# Patient Record
Sex: FEMALE | Race: WHITE | NOT HISPANIC OR LATINO | Employment: OTHER | URBAN - METROPOLITAN AREA
[De-identification: names, ages, dates, MRNs, and addresses within clinical notes are randomized per-mention and may not be internally consistent; named-entity substitution may affect disease eponyms.]

---

## 2017-02-22 DIAGNOSIS — E03.9 HYPOTHYROIDISM, UNSPECIFIED TYPE: ICD-10-CM

## 2017-02-22 RX ORDER — LEVOTHYROXINE SODIUM 88 UG/1
TABLET ORAL
Qty: 90 TABLET | Refills: 0 | Status: SHIPPED | OUTPATIENT
Start: 2017-02-22 | End: 2017-06-14 | Stop reason: SDUPTHER

## 2017-05-02 DIAGNOSIS — E03.9 HYPOTHYROIDISM, UNSPECIFIED TYPE: ICD-10-CM

## 2017-05-02 RX ORDER — LEVOTHYROXINE SODIUM 88 UG/1
TABLET ORAL
Qty: 90 TABLET | Refills: 0 | OUTPATIENT
Start: 2017-05-02

## 2017-05-09 DIAGNOSIS — K21.00 GASTROESOPHAGEAL REFLUX DISEASE WITH ESOPHAGITIS: ICD-10-CM

## 2017-05-09 DIAGNOSIS — E78.2 MIXED HYPERLIPIDEMIA: ICD-10-CM

## 2017-05-09 RX ORDER — ATORVASTATIN CALCIUM 10 MG/1
TABLET, FILM COATED ORAL
Qty: 90 TABLET | Refills: 1 | OUTPATIENT
Start: 2017-05-09

## 2017-05-09 RX ORDER — OMEPRAZOLE 20 MG/1
CAPSULE, DELAYED RELEASE ORAL
Qty: 90 CAPSULE | Refills: 1 | OUTPATIENT
Start: 2017-05-09

## 2017-05-25 DIAGNOSIS — E03.9 HYPOTHYROIDISM, ACQUIRED: ICD-10-CM

## 2017-05-25 DIAGNOSIS — E78.5 HYPERLIPIDEMIA, UNSPECIFIED HYPERLIPIDEMIA TYPE: Primary | ICD-10-CM

## 2017-05-25 DIAGNOSIS — R73.01 IMPAIRED FASTING GLUCOSE: ICD-10-CM

## 2017-06-10 LAB
ALBUMIN SERPL-MCNC: 4.2 G/DL (ref 3.5–4.8)
ALBUMIN/GLOB SERPL: 1.8 {RATIO} (ref 1.2–2.2)
ALP SERPL-CCNC: 77 IU/L (ref 39–117)
ALT SERPL-CCNC: 11 IU/L (ref 0–32)
AST SERPL-CCNC: 15 IU/L (ref 0–40)
BASOPHILS # BLD AUTO: 0 X10E3/UL (ref 0–0.2)
BASOPHILS NFR BLD AUTO: 0 %
BILIRUB SERPL-MCNC: 0.3 MG/DL (ref 0–1.2)
BUN SERPL-MCNC: 14 MG/DL (ref 8–27)
BUN/CREAT SERPL: 20 (ref 12–28)
CALCIUM SERPL-MCNC: 9.5 MG/DL (ref 8.7–10.3)
CHLORIDE SERPL-SCNC: 101 MMOL/L (ref 96–106)
CHOLEST SERPL-MCNC: 178 MG/DL (ref 100–199)
CO2 SERPL-SCNC: 27 MMOL/L (ref 18–29)
CREAT SERPL-MCNC: 0.7 MG/DL (ref 0.57–1)
EOSINOPHIL # BLD AUTO: 0.2 X10E3/UL (ref 0–0.4)
EOSINOPHIL NFR BLD AUTO: 3 %
ERYTHROCYTE [DISTWIDTH] IN BLOOD BY AUTOMATED COUNT: 14.1 % (ref 12.3–15.4)
GLOBULIN SER CALC-MCNC: 2.4 G/DL (ref 1.5–4.5)
GLUCOSE SERPL-MCNC: 101 MG/DL (ref 65–99)
HBA1C MFR BLD: 5.6 % (ref 4.8–5.6)
HCT VFR BLD AUTO: 38.8 % (ref 34–46.6)
HDLC SERPL-MCNC: 45 MG/DL
HGB BLD-MCNC: 12.3 G/DL (ref 11.1–15.9)
IMM GRANULOCYTES # BLD: 0 X10E3/UL (ref 0–0.1)
IMM GRANULOCYTES NFR BLD: 0 %
LDLC SERPL CALC-MCNC: 97 MG/DL (ref 0–99)
LDLC/HDLC SERPL: 2.2 RATIO UNITS (ref 0–3.2)
LYMPHOCYTES # BLD AUTO: 1.3 X10E3/UL (ref 0.7–3.1)
LYMPHOCYTES NFR BLD AUTO: 18 %
MCH RBC QN AUTO: 27 PG (ref 26.6–33)
MCHC RBC AUTO-ENTMCNC: 31.7 G/DL (ref 31.5–35.7)
MCV RBC AUTO: 85 FL (ref 79–97)
MONOCYTES # BLD AUTO: 0.4 X10E3/UL (ref 0.1–0.9)
MONOCYTES NFR BLD AUTO: 6 %
NEUTROPHILS # BLD AUTO: 5.2 X10E3/UL (ref 1.4–7)
NEUTROPHILS NFR BLD AUTO: 73 %
PLATELET # BLD AUTO: 195 X10E3/UL (ref 150–379)
POTASSIUM SERPL-SCNC: 4.3 MMOL/L (ref 3.5–5.2)
PROT SERPL-MCNC: 6.6 G/DL (ref 6–8.5)
RBC # BLD AUTO: 4.56 X10E6/UL (ref 3.77–5.28)
SODIUM SERPL-SCNC: 145 MMOL/L (ref 134–144)
T4 FREE SERPL-MCNC: 0.81 NG/DL (ref 0.82–1.77)
TRIGL SERPL-MCNC: 182 MG/DL (ref 0–149)
TSH SERPL DL<=0.005 MIU/L-ACNC: 1.13 UIU/ML (ref 0.45–4.5)
VLDLC SERPL CALC-MCNC: 36 MG/DL (ref 5–40)
WBC # BLD AUTO: 7.1 X10E3/UL (ref 3.4–10.8)

## 2017-06-14 ENCOUNTER — OFFICE VISIT (OUTPATIENT)
Dept: FAMILY MEDICINE CLINIC | Facility: CLINIC | Age: 77
End: 2017-06-14

## 2017-06-14 VITALS
RESPIRATION RATE: 16 BRPM | TEMPERATURE: 97.7 F | HEART RATE: 88 BPM | DIASTOLIC BLOOD PRESSURE: 69 MMHG | WEIGHT: 181 LBS | BODY MASS INDEX: 32.07 KG/M2 | SYSTOLIC BLOOD PRESSURE: 124 MMHG | HEIGHT: 63 IN

## 2017-06-14 DIAGNOSIS — E78.2 MIXED HYPERLIPIDEMIA: ICD-10-CM

## 2017-06-14 DIAGNOSIS — K21.00 GASTROESOPHAGEAL REFLUX DISEASE WITH ESOPHAGITIS: ICD-10-CM

## 2017-06-14 DIAGNOSIS — F33.42 MAJOR DEPRESSIVE DISORDER, RECURRENT EPISODE, IN FULL REMISSION (HCC): ICD-10-CM

## 2017-06-14 DIAGNOSIS — Z00.00 ANNUAL PHYSICAL EXAM: Primary | ICD-10-CM

## 2017-06-14 DIAGNOSIS — E03.9 HYPOTHYROIDISM, UNSPECIFIED TYPE: ICD-10-CM

## 2017-06-14 PROCEDURE — G0438 PPPS, INITIAL VISIT: HCPCS | Performed by: FAMILY MEDICINE

## 2017-06-14 PROCEDURE — 99214 OFFICE O/P EST MOD 30 MIN: CPT | Performed by: FAMILY MEDICINE

## 2017-06-14 PROCEDURE — 96160 PT-FOCUSED HLTH RISK ASSMT: CPT | Performed by: FAMILY MEDICINE

## 2017-06-14 RX ORDER — ATORVASTATIN CALCIUM 10 MG/1
10 TABLET, FILM COATED ORAL DAILY
Qty: 90 TABLET | Refills: 1 | Status: SHIPPED | OUTPATIENT
Start: 2017-06-14 | End: 2017-12-04 | Stop reason: SDUPTHER

## 2017-06-14 RX ORDER — OMEPRAZOLE 20 MG/1
20 CAPSULE, DELAYED RELEASE ORAL DAILY
Qty: 90 CAPSULE | Refills: 1 | Status: SHIPPED | OUTPATIENT
Start: 2017-06-14 | End: 2017-12-04 | Stop reason: SDUPTHER

## 2017-06-14 RX ORDER — LEVOTHYROXINE SODIUM 0.1 MG/1
100 TABLET ORAL DAILY
Qty: 90 TABLET | Refills: 1 | Status: SHIPPED | OUTPATIENT
Start: 2017-06-14 | End: 2017-12-04 | Stop reason: SDUPTHER

## 2017-06-14 NOTE — PROGRESS NOTES
QUICK REFERENCE INFORMATION:  The ABCs of the Annual Wellness Visit    Initial Medicare Wellness Visit    HEALTH RISK ASSESSMENT    1940    Recent Hospitalizations:  No hospitalization(s) within the last year..        Current Medical Providers:  Patient Care Team:  Enmanuel Berger MD as PCP - General (Family Medicine)        Smoking Status:  History   Smoking Status   • Former Smoker   Smokeless Tobacco   • Never Used       Alcohol Consumption:  History   Alcohol Use   • Yes     Comment: rare       Depression Screen:   PHQ-9 Depression Screening 6/14/2017   Little interest or pleasure in doing things 0   Feeling down, depressed, or hopeless 0   Trouble falling or staying asleep, or sleeping too much 1   Feeling tired or having little energy 1   Poor appetite or overeating 0   Feeling bad about yourself - or that you are a failure or have let yourself or your family down 0   Trouble concentrating on things, such as reading the newspaper or watching television 0   Moving or speaking so slowly that other people could have noticed. Or the opposite - being so fidgety or restless that you have been moving around a lot more than usual 0   Thoughts that you would be better off dead, or of hurting yourself in some way 0   PHQ-9 Total Score 2       Health Habits and Functional and Cognitive Screening:  Functional & Cognitive Status 6/14/2017   Do you have difficulty preparing food and eating? No   Do you have difficulty bathing yourself? No   Do you have difficulty getting dressed? No   Do you have difficulty using the toilet? No   Do you have difficulty moving around from place to place? No   In the past year have you fallen or experienced a near fall? No   Do you need help using the phone?  No   Are you deaf or do you have serious difficulty hearing?  No   Do you need help with transportation? No   Do you need help shopping? No   Do you need help preparing meals?  No   Do you need help with housework?  No   Do you need  help with laundry? No   Do you need help taking your medications? No   Do you need help managing money? No   Do you have difficulty concentrating, remembering or making decisions? No       Health Habits  Current Diet: Limited Junk Food  Dental Exam: Up to date  Eye Exam: Not up to date  Exercise (times per week): 0 times per week  Current Exercise Activities Include: None          Does the patient have evidence of cognitive impairment? No    Asiprin use counseling: Taking ASA appropriately as indicated      Recent Lab Results:    Visual Acuity:  No exam data present    Age-appropriate Screening Schedule:  Refer to the list below for future screening recommendations based on patient's age, sex and/or medical conditions. Orders for these recommended tests are listed in the plan section. The patient has been provided with a written plan.    Health Maintenance   Topic Date Due   • INFLUENZA VACCINE  08/01/2017   • LIPID PANEL  06/09/2018   • MAMMOGRAM  07/25/2018   • COLONOSCOPY  02/20/2022   • TDAP/TD VACCINES (2 - Td) 09/26/2022   • PNEUMOCOCCAL VACCINES (65+ LOW/MEDIUM RISK)  Completed   • ZOSTER VACCINE  Completed        Subjective   History of Present Illness    Madalyn Galeas is a 76 y.o. female who presents for an Annual Wellness Visit.    The following portions of the patient's history were reviewed and updated as appropriate: allergies, current medications, past family history, past medical history, past social history, past surgical history and problem list.    Outpatient Medications Prior to Visit   Medication Sig Dispense Refill   • atorvastatin (LIPITOR) 10 MG tablet Take 1 tablet by mouth Daily. 90 tablet 1   • levothyroxine (SYNTHROID, LEVOTHROID) 88 MCG tablet TAKE 1 TABLET EVERY DAY 90 tablet 0   • omeprazole (priLOSEC) 20 MG capsule Take 1 capsule by mouth Daily. 90 capsule 1   • sertraline (ZOLOFT) 50 MG tablet Take 1 tablet by mouth Daily. 90 tablet 1   • ADCIRCA 20 MG tablet      • aspirin 81 MG  "EC tablet      • desonide (DESOWEN) 0.05 % cream Apply topically 2 (two) times a day. Use BID prn up to 10 days at a time     • ESBRIET 267 MG capsule      • meclizine (ANTIVERT) 25 MG tablet Take 25 mg by mouth 3 (three) times a day as needed for dizziness.     • verapamil SR (CALAN-SR) 120 MG CR tablet   3     No facility-administered medications prior to visit.        Patient Active Problem List   Diagnosis   • CHF (congestive heart failure)   • Esophageal reflux   • Hyperlipidemia   • Hypothyroidism, acquired   • Major depressive disorder, recurrent episode, in full remission   • Pulmonary fibrosis   • Pulmonary hypertension   • Sleep apnea, obstructive   • Vertigo, peripheral   • Impaired fasting glucose   • Murmur, heart       Advance Care Planning:  has an advance directive - a copy HAS NOT been provided    Identification of Risk Factors:  Risk factors include: weight , cardiovascular risk and inactivity.    Review of Systems    Compared to one year ago, the patient feels her physical health is the same.  Compared to one year ago, the patient feels her mental health is the same.    Objective     Physical Exam    Vitals:    06/14/17 1102   BP: 124/69   Pulse: 88   Resp: 16   Temp: 97.7 °F (36.5 °C)   TempSrc: Oral   Weight: 181 lb (82.1 kg)   Height: 62.5\" (158.8 cm)   PainSc: 0-No pain       Body mass index is 32.58 kg/(m^2).  Discussed the patient's BMI with her. The BMI is above average; BMI management plan is completed.    Assessment/Plan   Patient Self-Management and Personalized Health Advice  The patient has been provided with information about: diet, exercise and weight management and preventive services including:   · Exercise counseling provided, Fall Risk assessment done, Nutrition counseling provided.    Visit Diagnoses:    ICD-10-CM ICD-9-CM   1. Annual physical exam Z00.00 V70.0   2. Major depressive disorder, recurrent episode, in full remission F33.42 296.36   3. Gastroesophageal reflux disease " with esophagitis K21.0 530.11   4. Mixed hyperlipidemia E78.2 272.2   5. Hypothyroidism, unspecified type E03.9 244.9       No orders of the defined types were placed in this encounter.      Outpatient Encounter Prescriptions as of 6/14/2017   Medication Sig Dispense Refill   • atorvastatin (LIPITOR) 10 MG tablet Take 1 tablet by mouth Daily. 90 tablet 1   • levothyroxine (SYNTHROID, LEVOTHROID) 100 MCG tablet Take 1 tablet by mouth Daily. 90 tablet 1   • omeprazole (priLOSEC) 20 MG capsule Take 1 capsule by mouth Daily. 90 capsule 1   • sertraline (ZOLOFT) 50 MG tablet Take 1 tablet by mouth Daily. 90 tablet 1   • [DISCONTINUED] atorvastatin (LIPITOR) 10 MG tablet Take 1 tablet by mouth Daily. 90 tablet 1   • [DISCONTINUED] levothyroxine (SYNTHROID, LEVOTHROID) 88 MCG tablet TAKE 1 TABLET EVERY DAY 90 tablet 0   • [DISCONTINUED] omeprazole (priLOSEC) 20 MG capsule Take 1 capsule by mouth Daily. 90 capsule 1   • [DISCONTINUED] sertraline (ZOLOFT) 50 MG tablet Take 1 tablet by mouth Daily. 90 tablet 1   • ADCIRCA 20 MG tablet      • aspirin 81 MG EC tablet      • desonide (DESOWEN) 0.05 % cream Apply topically 2 (two) times a day. Use BID prn up to 10 days at a time     • ESBRIET 267 MG capsule      • meclizine (ANTIVERT) 25 MG tablet Take 25 mg by mouth 3 (three) times a day as needed for dizziness.     • verapamil SR (CALAN-SR) 120 MG CR tablet   3     No facility-administered encounter medications on file as of 6/14/2017.        Reviewed use of high risk medication in the elderly: not applicable  Reviewed for potential of harmful drug interactions in the elderly: not applicable    Follow Up:  No Follow-up on file.     An After Visit Summary and PPPS with all of these plans were given to the patient.

## 2017-06-14 NOTE — PATIENT INSTRUCTIONS
Medicare Wellness  Personal Prevention Plan of Service     Date of Office Visit:  2017  Encounter Provider:  Enmanuel Berger MD  Place of Service:  [facility]  Patient Name: Madalyn Galeas  :  1940    As part of the Medicare Wellness portion of your visit today, we are providing you with this personalized preventive plan of services (PPPS). This plan is based upon recommendations of the [place] Preventive Services Task Force (USPSTF) and the Advisory Committee on Immunization Practices (ACIP).    This lists the preventive care services that should be considered, and provides dates of when you are due. Items listed as completed are up-to-date and do not require any further intervention.    Health Maintenance   Topic Date Due   • INFLUENZA VACCINE  2017   • LIPID PANEL  2018   • MEDICARE ANNUAL WELLNESS  2018   • MAMMOGRAM  2018   • COLONOSCOPY  2022   • TDAP/TD VACCINES (2 - Td) 2022   • PNEUMOCOCCAL VACCINES (65+ LOW/MEDIUM RISK)  Completed   • ZOSTER VACCINE  Completed       No orders of the defined types were placed in this encounter.      Return in about 6 months (around 2017) for Recheck.

## 2017-06-14 NOTE — PROGRESS NOTES
"Subjective   Madalyn Galeas is a 76 y.o. female.     History of Present Illness     Chief Complaint:   Chief Complaint   Patient presents with   • MEDICARE WELLNESS EXAM   • Hypothyroidism   • Anxiety   • Heartburn   • LAB RESULTS   • Hyperlipidemia       Madalyn Galeas 76 y.o. female who presents today for Medical Management of the below listed issues and medication refills.  she has a history of   Patient Active Problem List   Diagnosis   • CHF (congestive heart failure)   • Esophageal reflux   • Hyperlipidemia   • Hypothyroidism, acquired   • Major depressive disorder, recurrent episode, in full remission   • Pulmonary fibrosis   • Pulmonary hypertension   • Sleep apnea, obstructive   • Vertigo, peripheral   • Impaired fasting glucose   • Murmur, heart   .  Since the last visit, she has overall felt well.  she has been compliant with   Current Outpatient Prescriptions:   •  atorvastatin (LIPITOR) 10 MG tablet, Take 1 tablet by mouth Daily., Disp: 90 tablet, Rfl: 1  •  levothyroxine (SYNTHROID, LEVOTHROID) 100 MCG tablet, Take 1 tablet by mouth Daily., Disp: 90 tablet, Rfl: 1  •  omeprazole (priLOSEC) 20 MG capsule, Take 1 capsule by mouth Daily., Disp: 90 capsule, Rfl: 1  •  sertraline (ZOLOFT) 50 MG tablet, Take 1 tablet by mouth Daily., Disp: 90 tablet, Rfl: 1  •  ADCIRCA 20 MG tablet, , Disp: , Rfl:   •  aspirin 81 MG EC tablet, , Disp: , Rfl:   •  desonide (DESOWEN) 0.05 % cream, Apply topically 2 (two) times a day. Use BID prn up to 10 days at a time, Disp: , Rfl:   •  ESBRIET 267 MG capsule, , Disp: , Rfl:   •  meclizine (ANTIVERT) 25 MG tablet, Take 25 mg by mouth 3 (three) times a day as needed for dizziness., Disp: , Rfl:   •  verapamil SR (CALAN-SR) 120 MG CR tablet, , Disp: , Rfl: 3.  she denies medication side effects.    All of the chronic condition(s) listed above are stable w/o issues.    /69  Pulse 88  Temp 97.7 °F (36.5 °C) (Oral)   Resp 16  Ht 62.5\" (158.8 cm)  Wt 181 lb (82.1 " kg)  LMP  (LMP Unknown)  BMI 32.58 kg/m2    Results for orders placed or performed in visit on 05/25/17   Comprehensive metabolic panel   Result Value Ref Range    Glucose 101 (H) 65 - 99 mg/dL    BUN 14 8 - 27 mg/dL    Creatinine 0.70 0.57 - 1.00 mg/dL    eGFR Non African Am 85 >59 mL/min/1.73    eGFR African Am 98 >59 mL/min/1.73    BUN/Creatinine Ratio 20 12 - 28    Sodium 145 (H) 134 - 144 mmol/L    Potassium 4.3 3.5 - 5.2 mmol/L    Chloride 101 96 - 106 mmol/L    Total CO2 27 18 - 29 mmol/L    Calcium 9.5 8.7 - 10.3 mg/dL    Total Protein 6.6 6.0 - 8.5 g/dL    Albumin 4.2 3.5 - 4.8 g/dL    Globulin 2.4 1.5 - 4.5 g/dL    A/G Ratio 1.8 1.2 - 2.2    Total Bilirubin 0.3 0.0 - 1.2 mg/dL    Alkaline Phosphatase 77 39 - 117 IU/L    AST (SGOT) 15 0 - 40 IU/L    ALT (SGPT) 11 0 - 32 IU/L   Lipid Panel With LDL/HDL Ratio   Result Value Ref Range    Total Cholesterol 178 100 - 199 mg/dL    Triglycerides 182 (H) 0 - 149 mg/dL    HDL Cholesterol 45 >39 mg/dL    VLDL Cholesterol 36 5 - 40 mg/dL    LDL Cholesterol  97 0 - 99 mg/dL    LDL/HDL Ratio 2.2 0.0 - 3.2 ratio units   TSH   Result Value Ref Range    TSH 1.130 0.450 - 4.500 uIU/mL   T4, Free   Result Value Ref Range    Free T4 0.81 (L) 0.82 - 1.77 ng/dL   Hemoglobin A1c   Result Value Ref Range    Hemoglobin A1C 5.6 4.8 - 5.6 %   CBC and Differential   Result Value Ref Range    WBC 7.1 3.4 - 10.8 x10E3/uL    RBC 4.56 3.77 - 5.28 x10E6/uL    Hemoglobin 12.3 11.1 - 15.9 g/dL    Hematocrit 38.8 34.0 - 46.6 %    MCV 85 79 - 97 fL    MCH 27.0 26.6 - 33.0 pg    MCHC 31.7 31.5 - 35.7 g/dL    RDW 14.1 12.3 - 15.4 %    Platelets 195 150 - 379 x10E3/uL    Neutrophil Rel % 73 %    Lymphocyte Rel % 18 %    Monocyte Rel % 6 %    Eosinophil Rel % 3 %    Basophil Rel % 0 %    Neutrophils Absolute 5.2 1.4 - 7.0 x10E3/uL    Lymphocytes Absolute 1.3 0.7 - 3.1 x10E3/uL    Monocytes Absolute 0.4 0.1 - 0.9 x10E3/uL    Eosinophils Absolute 0.2 0.0 - 0.4 x10E3/uL    Basophils Absolute 0.0  0.0 - 0.2 x10E3/uL    Immature Granulocyte Rel % 0 %    Immature Grans Absolute 0.0 0.0 - 0.1 x10E3/uL         The following portions of the patient's history were reviewed and updated as appropriate: allergies, current medications, past family history, past medical history, past social history, past surgical history and problem list.    Review of Systems   Constitutional: Negative for activity change, chills, fatigue and fever.   Respiratory: Negative for cough and chest tightness.    Cardiovascular: Negative for chest pain and palpitations.   Gastrointestinal: Negative for abdominal pain and nausea.   Endocrine: Negative for cold intolerance.   Psychiatric/Behavioral: Negative for behavioral problems and dysphoric mood.       Objective   Physical Exam   Constitutional: She appears well-developed and well-nourished.   Neck: Neck supple. No thyromegaly present.   Cardiovascular: Normal rate and regular rhythm.    No murmur heard.  Pulmonary/Chest: Effort normal and breath sounds normal.   Abdominal: Bowel sounds are normal.   Psychiatric: She has a normal mood and affect. Her behavior is normal.   Nursing note and vitals reviewed.  Labs reviewed with pt today during visit. All questions answered.      Assessment/Plan   Madalyn was seen today for medicare wellness exam, hypothyroidism, anxiety, heartburn, lab results and hyperlipidemia.    Diagnoses and all orders for this visit:    Annual physical exam    Major depressive disorder, recurrent episode, in full remission  -     sertraline (ZOLOFT) 50 MG tablet; Take 1 tablet by mouth Daily.    Gastroesophageal reflux disease with esophagitis  -     omeprazole (priLOSEC) 20 MG capsule; Take 1 capsule by mouth Daily.    Mixed hyperlipidemia  -     atorvastatin (LIPITOR) 10 MG tablet; Take 1 tablet by mouth Daily.    Hypothyroidism, unspecified type  -     levothyroxine (SYNTHROID, LEVOTHROID) 100 MCG tablet; Take 1 tablet by mouth Daily.

## 2017-07-21 ENCOUNTER — TRANSCRIBE ORDERS (OUTPATIENT)
Dept: ADMINISTRATIVE | Facility: HOSPITAL | Age: 77
End: 2017-07-21

## 2017-07-21 DIAGNOSIS — Z12.31 VISIT FOR SCREENING MAMMOGRAM: Primary | ICD-10-CM

## 2017-08-03 ENCOUNTER — APPOINTMENT (OUTPATIENT)
Dept: MAMMOGRAPHY | Facility: HOSPITAL | Age: 77
End: 2017-08-03

## 2017-08-10 ENCOUNTER — HOSPITAL ENCOUNTER (OUTPATIENT)
Dept: MAMMOGRAPHY | Facility: HOSPITAL | Age: 77
Discharge: HOME | End: 2017-08-10
Admitting: FAMILY MEDICINE

## 2017-08-10 DIAGNOSIS — Z12.31 VISIT FOR SCREENING MAMMOGRAM: ICD-10-CM

## 2017-08-10 PROCEDURE — G0202 SCR MAMMO BI INCL CAD: HCPCS

## 2017-08-10 PROCEDURE — 77063 BREAST TOMOSYNTHESIS BI: CPT

## 2017-11-06 DIAGNOSIS — K21.00 GASTROESOPHAGEAL REFLUX DISEASE WITH ESOPHAGITIS: ICD-10-CM

## 2017-11-06 DIAGNOSIS — E78.2 MIXED HYPERLIPIDEMIA: ICD-10-CM

## 2017-11-06 DIAGNOSIS — E03.9 HYPOTHYROIDISM, UNSPECIFIED TYPE: ICD-10-CM

## 2017-11-07 RX ORDER — ATORVASTATIN CALCIUM 10 MG/1
TABLET, FILM COATED ORAL
Qty: 90 TABLET | Refills: 1 | OUTPATIENT
Start: 2017-11-07

## 2017-11-07 RX ORDER — OMEPRAZOLE 20 MG/1
CAPSULE, DELAYED RELEASE ORAL
Qty: 90 CAPSULE | Refills: 1 | OUTPATIENT
Start: 2017-11-07

## 2017-11-07 RX ORDER — LEVOTHYROXINE SODIUM 0.1 MG/1
TABLET ORAL
Qty: 90 TABLET | Refills: 1 | OUTPATIENT
Start: 2017-11-07

## 2017-11-15 ENCOUNTER — TELEPHONE (OUTPATIENT)
Dept: FAMILY MEDICINE CLINIC | Facility: CLINIC | Age: 77
End: 2017-11-15

## 2017-11-15 RX ORDER — DESONIDE 0.5 MG/G
CREAM TOPICAL 2 TIMES DAILY
Qty: 60 G | Refills: 1 | Status: SHIPPED | OUTPATIENT
Start: 2017-11-15 | End: 2019-12-11 | Stop reason: SDUPTHER

## 2017-11-27 LAB
ALBUMIN SERPL-MCNC: 4.5 G/DL (ref 3.5–5.2)
ALBUMIN/GLOB SERPL: 1.7 G/DL
ALP SERPL-CCNC: 81 U/L (ref 39–117)
ALT SERPL-CCNC: 11 U/L (ref 1–33)
AST SERPL-CCNC: 18 U/L (ref 1–32)
BILIRUB SERPL-MCNC: 0.3 MG/DL (ref 0.1–1.2)
BUN SERPL-MCNC: 17 MG/DL (ref 8–23)
BUN/CREAT SERPL: 22.1 (ref 7–25)
CALCIUM SERPL-MCNC: 10.1 MG/DL (ref 8.6–10.5)
CHLORIDE SERPL-SCNC: 101 MMOL/L (ref 98–107)
CHOLEST SERPL-MCNC: 194 MG/DL (ref 0–200)
CO2 SERPL-SCNC: 32 MMOL/L (ref 22–29)
CREAT SERPL-MCNC: 0.77 MG/DL (ref 0.57–1)
GFR SERPLBLD CREATININE-BSD FMLA CKD-EPI: 73 ML/MIN/1.73
GFR SERPLBLD CREATININE-BSD FMLA CKD-EPI: 88 ML/MIN/1.73
GLOBULIN SER CALC-MCNC: 2.7 GM/DL
GLUCOSE SERPL-MCNC: 107 MG/DL (ref 65–99)
HDLC SERPL-MCNC: 50 MG/DL (ref 40–60)
LDLC SERPL CALC-MCNC: 114 MG/DL (ref 0–100)
LDLC/HDLC SERPL: 2.28 {RATIO}
POTASSIUM SERPL-SCNC: 4.4 MMOL/L (ref 3.5–5.2)
PROT SERPL-MCNC: 7.2 G/DL (ref 6–8.5)
SODIUM SERPL-SCNC: 144 MMOL/L (ref 136–145)
T4 FREE SERPL-MCNC: 0.88 NG/DL (ref 0.93–1.7)
TRIGL SERPL-MCNC: 149 MG/DL (ref 0–150)
TSH SERPL DL<=0.005 MIU/L-ACNC: 0.85 MIU/ML (ref 0.27–4.2)
VLDLC SERPL CALC-MCNC: 29.8 MG/DL (ref 5–40)

## 2017-12-04 ENCOUNTER — OFFICE VISIT (OUTPATIENT)
Dept: FAMILY MEDICINE CLINIC | Facility: CLINIC | Age: 77
End: 2017-12-04

## 2017-12-04 VITALS
BODY MASS INDEX: 32.39 KG/M2 | SYSTOLIC BLOOD PRESSURE: 128 MMHG | DIASTOLIC BLOOD PRESSURE: 60 MMHG | WEIGHT: 176 LBS | HEIGHT: 62 IN | TEMPERATURE: 97.8 F | HEART RATE: 74 BPM | RESPIRATION RATE: 16 BRPM

## 2017-12-04 DIAGNOSIS — E78.2 MIXED HYPERLIPIDEMIA: ICD-10-CM

## 2017-12-04 DIAGNOSIS — R73.01 IMPAIRED FASTING GLUCOSE: Primary | ICD-10-CM

## 2017-12-04 DIAGNOSIS — E03.9 HYPOTHYROIDISM, UNSPECIFIED TYPE: ICD-10-CM

## 2017-12-04 DIAGNOSIS — K21.00 GASTROESOPHAGEAL REFLUX DISEASE WITH ESOPHAGITIS: ICD-10-CM

## 2017-12-04 PROCEDURE — 99214 OFFICE O/P EST MOD 30 MIN: CPT | Performed by: FAMILY MEDICINE

## 2017-12-04 RX ORDER — ATORVASTATIN CALCIUM 10 MG/1
10 TABLET, FILM COATED ORAL DAILY
Qty: 90 TABLET | Refills: 1 | Status: SHIPPED | OUTPATIENT
Start: 2017-12-04 | End: 2018-05-29 | Stop reason: SDUPTHER

## 2017-12-04 RX ORDER — OMEPRAZOLE 20 MG/1
20 CAPSULE, DELAYED RELEASE ORAL DAILY
Qty: 90 CAPSULE | Refills: 1 | Status: SHIPPED | OUTPATIENT
Start: 2017-12-04 | End: 2018-06-03 | Stop reason: SDUPTHER

## 2017-12-04 RX ORDER — LEVOTHYROXINE SODIUM 112 UG/1
112 TABLET ORAL DAILY
Qty: 90 TABLET | Refills: 1 | Status: SHIPPED | OUTPATIENT
Start: 2017-12-04 | End: 2018-06-03 | Stop reason: SDUPTHER

## 2017-12-04 NOTE — PROGRESS NOTES
"Subjective   Madalyn Galeas is a 77 y.o. female.     History of Present Illness     Chief Complaint:   Chief Complaint   Patient presents with   • Hypertension     med refill    • Hyperlipidemia   • Hypothyroidism       Madalyn Galeas 77 y.o. female who presents today for Medical Management of the below listed issues and medication refills.  she has a problem list of   Patient Active Problem List   Diagnosis   • CHF (congestive heart failure)   • Esophageal reflux   • Hyperlipidemia   • Hypothyroidism, acquired   • Major depressive disorder, recurrent episode, in full remission   • Pulmonary fibrosis   • Pulmonary hypertension   • Sleep apnea, obstructive   • Vertigo, peripheral   • Impaired fasting glucose   • Murmur, heart   .  Since the last visit, she has overall felt well.  she has been compliant with   Current Outpatient Prescriptions:   •  atorvastatin (LIPITOR) 10 MG tablet, Take 1 tablet by mouth Daily., Disp: 90 tablet, Rfl: 1  •  levothyroxine (SYNTHROID, LEVOTHROID) 112 MCG tablet, Take 1 tablet by mouth Daily., Disp: 90 tablet, Rfl: 1  •  omeprazole (priLOSEC) 20 MG capsule, Take 1 capsule by mouth Daily., Disp: 90 capsule, Rfl: 1  •  ADCIRCA 20 MG tablet, , Disp: , Rfl:   •  aspirin 81 MG EC tablet, , Disp: , Rfl:   •  desonide (DESOWEN) 0.05 % cream, Apply  topically 2 (Two) Times a Day. Use BID prn up to 10 days at a time, Disp: 60 g, Rfl: 1  •  ESBRIET 267 MG capsule, , Disp: , Rfl:   •  meclizine (ANTIVERT) 25 MG tablet, Take 25 mg by mouth 3 (three) times a day as needed for dizziness., Disp: , Rfl:   •  sertraline (ZOLOFT) 50 MG tablet, Take 1 tablet by mouth Daily., Disp: 90 tablet, Rfl: 1  •  verapamil SR (CALAN-SR) 120 MG CR tablet, , Disp: , Rfl: 3.  she denies medication side effects.    All of the chronic condition(s) listed above are stable w/o issues.    /60  Pulse 74  Temp 97.8 °F (36.6 °C) (Oral)   Resp 16  Ht 62\" (157.5 cm)  Wt 176 lb (79.8 kg)  LMP  (LMP Unknown)  " BMI 32.19 kg/m2    Results for orders placed or performed in visit on 11/27/17   Comprehensive Metabolic Panel   Result Value Ref Range    Glucose 107 (H) 65 - 99 mg/dL    BUN 17 8 - 23 mg/dL    Creatinine 0.77 0.57 - 1.00 mg/dL    eGFR Non African Am 73 >60 mL/min/1.73    eGFR African Am 88 >60 mL/min/1.73    BUN/Creatinine Ratio 22.1 7.0 - 25.0    Sodium 144 136 - 145 mmol/L    Potassium 4.4 3.5 - 5.2 mmol/L    Chloride 101 98 - 107 mmol/L    Total CO2 32.0 (H) 22.0 - 29.0 mmol/L    Calcium 10.1 8.6 - 10.5 mg/dL    Total Protein 7.2 6.0 - 8.5 g/dL    Albumin 4.50 3.50 - 5.20 g/dL    Globulin 2.7 gm/dL    A/G Ratio 1.7 g/dL    Total Bilirubin 0.3 0.1 - 1.2 mg/dL    Alkaline Phosphatase 81 39 - 117 U/L    AST (SGOT) 18 1 - 32 U/L    ALT (SGPT) 11 1 - 33 U/L   Lipid Panel With LDL / HDL Ratio   Result Value Ref Range    Total Cholesterol 194 0 - 200 mg/dL    Triglycerides 149 0 - 150 mg/dL    HDL Cholesterol 50 40 - 60 mg/dL    VLDL Cholesterol 29.8 5 - 40 mg/dL    LDL Cholesterol  114 (H) 0 - 100 mg/dL    LDL/HDL Ratio 2.28    T4, Free   Result Value Ref Range    Free T4 0.88 (L) 0.93 - 1.70 ng/dL   TSH   Result Value Ref Range    TSH 0.848 0.270 - 4.200 mIU/mL           The following portions of the patient's history were reviewed and updated as appropriate: allergies, current medications, past family history, past medical history, past social history, past surgical history and problem list.    Review of Systems   Constitutional: Negative for activity change, chills, fatigue and fever.   Respiratory: Negative for cough and chest tightness.    Cardiovascular: Negative for chest pain and palpitations.   Gastrointestinal: Negative for abdominal pain and nausea.   Endocrine: Negative for cold intolerance.   Psychiatric/Behavioral: Negative for behavioral problems and dysphoric mood.       Objective   Physical Exam   Constitutional: She appears well-developed and well-nourished.   Neck: Neck supple. No thyromegaly  present.   Cardiovascular: Normal rate and regular rhythm.    No murmur heard.  Pulmonary/Chest: Effort normal and breath sounds normal.   Abdominal: Bowel sounds are normal.   Psychiatric: She has a normal mood and affect. Her behavior is normal.   Nursing note and vitals reviewed.  Labs reviewed with pt today during visit. All questions answered.      Assessment/Plan   Madalyn was seen today for hypertension, hyperlipidemia and hypothyroidism.    Diagnoses and all orders for this visit:    Impaired fasting glucose  -     Basic Metabolic Panel; Future  -     Hemoglobin A1c; Future    Mixed hyperlipidemia  -     atorvastatin (LIPITOR) 10 MG tablet; Take 1 tablet by mouth Daily.    Gastroesophageal reflux disease with esophagitis  -     omeprazole (priLOSEC) 20 MG capsule; Take 1 capsule by mouth Daily.    Hypothyroidism, unspecified type  Comments:  unresponsive to Rx.  Orders:  -     levothyroxine (SYNTHROID, LEVOTHROID) 112 MCG tablet; Take 1 tablet by mouth Daily.  -     TSH; Future  -     T4, Free; Future      Diet/exercise to decrease glucose.

## 2018-04-20 DIAGNOSIS — F33.42 MAJOR DEPRESSIVE DISORDER, RECURRENT EPISODE, IN FULL REMISSION (HCC): ICD-10-CM

## 2018-05-04 ENCOUNTER — RESULTS ENCOUNTER (OUTPATIENT)
Dept: FAMILY MEDICINE CLINIC | Facility: CLINIC | Age: 78
End: 2018-05-04

## 2018-05-04 DIAGNOSIS — R73.01 IMPAIRED FASTING GLUCOSE: ICD-10-CM

## 2018-05-04 DIAGNOSIS — E03.9 HYPOTHYROIDISM, UNSPECIFIED TYPE: ICD-10-CM

## 2018-05-29 DIAGNOSIS — E78.2 MIXED HYPERLIPIDEMIA: ICD-10-CM

## 2018-05-29 RX ORDER — ATORVASTATIN CALCIUM 10 MG/1
TABLET, FILM COATED ORAL
Qty: 90 TABLET | Refills: 0 | Status: SHIPPED | OUTPATIENT
Start: 2018-05-29 | End: 2018-06-03 | Stop reason: SDUPTHER

## 2018-06-03 NOTE — PROGRESS NOTES
"Subjective   Madalyn Galeas is a 77 y.o. female.     History of Present Illness     Chief Complaint:   Chief Complaint   Patient presents with   • Hyperlipidemia     med refill    • Heartburn   • Depression       Madalyn Galeas 77 y.o. female who presents today for Medical Management of the below listed issues and medication refills.  she has a problem list of   Patient Active Problem List   Diagnosis   • CHF (congestive heart failure)   • Esophageal reflux   • Hyperlipidemia   • Hypothyroidism, acquired   • Major depressive disorder, recurrent episode, in full remission   • Pulmonary fibrosis   • Pulmonary hypertension   • Sleep apnea, obstructive   • Vertigo, peripheral   • Impaired fasting glucose   • Murmur, heart   .  Since the last visit, she has overall felt well.  she has been compliant with   Current Outpatient Prescriptions:   •  ADCIRCA 20 MG tablet, , Disp: , Rfl:   •  aspirin 81 MG EC tablet, , Disp: , Rfl:   •  atorvastatin (LIPITOR) 10 MG tablet, Take 1 tablet by mouth Daily., Disp: 90 tablet, Rfl: 1  •  desonide (DESOWEN) 0.05 % cream, Apply  topically 2 (Two) Times a Day. Use BID prn up to 10 days at a time, Disp: 60 g, Rfl: 1  •  ESBRIET 267 MG capsule, , Disp: , Rfl:   •  levothyroxine (SYNTHROID, LEVOTHROID) 112 MCG tablet, Take 1 tablet by mouth Daily., Disp: 90 tablet, Rfl: 1  •  meclizine (ANTIVERT) 25 MG tablet, Take 25 mg by mouth 3 (three) times a day as needed for dizziness., Disp: , Rfl:   •  omeprazole (priLOSEC) 20 MG capsule, Take 1 capsule by mouth Daily., Disp: 90 capsule, Rfl: 1  •  sertraline (ZOLOFT) 50 MG tablet, Take 1 tablet by mouth Daily., Disp: 90 tablet, Rfl: 1  •  verapamil SR (CALAN-SR) 120 MG CR tablet, , Disp: , Rfl: 3.  she denies medication side effects.    All of the chronic condition(s) listed above are stable w/o issues.    /78   Pulse 84   Temp 97.7 °F (36.5 °C) (Oral)   Resp 16   Ht 157.5 cm (62\")   Wt 76.7 kg (169 lb)   LMP  (LMP Unknown)   " BMI 30.91 kg/m²     Results for orders placed or performed in visit on 11/27/17   Comprehensive Metabolic Panel   Result Value Ref Range    Glucose 107 (H) 65 - 99 mg/dL    BUN 17 8 - 23 mg/dL    Creatinine 0.77 0.57 - 1.00 mg/dL    eGFR Non African Am 73 >60 mL/min/1.73    eGFR African Am 88 >60 mL/min/1.73    BUN/Creatinine Ratio 22.1 7.0 - 25.0    Sodium 144 136 - 145 mmol/L    Potassium 4.4 3.5 - 5.2 mmol/L    Chloride 101 98 - 107 mmol/L    Total CO2 32.0 (H) 22.0 - 29.0 mmol/L    Calcium 10.1 8.6 - 10.5 mg/dL    Total Protein 7.2 6.0 - 8.5 g/dL    Albumin 4.50 3.50 - 5.20 g/dL    Globulin 2.7 gm/dL    A/G Ratio 1.7 g/dL    Total Bilirubin 0.3 0.1 - 1.2 mg/dL    Alkaline Phosphatase 81 39 - 117 U/L    AST (SGOT) 18 1 - 32 U/L    ALT (SGPT) 11 1 - 33 U/L   Lipid Panel With LDL / HDL Ratio   Result Value Ref Range    Total Cholesterol 194 0 - 200 mg/dL    Triglycerides 149 0 - 150 mg/dL    HDL Cholesterol 50 40 - 60 mg/dL    VLDL Cholesterol 29.8 5 - 40 mg/dL    LDL Cholesterol  114 (H) 0 - 100 mg/dL    LDL/HDL Ratio 2.28    T4, Free   Result Value Ref Range    Free T4 0.88 (L) 0.93 - 1.70 ng/dL   TSH   Result Value Ref Range    TSH 0.848 0.270 - 4.200 mIU/mL           The following portions of the patient's history were reviewed and updated as appropriate: allergies, current medications, past family history, past medical history, past social history, past surgical history and problem list.    Review of Systems   Constitutional: Negative for activity change, chills, fatigue and fever.   Respiratory: Negative for cough and chest tightness.    Cardiovascular: Negative for chest pain and palpitations.   Gastrointestinal: Negative for abdominal pain and nausea.   Endocrine: Negative for cold intolerance.   Psychiatric/Behavioral: Negative for behavioral problems and dysphoric mood.       Objective   Physical Exam   Constitutional: She appears well-developed and well-nourished.   Neck: Neck supple. No thyromegaly  present.   Cardiovascular: Normal rate and regular rhythm.    No murmur heard.  Pulmonary/Chest: Effort normal and breath sounds normal.   Abdominal: Bowel sounds are normal. There is no tenderness.   Neurological: She is alert.   Psychiatric: She has a normal mood and affect. Her behavior is normal.   Nursing note and vitals reviewed.  Labs have been ordered and pt to complete.    Assessment/Plan   Madalyn was seen today for hyperlipidemia, heartburn and depression.    Diagnoses and all orders for this visit:    Mixed hyperlipidemia  -     atorvastatin (LIPITOR) 10 MG tablet; Take 1 tablet by mouth Daily.    Hypothyroidism, unspecified type  -     levothyroxine (SYNTHROID, LEVOTHROID) 112 MCG tablet; Take 1 tablet by mouth Daily.    Gastroesophageal reflux disease with esophagitis  -     omeprazole (priLOSEC) 20 MG capsule; Take 1 capsule by mouth Daily.    Major depressive disorder, recurrent episode, in full remission  -     sertraline (ZOLOFT) 50 MG tablet; Take 1 tablet by mouth Daily.    Impaired fasting glucose    Diet/exercise to Rx the glucose.

## 2018-06-04 ENCOUNTER — OFFICE VISIT (OUTPATIENT)
Dept: FAMILY MEDICINE CLINIC | Facility: CLINIC | Age: 78
End: 2018-06-04

## 2018-06-04 VITALS
TEMPERATURE: 97.7 F | HEART RATE: 84 BPM | RESPIRATION RATE: 16 BRPM | DIASTOLIC BLOOD PRESSURE: 78 MMHG | SYSTOLIC BLOOD PRESSURE: 143 MMHG | HEIGHT: 62 IN | BODY MASS INDEX: 31.1 KG/M2 | WEIGHT: 169 LBS

## 2018-06-04 DIAGNOSIS — E78.2 MIXED HYPERLIPIDEMIA: Primary | ICD-10-CM

## 2018-06-04 DIAGNOSIS — E03.9 HYPOTHYROIDISM, UNSPECIFIED TYPE: ICD-10-CM

## 2018-06-04 DIAGNOSIS — R73.01 IMPAIRED FASTING GLUCOSE: ICD-10-CM

## 2018-06-04 DIAGNOSIS — F33.42 MAJOR DEPRESSIVE DISORDER, RECURRENT EPISODE, IN FULL REMISSION (HCC): ICD-10-CM

## 2018-06-04 DIAGNOSIS — K21.00 GASTROESOPHAGEAL REFLUX DISEASE WITH ESOPHAGITIS: ICD-10-CM

## 2018-06-04 PROCEDURE — 99214 OFFICE O/P EST MOD 30 MIN: CPT | Performed by: FAMILY MEDICINE

## 2018-06-04 RX ORDER — ATORVASTATIN CALCIUM 10 MG/1
10 TABLET, FILM COATED ORAL DAILY
Qty: 90 TABLET | Refills: 1 | Status: SHIPPED | OUTPATIENT
Start: 2018-06-04 | End: 2018-12-04 | Stop reason: SDUPTHER

## 2018-06-04 RX ORDER — LEVOTHYROXINE SODIUM 112 UG/1
TABLET ORAL
Qty: 90 TABLET | Refills: 1 | Status: SHIPPED | OUTPATIENT
Start: 2018-06-04 | End: 2018-11-29 | Stop reason: SDUPTHER

## 2018-06-04 RX ORDER — OMEPRAZOLE 20 MG/1
20 CAPSULE, DELAYED RELEASE ORAL DAILY
Qty: 90 CAPSULE | Refills: 1 | Status: SHIPPED | OUTPATIENT
Start: 2018-06-04 | End: 2018-12-04 | Stop reason: SDUPTHER

## 2018-06-04 RX ORDER — LEVOTHYROXINE SODIUM 112 UG/1
112 TABLET ORAL DAILY
Qty: 90 TABLET | Refills: 1 | Status: SHIPPED | OUTPATIENT
Start: 2018-06-04 | End: 2018-06-04 | Stop reason: SDUPTHER

## 2018-06-05 LAB
BUN SERPL-MCNC: 15 MG/DL (ref 8–23)
BUN/CREAT SERPL: 17.6 (ref 7–25)
CALCIUM SERPL-MCNC: 10.3 MG/DL (ref 8.6–10.5)
CHLORIDE SERPL-SCNC: 101 MMOL/L (ref 98–107)
CO2 SERPL-SCNC: 35.5 MMOL/L (ref 22–29)
CREAT SERPL-MCNC: 0.85 MG/DL (ref 0.57–1)
GFR SERPLBLD CREATININE-BSD FMLA CKD-EPI: 65 ML/MIN/1.73
GFR SERPLBLD CREATININE-BSD FMLA CKD-EPI: 79 ML/MIN/1.73
GLUCOSE SERPL-MCNC: 109 MG/DL (ref 65–99)
HBA1C MFR BLD: 5.21 % (ref 4.8–5.6)
POTASSIUM SERPL-SCNC: 5 MMOL/L (ref 3.5–5.2)
SODIUM SERPL-SCNC: 145 MMOL/L (ref 136–145)
T4 FREE SERPL-MCNC: 1.15 NG/DL (ref 0.93–1.7)
TSH SERPL DL<=0.005 MIU/L-ACNC: 0.41 MIU/ML (ref 0.27–4.2)

## 2018-07-12 ENCOUNTER — TRANSCRIBE ORDERS (OUTPATIENT)
Dept: FAMILY MEDICINE CLINIC | Facility: CLINIC | Age: 78
End: 2018-07-12

## 2018-07-12 DIAGNOSIS — Z12.31 VISIT FOR SCREENING MAMMOGRAM: Primary | ICD-10-CM

## 2018-08-13 ENCOUNTER — HOSPITAL ENCOUNTER (OUTPATIENT)
Dept: MAMMOGRAPHY | Facility: HOSPITAL | Age: 78
Discharge: HOME | End: 2018-08-13
Admitting: FAMILY MEDICINE

## 2018-08-13 DIAGNOSIS — Z12.31 VISIT FOR SCREENING MAMMOGRAM: ICD-10-CM

## 2018-08-13 PROCEDURE — 77063 BREAST TOMOSYNTHESIS BI: CPT

## 2018-08-13 PROCEDURE — 77067 SCR MAMMO BI INCL CAD: CPT

## 2018-11-20 ENCOUNTER — TELEPHONE (OUTPATIENT)
Dept: FAMILY MEDICINE CLINIC | Facility: CLINIC | Age: 78
End: 2018-11-20

## 2018-11-20 DIAGNOSIS — I10 BENIGN ESSENTIAL HYPERTENSION: Primary | ICD-10-CM

## 2018-11-20 DIAGNOSIS — R73.01 IMPAIRED FASTING GLUCOSE: ICD-10-CM

## 2018-11-20 DIAGNOSIS — E78.5 HYPERLIPIDEMIA, UNSPECIFIED HYPERLIPIDEMIA TYPE: ICD-10-CM

## 2018-11-20 DIAGNOSIS — E03.9 HYPOTHYROIDISM, ACQUIRED: ICD-10-CM

## 2018-11-28 LAB
ALBUMIN SERPL-MCNC: 3.8 G/DL (ref 3.5–5.2)
ALBUMIN/GLOB SERPL: 1.3 G/DL
ALP SERPL-CCNC: 76 U/L (ref 39–117)
ALT SERPL-CCNC: 8 U/L (ref 1–33)
AST SERPL-CCNC: 14 U/L (ref 1–32)
BASOPHILS # BLD AUTO: 0.02 10*3/MM3 (ref 0–0.2)
BASOPHILS NFR BLD AUTO: 0.3 % (ref 0–1.5)
BILIRUB SERPL-MCNC: 0.3 MG/DL (ref 0.1–1.2)
BUN SERPL-MCNC: 17 MG/DL (ref 8–23)
BUN/CREAT SERPL: 26.6 (ref 7–25)
CALCIUM SERPL-MCNC: 9.9 MG/DL (ref 8.6–10.5)
CHLORIDE SERPL-SCNC: 103 MMOL/L (ref 98–107)
CHOLEST SERPL-MCNC: 162 MG/DL (ref 0–200)
CO2 SERPL-SCNC: 31.1 MMOL/L (ref 22–29)
CREAT SERPL-MCNC: 0.64 MG/DL (ref 0.57–1)
EOSINOPHIL # BLD AUTO: 0.15 10*3/MM3 (ref 0–0.7)
EOSINOPHIL NFR BLD AUTO: 2.4 % (ref 0.3–6.2)
ERYTHROCYTE [DISTWIDTH] IN BLOOD BY AUTOMATED COUNT: 13.1 % (ref 11.7–13)
GLOBULIN SER CALC-MCNC: 3 GM/DL
GLUCOSE SERPL-MCNC: 103 MG/DL (ref 65–99)
HBA1C MFR BLD: 5.3 % (ref 4.8–5.6)
HCT VFR BLD AUTO: 39.6 % (ref 35.6–45.5)
HDLC SERPL-MCNC: 44 MG/DL (ref 40–60)
HGB BLD-MCNC: 12 G/DL (ref 11.9–15.5)
IMM GRANULOCYTES # BLD: 0 10*3/MM3 (ref 0–0.03)
IMM GRANULOCYTES NFR BLD: 0 % (ref 0–0.5)
LDLC SERPL CALC-MCNC: 94 MG/DL (ref 0–100)
LDLC/HDLC SERPL: 2.13 {RATIO}
LYMPHOCYTES # BLD AUTO: 1.05 10*3/MM3 (ref 0.9–4.8)
LYMPHOCYTES NFR BLD AUTO: 16.9 % (ref 19.6–45.3)
MCH RBC QN AUTO: 26.7 PG (ref 26.9–32)
MCHC RBC AUTO-ENTMCNC: 30.3 G/DL (ref 32.4–36.3)
MCV RBC AUTO: 88 FL (ref 80.5–98.2)
MONOCYTES # BLD AUTO: 0.53 10*3/MM3 (ref 0.2–1.2)
MONOCYTES NFR BLD AUTO: 8.5 % (ref 5–12)
NEUTROPHILS # BLD AUTO: 4.46 10*3/MM3 (ref 1.9–8.1)
NEUTROPHILS NFR BLD AUTO: 71.9 % (ref 42.7–76)
PLATELET # BLD AUTO: 185 10*3/MM3 (ref 140–500)
POTASSIUM SERPL-SCNC: 5.1 MMOL/L (ref 3.5–5.2)
PROT SERPL-MCNC: 6.8 G/DL (ref 6–8.5)
RBC # BLD AUTO: 4.5 10*6/MM3 (ref 3.9–5.2)
SODIUM SERPL-SCNC: 144 MMOL/L (ref 136–145)
T4 FREE SERPL-MCNC: 1.09 NG/DL (ref 0.93–1.7)
TRIGL SERPL-MCNC: 121 MG/DL (ref 0–150)
TSH SERPL DL<=0.005 MIU/L-ACNC: 0.18 MIU/ML (ref 0.27–4.2)
VLDLC SERPL CALC-MCNC: 24.2 MG/DL (ref 5–40)
WBC # BLD AUTO: 6.21 10*3/MM3 (ref 4.5–10.7)

## 2018-11-29 DIAGNOSIS — E03.9 HYPOTHYROIDISM, UNSPECIFIED TYPE: ICD-10-CM

## 2018-11-29 RX ORDER — LEVOTHYROXINE SODIUM 112 UG/1
TABLET ORAL
Qty: 90 TABLET | Refills: 0 | Status: SHIPPED | OUTPATIENT
Start: 2018-11-29 | End: 2018-12-04 | Stop reason: SDUPTHER

## 2018-12-04 ENCOUNTER — OFFICE VISIT (OUTPATIENT)
Dept: FAMILY MEDICINE CLINIC | Facility: CLINIC | Age: 78
End: 2018-12-04

## 2018-12-04 VITALS
TEMPERATURE: 98.1 F | DIASTOLIC BLOOD PRESSURE: 73 MMHG | HEIGHT: 62 IN | RESPIRATION RATE: 16 BRPM | BODY MASS INDEX: 31.83 KG/M2 | HEART RATE: 74 BPM | WEIGHT: 173 LBS | SYSTOLIC BLOOD PRESSURE: 145 MMHG

## 2018-12-04 DIAGNOSIS — E78.2 MIXED HYPERLIPIDEMIA: ICD-10-CM

## 2018-12-04 DIAGNOSIS — K21.00 GASTROESOPHAGEAL REFLUX DISEASE WITH ESOPHAGITIS: ICD-10-CM

## 2018-12-04 DIAGNOSIS — E03.9 HYPOTHYROIDISM, UNSPECIFIED TYPE: ICD-10-CM

## 2018-12-04 DIAGNOSIS — F33.42 MAJOR DEPRESSIVE DISORDER, RECURRENT EPISODE, IN FULL REMISSION (HCC): ICD-10-CM

## 2018-12-04 DIAGNOSIS — Z00.00 MEDICARE ANNUAL WELLNESS VISIT, SUBSEQUENT: Primary | ICD-10-CM

## 2018-12-04 PROCEDURE — G0439 PPPS, SUBSEQ VISIT: HCPCS | Performed by: FAMILY MEDICINE

## 2018-12-04 PROCEDURE — 99214 OFFICE O/P EST MOD 30 MIN: CPT | Performed by: FAMILY MEDICINE

## 2018-12-04 PROCEDURE — 96160 PT-FOCUSED HLTH RISK ASSMT: CPT | Performed by: FAMILY MEDICINE

## 2018-12-04 RX ORDER — OMEPRAZOLE 20 MG/1
20 CAPSULE, DELAYED RELEASE ORAL DAILY
Qty: 90 CAPSULE | Refills: 1 | Status: SHIPPED | OUTPATIENT
Start: 2018-12-04 | End: 2019-06-18 | Stop reason: SDUPTHER

## 2018-12-04 RX ORDER — LEVOTHYROXINE SODIUM 112 UG/1
112 TABLET ORAL DAILY
Qty: 90 TABLET | Refills: 1 | Status: SHIPPED | OUTPATIENT
Start: 2018-12-04 | End: 2019-06-18 | Stop reason: SDUPTHER

## 2018-12-04 RX ORDER — ATORVASTATIN CALCIUM 10 MG/1
10 TABLET, FILM COATED ORAL DAILY
Qty: 90 TABLET | Refills: 1 | Status: SHIPPED | OUTPATIENT
Start: 2018-12-04 | End: 2019-06-18 | Stop reason: SDUPTHER

## 2018-12-04 NOTE — PROGRESS NOTES
QUICK REFERENCE INFORMATION:  The ABCs of the Annual Wellness Visit    Subsequent Medicare Wellness Visit    HEALTH RISK ASSESSMENT    1940    Recent Hospitalizations:  No hospitalization(s) within the last year..        Current Medical Providers:  Patient Care Team:  Enmanuel Berger MD as PCP - General (Family Medicine)  Zita Guy MD as Consulting Physician (Pulmonary Disease)        Smoking Status:  Social History     Tobacco Use   Smoking Status Former Smoker   Smokeless Tobacco Never Used       Alcohol Consumption:  Social History     Substance and Sexual Activity   Alcohol Use Yes    Comment: rare       Depression Screen:   PHQ-2/PHQ-9 Depression Screening 12/4/2018   Little interest or pleasure in doing things 0   Feeling down, depressed, or hopeless 0   Trouble falling or staying asleep, or sleeping too much -   Feeling tired or having little energy -   Poor appetite or overeating -   Feeling bad about yourself - or that you are a failure or have let yourself or your family down -   Trouble concentrating on things, such as reading the newspaper or watching television -   Moving or speaking so slowly that other people could have noticed. Or the opposite - being so fidgety or restless that you have been moving around a lot more than usual -   Thoughts that you would be better off dead, or of hurting yourself in some way -   Total Score 0       Health Habits and Functional and Cognitive Screening:  Functional & Cognitive Status 12/4/2018   Do you have difficulty preparing food and eating? No   Do you have difficulty bathing yourself, getting dressed or grooming yourself? No   Do you have difficulty using the toilet? No   Do you have difficulty moving around from place to place? No   Do you have trouble with steps or getting out of a bed or a chair? No   In the past year have you fallen or experienced a near fall? No   Current Diet Well Balanced Diet   Dental Exam Up to date   Eye Exam Up to date    Exercise (times per week) 0 times per week   Current Exercise Activities Include None   Do you need help using the phone?  No   Are you deaf or do you have serious difficulty hearing?  No   Do you need help with transportation? No   Do you need help shopping? No   Do you need help preparing meals?  No   Do you need help with housework?  No   Do you need help with laundry? No   Do you need help taking your medications? No   Do you need help managing money? No   Do you ever drive or ride in a car without wearing a seat belt? No   Have you felt unusual stress, anger or loneliness in the last month? No   Who do you live with? Alone   If you need help, do you have trouble finding someone available to you? Yes   Have you been bothered in the last four weeks by sexual problems? No   Do you have difficulty concentrating, remembering or making decisions? No           Does the patient have evidence of cognitive impairment? No    Aspirin use counseling: Taking ASA appropriately as indicated      Recent Lab Results:  CMP:  Lab Results   Component Value Date     (H) 11/28/2018    BUN 17 11/28/2018    CREATININE 0.64 11/28/2018    EGFRIFNONA 90 11/28/2018    EGFRIFAFRI 109 11/28/2018    BCR 26.6 (H) 11/28/2018     11/28/2018    K 5.1 11/28/2018    CO2 31.1 (H) 11/28/2018    CALCIUM 9.9 11/28/2018    PROTENTOTREF 6.8 11/28/2018    ALBUMIN 3.80 11/28/2018    LABGLOBREF 3.0 11/28/2018    LABIL2 1.3 11/28/2018    BILITOT 0.3 11/28/2018    ALKPHOS 76 11/28/2018    AST 14 11/28/2018    ALT 8 11/28/2018     Lipid Panel:  Lab Results   Component Value Date    TRIG 121 11/28/2018    HDL 44 11/28/2018    VLDL 24.2 11/28/2018    LDLHDL 2.13 11/28/2018     HbA1c:  Lab Results   Component Value Date    HGBA1C 5.30 11/28/2018       Visual Acuity:  No exam data present    Age-appropriate Screening Schedule:  Refer to the list below for future screening recommendations based on patient's age, sex and/or medical conditions. Orders  for these recommended tests are listed in the plan section. The patient has been provided with a written plan.    Health Maintenance   Topic Date Due   • ZOSTER VACCINE (2 of 3) 06/04/2019 (Originally 9/20/2012)   • LIPID PANEL  11/28/2019   • MAMMOGRAM  08/13/2020   • COLONOSCOPY  02/20/2022   • TDAP/TD VACCINES (2 - Td) 09/26/2022   • INFLUENZA VACCINE  Completed   • PNEUMOCOCCAL VACCINES (65+ LOW/MEDIUM RISK)  Completed        Subjective   History of Present Illness    Madalyn Galeas is a 78 y.o. female who presents for an Subsequent Wellness Visit.    The following portions of the patient's history were reviewed and updated as appropriate: allergies, current medications, past family history, past medical history, past social history, past surgical history and problem list.    Outpatient Medications Prior to Visit   Medication Sig Dispense Refill   • atorvastatin (LIPITOR) 10 MG tablet Take 1 tablet by mouth Daily. 90 tablet 1   • levothyroxine (SYNTHROID, LEVOTHROID) 112 MCG tablet TAKE 1 TABLET EVERY DAY 90 tablet 0   • omeprazole (priLOSEC) 20 MG capsule Take 1 capsule by mouth Daily. 90 capsule 1   • ADCIRCA 20 MG tablet      • aspirin 81 MG EC tablet      • desonide (DESOWEN) 0.05 % cream Apply  topically 2 (Two) Times a Day. Use BID prn up to 10 days at a time 60 g 1   • ESBRIET 267 MG capsule      • meclizine (ANTIVERT) 25 MG tablet Take 25 mg by mouth 3 (three) times a day as needed for dizziness.     • verapamil SR (CALAN-SR) 120 MG CR tablet   3   • sertraline (ZOLOFT) 50 MG tablet Take 1 tablet by mouth Daily. 90 tablet 1     No facility-administered medications prior to visit.        Patient Active Problem List   Diagnosis   • CHF (congestive heart failure) (CMS/HCC)   • Esophageal reflux   • Hyperlipidemia   • Hypothyroidism   • Major depressive disorder, recurrent episode, in full remission (CMS/HCC)   • Pulmonary fibrosis (CMS/HCC)   • Pulmonary hypertension (CMS/HCC)   • Sleep apnea,  "obstructive   • Vertigo, peripheral   • Impaired fasting glucose   • Murmur, heart   • Benign essential hypertension       Advance Care Planning:  has an advance directive - a copy HAS NOT been provided    Identification of Risk Factors:  Risk factors include: cardiovascular risk and pulmonary disease.    Review of Systems    Compared to one year ago, the patient feels her physical health is the same.  Compared to one year ago, the patient feels her mental health is the same.    Objective     Physical Exam    Vitals:    12/04/18 1007   BP: 145/73   Pulse: 74   Resp: 16   Temp: 98.1 °F (36.7 °C)   TempSrc: Oral   Weight: 78.5 kg (173 lb)   Height: 157.5 cm (62\")   PainSc: 0-No pain       Patient's Body mass index is 31.64 kg/m². BMI is above normal parameters. Recommendations include: exercise counseling and nutrition counseling.      Assessment/Plan   Patient Self-Management and Personalized Health Advice  The patient has been provided with information about: diet, exercise and weight management and preventive services including:   · Exercise counseling provided, Fall Risk assessment done, Nutrition counseling provided.    Visit Diagnoses:    ICD-10-CM ICD-9-CM   1. Medicare annual wellness visit, subsequent Z00.00 V70.0   2. Mixed hyperlipidemia E78.2 272.2   3. Hypothyroidism, unspecified type E03.9 244.9   4. Gastroesophageal reflux disease with esophagitis K21.0 530.11   5. Major depressive disorder, recurrent episode, in full remission (CMS/[facility]) F33.42 296.36       No orders of the defined types were placed in this encounter.      Outpatient Encounter Medications as of 12/4/2018   Medication Sig Dispense Refill   • [DISCONTINUED] atorvastatin (LIPITOR) 10 MG tablet Take 1 tablet by mouth Daily. 90 tablet 1   • [DISCONTINUED] levothyroxine (SYNTHROID, LEVOTHROID) 112 MCG tablet TAKE 1 TABLET EVERY DAY 90 tablet 0   • [DISCONTINUED] omeprazole (priLOSEC) 20 MG capsule Take 1 capsule by mouth Daily. 90 capsule 1 "   • ADCIRCA 20 MG tablet      • aspirin 81 MG EC tablet      • atorvastatin (LIPITOR) 10 MG tablet Take 1 tablet by mouth Daily. 90 tablet 1   • desonide (DESOWEN) 0.05 % cream Apply  topically 2 (Two) Times a Day. Use BID prn up to 10 days at a time 60 g 1   • ESBRIET 267 MG capsule      • levothyroxine (SYNTHROID, LEVOTHROID) 112 MCG tablet Take 1 tablet by mouth Daily. 90 tablet 1   • meclizine (ANTIVERT) 25 MG tablet Take 25 mg by mouth 3 (three) times a day as needed for dizziness.     • omeprazole (priLOSEC) 20 MG capsule Take 1 capsule by mouth Daily. 90 capsule 1   • sertraline (ZOLOFT) 50 MG tablet Take 1 tablet by mouth Daily. 90 tablet 1   • verapamil SR (CALAN-SR) 120 MG CR tablet   3   • [DISCONTINUED] sertraline (ZOLOFT) 50 MG tablet Take 1 tablet by mouth Daily. 90 tablet 1     No facility-administered encounter medications on file as of 12/4/2018.        Reviewed use of high risk medication in the elderly: not applicable  Reviewed for potential of harmful drug interactions in the elderly: not applicable    Follow Up:  No Follow-up on file.     An After Visit Summary and PPPS with all of these plans were given to the patient.

## 2018-12-04 NOTE — PROGRESS NOTES
Subjective   Madalyn Galeas is a 78 y.o. female.     History of Present Illness     Chief Complaint:   Chief Complaint   Patient presents with   • medicare wellness   • Hypothyroidism     med refill  - lab results - meds reviewed with pt    • Hyperlipidemia   • Heartburn       Madalyn Galeas 78 y.o. female who presents today for Medical Management of the below listed issues and medication refills.  she has a problem list of   Patient Active Problem List   Diagnosis   • CHF (congestive heart failure) (CMS/[facility])   • Esophageal reflux   • Hyperlipidemia   • Hypothyroidism   • Major depressive disorder, recurrent episode, in full remission (CMS/[facility])   • Pulmonary fibrosis (CMS/[facility])   • Pulmonary hypertension (CMS/[facility])   • Sleep apnea, obstructive   • Vertigo, peripheral   • Impaired fasting glucose   • Murmur, heart   • Benign essential hypertension   .  Since the last visit, she has overall felt well.  she has been compliant with   Current Outpatient Medications:   •  ADCIRCA 20 MG tablet, , Disp: , Rfl:   •  aspirin 81 MG EC tablet, , Disp: , Rfl:   •  atorvastatin (LIPITOR) 10 MG tablet, Take 1 tablet by mouth Daily., Disp: 90 tablet, Rfl: 1  •  desonide (DESOWEN) 0.05 % cream, Apply  topically 2 (Two) Times a Day. Use BID prn up to 10 days at a time, Disp: 60 g, Rfl: 1  •  ESBRIET 267 MG capsule, , Disp: , Rfl:   •  levothyroxine (SYNTHROID, LEVOTHROID) 112 MCG tablet, Take 1 tablet by mouth Daily., Disp: 90 tablet, Rfl: 1  •  meclizine (ANTIVERT) 25 MG tablet, Take 25 mg by mouth 3 (three) times a day as needed for dizziness., Disp: , Rfl:   •  omeprazole (priLOSEC) 20 MG capsule, Take 1 capsule by mouth Daily., Disp: 90 capsule, Rfl: 1  •  sertraline (ZOLOFT) 50 MG tablet, Take 1 tablet by mouth Daily., Disp: 90 tablet, Rfl: 1  •  verapamil SR (CALAN-SR) 120 MG CR tablet, , Disp: , Rfl: 3.  she denies medication side effects.    All of the chronic condition(s) listed above are stable w/o issues.    /73   " Pulse 74   Temp 98.1 °F (36.7 °C) (Oral)   Resp 16   Ht 157.5 cm (62\")   Wt 78.5 kg (173 lb)   LMP  (LMP Unknown)   BMI 31.64 kg/m²     Results for orders placed or performed in visit on 11/20/18   Comprehensive metabolic panel   Result Value Ref Range    Glucose 103 (H) 65 - 99 mg/dL    BUN 17 8 - 23 mg/dL    Creatinine 0.64 0.57 - 1.00 mg/dL    eGFR Non African Am 90 >60 mL/min/1.73    eGFR African Am 109 >60 mL/min/1.73    BUN/Creatinine Ratio 26.6 (H) 7.0 - 25.0    Sodium 144 136 - 145 mmol/L    Potassium 5.1 3.5 - 5.2 mmol/L    Chloride 103 98 - 107 mmol/L    Total CO2 31.1 (H) 22.0 - 29.0 mmol/L    Calcium 9.9 8.6 - 10.5 mg/dL    Total Protein 6.8 6.0 - 8.5 g/dL    Albumin 3.80 3.50 - 5.20 g/dL    Globulin 3.0 gm/dL    A/G Ratio 1.3 g/dL    Total Bilirubin 0.3 0.1 - 1.2 mg/dL    Alkaline Phosphatase 76 39 - 117 U/L    AST (SGOT) 14 1 - 32 U/L    ALT (SGPT) 8 1 - 33 U/L   Lipid Panel With LDL/HDL Ratio   Result Value Ref Range    Total Cholesterol 162 0 - 200 mg/dL    Triglycerides 121 0 - 150 mg/dL    HDL Cholesterol 44 40 - 60 mg/dL    VLDL Cholesterol 24.2 5 - 40 mg/dL    LDL Cholesterol  94 0 - 100 mg/dL    LDL/HDL Ratio 2.13    TSH   Result Value Ref Range    TSH 0.184 (L) 0.270 - 4.200 mIU/mL   T4, Free   Result Value Ref Range    Free T4 1.09 0.93 - 1.70 ng/dL   Hemoglobin A1c   Result Value Ref Range    Hemoglobin A1C 5.30 4.80 - 5.60 %   CBC and Differential   Result Value Ref Range    WBC 6.21 4.50 - 10.70 10*3/mm3    RBC 4.50 3.90 - 5.20 10*6/mm3    Hemoglobin 12.0 11.9 - 15.5 g/dL    Hematocrit 39.6 35.6 - 45.5 %    MCV 88.0 80.5 - 98.2 fL    MCH 26.7 (L) 26.9 - 32.0 pg    MCHC 30.3 (L) 32.4 - 36.3 g/dL    RDW 13.1 (H) 11.7 - 13.0 %    Platelets 185 140 - 500 10*3/mm3    Neutrophil Rel % 71.9 42.7 - 76.0 %    Lymphocyte Rel % 16.9 (L) 19.6 - 45.3 %    Monocyte Rel % 8.5 5.0 - 12.0 %    Eosinophil Rel % 2.4 0.3 - 6.2 %    Basophil Rel % 0.3 0.0 - 1.5 %    Neutrophils Absolute 4.46 1.90 - 8.10 " 10*3/mm3    Lymphocytes Absolute 1.05 0.90 - 4.80 10*3/mm3    Monocytes Absolute 0.53 0.20 - 1.20 10*3/mm3    Eosinophils Absolute 0.15 0.00 - 0.70 10*3/mm3    Basophils Absolute 0.02 0.00 - 0.20 10*3/mm3    Immature Granulocyte Rel % 0.0 0.0 - 0.5 %    Immature Grans Absolute 0.00 0.00 - 0.03 10*3/mm3           The following portions of the patient's history were reviewed and updated as appropriate: allergies, current medications, past family history, past medical history, past social history, past surgical history and problem list.    Review of Systems   Constitutional: Negative for activity change, chills, fatigue and fever.   Respiratory: Negative for cough and chest tightness.    Cardiovascular: Negative for chest pain and palpitations.   Gastrointestinal: Negative for abdominal pain and nausea.   Endocrine: Negative for cold intolerance.   Psychiatric/Behavioral: Negative for behavioral problems and dysphoric mood.       Objective   Physical Exam   Constitutional: She appears well-developed and well-nourished.   Neck: Neck supple. No thyromegaly present.   Cardiovascular: Normal rate and regular rhythm.   No murmur heard.  Pulmonary/Chest: Effort normal and breath sounds normal.   Abdominal: Bowel sounds are normal. There is no tenderness.   Neurological: She is alert.   Psychiatric: She has a normal mood and affect. Her behavior is normal.   Nursing note and vitals reviewed.  Labs reviewed with pt today during visit. All questions answered.      Assessment/Plan   Madalyn was seen today for medicare wellness, hypothyroidism, hyperlipidemia and heartburn.    Diagnoses and all orders for this visit:    Medicare annual wellness visit, subsequent    Mixed hyperlipidemia  -     atorvastatin (LIPITOR) 10 MG tablet; Take 1 tablet by mouth Daily.    Hypothyroidism, unspecified type  -     levothyroxine (SYNTHROID, LEVOTHROID) 112 MCG tablet; Take 1 tablet by mouth Daily.    Gastroesophageal reflux disease with  esophagitis  -     omeprazole (priLOSEC) 20 MG capsule; Take 1 capsule by mouth Daily.    Major depressive disorder, recurrent episode, in full remission (CMS/HCC)  -     sertraline (ZOLOFT) 50 MG tablet; Take 1 tablet by mouth Daily.

## 2019-06-14 ENCOUNTER — TELEPHONE (OUTPATIENT)
Dept: FAMILY MEDICINE CLINIC | Facility: CLINIC | Age: 79
End: 2019-06-14

## 2019-06-14 DIAGNOSIS — R73.01 IMPAIRED FASTING GLUCOSE: ICD-10-CM

## 2019-06-14 DIAGNOSIS — I10 BENIGN ESSENTIAL HYPERTENSION: Primary | ICD-10-CM

## 2019-06-14 DIAGNOSIS — E03.9 ACQUIRED HYPOTHYROIDISM: ICD-10-CM

## 2019-06-14 DIAGNOSIS — E78.5 HYPERLIPIDEMIA, UNSPECIFIED HYPERLIPIDEMIA TYPE: ICD-10-CM

## 2019-06-14 DIAGNOSIS — I50.9 CONGESTIVE HEART FAILURE, UNSPECIFIED HF CHRONICITY, UNSPECIFIED HEART FAILURE TYPE (HCC): ICD-10-CM

## 2019-06-14 LAB
ALBUMIN SERPL-MCNC: 4 G/DL (ref 3.5–5.2)
ALBUMIN/GLOB SERPL: 1.4 G/DL
ALP SERPL-CCNC: 79 U/L (ref 39–117)
ALT SERPL-CCNC: 11 U/L (ref 1–33)
AST SERPL-CCNC: 17 U/L (ref 1–32)
BASOPHILS # BLD AUTO: 0.03 10*3/MM3 (ref 0–0.2)
BASOPHILS NFR BLD AUTO: 0.6 % (ref 0–1.5)
BILIRUB SERPL-MCNC: 0.3 MG/DL (ref 0.2–1.2)
BUN SERPL-MCNC: 11 MG/DL (ref 8–23)
BUN/CREAT SERPL: 16.2 (ref 7–25)
CALCIUM SERPL-MCNC: 9.8 MG/DL (ref 8.6–10.5)
CHLORIDE SERPL-SCNC: 105 MMOL/L (ref 98–107)
CHOLEST SERPL-MCNC: 160 MG/DL (ref 0–200)
CO2 SERPL-SCNC: 33.2 MMOL/L (ref 22–29)
CREAT SERPL-MCNC: 0.68 MG/DL (ref 0.57–1)
EOSINOPHIL # BLD AUTO: 0.2 10*3/MM3 (ref 0–0.4)
EOSINOPHIL NFR BLD AUTO: 3.8 % (ref 0.3–6.2)
ERYTHROCYTE [DISTWIDTH] IN BLOOD BY AUTOMATED COUNT: 12.8 % (ref 12.3–15.4)
GLOBULIN SER CALC-MCNC: 2.8 GM/DL
GLUCOSE SERPL-MCNC: 99 MG/DL (ref 65–99)
HBA1C MFR BLD: 5 % (ref 4.8–5.6)
HCT VFR BLD AUTO: 39 % (ref 34–46.6)
HDLC SERPL-MCNC: 52 MG/DL (ref 40–60)
HGB BLD-MCNC: 12.1 G/DL (ref 12–15.9)
IMM GRANULOCYTES # BLD AUTO: 0.02 10*3/MM3 (ref 0–0.05)
IMM GRANULOCYTES NFR BLD AUTO: 0.4 % (ref 0–0.5)
LDLC SERPL CALC-MCNC: 88 MG/DL (ref 0–100)
LDLC/HDLC SERPL: 1.7 {RATIO}
LYMPHOCYTES # BLD AUTO: 0.99 10*3/MM3 (ref 0.7–3.1)
LYMPHOCYTES NFR BLD AUTO: 18.6 % (ref 19.6–45.3)
MCH RBC QN AUTO: 26.7 PG (ref 26.6–33)
MCHC RBC AUTO-ENTMCNC: 31 G/DL (ref 31.5–35.7)
MCV RBC AUTO: 85.9 FL (ref 79–97)
MONOCYTES # BLD AUTO: 0.41 10*3/MM3 (ref 0.1–0.9)
MONOCYTES NFR BLD AUTO: 7.7 % (ref 5–12)
NEUTROPHILS # BLD AUTO: 3.68 10*3/MM3 (ref 1.7–7)
NEUTROPHILS NFR BLD AUTO: 68.9 % (ref 42.7–76)
NRBC BLD AUTO-RTO: 0 /100 WBC (ref 0–0.2)
PLATELET # BLD AUTO: 203 10*3/MM3 (ref 140–450)
POTASSIUM SERPL-SCNC: 5.6 MMOL/L (ref 3.5–5.2)
PROT SERPL-MCNC: 6.8 G/DL (ref 6–8.5)
RBC # BLD AUTO: 4.54 10*6/MM3 (ref 3.77–5.28)
SODIUM SERPL-SCNC: 146 MMOL/L (ref 136–145)
T4 FREE SERPL-MCNC: 1.25 NG/DL (ref 0.93–1.7)
TRIGL SERPL-MCNC: 99 MG/DL (ref 0–150)
TSH SERPL DL<=0.005 MIU/L-ACNC: 0.14 MIU/ML (ref 0.27–4.2)
VLDLC SERPL CALC-MCNC: 19.8 MG/DL
WBC # BLD AUTO: 5.33 10*3/MM3 (ref 3.4–10.8)

## 2019-06-18 ENCOUNTER — OFFICE VISIT (OUTPATIENT)
Dept: FAMILY MEDICINE CLINIC | Facility: CLINIC | Age: 79
End: 2019-06-18

## 2019-06-18 VITALS
RESPIRATION RATE: 16 BRPM | BODY MASS INDEX: 31.28 KG/M2 | OXYGEN SATURATION: 100 % | SYSTOLIC BLOOD PRESSURE: 141 MMHG | HEART RATE: 76 BPM | WEIGHT: 170 LBS | DIASTOLIC BLOOD PRESSURE: 82 MMHG | TEMPERATURE: 98.1 F | HEIGHT: 62 IN

## 2019-06-18 DIAGNOSIS — K21.00 GASTROESOPHAGEAL REFLUX DISEASE WITH ESOPHAGITIS: ICD-10-CM

## 2019-06-18 DIAGNOSIS — E78.2 MIXED HYPERLIPIDEMIA: Primary | ICD-10-CM

## 2019-06-18 DIAGNOSIS — I27.20 PULMONARY HYPERTENSION (HCC): ICD-10-CM

## 2019-06-18 DIAGNOSIS — E03.9 HYPOTHYROIDISM, UNSPECIFIED TYPE: ICD-10-CM

## 2019-06-18 DIAGNOSIS — F33.42 MAJOR DEPRESSIVE DISORDER, RECURRENT EPISODE, IN FULL REMISSION (HCC): ICD-10-CM

## 2019-06-18 DIAGNOSIS — J84.10 PULMONARY FIBROSIS (HCC): ICD-10-CM

## 2019-06-18 PROCEDURE — 99214 OFFICE O/P EST MOD 30 MIN: CPT | Performed by: FAMILY MEDICINE

## 2019-06-18 RX ORDER — LEVOTHYROXINE SODIUM 112 UG/1
112 TABLET ORAL DAILY
Qty: 90 TABLET | Refills: 1 | Status: SHIPPED | OUTPATIENT
Start: 2019-06-18 | End: 2019-12-11

## 2019-06-18 RX ORDER — ATORVASTATIN CALCIUM 10 MG/1
10 TABLET, FILM COATED ORAL DAILY
Qty: 90 TABLET | Refills: 1 | Status: SHIPPED | OUTPATIENT
Start: 2019-06-18 | End: 2019-12-11 | Stop reason: SDUPTHER

## 2019-06-18 RX ORDER — OMEPRAZOLE 20 MG/1
20 CAPSULE, DELAYED RELEASE ORAL DAILY
Qty: 90 CAPSULE | Refills: 1 | Status: SHIPPED | OUTPATIENT
Start: 2019-06-18 | End: 2019-12-11 | Stop reason: SDUPTHER

## 2019-06-18 NOTE — PROGRESS NOTES
Subjective   Madalyn Galeas is a 78 y.o. female.     History of Present Illness     Chief Complaint:   Chief Complaint   Patient presents with   • Hyperlipidemia     med refill - lab results   • Hypothyroidism   • Anxiety   • handicapp parking form       Madalyn Galeas 78 y.o. female who presents today for Medical Management of the below listed issues and medication refills.  she has a problem list of   Patient Active Problem List   Diagnosis   • CHF (congestive heart failure) (CMS/HCC)   • Esophageal reflux   • Hyperlipidemia   • Hypothyroidism   • Major depressive disorder, recurrent episode, in full remission (CMS/HCC)   • Pulmonary fibrosis (CMS/HCC)   • Pulmonary hypertension (CMS/HCC)   • Sleep apnea, obstructive   • Vertigo, peripheral   • Impaired fasting glucose   • Murmur, heart   • Benign essential hypertension   .  Since the last visit, she has overall felt well.  she has been compliant with   Current Outpatient Medications:   •  atorvastatin (LIPITOR) 10 MG tablet, Take 1 tablet by mouth Daily., Disp: 90 tablet, Rfl: 1  •  levothyroxine (SYNTHROID, LEVOTHROID) 112 MCG tablet, Take 1 tablet by mouth Daily., Disp: 90 tablet, Rfl: 1  •  omeprazole (priLOSEC) 20 MG capsule, Take 1 capsule by mouth Daily., Disp: 90 capsule, Rfl: 1  •  sertraline (ZOLOFT) 50 MG tablet, Take 1 tablet by mouth Daily., Disp: 90 tablet, Rfl: 1  •  ADCIRCA 20 MG tablet, , Disp: , Rfl:   •  aspirin 81 MG EC tablet, , Disp: , Rfl:   •  desonide (DESOWEN) 0.05 % cream, Apply  topically 2 (Two) Times a Day. Use BID prn up to 10 days at a time, Disp: 60 g, Rfl: 1  •  ESBRIET 267 MG capsule, , Disp: , Rfl:   •  meclizine (ANTIVERT) 25 MG tablet, Take 25 mg by mouth 3 (three) times a day as needed for dizziness., Disp: , Rfl:   •  Selexipag (UPTRAVI) 600 MCG tablet, Take  by mouth., Disp: , Rfl:   •  verapamil SR (CALAN-SR) 120 MG CR tablet, , Disp: , Rfl: 3.  she denies medication side effects.    All of the chronic condition(s)  "listed above are stable w/o issues.    /82   Pulse 76   Temp 98.1 °F (36.7 °C) (Oral)   Resp 16   Ht 157.5 cm (62\")   Wt 77.1 kg (170 lb)   LMP  (LMP Unknown)   SpO2 100%   BMI 31.09 kg/m²     Results for orders placed or performed in visit on 06/14/19   Comprehensive metabolic panel   Result Value Ref Range    Glucose 99 65 - 99 mg/dL    BUN 11 8 - 23 mg/dL    Creatinine 0.68 0.57 - 1.00 mg/dL    eGFR Non African Am 84 >60 mL/min/1.73    eGFR African Am 102 >60 mL/min/1.73    BUN/Creatinine Ratio 16.2 7.0 - 25.0    Sodium 146 (H) 136 - 145 mmol/L    Potassium 5.6 (H) 3.5 - 5.2 mmol/L    Chloride 105 98 - 107 mmol/L    Total CO2 33.2 (H) 22.0 - 29.0 mmol/L    Calcium 9.8 8.6 - 10.5 mg/dL    Total Protein 6.8 6.0 - 8.5 g/dL    Albumin 4.00 3.50 - 5.20 g/dL    Globulin 2.8 gm/dL    A/G Ratio 1.4 g/dL    Total Bilirubin 0.3 0.2 - 1.2 mg/dL    Alkaline Phosphatase 79 39 - 117 U/L    AST (SGOT) 17 1 - 32 U/L    ALT (SGPT) 11 1 - 33 U/L   Lipid Panel With LDL/HDL Ratio   Result Value Ref Range    Total Cholesterol 160 0 - 200 mg/dL    Triglycerides 99 0 - 150 mg/dL    HDL Cholesterol 52 40 - 60 mg/dL    VLDL Cholesterol 19.8 mg/dL    LDL Cholesterol  88 0 - 100 mg/dL    LDL/HDL Ratio 1.70    TSH   Result Value Ref Range    TSH 0.135 (L) 0.270 - 4.200 mIU/mL   Hemoglobin A1c   Result Value Ref Range    Hemoglobin A1C 5.00 4.80 - 5.60 %   T4, Free   Result Value Ref Range    Free T4 1.25 0.93 - 1.70 ng/dL   CBC and Differential   Result Value Ref Range    WBC 5.33 3.40 - 10.80 10*3/mm3    RBC 4.54 3.77 - 5.28 10*6/mm3    Hemoglobin 12.1 12.0 - 15.9 g/dL    Hematocrit 39.0 34.0 - 46.6 %    MCV 85.9 79.0 - 97.0 fL    MCH 26.7 26.6 - 33.0 pg    MCHC 31.0 (L) 31.5 - 35.7 g/dL    RDW 12.8 12.3 - 15.4 %    Platelets 203 140 - 450 10*3/mm3    Neutrophil Rel % 68.9 42.7 - 76.0 %    Lymphocyte Rel % 18.6 (L) 19.6 - 45.3 %    Monocyte Rel % 7.7 5.0 - 12.0 %    Eosinophil Rel % 3.8 0.3 - 6.2 %    Basophil Rel % 0.6 0.0 - " 1.5 %    Neutrophils Absolute 3.68 1.70 - 7.00 10*3/mm3    Lymphocytes Absolute 0.99 0.70 - 3.10 10*3/mm3    Monocytes Absolute 0.41 0.10 - 0.90 10*3/mm3    Eosinophils Absolute 0.20 0.00 - 0.40 10*3/mm3    Basophils Absolute 0.03 0.00 - 0.20 10*3/mm3    Immature Granulocyte Rel % 0.4 0.0 - 0.5 %    Immature Grans Absolute 0.02 0.00 - 0.05 10*3/mm3    nRBC 0.0 0.0 - 0.2 /100 WBC           The following portions of the patient's history were reviewed and updated as appropriate: allergies, current medications, past family history, past medical history, past social history, past surgical history and problem list.    Review of Systems   Constitutional: Negative for activity change, chills, fatigue and fever.   Respiratory: Negative for cough and chest tightness.    Cardiovascular: Negative for chest pain and palpitations.   Gastrointestinal: Negative for abdominal pain and nausea.   Endocrine: Negative for cold intolerance.   Psychiatric/Behavioral: Negative for behavioral problems and dysphoric mood.       Objective   Physical Exam   Constitutional: She appears well-developed and well-nourished.   Neck: Neck supple. No thyromegaly present.   Cardiovascular: Normal rate and regular rhythm.   No murmur heard.  Pulmonary/Chest: Effort normal and breath sounds normal.   Abdominal: Bowel sounds are normal. There is no tenderness.   Neurological: She is alert.   Psychiatric: She has a normal mood and affect. Her behavior is normal.   Nursing note and vitals reviewed.  Labs reviewed with pt today during visit. All questions answered.      Assessment/Plan   Madalyn was seen today for hyperlipidemia, hypothyroidism, anxiety and handicapp parking form.    Diagnoses and all orders for this visit:    Mixed hyperlipidemia  -     atorvastatin (LIPITOR) 10 MG tablet; Take 1 tablet by mouth Daily.  -     Basic Metabolic Panel; Future  -     Lipid Panel; Future    Hypothyroidism, unspecified type  -     levothyroxine (SYNTHROID,  LEVOTHROID) 112 MCG tablet; Take 1 tablet by mouth Daily.  -     T4, Free; Future  -     TSH; Future    Gastroesophageal reflux disease with esophagitis  -     omeprazole (priLOSEC) 20 MG capsule; Take 1 capsule by mouth Daily.    Major depressive disorder, recurrent episode, in full remission (CMS/HCC)  -     sertraline (ZOLOFT) 50 MG tablet; Take 1 tablet by mouth Daily.  -     Basic Metabolic Panel; Future    Pulmonary fibrosis (CMS/HCC)    Pulmonary hypertension (CMS/HCC)  -     Basic Metabolic Panel; Future

## 2019-07-10 ENCOUNTER — TRANSCRIBE ORDERS (OUTPATIENT)
Dept: ADMINISTRATIVE | Facility: HOSPITAL | Age: 79
End: 2019-07-10

## 2019-07-10 DIAGNOSIS — Z12.31 VISIT FOR SCREENING MAMMOGRAM: Primary | ICD-10-CM

## 2019-08-08 ENCOUNTER — TELEPHONE (OUTPATIENT)
Dept: FAMILY MEDICINE CLINIC | Facility: CLINIC | Age: 79
End: 2019-08-08

## 2019-08-08 NOTE — TELEPHONE ENCOUNTER
Patient calling wanting to know if you would see a NEW PT Chay Guidry  (3/27/1958) Pt does not have a family

## 2019-08-27 ENCOUNTER — HOSPITAL ENCOUNTER (OUTPATIENT)
Dept: MAMMOGRAPHY | Facility: HOSPITAL | Age: 79
Discharge: HOME | End: 2019-08-27
Admitting: FAMILY MEDICINE

## 2019-08-27 DIAGNOSIS — Z12.31 VISIT FOR SCREENING MAMMOGRAM: ICD-10-CM

## 2019-08-27 PROCEDURE — 77063 BREAST TOMOSYNTHESIS BI: CPT

## 2019-08-27 PROCEDURE — 77067 SCR MAMMO BI INCL CAD: CPT

## 2019-11-18 ENCOUNTER — RESULTS ENCOUNTER (OUTPATIENT)
Dept: FAMILY MEDICINE CLINIC | Facility: CLINIC | Age: 79
End: 2019-11-18

## 2019-11-18 DIAGNOSIS — E03.9 HYPOTHYROIDISM, UNSPECIFIED TYPE: ICD-10-CM

## 2019-11-18 DIAGNOSIS — E78.2 MIXED HYPERLIPIDEMIA: ICD-10-CM

## 2019-11-18 DIAGNOSIS — I27.20 PULMONARY HYPERTENSION (HCC): ICD-10-CM

## 2019-11-18 DIAGNOSIS — F33.42 MAJOR DEPRESSIVE DISORDER, RECURRENT EPISODE, IN FULL REMISSION (HCC): ICD-10-CM

## 2019-12-07 LAB
BUN SERPL-MCNC: 18 MG/DL (ref 8–23)
BUN/CREAT SERPL: 26.1 (ref 7–25)
CALCIUM SERPL-MCNC: 9.8 MG/DL (ref 8.6–10.5)
CHLORIDE SERPL-SCNC: 103 MMOL/L (ref 98–107)
CHOLEST SERPL-MCNC: 187 MG/DL (ref 0–200)
CO2 SERPL-SCNC: 34.7 MMOL/L (ref 22–29)
CREAT SERPL-MCNC: 0.69 MG/DL (ref 0.57–1)
GLUCOSE SERPL-MCNC: 111 MG/DL (ref 65–99)
HDLC SERPL-MCNC: 50 MG/DL (ref 40–60)
LDLC SERPL CALC-MCNC: 115 MG/DL (ref 0–100)
POTASSIUM SERPL-SCNC: 4.7 MMOL/L (ref 3.5–5.2)
SODIUM SERPL-SCNC: 145 MMOL/L (ref 136–145)
T4 FREE SERPL-MCNC: 0.98 NG/DL (ref 0.93–1.7)
TRIGL SERPL-MCNC: 110 MG/DL (ref 0–150)
TSH SERPL DL<=0.005 MIU/L-ACNC: 0.21 UIU/ML (ref 0.27–4.2)
VLDLC SERPL CALC-MCNC: 22 MG/DL

## 2019-12-11 ENCOUNTER — OFFICE VISIT (OUTPATIENT)
Dept: FAMILY MEDICINE CLINIC | Facility: CLINIC | Age: 79
End: 2019-12-11

## 2019-12-11 VITALS
SYSTOLIC BLOOD PRESSURE: 133 MMHG | TEMPERATURE: 98 F | BODY MASS INDEX: 31.1 KG/M2 | WEIGHT: 169 LBS | HEIGHT: 62 IN | RESPIRATION RATE: 20 BRPM | DIASTOLIC BLOOD PRESSURE: 72 MMHG | HEART RATE: 96 BPM | OXYGEN SATURATION: 93 %

## 2019-12-11 DIAGNOSIS — E03.9 ACQUIRED HYPOTHYROIDISM: ICD-10-CM

## 2019-12-11 DIAGNOSIS — K21.00 GASTROESOPHAGEAL REFLUX DISEASE WITH ESOPHAGITIS: ICD-10-CM

## 2019-12-11 DIAGNOSIS — F33.42 MAJOR DEPRESSIVE DISORDER, RECURRENT EPISODE, IN FULL REMISSION (HCC): ICD-10-CM

## 2019-12-11 DIAGNOSIS — R73.01 IMPAIRED FASTING GLUCOSE: ICD-10-CM

## 2019-12-11 DIAGNOSIS — E78.2 MIXED HYPERLIPIDEMIA: Primary | ICD-10-CM

## 2019-12-11 PROCEDURE — 99214 OFFICE O/P EST MOD 30 MIN: CPT | Performed by: FAMILY MEDICINE

## 2019-12-11 RX ORDER — DESONIDE 0.5 MG/G
CREAM TOPICAL 2 TIMES DAILY
Qty: 60 G | Refills: 1 | Status: SHIPPED | OUTPATIENT
Start: 2019-12-11 | End: 2020-12-30

## 2019-12-11 RX ORDER — OMEPRAZOLE 20 MG/1
20 CAPSULE, DELAYED RELEASE ORAL DAILY
Qty: 90 CAPSULE | Refills: 1 | Status: SHIPPED | OUTPATIENT
Start: 2019-12-11 | End: 2020-06-11 | Stop reason: SDUPTHER

## 2019-12-11 RX ORDER — LEVOTHYROXINE SODIUM 112 UG/1
112 TABLET ORAL DAILY
Qty: 90 TABLET | Refills: 1 | Status: CANCELLED | OUTPATIENT
Start: 2019-12-11

## 2019-12-11 RX ORDER — ATORVASTATIN CALCIUM 10 MG/1
10 TABLET, FILM COATED ORAL DAILY
Qty: 90 TABLET | Refills: 1 | Status: SHIPPED | OUTPATIENT
Start: 2019-12-11 | End: 2020-06-11 | Stop reason: SDUPTHER

## 2019-12-11 RX ORDER — LEVOTHYROXINE SODIUM 0.12 MG/1
125 TABLET ORAL DAILY
Qty: 90 TABLET | Refills: 1 | Status: SHIPPED | OUTPATIENT
Start: 2019-12-11 | End: 2020-06-11 | Stop reason: SDUPTHER

## 2019-12-11 NOTE — PROGRESS NOTES
Subjective   Madalyn Galeas is a 79 y.o. female.     History of Present Illness     Chief Complaint:   Chief Complaint   Patient presents with   • Hyperlipidemia     med refill - lab results - meds reviewed with pt today    • Hypothyroidism   • Heartburn   • Depression       Madalyn Galeas 79 y.o. female who presents today for Medical Management of the below listed issues and medication refills.  she has a problem list of   Patient Active Problem List   Diagnosis   • CHF (congestive heart failure) (CMS/HCC)   • Esophageal reflux   • Hyperlipidemia   • Hypothyroidism   • Major depressive disorder, recurrent episode, in full remission (CMS/HCC)   • Pulmonary fibrosis (CMS/HCC)   • Pulmonary hypertension (CMS/HCC)   • Sleep apnea, obstructive   • Vertigo, peripheral   • Impaired fasting glucose   • Murmur, heart   • Benign essential hypertension   .  Since the last visit, she has overall felt well.  she has been compliant with   Current Outpatient Medications:   •  ADCIRCA 20 MG tablet, , Disp: , Rfl:   •  aspirin 81 MG EC tablet, , Disp: , Rfl:   •  atorvastatin (LIPITOR) 10 MG tablet, Take 1 tablet by mouth Daily., Disp: 90 tablet, Rfl: 1  •  desonide (DESOWEN) 0.05 % cream, Apply  topically to the appropriate area as directed 2 (Two) Times a Day., Disp: 60 g, Rfl: 1  •  ESBRIET 267 MG capsule, , Disp: , Rfl:   •  levothyroxine (SYNTHROID, LEVOTHROID) 125 MCG tablet, Take 1 tablet by mouth Daily., Disp: 90 tablet, Rfl: 1  •  meclizine (ANTIVERT) 25 MG tablet, Take 25 mg by mouth 3 (three) times a day as needed for dizziness., Disp: , Rfl:   •  omeprazole (priLOSEC) 20 MG capsule, Take 1 capsule by mouth Daily., Disp: 90 capsule, Rfl: 1  •  Selexipag (UPTRAVI) 600 MCG tablet, Take  by mouth., Disp: , Rfl:   •  sertraline (ZOLOFT) 50 MG tablet, Take 1 tablet by mouth Daily., Disp: 90 tablet, Rfl: 1  •  verapamil SR (CALAN-SR) 120 MG CR tablet, , Disp: , Rfl: 3.  she denies medication side effects.    All of the  "other chronic condition(s) listed above are stable w/o issues.    /72   Pulse 96   Temp 98 °F (36.7 °C) (Oral)   Resp 20   Ht 157.5 cm (62\")   Wt 76.7 kg (169 lb)   LMP  (LMP Unknown)   SpO2 93% Comment: pt on 4 liters of oxygen  BMI 30.91 kg/m²     Results for orders placed or performed in visit on 11/18/19   T4, Free   Result Value Ref Range    Free T4 0.98 0.93 - 1.70 ng/dL   TSH   Result Value Ref Range    TSH 0.214 (L) 0.270 - 4.200 uIU/mL   Basic Metabolic Panel   Result Value Ref Range    Glucose 111 (H) 65 - 99 mg/dL    BUN 18 8 - 23 mg/dL    Creatinine 0.69 0.57 - 1.00 mg/dL    eGFR Non African Am 82 >60 mL/min/1.73    eGFR African Am 100 >60 mL/min/1.73    BUN/Creatinine Ratio 26.1 (H) 7.0 - 25.0    Sodium 145 136 - 145 mmol/L    Potassium 4.7 3.5 - 5.2 mmol/L    Chloride 103 98 - 107 mmol/L    Total CO2 34.7 (H) 22.0 - 29.0 mmol/L    Calcium 9.8 8.6 - 10.5 mg/dL   Lipid Panel   Result Value Ref Range    Total Cholesterol 187 0 - 200 mg/dL    Triglycerides 110 0 - 150 mg/dL    HDL Cholesterol 50 40 - 60 mg/dL    VLDL Cholesterol 22 mg/dL    LDL Cholesterol  115 (H) 0 - 100 mg/dL           The following portions of the patient's history were reviewed and updated as appropriate: allergies, current medications, past family history, past medical history, past social history, past surgical history and problem list.    Review of Systems   Constitutional: Negative for activity change, chills, fatigue and fever.   Respiratory: Negative for cough and chest tightness.    Cardiovascular: Negative for chest pain and palpitations.   Gastrointestinal: Negative for abdominal pain and nausea.   Endocrine: Negative for cold intolerance.   Psychiatric/Behavioral: Negative for behavioral problems and dysphoric mood.       Objective   Physical Exam   Constitutional: She appears well-developed and well-nourished.   Neck: Neck supple. No thyromegaly present.   Cardiovascular: Normal rate and regular rhythm.   No " murmur heard.  Pulmonary/Chest: Effort normal and breath sounds normal.   Abdominal: Bowel sounds are normal. There is no tenderness.   Neurological: She is alert.   Psychiatric: She has a normal mood and affect. Her behavior is normal.   Nursing note and vitals reviewed.  Labs reviewed with pt today during visit. All questions answered.      Assessment/Plan   Madalyn was seen today for hyperlipidemia, hypothyroidism, heartburn and depression.    Diagnoses and all orders for this visit:    Mixed hyperlipidemia  -     atorvastatin (LIPITOR) 10 MG tablet; Take 1 tablet by mouth Daily.    Acquired hypothyroidism  -     levothyroxine (SYNTHROID, LEVOTHROID) 125 MCG tablet; Take 1 tablet by mouth Daily.    Gastroesophageal reflux disease with esophagitis  -     omeprazole (priLOSEC) 20 MG capsule; Take 1 capsule by mouth Daily.    Major depressive disorder, recurrent episode, in full remission (CMS/HCC)  -     sertraline (ZOLOFT) 50 MG tablet; Take 1 tablet by mouth Daily.    Impaired fasting glucose    Other orders  -     desonide (DESOWEN) 0.05 % cream; Apply  topically to the appropriate area as directed 2 (Two) Times a Day.    Diet/exercise/weight management to treat the glucose discussed.

## 2020-05-30 DIAGNOSIS — E03.9 ACQUIRED HYPOTHYROIDISM: ICD-10-CM

## 2020-06-01 RX ORDER — LEVOTHYROXINE SODIUM 0.12 MG/1
TABLET ORAL
Qty: 90 TABLET | Refills: 1 | OUTPATIENT
Start: 2020-06-01

## 2020-06-02 ENCOUNTER — TELEPHONE (OUTPATIENT)
Dept: FAMILY MEDICINE CLINIC | Facility: CLINIC | Age: 80
End: 2020-06-02

## 2020-06-02 DIAGNOSIS — R73.01 IMPAIRED FASTING GLUCOSE: ICD-10-CM

## 2020-06-02 DIAGNOSIS — E03.9 ACQUIRED HYPOTHYROIDISM: ICD-10-CM

## 2020-06-02 DIAGNOSIS — I10 BENIGN ESSENTIAL HYPERTENSION: ICD-10-CM

## 2020-06-02 DIAGNOSIS — E78.5 HYPERLIPIDEMIA, UNSPECIFIED HYPERLIPIDEMIA TYPE: ICD-10-CM

## 2020-06-02 DIAGNOSIS — I50.9 CONGESTIVE HEART FAILURE, UNSPECIFIED HF CHRONICITY, UNSPECIFIED HEART FAILURE TYPE (HCC): Primary | ICD-10-CM

## 2020-06-04 DIAGNOSIS — K21.00 GASTROESOPHAGEAL REFLUX DISEASE WITH ESOPHAGITIS: ICD-10-CM

## 2020-06-04 DIAGNOSIS — F33.42 MAJOR DEPRESSIVE DISORDER, RECURRENT EPISODE, IN FULL REMISSION (HCC): ICD-10-CM

## 2020-06-04 DIAGNOSIS — E78.2 MIXED HYPERLIPIDEMIA: ICD-10-CM

## 2020-06-05 RX ORDER — OMEPRAZOLE 20 MG/1
CAPSULE, DELAYED RELEASE ORAL
Qty: 90 CAPSULE | Refills: 1 | OUTPATIENT
Start: 2020-06-05

## 2020-06-05 RX ORDER — ATORVASTATIN CALCIUM 10 MG/1
TABLET, FILM COATED ORAL
Qty: 90 TABLET | Refills: 1 | OUTPATIENT
Start: 2020-06-05

## 2020-06-06 LAB
ALBUMIN SERPL-MCNC: 3.9 G/DL (ref 3.5–5.2)
ALBUMIN/GLOB SERPL: 1.5 G/DL
ALP SERPL-CCNC: 76 U/L (ref 39–117)
ALT SERPL-CCNC: 10 U/L (ref 1–33)
AST SERPL-CCNC: 13 U/L (ref 1–32)
BASOPHILS # BLD AUTO: 0.04 10*3/MM3 (ref 0–0.2)
BASOPHILS NFR BLD AUTO: 0.7 % (ref 0–1.5)
BILIRUB SERPL-MCNC: 0.2 MG/DL (ref 0.2–1.2)
BUN SERPL-MCNC: 10 MG/DL (ref 8–23)
BUN/CREAT SERPL: 13.3 (ref 7–25)
CALCIUM SERPL-MCNC: 9.6 MG/DL (ref 8.6–10.5)
CHLORIDE SERPL-SCNC: 103 MMOL/L (ref 98–107)
CHOLEST SERPL-MCNC: 163 MG/DL (ref 0–200)
CO2 SERPL-SCNC: 35.3 MMOL/L (ref 22–29)
CREAT SERPL-MCNC: 0.75 MG/DL (ref 0.57–1)
EOSINOPHIL # BLD AUTO: 0.23 10*3/MM3 (ref 0–0.4)
EOSINOPHIL NFR BLD AUTO: 4.1 % (ref 0.3–6.2)
ERYTHROCYTE [DISTWIDTH] IN BLOOD BY AUTOMATED COUNT: 13.1 % (ref 12.3–15.4)
GLOBULIN SER CALC-MCNC: 2.6 GM/DL
GLUCOSE SERPL-MCNC: 115 MG/DL (ref 65–99)
HBA1C MFR BLD: 5.2 % (ref 4.8–5.6)
HCT VFR BLD AUTO: 33.4 % (ref 34–46.6)
HDLC SERPL-MCNC: 51 MG/DL (ref 40–60)
HGB BLD-MCNC: 10.4 G/DL (ref 12–15.9)
IMM GRANULOCYTES # BLD AUTO: 0.02 10*3/MM3 (ref 0–0.05)
IMM GRANULOCYTES NFR BLD AUTO: 0.4 % (ref 0–0.5)
LDLC SERPL CALC-MCNC: 89 MG/DL (ref 0–100)
LDLC/HDLC SERPL: 1.75 {RATIO}
LYMPHOCYTES # BLD AUTO: 1.28 10*3/MM3 (ref 0.7–3.1)
LYMPHOCYTES NFR BLD AUTO: 22.7 % (ref 19.6–45.3)
MCH RBC QN AUTO: 24.8 PG (ref 26.6–33)
MCHC RBC AUTO-ENTMCNC: 31.1 G/DL (ref 31.5–35.7)
MCV RBC AUTO: 79.5 FL (ref 79–97)
MONOCYTES # BLD AUTO: 0.42 10*3/MM3 (ref 0.1–0.9)
MONOCYTES NFR BLD AUTO: 7.5 % (ref 5–12)
NEUTROPHILS # BLD AUTO: 3.64 10*3/MM3 (ref 1.7–7)
NEUTROPHILS NFR BLD AUTO: 64.6 % (ref 42.7–76)
NRBC BLD AUTO-RTO: 0 /100 WBC (ref 0–0.2)
PLATELET # BLD AUTO: 231 10*3/MM3 (ref 140–450)
POTASSIUM SERPL-SCNC: 4.4 MMOL/L (ref 3.5–5.2)
PROT SERPL-MCNC: 6.5 G/DL (ref 6–8.5)
RBC # BLD AUTO: 4.2 10*6/MM3 (ref 3.77–5.28)
SODIUM SERPL-SCNC: 144 MMOL/L (ref 136–145)
T4 FREE SERPL-MCNC: 1 NG/DL (ref 0.93–1.7)
TRIGL SERPL-MCNC: 115 MG/DL (ref 0–150)
TSH SERPL DL<=0.005 MIU/L-ACNC: 0.12 UIU/ML (ref 0.27–4.2)
VLDLC SERPL CALC-MCNC: 23 MG/DL
WBC # BLD AUTO: 5.63 10*3/MM3 (ref 3.4–10.8)

## 2020-06-08 DIAGNOSIS — D64.9 ANEMIA, UNSPECIFIED TYPE: Primary | ICD-10-CM

## 2020-06-11 ENCOUNTER — OFFICE VISIT (OUTPATIENT)
Dept: FAMILY MEDICINE CLINIC | Facility: CLINIC | Age: 80
End: 2020-06-11

## 2020-06-11 VITALS
DIASTOLIC BLOOD PRESSURE: 60 MMHG | TEMPERATURE: 96.8 F | SYSTOLIC BLOOD PRESSURE: 115 MMHG | BODY MASS INDEX: 30.55 KG/M2 | WEIGHT: 166 LBS | RESPIRATION RATE: 16 BRPM | OXYGEN SATURATION: 99 % | HEART RATE: 82 BPM | HEIGHT: 62 IN

## 2020-06-11 DIAGNOSIS — F33.42 MAJOR DEPRESSIVE DISORDER, RECURRENT EPISODE, IN FULL REMISSION (HCC): ICD-10-CM

## 2020-06-11 DIAGNOSIS — E78.2 MIXED HYPERLIPIDEMIA: Primary | ICD-10-CM

## 2020-06-11 DIAGNOSIS — K21.00 GASTROESOPHAGEAL REFLUX DISEASE WITH ESOPHAGITIS: ICD-10-CM

## 2020-06-11 DIAGNOSIS — R73.01 IMPAIRED FASTING GLUCOSE: ICD-10-CM

## 2020-06-11 DIAGNOSIS — E03.9 ACQUIRED HYPOTHYROIDISM: ICD-10-CM

## 2020-06-11 DIAGNOSIS — D64.9 ANEMIA, UNSPECIFIED TYPE: ICD-10-CM

## 2020-06-11 PROCEDURE — 99214 OFFICE O/P EST MOD 30 MIN: CPT | Performed by: FAMILY MEDICINE

## 2020-06-11 RX ORDER — OMEPRAZOLE 20 MG/1
20 CAPSULE, DELAYED RELEASE ORAL DAILY
Qty: 90 CAPSULE | Refills: 1 | Status: SHIPPED | OUTPATIENT
Start: 2020-06-11 | End: 2020-11-16

## 2020-06-11 RX ORDER — LEVOTHYROXINE SODIUM 0.12 MG/1
125 TABLET ORAL DAILY
Qty: 90 TABLET | Refills: 1 | Status: SHIPPED | OUTPATIENT
Start: 2020-06-11 | End: 2020-11-15 | Stop reason: SDUPTHER

## 2020-06-11 RX ORDER — FUROSEMIDE 40 MG/1
TABLET ORAL
COMMUNITY
Start: 2020-05-18 | End: 2020-12-15

## 2020-06-11 RX ORDER — GABAPENTIN 100 MG/1
CAPSULE ORAL
COMMUNITY
Start: 2020-05-04 | End: 2020-06-11

## 2020-06-11 RX ORDER — SELEXIPAG 800 UG/1
TABLET, COATED ORAL 2 TIMES DAILY
COMMUNITY
Start: 2020-05-26 | End: 2020-12-15

## 2020-06-11 RX ORDER — ATORVASTATIN CALCIUM 10 MG/1
10 TABLET, FILM COATED ORAL DAILY
Qty: 90 TABLET | Refills: 1 | Status: SHIPPED | OUTPATIENT
Start: 2020-06-11 | End: 2020-11-15 | Stop reason: SDUPTHER

## 2020-06-11 NOTE — PROGRESS NOTES
Subjective   Madalyn Galeas is a 79 y.o. female.     History of Present Illness     Chief Complaint:   Chief Complaint   Patient presents with   • Hyperlipidemia     med refill  - lab results - meds reviewed with pt today    • Hypothyroidism     [facility]    • Heartburn     on 6 liters of oxygen spo2 99% done today    • Depression       Madalyn Galeas 79 y.o. female who presents today for Medical Management of the below listed issues and medication refills.  she has a problem list of   Patient Active Problem List   Diagnosis   • CHF (congestive heart failure) (CMS/HCC)   • Esophageal reflux   • Hyperlipidemia   • Hypothyroidism   • Major depressive disorder, recurrent episode, in full remission (CMS/HCC)   • Pulmonary fibrosis (CMS/HCC)   • Pulmonary hypertension (CMS/HCC)   • Sleep apnea, obstructive   • Vertigo, peripheral   • Impaired fasting glucose   • Murmur, heart   • Benign essential hypertension   .  Since the last visit, she has overall felt well.  she has been compliant with   Current Outpatient Medications:   •  atorvastatin (LIPITOR) 10 MG tablet, Take 1 tablet by mouth Daily., Disp: 90 tablet, Rfl: 1  •  levothyroxine (SYNTHROID, LEVOTHROID) 125 MCG tablet, Take 1 tablet by mouth Daily., Disp: 90 tablet, Rfl: 1  •  omeprazole (priLOSEC) 20 MG capsule, Take 1 capsule by mouth Daily., Disp: 90 capsule, Rfl: 1  •  sertraline (ZOLOFT) 50 MG tablet, Take 1 tablet by mouth Daily., Disp: 90 tablet, Rfl: 1  •  ADCIRCA 20 MG tablet, , Disp: , Rfl:   •  aspirin 81 MG EC tablet, , Disp: , Rfl:   •  desonide (DESOWEN) 0.05 % cream, Apply  topically to the appropriate area as directed 2 (Two) Times a Day., Disp: 60 g, Rfl: 1  •  ESBRIET 267 MG capsule, , Disp: , Rfl:   •  furosemide (LASIX) 40 MG tablet, TK 1 T PO QD FOR 2 TO 3 DAYS PRN FOR LEG SWELLING., Disp: , Rfl:   •  meclizine (ANTIVERT) 25 MG tablet, Take 25 mg by mouth 3 (three) times a day as needed for dizziness., Disp: , Rfl:   •  UPTRAVI 800  "MCG tablet, , Disp: , Rfl:   •  verapamil SR (CALAN-SR) 120 MG CR tablet, , Disp: , Rfl: 3.  she denies medication side effects.  Advance Care Planning   ACP discussion was held with the patient during this visit. Patient has an advance directive (not in EMR), copy requested.    All of the other chronic condition(s) listed above are stable w/o issues.    /60   Pulse 82   Temp 96.8 °F (36 °C) (Oral)   Resp 16   Ht 157.5 cm (62\")   Wt 75.3 kg (166 lb)   LMP  (LMP Unknown)   SpO2 99%   BMI 30.36 kg/m²     Results for orders placed or performed in visit on 06/02/20   Comprehensive metabolic panel   Result Value Ref Range    Glucose 115 (H) 65 - 99 mg/dL    BUN 10 8 - 23 mg/dL    Creatinine 0.75 0.57 - 1.00 mg/dL    eGFR Non African Am 75 >60 mL/min/1.73    eGFR African Am 91 >60 mL/min/1.73    BUN/Creatinine Ratio 13.3 7.0 - 25.0    Sodium 144 136 - 145 mmol/L    Potassium 4.4 3.5 - 5.2 mmol/L    Chloride 103 98 - 107 mmol/L    Total CO2 35.3 (H) 22.0 - 29.0 mmol/L    Calcium 9.6 8.6 - 10.5 mg/dL    Total Protein 6.5 6.0 - 8.5 g/dL    Albumin 3.90 3.50 - 5.20 g/dL    Globulin 2.6 gm/dL    A/G Ratio 1.5 g/dL    Total Bilirubin 0.2 0.2 - 1.2 mg/dL    Alkaline Phosphatase 76 39 - 117 U/L    AST (SGOT) 13 1 - 32 U/L    ALT (SGPT) 10 1 - 33 U/L   Lipid Panel With LDL/HDL Ratio   Result Value Ref Range    Total Cholesterol 163 0 - 200 mg/dL    Triglycerides 115 0 - 150 mg/dL    HDL Cholesterol 51 40 - 60 mg/dL    VLDL Cholesterol 23 mg/dL    LDL Cholesterol  89 0 - 100 mg/dL    LDL/HDL Ratio 1.75    TSH   Result Value Ref Range    TSH 0.120 (L) 0.270 - 4.200 uIU/mL   Hemoglobin A1c   Result Value Ref Range    Hemoglobin A1C 5.20 4.80 - 5.60 %   T4, Free   Result Value Ref Range    Free T4 1.00 0.93 - 1.70 ng/dL   CBC and Differential   Result Value Ref Range    WBC 5.63 3.40 - 10.80 10*3/mm3    RBC 4.20 3.77 - 5.28 10*6/mm3    Hemoglobin 10.4 (L) 12.0 - 15.9 g/dL    Hematocrit 33.4 (L) 34.0 - 46.6 %    MCV 79.5 " 79.0 - 97.0 fL    MCH 24.8 (L) 26.6 - 33.0 pg    MCHC 31.1 (L) 31.5 - 35.7 g/dL    RDW 13.1 12.3 - 15.4 %    Platelets 231 140 - 450 10*3/mm3    Neutrophil Rel % 64.6 42.7 - 76.0 %    Lymphocyte Rel % 22.7 19.6 - 45.3 %    Monocyte Rel % 7.5 5.0 - 12.0 %    Eosinophil Rel % 4.1 0.3 - 6.2 %    Basophil Rel % 0.7 0.0 - 1.5 %    Neutrophils Absolute 3.64 1.70 - 7.00 10*3/mm3    Lymphocytes Absolute 1.28 0.70 - 3.10 10*3/mm3    Monocytes Absolute 0.42 0.10 - 0.90 10*3/mm3    Eosinophils Absolute 0.23 0.00 - 0.40 10*3/mm3    Basophils Absolute 0.04 0.00 - 0.20 10*3/mm3    Immature Granulocyte Rel % 0.4 0.0 - 0.5 %    Immature Grans Absolute 0.02 0.00 - 0.05 10*3/mm3    nRBC 0.0 0.0 - 0.2 /100 WBC           The following portions of the patient's history were reviewed and updated as appropriate: allergies, current medications, past family history, past medical history, past social history, past surgical history and problem list.    Review of Systems   Constitutional: Negative for activity change, chills, fatigue and fever.   Respiratory: Negative for cough and chest tightness.    Cardiovascular: Negative for chest pain and palpitations.   Gastrointestinal: Negative for abdominal pain and nausea.   Endocrine: Negative for cold intolerance.   Psychiatric/Behavioral: Negative for behavioral problems and dysphoric mood.       Objective   Physical Exam   Constitutional: She appears well-developed and well-nourished.   Neck: Neck supple. No thyromegaly present.   Cardiovascular: Normal rate and regular rhythm.   No murmur heard.  Pulmonary/Chest: Effort normal and breath sounds normal.   Abdominal: Bowel sounds are normal. There is no tenderness.   Neurological: She is alert.   Psychiatric: She has a normal mood and affect. Her behavior is normal.   Nursing note and vitals reviewed.  Labs reviewed with pt today during visit. All questions answered.      Assessment/Plan   Madalyn was seen today for hyperlipidemia, hypothyroidism,  heartburn and depression.    Diagnoses and all orders for this visit:    Mixed hyperlipidemia  -     atorvastatin (LIPITOR) 10 MG tablet; Take 1 tablet by mouth Daily.    Gastroesophageal reflux disease with esophagitis  -     omeprazole (priLOSEC) 20 MG capsule; Take 1 capsule by mouth Daily.    Major depressive disorder, recurrent episode, in full remission (CMS/HCC)  -     sertraline (ZOLOFT) 50 MG tablet; Take 1 tablet by mouth Daily.    Acquired hypothyroidism  -     levothyroxine (SYNTHROID, LEVOTHROID) 125 MCG tablet; Take 1 tablet by mouth Daily.    Anemia, unspecified type    Impaired fasting glucose    Diet/exercise/weight management to treat the glucose discussed.  GI referral already placed and pt to f/u there.

## 2020-09-02 ENCOUNTER — TRANSCRIBE ORDERS (OUTPATIENT)
Dept: ADMINISTRATIVE | Facility: HOSPITAL | Age: 80
End: 2020-09-02

## 2020-09-02 DIAGNOSIS — Z12.31 VISIT FOR SCREENING MAMMOGRAM: Primary | ICD-10-CM

## 2020-09-14 ENCOUNTER — OFFICE VISIT (OUTPATIENT)
Dept: GASTROENTEROLOGY | Facility: CLINIC | Age: 80
End: 2020-09-14

## 2020-09-14 VITALS — WEIGHT: 161.2 LBS | TEMPERATURE: 97.3 F | BODY MASS INDEX: 29.66 KG/M2 | HEIGHT: 62 IN

## 2020-09-14 DIAGNOSIS — J84.10 PULMONARY FIBROSIS (HCC): ICD-10-CM

## 2020-09-14 DIAGNOSIS — R63.4 WEIGHT LOSS: ICD-10-CM

## 2020-09-14 DIAGNOSIS — I27.20 PULMONARY HYPERTENSION (HCC): ICD-10-CM

## 2020-09-14 DIAGNOSIS — R68.81 EARLY SATIETY: ICD-10-CM

## 2020-09-14 DIAGNOSIS — D50.9 IRON DEFICIENCY ANEMIA, UNSPECIFIED IRON DEFICIENCY ANEMIA TYPE: Primary | ICD-10-CM

## 2020-09-14 LAB
BASOPHILS # BLD AUTO: ABNORMAL 10*3/UL
BASOPHILS # BLD MANUAL: 0 10*3/MM3 (ref 0–0.2)
BASOPHILS NFR BLD MANUAL: 0 % (ref 0–1.5)
DIFFERENTIAL COMMENT: ABNORMAL
EOSINOPHIL # BLD AUTO: ABNORMAL 10*3/UL
EOSINOPHIL # BLD MANUAL: 0.22 10*3/MM3 (ref 0–0.4)
EOSINOPHIL NFR BLD AUTO: ABNORMAL %
EOSINOPHIL NFR BLD MANUAL: 3 % (ref 0.3–6.2)
ERYTHROCYTE [DISTWIDTH] IN BLOOD BY AUTOMATED COUNT: 13.9 % (ref 12.3–15.4)
FERRITIN SERPL-MCNC: 8.15 NG/ML (ref 13–150)
FOLATE SERPL-MCNC: >20 NG/ML (ref 4.78–24.2)
HCT VFR BLD AUTO: 28.3 % (ref 34–46.6)
HGB BLD-MCNC: 8.2 G/DL (ref 12–15.9)
IRON SATN MFR SERPL: 5 % (ref 20–50)
IRON SERPL-MCNC: 18 MCG/DL (ref 37–145)
LYMPHOCYTES # BLD AUTO: ABNORMAL 10*3/UL
LYMPHOCYTES # BLD MANUAL: 0.88 10*3/MM3 (ref 0.7–3.1)
LYMPHOCYTES NFR BLD AUTO: ABNORMAL %
LYMPHOCYTES NFR BLD MANUAL: 12 % (ref 19.6–45.3)
MCH RBC QN AUTO: 20.7 PG (ref 26.6–33)
MCHC RBC AUTO-ENTMCNC: 29 G/DL (ref 31.5–35.7)
MCV RBC AUTO: 71.5 FL (ref 79–97)
MONOCYTES # BLD MANUAL: 0.66 10*3/MM3 (ref 0.1–0.9)
MONOCYTES NFR BLD AUTO: ABNORMAL %
MONOCYTES NFR BLD MANUAL: 9 % (ref 5–12)
NEUTROPHILS # BLD MANUAL: 5.56 10*3/MM3 (ref 1.7–7)
NEUTROPHILS NFR BLD AUTO: ABNORMAL %
NEUTROPHILS NFR BLD MANUAL: 76 % (ref 42.7–76)
PLATELET # BLD AUTO: 242 10*3/MM3 (ref 140–450)
PLATELET BLD QL SMEAR: ABNORMAL
RBC # BLD AUTO: 3.96 10*6/MM3 (ref 3.77–5.28)
RBC MORPH BLD: ABNORMAL
TIBC SERPL-MCNC: 398 MCG/DL
UIBC SERPL-MCNC: 380 MCG/DL (ref 112–346)
VIT B12 SERPL-MCNC: 349 PG/ML (ref 211–946)
WBC # BLD AUTO: 7.31 10*3/MM3 (ref 3.4–10.8)

## 2020-09-14 PROCEDURE — 99203 OFFICE O/P NEW LOW 30 MIN: CPT | Performed by: INTERNAL MEDICINE

## 2020-09-14 NOTE — PROGRESS NOTES
Subjective   Chief Complaint   Patient presents with   • Anemia       Madalyn Galeas is a  80 y.o. female here for an initial visit for anemia.     Her hemoglobin was 10.4 in June.  MCV was 79.  This was a new finding.  Previous hemoglobin the year prior was normal.    She reports unitentional weight loss.  She reports that he appetite is not as good as previous.  She reports early satiety.  She has 2-3 BM/d.  No abdominal pain.  She has pulm fibrosis and HTN and is on 4-8L daily.  She is followed by Dr Zita Shah.      C/s 2012 at [facility] - Dr Pennington.  She reports a history of polyps.  Her brother had colon cancer.    HPI  Past Medical History:   Diagnosis Date   • Benign essential hypertension    • CHF (congestive heart failure) (CMS/HCC)    • Colonic polyp    • Esophageal reflux    • H/O complete eye exam 03/2018   • Hx of bone density study 02/15/2016   • Hyperlipidemia    • Hypothyroidism, acquired    • Major depressive disorder, recurrent episode, in full remission (CMS/HCC)    • Pulmonary fibrosis (CMS/HCC)    • Pulmonary hypertension (CMS/HCC)    • Sleep apnea, obstructive    • Vertigo, peripheral      Past Surgical History:   Procedure Laterality Date   • COLONOSCOPY  02/20/2012   • INCONTINENCE SURGERY      Dr. Espinal   • MAMMO BILATERAL  2017    dr de la cruz    • PAP SMEAR  2014    dr de la cruz       Current Outpatient Medications:   •  ADCIRCA 20 MG tablet, , Disp: , Rfl:   •  aspirin 81 MG EC tablet, , Disp: , Rfl:   •  atorvastatin (LIPITOR) 10 MG tablet, Take 1 tablet by mouth Daily., Disp: 90 tablet, Rfl: 1  •  desonide (DESOWEN) 0.05 % cream, Apply  topically to the appropriate area as directed 2 (Two) Times a Day., Disp: 60 g, Rfl: 1  •  ESBRIET 267 MG capsule, , Disp: , Rfl:   •  furosemide (LASIX) 40 MG tablet, TK 1 T PO QD FOR 2 TO 3 DAYS PRN FOR LEG SWELLING., Disp: , Rfl:   •  levothyroxine (SYNTHROID, LEVOTHROID) 125 MCG tablet, Take 1 tablet by mouth Daily., Disp: 90 tablet, Rfl: 1  •   meclizine (ANTIVERT) 25 MG tablet, Take 25 mg by mouth 3 (three) times a day as needed for dizziness., Disp: , Rfl:   •  omeprazole (priLOSEC) 20 MG capsule, Take 1 capsule by mouth Daily., Disp: 90 capsule, Rfl: 1  •  sertraline (ZOLOFT) 50 MG tablet, Take 1 tablet by mouth Daily., Disp: 90 tablet, Rfl: 1  •  UPTRAVI 800 MCG tablet, 2 (two) times a day., Disp: , Rfl:   •  verapamil SR (CALAN-SR) 120 MG CR tablet, , Disp: , Rfl: 3  PRN Meds:.  Allergies   Allergen Reactions   • Sulfa Antibiotics      Social History     Socioeconomic History   • Marital status:      Spouse name: Not on file   • Number of children: Not on file   • Years of education: Not on file   • Highest education level: Not on file   Tobacco Use   • Smoking status: Former Smoker     Packs/day: 2.00     Types: Cigarettes     Start date:      Quit date:      Years since quittin.7   • Smokeless tobacco: Never Used   Substance and Sexual Activity   • Alcohol use: Yes     Comment: rare   • Drug use: No   • Sexual activity: Never     Partners: Male     Family History   Problem Relation Age of Onset   • Breast cancer Cousin    • Lung cancer Father    • Colon cancer Brother    • Prostate cancer Brother    • Lung disease Brother      Review of Systems   Constitutional: Positive for appetite change and unexpected weight change.   Respiratory: Positive for shortness of breath.    Gastrointestinal: Positive for diarrhea. Negative for abdominal pain and blood in stool.   All other systems reviewed and are negative.    Vitals:    20 1036   Temp: 97.3 °F (36.3 °C)         20  1036   Weight: 73.1 kg (161 lb 3.2 oz)       Objective   Physical Exam  Constitutional:       Appearance: Normal appearance. She is well-developed.   HENT:      Head: Normocephalic and atraumatic.   Eyes:      General: No scleral icterus.     Conjunctiva/sclera: Conjunctivae normal.   Neck:      Musculoskeletal: Normal range of motion and neck supple.    Pulmonary:      Effort: Pulmonary effort is normal.      Breath sounds: Normal breath sounds.   Abdominal:      General: There is no distension.      Palpations: Abdomen is soft.   Skin:     General: Skin is warm and dry.   Neurological:      Mental Status: She is alert.   Psychiatric:         Mood and Affect: Mood normal.         Behavior: Behavior normal.       No radiology results for the last 7 days    Assessment/Plan   Diagnoses and all orders for this visit:    Iron deficiency anemia, unspecified iron deficiency anemia type  -     CBC & Differential  -     Ferritin  -     Iron Profile  -     Vitamin B12  -     Folate  -     FL upper gi single contrast w kub; Future  -     FL Barium Enema Air Contrast; Future    Weight loss    Early satiety    Pulmonary fibrosis (CMS/HCC)    Pulmonary hypertension (CMS/HCC)      Plan:  · New onset iron deficiency noted this past June-significant change from previous lab work the year prior.  She does have a family history of colon polyps and colon cancer she is high risk for sedation given her history of pulmonary fibrosis and pulmonary hypertension.  We discussed performing an endoscopic evaluation with pulmonary clearance versus upper GI/barium enema for conservative work-up.  She prefers to proceed with noninvasive work-up.  We will order these test.  · Repeat CBC today, check iron studies, B12 and folate although she is microcytic more than anything.  She may have some component of anemia of chronic disease.  Further recommendations based upon the results of her repeat labs and radiology testing

## 2020-09-15 ENCOUNTER — TELEPHONE (OUTPATIENT)
Dept: GASTROENTEROLOGY | Facility: CLINIC | Age: 80
End: 2020-09-15

## 2020-09-15 DIAGNOSIS — D50.9 IRON DEFICIENCY ANEMIA, UNSPECIFIED IRON DEFICIENCY ANEMIA TYPE: Primary | ICD-10-CM

## 2020-09-15 RX ORDER — FERROUS SULFATE 325(65) MG
325 TABLET ORAL 2 TIMES DAILY WITH MEALS
Qty: 60 TABLET | Refills: 4 | Status: SHIPPED | OUTPATIENT
Start: 2020-09-15 | End: 2020-11-16

## 2020-09-15 RX ORDER — FERROUS SULFATE 325(65) MG
325 TABLET ORAL 2 TIMES DAILY WITH MEALS
Qty: 60 TABLET | Refills: 4 | Status: SHIPPED | OUTPATIENT
Start: 2020-09-15 | End: 2020-09-15 | Stop reason: SDUPTHER

## 2020-09-15 NOTE — TELEPHONE ENCOUNTER
Call to pt.  States has never seen blood before, but today noted blood in toilet water and on tissue.  Wanted DR Boyd to be aware of this.     Message to Dr Boyd

## 2020-09-15 NOTE — TELEPHONE ENCOUNTER
Called pt and advised of Dr Boyd's note. Pt verb understanding and will get labs done week of Sept 28th at her local [facility].  Pt advised that she needs that ferrous sulfate to go to her [facility].  Script resent to there.     ADvised pt we will call [facility] scheduling to arrange her ugi and be. Pt verb understanding and states she needs a Monday     Called [facility] scheduling and spoke with Linda .  Due conflicting preps pt can not get done on same day.  Pt scheduled for 09/21/2020 at 8 am at [facility] for BE, pt to arrive at 730a.     Also ugi scheduled for 09/28 at 930a arrive at 9a.      Pt to call [phone] for instructions.     Called pt and advised of the above. Pt verb understanding and will call scheduling to get instructions.  Update sent to Dr oByd.    complains of pain/discomfort

## 2020-09-15 NOTE — TELEPHONE ENCOUNTER
----- Message from Danielle Wolf sent at 9/15/2020  4:13 PM EDT -----  Regarding: call back  Contact: [phone]  Pt needs a call back

## 2020-09-15 NOTE — TELEPHONE ENCOUNTER
Anemia has worsened - very iron deficient.    Start ferrous sulfate 325 mg bid - take with food - will make stool dark -    Repeat cbc 2 weeks    pls try to expedite scheduling of her UGI and BE

## 2020-09-17 ENCOUNTER — TRANSCRIBE ORDERS (OUTPATIENT)
Dept: ADMINISTRATIVE | Facility: HOSPITAL | Age: 80
End: 2020-09-17

## 2020-09-17 ENCOUNTER — TELEPHONE (OUTPATIENT)
Dept: GASTROENTEROLOGY | Facility: CLINIC | Age: 80
End: 2020-09-17

## 2020-09-17 DIAGNOSIS — Z01.818 OTHER SPECIFIED PRE-OPERATIVE EXAMINATION: Primary | ICD-10-CM

## 2020-09-17 NOTE — TELEPHONE ENCOUNTER
Call to pt.  See note of 9/15.  Advise iron causes stool to look black.      States has not seen any blood in toilet water or on tissue since 9/15.     Will go to [facility] in [place] for necessary lab.     Update to DR Boyd.

## 2020-09-17 NOTE — TELEPHONE ENCOUNTER
----- Message from Danielle Palacios sent at 9/17/2020  2:56 PM EDT -----  Regarding: PT UPDATE-STOOLS NOW BLACK  PT SAID STOOLS NOW BLACK. [phone]

## 2020-09-21 ENCOUNTER — HOSPITAL ENCOUNTER (OUTPATIENT)
Dept: GENERAL RADIOLOGY | Facility: HOSPITAL | Age: 80
Discharge: HOME | End: 2020-09-21
Admitting: INTERNAL MEDICINE

## 2020-09-21 ENCOUNTER — TELEPHONE (OUTPATIENT)
Dept: GASTROENTEROLOGY | Facility: CLINIC | Age: 80
End: 2020-09-21

## 2020-09-21 DIAGNOSIS — D50.9 IRON DEFICIENCY ANEMIA, UNSPECIFIED IRON DEFICIENCY ANEMIA TYPE: ICD-10-CM

## 2020-09-21 PROCEDURE — 74270 X-RAY XM COLON 1CNTRST STD: CPT

## 2020-09-21 RX ADMIN — BARIUM SULFATE 350 ML: 1.05 SUSPENSION ORAL; RECTAL at 08:30

## 2020-09-25 ENCOUNTER — LAB (OUTPATIENT)
Dept: LAB | Facility: HOSPITAL | Age: 80
End: 2020-09-25

## 2020-09-25 DIAGNOSIS — Z01.818 OTHER SPECIFIED PRE-OPERATIVE EXAMINATION: ICD-10-CM

## 2020-09-28 ENCOUNTER — APPOINTMENT (OUTPATIENT)
Dept: GENERAL RADIOLOGY | Facility: HOSPITAL | Age: 80
End: 2020-09-28

## 2020-10-01 LAB
BASOPHILS # BLD AUTO: 0.1 X10E3/UL (ref 0–0.2)
BASOPHILS NFR BLD AUTO: 1 %
EOSINOPHIL # BLD AUTO: 0.2 X10E3/UL (ref 0–0.4)
EOSINOPHIL NFR BLD AUTO: 3 %
ERYTHROCYTE [DISTWIDTH] IN BLOOD BY AUTOMATED COUNT: 17.2 % (ref 11.7–15.4)
HCT VFR BLD AUTO: 31.9 % (ref 34–46.6)
HGB BLD-MCNC: 9.6 G/DL (ref 11.1–15.9)
IMM GRANULOCYTES # BLD AUTO: 0 X10E3/UL (ref 0–0.1)
IMM GRANULOCYTES NFR BLD AUTO: 0 %
LYMPHOCYTES # BLD AUTO: 1.1 X10E3/UL (ref 0.7–3.1)
LYMPHOCYTES NFR BLD AUTO: 16 %
MCH RBC QN AUTO: 22 PG (ref 26.6–33)
MCHC RBC AUTO-ENTMCNC: 30.1 G/DL (ref 31.5–35.7)
MCV RBC AUTO: 73 FL (ref 79–97)
MONOCYTES # BLD AUTO: 0.5 X10E3/UL (ref 0.1–0.9)
MONOCYTES NFR BLD AUTO: 7 %
NEUTROPHILS # BLD AUTO: 4.9 X10E3/UL (ref 1.4–7)
NEUTROPHILS NFR BLD AUTO: 73 %
PLATELET # BLD AUTO: 291 X10E3/UL (ref 150–450)
RBC # BLD AUTO: 4.37 X10E6/UL (ref 3.77–5.28)
WBC # BLD AUTO: 6.7 X10E3/UL (ref 3.4–10.8)

## 2020-10-02 ENCOUNTER — TELEPHONE (OUTPATIENT)
Dept: GASTROENTEROLOGY | Facility: CLINIC | Age: 80
End: 2020-10-02

## 2020-10-02 DIAGNOSIS — D50.9 IRON DEFICIENCY ANEMIA, UNSPECIFIED IRON DEFICIENCY ANEMIA TYPE: Primary | ICD-10-CM

## 2020-10-05 NOTE — TELEPHONE ENCOUNTER
Call to pt.  Advise per DR Boyd note.  Verb understanding.     States has quit taking iron supplement after barium enema because caused emesis.  Message to Dr Boyd.

## 2020-10-06 NOTE — TELEPHONE ENCOUNTER
If She does not feel like she can tolerate the iron supplement would recommend a multivitamin with iron and still repeat CBC in 4 weeks

## 2020-10-08 NOTE — TELEPHONE ENCOUNTER
"Call to pt.  Advise per DR Boyd note.  Verb understanding. States will complete CBC at [facility] in [place] - order placed.     States very stressed out about upcoming UGI on 10/19 \"because I can't be put to sleep\".  States barium enema was so stressful that not sure \"my body can tolerate something like that again - it took me 2 wks to get over\".  Asking if could just monitor labs and delay UGI.  Question to Dr Boyd.   "

## 2020-10-08 NOTE — TELEPHONE ENCOUNTER
I Know that the barium enema was hard on her but I strongly recommend the upper GI study given the severity of her anemia and iron deficiency.  I think that she will tolerate this better than the barium enema

## 2020-10-16 ENCOUNTER — LAB (OUTPATIENT)
Dept: LAB | Facility: HOSPITAL | Age: 80
End: 2020-10-16

## 2020-10-16 PROCEDURE — C9803 HOPD COVID-19 SPEC COLLECT: HCPCS

## 2020-10-16 PROCEDURE — U0004 COV-19 TEST NON-CDC HGH THRU: HCPCS | Performed by: INTERNAL MEDICINE

## 2020-10-17 LAB — SARS-COV-2 RNA RESP QL NAA+PROBE: NOT DETECTED

## 2020-10-19 ENCOUNTER — HOSPITAL ENCOUNTER (OUTPATIENT)
Dept: GENERAL RADIOLOGY | Facility: HOSPITAL | Age: 80
Discharge: HOME | End: 2020-10-19
Admitting: INTERNAL MEDICINE

## 2020-10-19 DIAGNOSIS — D50.9 IRON DEFICIENCY ANEMIA, UNSPECIFIED IRON DEFICIENCY ANEMIA TYPE: ICD-10-CM

## 2020-10-19 PROCEDURE — 74246 X-RAY XM UPR GI TRC 2CNTRST: CPT

## 2020-10-19 RX ADMIN — ANTACID/ANTIFLATULENT 1 TABLET: 380; 550; 10; 10 GRANULE, EFFERVESCENT ORAL at 09:32

## 2020-10-19 RX ADMIN — BARIUM SULFATE 135 ML: 980 POWDER, FOR SUSPENSION ORAL at 09:32

## 2020-10-21 ENCOUNTER — TELEPHONE (OUTPATIENT)
Dept: GASTROENTEROLOGY | Facility: CLINIC | Age: 80
End: 2020-10-21

## 2020-10-21 DIAGNOSIS — D50.9 IRON DEFICIENCY ANEMIA, UNSPECIFIED IRON DEFICIENCY ANEMIA TYPE: Primary | ICD-10-CM

## 2020-10-21 NOTE — TELEPHONE ENCOUNTER
Please let her know that the upper GI study did not show any ulceration or mass.  No apparent cause for her iron deficiency or anemia.  I would like to have her repeat her labs in early November-CBC, ferritin.

## 2020-10-22 DIAGNOSIS — E78.2 MIXED HYPERLIPIDEMIA: ICD-10-CM

## 2020-10-22 DIAGNOSIS — K21.00 GASTROESOPHAGEAL REFLUX DISEASE WITH ESOPHAGITIS: ICD-10-CM

## 2020-10-22 DIAGNOSIS — F33.42 MAJOR DEPRESSIVE DISORDER, RECURRENT EPISODE, IN FULL REMISSION (HCC): ICD-10-CM

## 2020-10-22 DIAGNOSIS — E03.9 ACQUIRED HYPOTHYROIDISM: ICD-10-CM

## 2020-10-22 NOTE — TELEPHONE ENCOUNTER
Call to pt.  Advise per DR Boyd note.  Verb understanding.     States will go to [facility] in [place] in early November for labs.

## 2020-10-23 RX ORDER — OMEPRAZOLE 20 MG/1
CAPSULE, DELAYED RELEASE ORAL
Qty: 90 CAPSULE | Refills: 1 | OUTPATIENT
Start: 2020-10-23

## 2020-10-23 RX ORDER — LEVOTHYROXINE SODIUM 0.12 MG/1
TABLET ORAL
Qty: 90 TABLET | Refills: 1 | OUTPATIENT
Start: 2020-10-23

## 2020-10-23 RX ORDER — ATORVASTATIN CALCIUM 10 MG/1
TABLET, FILM COATED ORAL
Qty: 90 TABLET | Refills: 1 | OUTPATIENT
Start: 2020-10-23

## 2020-10-26 ENCOUNTER — RESULTS ENCOUNTER (OUTPATIENT)
Dept: GASTROENTEROLOGY | Facility: CLINIC | Age: 80
End: 2020-10-26

## 2020-10-26 DIAGNOSIS — D50.9 IRON DEFICIENCY ANEMIA, UNSPECIFIED IRON DEFICIENCY ANEMIA TYPE: ICD-10-CM

## 2020-11-03 ENCOUNTER — TELEPHONE (OUTPATIENT)
Dept: GASTROENTEROLOGY | Facility: CLINIC | Age: 80
End: 2020-11-03

## 2020-11-03 RX ORDER — CHOLESTYRAMINE 4 G/9G
4 POWDER, FOR SUSPENSION ORAL DAILY
Qty: 378 G | Refills: 0 | Status: SHIPPED | OUTPATIENT
Start: 2020-11-03 | End: 2020-11-16

## 2020-11-03 NOTE — TELEPHONE ENCOUNTER
Called pt and pt reports that ever since the barium enema she has had diarrhea.  She states that the test was very hard on her.   Pt reports that food just runs thru her and she has diarrhea after eating anything.  Pt has lost 10lbs since barium enema.  She denies a fever and chills.  Pt states she is having 3-4 very soft to watery stools with urgency per day.  Pt has not tried taking anything for diarrhea due to fear of constipation.  Pt also reports she gets her lab work tomorrow. Advised pt will send message to Dr Boyd and in the meantime advised pt to drink plenty of fluids ( gatorade , powerade) and if symptoms worsen to go to ER.  Pt verb understanding.

## 2020-11-03 NOTE — TELEPHONE ENCOUNTER
rec trial cholestyramine - this should help to bulk her stool without stopping her up - can use daily as needed - sent to local pharmacy - she needs to take this several hours apart from her thyroid pill

## 2020-11-03 NOTE — TELEPHONE ENCOUNTER
----- Message from Danielle Wolf Rep sent at 11/3/2020 10:47 AM EST -----  Regarding: questions  Contact: [phone]  Pt has some questions she would like to speak to someone

## 2020-11-05 LAB
ERYTHROCYTE [DISTWIDTH] IN BLOOD BY AUTOMATED COUNT: 17.6 % (ref 12.3–15.4)
FERRITIN SERPL-MCNC: 10.7 NG/ML (ref 13–150)
HCT VFR BLD AUTO: 32.3 % (ref 34–46.6)
HGB BLD-MCNC: 9.4 G/DL (ref 12–15.9)
MCH RBC QN AUTO: 21.7 PG (ref 26.6–33)
MCHC RBC AUTO-ENTMCNC: 29.1 G/DL (ref 31.5–35.7)
MCV RBC AUTO: 74.6 FL (ref 79–97)
PLATELET # BLD AUTO: 218 10*3/MM3 (ref 140–450)
RBC # BLD AUTO: 4.33 10*6/MM3 (ref 3.77–5.28)
WBC # BLD AUTO: 8.14 10*3/MM3 (ref 3.4–10.8)

## 2020-11-09 ENCOUNTER — TELEPHONE (OUTPATIENT)
Dept: GASTROENTEROLOGY | Facility: CLINIC | Age: 80
End: 2020-11-09

## 2020-11-09 ENCOUNTER — RESULTS ENCOUNTER (OUTPATIENT)
Dept: GASTROENTEROLOGY | Facility: CLINIC | Age: 80
End: 2020-11-09

## 2020-11-09 DIAGNOSIS — R19.7 DIARRHEA, UNSPECIFIED TYPE: Primary | ICD-10-CM

## 2020-11-09 DIAGNOSIS — R19.7 DIARRHEA, UNSPECIFIED TYPE: ICD-10-CM

## 2020-11-09 NOTE — TELEPHONE ENCOUNTER
----- Message from Danielle Wolf Rep sent at 11/9/2020  8:06 AM EST -----  Regarding: med  Contact: [phone]  Pt daughter called about medication for mother, wants to speak to someone soon.Mother has has diarrhea for 8 weeks and med is not working. Anyi is daughter and number listed is hers.

## 2020-11-09 NOTE — TELEPHONE ENCOUNTER
----- Message from Danielle Huerta sent at 11/9/2020  4:07 PM EST -----  Regarding: Call back  Contact: [phone]  PT mother home luis manuel would like a call back regarding last conversation. Please call home FRANCIS

## 2020-11-09 NOTE — TELEPHONE ENCOUNTER
"Called pt's daughter who reports that her mother picked up the cholestyramine on Friday and it has not helped at all.  Her mother continues to have water diarrhea \"all the time.\"  She is having urgency and accidents. She has lost more weight since she was in the office. Current weight 155.  Pt' s daughter is asking if her gall stones could be inflammed.    Daughter states \"this can not keep going on\".  She advised that her mother \"was fine till she came to our office\".  She states she feels like her mother needs an antibx.  She denies a fever and her mother has been drinking plenty of fluids and watching her diet. ADvsied heck end message to Dr Boyd.   "

## 2020-11-09 NOTE — TELEPHONE ENCOUNTER
I would like to check her stool for C. difficile given that she is not responding.  She can increase the cholestyramine to twice daily.  She can use Imodium as needed

## 2020-11-10 NOTE — TELEPHONE ENCOUNTER
"lab results and questions  Received: Today  Message Contents   June Duke, Danielle Rep  [facility]   Phone Number: [phone]             Pt is wanting results but also is wanting to get some answers      **Call to daughter, Anyi.  Advise per DR Boyd note.  Verb understanding.  Very aggressive - states \"this should have all been done last wk - this is ridiculous\".  Update to DR Boyd.     Advise that stool kit at .  Gena Harrington RN.   "

## 2020-11-12 LAB — C DIFF TOX A+B STL QL IA: NEGATIVE

## 2020-11-15 RX ORDER — OMEPRAZOLE 20 MG/1
20 CAPSULE, DELAYED RELEASE ORAL DAILY
Qty: 90 CAPSULE | Refills: 1 | Status: CANCELLED | OUTPATIENT
Start: 2020-11-15

## 2020-11-15 NOTE — PROGRESS NOTES
"Subjective   Madalyn Galeas is a 80 y.o. female.     History of Present Illness     Chief Complaint:   Chief Complaint   Patient presents with   • Hypothyroidism     med refill due  -  pt to schedule med wellness per dot ryan    • Hyperlipidemia   • Heartburn   • Depression   • Diarrhea     x  7-8 weeks    • no appettite     weight loss        Madalyn Galeas 80 y.o. female who presents today for Medical Management of the below listed issues and medication refills.  she has a problem list of   Patient Active Problem List   Diagnosis   • CHF (congestive heart failure) (CMS/HCC)   • Esophageal reflux   • Hyperlipidemia   • Hypothyroidism   • Major depressive disorder, recurrent episode, in full remission (CMS/HCC)   • Pulmonary fibrosis (CMS/HCC)   • Pulmonary hypertension (CMS/HCC)   • Sleep apnea, obstructive   • Vertigo, peripheral   • Impaired fasting glucose   • Murmur, heart   • Benign essential hypertension   .  Since the last visit, she has been seeing GI for persistent anemia issues and persistent diarrhea/weight loss. Recent C.Diff was NEGATIVE. Pt reports \"I haven't been the same since the Barium Enema!\" Pt reports when she eats, she has to quickly go to the BR for loose stools. Barium showed Sigmoid Diverticulosis and gallstones. UGI was essentially normal. Doesn't like the Questran and stopped it.  she has been compliant with   Current Outpatient Medications:   •  ADCIRCA 20 MG tablet, , Disp: , Rfl:   •  aspirin 81 MG EC tablet, , Disp: , Rfl:   •  atorvastatin (LIPITOR) 10 MG tablet, Take 1 tablet by mouth Daily., Disp: 90 tablet, Rfl: 1  •  colesevelam (Welchol) 625 MG tablet, Take 3 tablets by mouth 2 (Two) Times a Day With Meals., Disp: 84 tablet, Rfl: 0  •  desonide (DESOWEN) 0.05 % cream, Apply  topically to the appropriate area as directed 2 (Two) Times a Day., Disp: 60 g, Rfl: 1  •  ESBRIET 267 MG capsule, , Disp: , Rfl:   •  famotidine (Pepcid) 20 MG tablet, Take 1 tablet by mouth 2 " "(Two) Times a Day., Disp: 180 tablet, Rfl: 1  •  FeroSul 325 (65 Fe) MG tablet, TAKE 1 TABLET TWICE DAILY WITH MEALS, Disp: 180 tablet, Rfl: 0  •  furosemide (LASIX) 40 MG tablet, TK 1 T PO QD FOR 2 TO 3 DAYS PRN FOR LEG SWELLING., Disp: , Rfl:   •  levothyroxine (SYNTHROID, LEVOTHROID) 125 MCG tablet, Take 1 tablet by mouth Daily., Disp: 90 tablet, Rfl: 1  •  meclizine (ANTIVERT) 25 MG tablet, Take 25 mg by mouth 3 (three) times a day as needed for dizziness., Disp: , Rfl:   •  sertraline (ZOLOFT) 50 MG tablet, Take 1 tablet by mouth Daily., Disp: 90 tablet, Rfl: 1  •  UPTRAVI 800 MCG tablet, 2 (two) times a day., Disp: , Rfl:   •  verapamil SR (CALAN-SR) 120 MG CR tablet, , Disp: , Rfl: 3.  she denies medication side effects.    All of the other chronic condition(s) listed above are stable w/o issues.    /82   Pulse 102   Temp 97.4 °F (36.3 °C) (Oral)   Resp 20   Ht 157.5 cm (62\")   Wt 64 kg (141 lb)   LMP  (LMP Unknown)   BMI 25.79 kg/m²     Results for orders placed or performed in visit on 11/09/20   Clostridium Difficile EIA - Stool, Per Rectum    Specimen: Per Rectum; Stool    ST   Result Value Ref Range    C difficile Toxins A+B, EIA Negative Negative           The following portions of the patient's history were reviewed and updated as appropriate: allergies, current medications, past family history, past medical history, past social history, past surgical history and problem list.    Review of Systems   Constitutional: Negative for activity change, chills, fatigue and fever.   Respiratory: Negative for cough and chest tightness.    Cardiovascular: Negative for chest pain and palpitations.   Gastrointestinal: Positive for diarrhea. Negative for abdominal pain and nausea.   Endocrine: Negative for cold intolerance.   Psychiatric/Behavioral: Negative for behavioral problems and dysphoric mood.       Objective   Physical Exam  Vitals signs and nursing note reviewed.   Constitutional:       " Appearance: She is well-developed.   Neck:      Musculoskeletal: Neck supple.      Thyroid: No thyromegaly.   Cardiovascular:      Rate and Rhythm: Normal rate and regular rhythm.      Heart sounds: No murmur.   Pulmonary:      Effort: Pulmonary effort is normal.      Breath sounds: Normal breath sounds.   Abdominal:      General: Bowel sounds are normal.      Tenderness: There is no abdominal tenderness.   Psychiatric:         Behavior: Behavior normal.     Labs from GI reviewed with visit today.    Assessment/Plan   Diagnoses and all orders for this visit:    1. Mixed hyperlipidemia (Primary)  -     atorvastatin (LIPITOR) 10 MG tablet; Take 1 tablet by mouth Daily.  Dispense: 90 tablet; Refill: 1    2. Acquired hypothyroidism  -     levothyroxine (SYNTHROID, LEVOTHROID) 125 MCG tablet; Take 1 tablet by mouth Daily.  Dispense: 90 tablet; Refill: 1    3. Gastroesophageal reflux disease with esophagitis without hemorrhage  -     Discontinue: famotidine (Pepcid) 20 MG tablet; Take 1 tablet by mouth 2 (Two) Times a Day.  Dispense: 28 tablet; Refill: 0  -     famotidine (Pepcid) 20 MG tablet; Take 1 tablet by mouth 2 (Two) Times a Day.  Dispense: 180 tablet; Refill: 1    4. Major depressive disorder, recurrent episode, in full remission (CMS/HCC)  -     sertraline (ZOLOFT) 50 MG tablet; Take 1 tablet by mouth Daily.  Dispense: 90 tablet; Refill: 1    5. Chronic diarrhea  -     colesevelam (Welchol) 625 MG tablet; Take 3 tablets by mouth 2 (Two) Times a Day With Meals.  Dispense: 84 tablet; Refill: 0    6. Anemia, unspecified type  -     Ambulatory Referral to Hematology    Other orders  -     Cancel: omeprazole (priLOSEC) 20 MG capsule; Take 1 capsule by mouth Daily.  Dispense: 90 capsule; Refill: 1    Pt to remove lactose from her system.

## 2020-11-16 ENCOUNTER — OFFICE VISIT (OUTPATIENT)
Dept: FAMILY MEDICINE CLINIC | Facility: CLINIC | Age: 80
End: 2020-11-16

## 2020-11-16 ENCOUNTER — TELEPHONE (OUTPATIENT)
Dept: GASTROENTEROLOGY | Facility: CLINIC | Age: 80
End: 2020-11-16

## 2020-11-16 VITALS
WEIGHT: 141 LBS | BODY MASS INDEX: 25.95 KG/M2 | HEART RATE: 102 BPM | RESPIRATION RATE: 20 BRPM | DIASTOLIC BLOOD PRESSURE: 82 MMHG | SYSTOLIC BLOOD PRESSURE: 156 MMHG | HEIGHT: 62 IN | TEMPERATURE: 97.4 F

## 2020-11-16 DIAGNOSIS — D50.9 IRON DEFICIENCY ANEMIA, UNSPECIFIED IRON DEFICIENCY ANEMIA TYPE: Primary | ICD-10-CM

## 2020-11-16 DIAGNOSIS — K21.00 GASTROESOPHAGEAL REFLUX DISEASE WITH ESOPHAGITIS WITHOUT HEMORRHAGE: ICD-10-CM

## 2020-11-16 DIAGNOSIS — D64.9 ANEMIA, UNSPECIFIED TYPE: ICD-10-CM

## 2020-11-16 DIAGNOSIS — F33.42 MAJOR DEPRESSIVE DISORDER, RECURRENT EPISODE, IN FULL REMISSION (HCC): ICD-10-CM

## 2020-11-16 DIAGNOSIS — E03.9 ACQUIRED HYPOTHYROIDISM: ICD-10-CM

## 2020-11-16 DIAGNOSIS — K52.9 CHRONIC DIARRHEA: ICD-10-CM

## 2020-11-16 DIAGNOSIS — E78.2 MIXED HYPERLIPIDEMIA: Primary | ICD-10-CM

## 2020-11-16 PROCEDURE — 99214 OFFICE O/P EST MOD 30 MIN: CPT | Performed by: FAMILY MEDICINE

## 2020-11-16 RX ORDER — LEVOTHYROXINE SODIUM 0.12 MG/1
125 TABLET ORAL DAILY
Qty: 90 TABLET | Refills: 1 | Status: SHIPPED | OUTPATIENT
Start: 2020-11-16 | End: 2021-06-30 | Stop reason: SDUPTHER

## 2020-11-16 RX ORDER — COLESEVELAM 180 1/1
1875 TABLET ORAL 2 TIMES DAILY WITH MEALS
Qty: 84 TABLET | Refills: 0 | Status: SHIPPED | OUTPATIENT
Start: 2020-11-16 | End: 2020-12-15

## 2020-11-16 RX ORDER — FERROUS SULFATE 325(65) MG
TABLET ORAL
Qty: 180 TABLET | Refills: 0 | Status: SHIPPED | OUTPATIENT
Start: 2020-11-16 | End: 2020-11-24 | Stop reason: SINTOL

## 2020-11-16 RX ORDER — ATORVASTATIN CALCIUM 10 MG/1
10 TABLET, FILM COATED ORAL DAILY
Qty: 90 TABLET | Refills: 1 | Status: SHIPPED | OUTPATIENT
Start: 2020-11-16 | End: 2021-06-30 | Stop reason: SDUPTHER

## 2020-11-16 RX ORDER — FAMOTIDINE 20 MG/1
20 TABLET, FILM COATED ORAL 2 TIMES DAILY
Qty: 28 TABLET | Refills: 0 | Status: SHIPPED | OUTPATIENT
Start: 2020-11-16 | End: 2020-11-16 | Stop reason: SDUPTHER

## 2020-11-16 RX ORDER — FAMOTIDINE 20 MG/1
20 TABLET, FILM COATED ORAL 2 TIMES DAILY
Qty: 180 TABLET | Refills: 1 | Status: SHIPPED | OUTPATIENT
Start: 2020-11-16 | End: 2020-11-30

## 2020-11-16 NOTE — TELEPHONE ENCOUNTER
Anemia persists but is stable.  Iron stores are still low.  Recommend continue iron twice daily.  If counts do not start to improve may need IV iron.  Repeat labs in 6 to 8 weeks

## 2020-11-17 ENCOUNTER — TELEPHONE (OUTPATIENT)
Dept: GASTROENTEROLOGY | Facility: CLINIC | Age: 80
End: 2020-11-17

## 2020-11-17 NOTE — TELEPHONE ENCOUNTER
C. difficile is negative-agree with PCP recommendations regarding change in medication to control diarrhea (welchol). Call if not improving

## 2020-11-21 ENCOUNTER — RESULTS ENCOUNTER (OUTPATIENT)
Dept: GASTROENTEROLOGY | Facility: CLINIC | Age: 80
End: 2020-11-21

## 2020-11-21 DIAGNOSIS — D50.9 IRON DEFICIENCY ANEMIA, UNSPECIFIED IRON DEFICIENCY ANEMIA TYPE: ICD-10-CM

## 2020-11-24 ENCOUNTER — CONSULT (OUTPATIENT)
Dept: ONCOLOGY | Facility: CLINIC | Age: 80
End: 2020-11-24

## 2020-11-24 ENCOUNTER — LAB (OUTPATIENT)
Dept: LAB | Facility: HOSPITAL | Age: 80
End: 2020-11-24

## 2020-11-24 VITALS
HEIGHT: 62 IN | WEIGHT: 148.1 LBS | SYSTOLIC BLOOD PRESSURE: 124 MMHG | HEART RATE: 76 BPM | BODY MASS INDEX: 27.25 KG/M2 | TEMPERATURE: 97 F | OXYGEN SATURATION: 99 % | DIASTOLIC BLOOD PRESSURE: 55 MMHG | RESPIRATION RATE: 20 BRPM

## 2020-11-24 DIAGNOSIS — K52.9 CHRONIC DIARRHEA: ICD-10-CM

## 2020-11-24 DIAGNOSIS — R63.4 WEIGHT LOSS, ABNORMAL: ICD-10-CM

## 2020-11-24 DIAGNOSIS — K90.9 MALABSORPTION OF IRON: ICD-10-CM

## 2020-11-24 DIAGNOSIS — D50.8 OTHER IRON DEFICIENCY ANEMIA: Primary | ICD-10-CM

## 2020-11-24 DIAGNOSIS — R19.01 ABDOMINAL MASS, RIGHT UPPER QUADRANT: ICD-10-CM

## 2020-11-24 DIAGNOSIS — E44.1 MILD PROTEIN-CALORIE MALNUTRITION (HCC): ICD-10-CM

## 2020-11-24 DIAGNOSIS — E03.9 ACQUIRED HYPOTHYROIDISM: ICD-10-CM

## 2020-11-24 DIAGNOSIS — K52.9 CHRONIC DIARRHEA OF UNKNOWN ORIGIN: ICD-10-CM

## 2020-11-24 DIAGNOSIS — D64.9 ANEMIA, UNSPECIFIED TYPE: Primary | ICD-10-CM

## 2020-11-24 PROBLEM — D50.9 IRON DEFICIENCY ANEMIA: Status: ACTIVE | Noted: 2020-11-24

## 2020-11-24 LAB
ALBUMIN SERPL-MCNC: 3.3 G/DL (ref 3.5–5.2)
ALBUMIN/GLOB SERPL: 1.3 G/DL (ref 1.1–2.4)
ALP SERPL-CCNC: 71 U/L (ref 38–116)
ALT SERPL W P-5'-P-CCNC: 6 U/L (ref 0–33)
ANION GAP SERPL CALCULATED.3IONS-SCNC: 7.1 MMOL/L (ref 5–15)
AST SERPL-CCNC: 16 U/L (ref 0–32)
BASOPHILS # BLD AUTO: 0.07 10*3/MM3 (ref 0–0.2)
BASOPHILS NFR BLD AUTO: 0.6 % (ref 0–1.5)
BILIRUB SERPL-MCNC: <0.2 MG/DL (ref 0.2–1.2)
BUN SERPL-MCNC: 11 MG/DL (ref 6–20)
BUN/CREAT SERPL: 17.2 (ref 7.3–30)
CALCIUM SPEC-SCNC: 9.1 MG/DL (ref 8.5–10.2)
CHLORIDE SERPL-SCNC: 102 MMOL/L (ref 98–107)
CO2 SERPL-SCNC: 30.9 MMOL/L (ref 22–29)
CREAT SERPL-MCNC: 0.64 MG/DL (ref 0.6–1.1)
DEPRECATED RDW RBC AUTO: 43.8 FL (ref 37–54)
EOSINOPHIL # BLD AUTO: 0.22 10*3/MM3 (ref 0–0.4)
EOSINOPHIL NFR BLD AUTO: 1.9 % (ref 0.3–6.2)
ERYTHROCYTE [DISTWIDTH] IN BLOOD BY AUTOMATED COUNT: 17.1 % (ref 12.3–15.4)
ERYTHROCYTE [SEDIMENTATION RATE] IN BLOOD: 45 MM/HR (ref 0–30)
FERRITIN SERPL-MCNC: 15.9 NG/ML (ref 13–150)
FOLATE SERPL-MCNC: 14.5 NG/ML (ref 4.78–24.2)
GFR SERPL CREATININE-BSD FRML MDRD: 90 ML/MIN/1.73
GLOBULIN UR ELPH-MCNC: 2.6 GM/DL (ref 1.8–3.5)
GLUCOSE SERPL-MCNC: 114 MG/DL (ref 74–124)
HCT VFR BLD AUTO: 31.1 % (ref 34–46.6)
HGB BLD-MCNC: 9.3 G/DL (ref 12–15.9)
HGB RETIC QN AUTO: 24.2 PG (ref 29.8–36.1)
IMM GRANULOCYTES # BLD AUTO: 0.14 10*3/MM3 (ref 0–0.05)
IMM GRANULOCYTES NFR BLD AUTO: 1.2 % (ref 0–0.5)
IMM RETICS NFR: 14.4 % (ref 3–15.8)
IRON 24H UR-MRATE: 15 MCG/DL (ref 37–145)
IRON SATN MFR SERPL: 5 % (ref 14–48)
LDH SERPL-CCNC: 228 U/L (ref 99–259)
LYMPHOCYTES # BLD AUTO: 2.31 10*3/MM3 (ref 0.7–3.1)
LYMPHOCYTES NFR BLD AUTO: 20.2 % (ref 19.6–45.3)
MCH RBC QN AUTO: 21.2 PG (ref 26.6–33)
MCHC RBC AUTO-ENTMCNC: 29.9 G/DL (ref 31.5–35.7)
MCV RBC AUTO: 70.8 FL (ref 79–97)
MONOCYTES # BLD AUTO: 0.86 10*3/MM3 (ref 0.1–0.9)
MONOCYTES NFR BLD AUTO: 7.5 % (ref 5–12)
NEUTROPHILS NFR BLD AUTO: 68.6 % (ref 42.7–76)
NEUTROPHILS NFR BLD AUTO: 7.85 10*3/MM3 (ref 1.7–7)
NRBC BLD AUTO-RTO: 0 /100 WBC (ref 0–0.2)
PLATELET # BLD AUTO: 321 10*3/MM3 (ref 140–450)
PMV BLD AUTO: 8.7 FL (ref 6–12)
POTASSIUM SERPL-SCNC: 4 MMOL/L (ref 3.5–4.7)
PROT SERPL-MCNC: 5.9 G/DL (ref 6.3–8)
RBC # BLD AUTO: 4.39 10*6/MM3 (ref 3.77–5.28)
RETICS # AUTO: 0.04 10*6/MM3 (ref 0.02–0.13)
RETICS/RBC NFR AUTO: 1.02 % (ref 0.7–1.9)
SODIUM SERPL-SCNC: 140 MMOL/L (ref 134–145)
T4 FREE SERPL-MCNC: 1.07 NG/DL (ref 0.93–1.7)
TIBC SERPL-MCNC: 293 MCG/DL (ref 249–505)
TRANSFERRIN SERPL-MCNC: 209 MG/DL (ref 200–360)
TSH SERPL DL<=0.05 MIU/L-ACNC: 0.71 UIU/ML (ref 0.27–4.2)
VIT B12 BLD-MCNC: 527 PG/ML (ref 211–946)
WBC # BLD AUTO: 11.45 10*3/MM3 (ref 3.4–10.8)

## 2020-11-24 PROCEDURE — 82728 ASSAY OF FERRITIN: CPT | Performed by: INTERNAL MEDICINE

## 2020-11-24 PROCEDURE — 85025 COMPLETE CBC W/AUTO DIFF WBC: CPT

## 2020-11-24 PROCEDURE — 85652 RBC SED RATE AUTOMATED: CPT | Performed by: INTERNAL MEDICINE

## 2020-11-24 PROCEDURE — 82607 VITAMIN B-12: CPT | Performed by: INTERNAL MEDICINE

## 2020-11-24 PROCEDURE — 80053 COMPREHEN METABOLIC PANEL: CPT | Performed by: INTERNAL MEDICINE

## 2020-11-24 PROCEDURE — 84443 ASSAY THYROID STIM HORMONE: CPT | Performed by: INTERNAL MEDICINE

## 2020-11-24 PROCEDURE — 83615 LACTATE (LD) (LDH) ENZYME: CPT | Performed by: INTERNAL MEDICINE

## 2020-11-24 PROCEDURE — 84439 ASSAY OF FREE THYROXINE: CPT | Performed by: INTERNAL MEDICINE

## 2020-11-24 PROCEDURE — 84466 ASSAY OF TRANSFERRIN: CPT | Performed by: INTERNAL MEDICINE

## 2020-11-24 PROCEDURE — 83540 ASSAY OF IRON: CPT | Performed by: INTERNAL MEDICINE

## 2020-11-24 PROCEDURE — 82746 ASSAY OF FOLIC ACID SERUM: CPT | Performed by: INTERNAL MEDICINE

## 2020-11-24 PROCEDURE — 99205 OFFICE O/P NEW HI 60 MIN: CPT | Performed by: INTERNAL MEDICINE

## 2020-11-24 PROCEDURE — 36415 COLL VENOUS BLD VENIPUNCTURE: CPT

## 2020-11-24 PROCEDURE — 85046 RETICYTE/HGB CONCENTRATE: CPT | Performed by: INTERNAL MEDICINE

## 2020-11-24 NOTE — PROGRESS NOTES
Subjective     PATIENT WAS CALLED THE DAY BEFORE BY THE OFFICE TO ASK FOR SYMPTOMS THAT COULD BE CONSISTENT WITH CORONAVIRUS INFECTION, AND BEING NEGATIVE WAS SCHEDULED TO BE SEEN IN THE OFFICE TODAY. SIMILAR QUESTIONING TODAY INCLUDING, CHILLS, FEVER, NEW COUGH, SHORTNESS OF BREATH, DIARRHEA,DIFFUSE BODY ACHES  AND CHANGES IN SMELL OR TASTE WERE NEGATIVE.THE PATIENT DENIED ANY CONTACT WITH PERSONS WHO WERE POSITIVE FOR COVID, AND PATIENT IS NOT IN CATEGORY OF HIGH RISK BEHAVIOR TO ACQUIRE COVID.    DURING THE VISIT WITH THE PATIENT TODAY , PATIENT HAD FACE MASK, MY MEDICAL ASSISTANT AND I  HAD PROPPER PROTECTIVE EQUIPMENT, AND I DID HAND HYGIENE WITH SOAP AND WATER BEFORE AND AFTER THE VISIT.     REASON FOR CONSULTATION:  IRON DEFICIECNY ANEMIA, CHRONIC DIARRHEA, MALNUTRITION, WEIGHT LOSS, ABDOMINAL MASS RUQ  Provide an opinion on any further workup or treatment                             REQUESTING PHYSICIAN:  Enmanuel Berger MD   PCP - General, Family Medicine    Since 11/13/2015    [phone]    Anju Garg MD   Consulting Physician, Gastroenterology    Since 11/15/2020    [phone]    New chat            RECORDS OBTAINED:  Records of the patients history including those obtained from the referring provider were reviewed and summarized in detail.    HISTORY OF PRESENT ILLNESS:  The patient is a 80 y.o. year old female who is here for an opinion about the above issue.    I had the opportunity to see this patient in consultation today along with her daughter, a 80-year-old delightful white female who has developed iron deficiency anemia for the last several months, more documented since the summer of 2019 and having very significant fatigue. Since she underwent upper GI series and barium enema, the patient has not had any day that she has not had at least 6 stools, diarrheic in nature, associated with episodes of incontinence and accidents. She has not had any passage of blood in the  stool or mucus. She has minimal abdominal pain. It does not matter what she eats, she has diarrhea. She has diarrhea through the day and through the night. She has had episodes of accidents in the middle of the night when she realizes that she has defecated in her diaper. The patient obviously is afraid of eating anything because she has not found a food that will settle her stomach. She has no nausea, vomiting, distention, jaundice, chills or fever. She has significant degree of fatigue for any activity and her shortness of breath associated with pulmonary fibrosis has become worse obviously because of the development of her anemia. She depends on oxygen. The patient is sitting in the house all day long not willing to do anything because of the profound fatigue and shortness of breath.     The patient denies any rashes in the skin. No joint pain. No swelling in lower extremities. She has lost muscle mass. She has had cough associated with her pulmonary fibrosis. She relates no hemoptysis. She has occasional palpitations. No evidence of congestive heart failure.      Past Medical History:   Diagnosis Date   • Benign essential hypertension    • CHF (congestive heart failure) (CMS/HCC)    • Colonic polyp    • Esophageal reflux    • H/O complete eye exam 03/2018   • Hx of bone density study 02/15/2016   • Hyperlipidemia    • Hypothyroidism, acquired    • Major depressive disorder, recurrent episode, in full remission (CMS/HCC)    • Pulmonary fibrosis (CMS/HCC)    • Pulmonary hypertension (CMS/HCC)    • Sleep apnea, obstructive    • Vertigo, peripheral         Past Surgical History:   Procedure Laterality Date   • COLONOSCOPY  02/20/2012   • INCONTINENCE SURGERY      Dr. Espinal   • MAMMO BILATERAL  2017    dr de la cruz    • PAP SMEAR  2014    dr de la cruz        Current Outpatient Medications on File Prior to Visit   Medication Sig Dispense Refill   • ADCIRCA 20 MG tablet      • aspirin 81 MG EC tablet      •  atorvastatin (LIPITOR) 10 MG tablet Take 1 tablet by mouth Daily. 90 tablet 1   • colesevelam (Welchol) 625 MG tablet Take 3 tablets by mouth 2 (Two) Times a Day With Meals. 84 tablet 0   • desonide (DESOWEN) 0.05 % cream Apply  topically to the appropriate area as directed 2 (Two) Times a Day. 60 g 1   • ESBRIET 267 MG capsule      • famotidine (Pepcid) 20 MG tablet Take 1 tablet by mouth 2 (Two) Times a Day. 180 tablet 1   • FeroSul 325 (65 Fe) MG tablet TAKE 1 TABLET TWICE DAILY WITH MEALS 180 tablet 0   • furosemide (LASIX) 40 MG tablet TK 1 T PO QD FOR 2 TO 3 DAYS PRN FOR LEG SWELLING.     • levothyroxine (SYNTHROID, LEVOTHROID) 125 MCG tablet Take 1 tablet by mouth Daily. 90 tablet 1   • meclizine (ANTIVERT) 25 MG tablet Take 25 mg by mouth 3 (three) times a day as needed for dizziness.     • sertraline (ZOLOFT) 50 MG tablet Take 1 tablet by mouth Daily. 90 tablet 1   • UPTRAVI 800 MCG tablet 2 (two) times a day.     • verapamil SR (CALAN-SR) 120 MG CR tablet   3     No current facility-administered medications on file prior to visit.         ALLERGIES:    Allergies   Allergen Reactions   • Sulfa Antibiotics         Social History     Socioeconomic History   • Marital status:      Spouse name: Not on file   • Number of children: Not on file   • Years of education: Not on file   • Highest education level: Not on file   Tobacco Use   • Smoking status: Former Smoker     Packs/day: 2.00     Types: Cigarettes     Start date:      Quit date:      Years since quittin.9   • Smokeless tobacco: Never Used   Substance and Sexual Activity   • Alcohol use: Yes     Comment: rare   • Drug use: No   • Sexual activity: Never     Partners: Male        Family History   Problem Relation Age of Onset   • Breast cancer Cousin    • Lung cancer Father    • Colon cancer Brother    • Prostate cancer Brother    • Lung disease Brother         Review of Systems   General: no fever, no chills, SEVERE fatigue,NEGATIVE 15  LB weight changes,  lack of appetite.  Eyes: no epiphora, xerophthalmia,conjunctivitis, pain, glaucoma, blurred vision, blindness, secretion, photophobia, proptosis, diplopia.  Ears: no otorrhea, tinnitus, otorrhagia, deafness, pain, vertigo.  Nose: no rhinorrhea, no epistaxis, no alteration in perception of odors, no sinuses pressure.  Mouth: no alteration in gums or teeth,  No ulcers, no difficulty with mastication or deglut ion, no odynophagia.  Neck: no masses or pain, no thyroid alterations, no pain in muscles or arteries, no carotid odynia, no crepitation.  Respiratory:  cough, no sputum production,MODERATE dyspnea,no trepopnea, no pleuritic pain,no hemoptysis.  Heart: no syncope, no irregularity, OCCASIONAL palpitations, no angina,no orthopnea,no paroxysmal nocturnal dyspnea.  Vascular Venous: no tenderness,no edema,no palpable cords,no postphlebitic syndrome, no skin changes no ulcerations.  Vascular Arterial: no distal ischemia, noclaudication, no gangrene, no neuropathic ischemic pain, no skin ulcers, no paleness no cyanosis.  GI: no dysphagia, no odynophagia, no regurgitation, no heartburn,no indigestion,no nausea,no vomiting,no hematemesis ,no melena,no jaundice, distention, no obstipation,no enterorrhagia,no proctalgia,no anal  lesions, STATED SEVERE CHANGES   in bowel habits, FOR 2 MONTHS  : no frequency, no hesitancy, no hematuria, no discharge,no  pain.  Musculoskeletal: no muscle or tendon pain or inflammation,no  joint pain, no edema, SEVERE functional limitation,no fasciculations, no mass.LOOSING MUSCLE MASS  Neurologic: no headache, no seizures, noalterations on Craneal nerves, no motor deficit, no sensory deficit, normal coordination, no alteration in memory,normal orientation, calculation,normal writting, verbal and written language.  Skin: no rashes,no pruritus no localized lesions.  Psychiatric:  anxiety, no depression,no agitation, no delusions, proper insight.      Objective     Vitals:  "   11/24/20 1358   BP: 124/55   Pulse: 76   Resp: 20   Temp: 97 °F (36.1 °C)   TempSrc: Temporal   SpO2: 99%   Weight: 67.2 kg (148 lb 1.6 oz)   Height: 157.5 cm (62.01\")   PainSc: 0-No pain     Current Status 11/24/2020   ECOG score 1       Physical Exam  I HAVE PERSONALLY REVIEWED THE HISTORY OF THE PRESENT ILLNESS, PAST MEDICAL HISTORY, FAMILY HISTORY, SOCIAL HISTORY, ALLERGIES, MEDICATIONS STATED ABOVE IN THE OFFICE NOTE FROM TODAY.     Patient screened  for depression based on a PHQ-9 score of 0 on 6/11/2020.       GENERAL:  Well-developed, POORLY nourished  Patient  in no acute distress. ON OXYGEN  SKIN:  Warm, dry ,NO rashes,NO purpura ,NO petechiae.  HEENT:  Pupils were equal and reactive to light and accomodation, conjunctivae noninjected, no pterygium, normal extraocular movements, normal visual acuity.   NECK:  Supple with good range of motion; no thyromegaly or masses, no JVD or bruits, no cervical adenopathies.No carotid artery pain, no carotid abnormal pulsation , NO arterial dance.  LYMPHATICS:  No cervical, NO supraclavicular, NO axillary,NO epitrochlear , NO inguinal adenopathy.  CARDIAC   normal rate and regular rhythm, without murmur,NO rubs NO S3 NO S4 right or left . Normal femoral, popliteal, pedis, brachial and carotid pulses.  VASCULAR ARTERIAL: normal carotids,brachial,radial,femoral,popliteal, pedis pulses , no bruits.no paleness or cyanosis, no pain, no edema, no numbness, no gangrene.  VASCULAR VENOUS: no cyanosis, collateral circulation, varicosities, edema, palpable cords, pain, erythema.  ABDOMEN:  Soft, nontender with no hepatomegaly, no splenomegaly, no ascites, no collateral circulation,no distention,no [place] sign, no abdominal pain, no inguinal hernias,no umbilical hernia, no abdominal bruits. Upon deep palpation of her abdomen in the right upper quadrant she has a mass that measures close to 6 cm in diameter, soft, mobile, nontender that is mobilized right to left, up and down " with no difficulties. If this represents a bowel loop or a gallbladder that is dilated and descended to this area that is above the umbilical towards the right and diagonal to the right axillae, it is probably representation of gallbladder distention or some other anatomical alteration. I have the belief that her liver is mildly enlarged, nodular, nontender, 2 cm below the right costal margin. There is no ascites. There is no collateral circulation and there is no splenomegaly.      GENITAL: Not  Performed.  EXTREMITIES  AND SPINE:  FINGER  clubbing, NO cyanosis or edema, no deformities or pain .MINIMAL T SPINE kyphosis, NO scoliosis, deformities or pain in spine, ribs or pelvic bone.SARCOPENIA  NEUROLOGICAL:  Patient was awake, alert, oriented to time, person and place.        RECENT LABS:  Hematology WBC   Date Value Ref Range Status   11/24/2020 11.45 (H) 3.40 - 10.80 10*3/mm3 Final   11/04/2020 8.14 3.40 - 10.80 10*3/mm3 Final   03/18/2018 8.6 4.5 - 11.0 10*3/uL Final     RBC   Date Value Ref Range Status   11/24/2020 4.39 3.77 - 5.28 10*6/mm3 Final   11/04/2020 4.33 3.77 - 5.28 10*6/mm3 Final   03/18/2018 4.81 4.0 - 6.0 10*6/uL Final     Hemoglobin   Date Value Ref Range Status   11/24/2020 9.3 (L) 12.0 - 15.9 g/dL Final   03/18/2018 12.8 12.0 - 16.0 g/dL Final     Hematocrit   Date Value Ref Range Status   11/24/2020 31.1 (L) 34.0 - 46.6 % Final   03/18/2018 39.8 33.0 - 51.0 % Final     Platelets   Date Value Ref Range Status   11/24/2020 321 140 - 450 10*3/mm3 Final   03/18/2018 253 150 - 450 10*3/uL Final       CBC:    WBC   Date Value Ref Range Status   11/24/2020 11.45 (H) 3.40 - 10.80 10*3/mm3 Final   11/04/2020 8.14 3.40 - 10.80 10*3/mm3 Final   03/18/2018 8.6 4.5 - 11.0 10*3/uL Final     RBC   Date Value Ref Range Status   11/24/2020 4.39 3.77 - 5.28 10*6/mm3 Final   11/04/2020 4.33 3.77 - 5.28 10*6/mm3 Final   03/18/2018 4.81 4.0 - 6.0 10*6/uL Final     Hemoglobin   Date Value Ref Range Status    11/24/2020 9.3 (L) 12.0 - 15.9 g/dL Final   03/18/2018 12.8 12.0 - 16.0 g/dL Final     Hematocrit   Date Value Ref Range Status   11/24/2020 31.1 (L) 34.0 - 46.6 % Final   03/18/2018 39.8 33.0 - 51.0 % Final     MCV   Date Value Ref Range Status   11/24/2020 70.8 (L) 79.0 - 97.0 fL Final   03/18/2018 82.7 80.0 - 100.0 fL Final     MCH   Date Value Ref Range Status   11/24/2020 21.2 (L) 26.6 - 33.0 pg Final   03/18/2018 26.5 (L) 27.0 - 31.0 pg Final     MCHC   Date Value Ref Range Status   11/24/2020 29.9 (L) 31.5 - 35.7 g/dL Final   03/18/2018 32.0 32.0 - 37.0 g/dL Final     RDW   Date Value Ref Range Status   11/24/2020 17.1 (H) 12.3 - 15.4 % Final   03/18/2018 12.5 11.6 - 13.7 % Final     RDW-SD   Date Value Ref Range Status   11/24/2020 43.8 37.0 - 54.0 fl Final     MPV   Date Value Ref Range Status   11/24/2020 8.7 6.0 - 12.0 fL Final   03/18/2018 6.0 (L) 7.8 - 11.0 fL Final     Platelets   Date Value Ref Range Status   11/24/2020 321 140 - 450 10*3/mm3 Final   03/18/2018 253 150 - 450 10*3/uL Final     Neutrophil %   Date Value Ref Range Status   11/24/2020 68.6 42.7 - 76.0 % Final     Lymphocyte Rel %   Date Value Ref Range Status   03/18/2018 14.3 (L) 20 - 51 % Final     Lymphocyte %   Date Value Ref Range Status   11/24/2020 20.2 19.6 - 45.3 % Final     Monocyte Rel %   Date Value Ref Range Status   03/18/2018 4.6 0 - 15 % Final     Monocyte %   Date Value Ref Range Status   11/24/2020 7.5 5.0 - 12.0 % Final     Eosinophil %   Date Value Ref Range Status   11/24/2020 1.9 0.3 - 6.2 % Final     Basophil %   Date Value Ref Range Status   11/24/2020 0.6 0.0 - 1.5 % Final     Immature Grans %   Date Value Ref Range Status   11/24/2020 1.2 (H) 0.0 - 0.5 % Final   03/18/2018 81.1 (H) 42 - 75 % Final     Neutrophils, Absolute   Date Value Ref Range Status   11/24/2020 7.85 (H) 1.70 - 7.00 10*3/mm3 Final     Lymphocytes Absolute   Date Value Ref Range Status   03/18/2018 1.20 1.2 - 3.4 10*3/uL Final      Lymphocytes, Absolute   Date Value Ref Range Status   11/24/2020 2.31 0.70 - 3.10 10*3/mm3 Final     Monocytes Absolute   Date Value Ref Range Status   03/18/2018 0.40 0.1 - 0.6 10*3/uL Final     Monocytes, Absolute   Date Value Ref Range Status   11/24/2020 0.86 0.10 - 0.90 10*3/mm3 Final     Eosinophils, Absolute   Date Value Ref Range Status   11/24/2020 0.22 0.00 - 0.40 10*3/mm3 Final     Basophils, Absolute   Date Value Ref Range Status   11/24/2020 0.07 0.00 - 0.20 10*3/mm3 Final     Immature Grans, Absolute   Date Value Ref Range Status   11/24/2020 0.14 (H) 0.00 - 0.05 10*3/mm3 Final   03/18/2018 7.00 (H) 1.4 - 6.5 10*3/uL Final     nRBC   Date Value Ref Range Status   11/24/2020 0.0 0.0 - 0.2 /100 WBC Final        CMP:    Glucose   Date Value Ref Range Status   11/24/2020 114 74 - 124 mg/dL Final     BUN   Date Value Ref Range Status   11/24/2020 11 6 - 20 mg/dL Final     Creatinine   Date Value Ref Range Status   11/24/2020 0.64 0.60 - 1.10 mg/dL Final     Sodium   Date Value Ref Range Status   11/24/2020 140 134 - 145 mmol/L Final     Potassium   Date Value Ref Range Status   11/24/2020 4.0 3.5 - 4.7 mmol/L Final     Chloride   Date Value Ref Range Status   11/24/2020 102 98 - 107 mmol/L Final     CO2   Date Value Ref Range Status   11/24/2020 30.9 (H) 22.0 - 29.0 mmol/L Final     Calcium   Date Value Ref Range Status   11/24/2020 9.1 8.5 - 10.2 mg/dL Final     Total Protein   Date Value Ref Range Status   11/24/2020 5.9 (L) 6.3 - 8.0 g/dL Final     Albumin   Date Value Ref Range Status   11/24/2020 3.30 (L) 3.50 - 5.20 g/dL Final     ALT (SGPT)   Date Value Ref Range Status   11/24/2020 6 0 - 33 U/L Final     AST (SGOT)   Date Value Ref Range Status   11/24/2020 16 0 - 32 U/L Final     Alkaline Phosphatase   Date Value Ref Range Status   11/24/2020 71 38 - 116 U/L Final     Total Bilirubin   Date Value Ref Range Status   11/24/2020 <0.2 (L) 0.2 - 1.2 mg/dL Final     eGFR  Am   Date Value Ref  Range Status   06/05/2020 91 >60 mL/min/1.73 Final     Globulin   Date Value Ref Range Status   11/24/2020 2.6 1.8 - 3.5 gm/dL Final     A/G Ratio   Date Value Ref Range Status   11/24/2020 1.3 1.1 - 2.4 g/dL Final     BUN/Creatinine Ratio   Date Value Ref Range Status   11/24/2020 17.2 7.3 - 30.0 Final     Anion Gap   Date Value Ref Range Status   11/24/2020 7.1 5.0 - 15.0 mmol/L Final             Component      Latest Ref Rng & Units 11/28/2018 6/14/2019 6/5/2020 9/14/2020             11:42 AM   WBC      3.40 - 10.80 10*3/mm3 6.21 5.33 5.63 7.31   RBC      3.77 - 5.28 10*6/mm3 4.50 4.54 4.20 3.96   Hemoglobin      12.0 - 15.9 g/dL 12.0 12.1 10.4 (L) 8.2 (L)   Hematocrit      34.0 - 46.6 % 39.6 39.0 33.4 (L) 28.3 (L)   MCV      79.0 - 97.0 fL 88.0 85.9 79.5 71.5 (L)   MCH      26.6 - 33.0 pg 26.7 (L) 26.7 24.8 (L) 20.7 (L)   MCHC      31.5 - 35.7 g/dL 30.3 (L) 31.0 (L) 31.1 (L) 29.0 (L)   RDW      12.3 - 15.4 % 13.1 (H) 12.8 13.1 13.9   RDW-SD      37.0 - 54.0 fl       MPV      6.0 - 12.0 fL       Platelets      140 - 450 10*3/mm3 185 203 231 242   Neutrophil Rel %      42.7 - 76.0 % 71.9 68.9 64.6 CANCELED   Lymphocyte Rel %      19.6 - 45.3 % 16.9 (L) 18.6 (L) 22.7 CANCELED   Monocyte Rel %      5.0 - 12.0 % 8.5 7.7 7.5 CANCELED   Eosinophil Rel %      0.3 - 6.2 % 2.4 3.8 4.1 CANCELED   Basophil Rel %      0.0 - 1.5 % 0.3 0.6 0.7    Immature Granulocyte Rel %      0.0 - 0.5 % 0.0 0.4 0.4    Neutrophils Absolute      1.70 - 7.00 10*3/mm3 4.46 3.68 3.64    Lymphocytes Absolute      0.70 - 3.10 10*3/mm3 1.05 0.99 1.28 CANCELED   Monocytes Absolute      0.10 - 0.90 10*3/mm3 0.53 0.41 0.42    Eosinophils Absolute      0.00 - 0.40 10*3/mm3 0.15 0.20 0.23 CANCELED   Basophils Absolute      0.00 - 0.20 10*3/mm3 0.02 0.03 0.04 CANCELED   Immature Grans, Absolute      0.00 - 0.05 10*3/mm3 0.00 0.02 0.02      Component      Latest Ref Rng & Units 9/14/2020 9/30/2020 11/4/2020 11/24/2020          11:42 AM      WBC      3.40  - 10.80 10*3/mm3  6.7 8.14 11.45 (H)   RBC      3.77 - 5.28 10*6/mm3  4.37 4.33 4.39   Hemoglobin      12.0 - 15.9 g/dL  9.6 (L) 9.4 (L) 9.3 (L)   Hematocrit      34.0 - 46.6 %  31.9 (L) 32.3 (L) 31.1 (L)   MCV      79.0 - 97.0 fL  73 (L) 74.6 (L) 70.8 (L)   MCH      26.6 - 33.0 pg  22.0 (L) 21.7 (L) 21.2 (L)   MCHC      31.5 - 35.7 g/dL  30.1 (L) 29.1 (L) 29.9 (L)   RDW      12.3 - 15.4 %  17.2 (H) 17.6 (H) 17.1 (H)   RDW-SD      37.0 - 54.0 fl    43.8   MPV      6.0 - 12.0 fL    8.7   Platelets      140 - 450 10*3/mm3  291 218 321   Neutrophil Rel %      42.7 - 76.0 % 76.0 73  68.6   Lymphocyte Rel %      19.6 - 45.3 % 12.0 (L) 16  20.2   Monocyte Rel %      5.0 - 12.0 % 9.0 7  7.5   Eosinophil Rel %      0.3 - 6.2 % 3.0 3  1.9   Basophil Rel %      0.0 - 1.5 % 0.0 1  0.6   Immature Granulocyte Rel %      0.0 - 0.5 %  0  1.2 (H)   Neutrophils Absolute      1.70 - 7.00 10*3/mm3 5.56 4.9  7.85 (H)   Lymphocytes Absolute      0.70 - 3.10 10*3/mm3 0.88 1.1  2.31   Monocytes Absolute      0.10 - 0.90 10*3/mm3 0.66 0.5  0.86   Eosinophils Absolute      0.00 - 0.40 10*3/mm3  0.2  0.22   Basophils Absolute      0.00 - 0.20 10*3/mm3 0.00 0.1  0.07   Immature Grans, Absolute      0.00 - 0.05 10*3/mm3  0.0  0.14 (H)     FLUOROSCOPIC UPPER GI DOUBLE CONTRAST WITH KUB     CLINICAL: Iron deficiency anemia.     FLUOROSCOPY TIME: 1 minute 51 seconds, 110 images.     FINDINGS: On the preliminary  radiograph, there are multiple  gallstones within the right upper quadrant. In addition, there is  retained barium arising from several sigmoid diverticula. Nonobstructive  bowel gas pattern.     Rapid sequence imaging of the esophagus performed while swallowing  thickened liquid barium. Valleculae and piriform sinuses have a  satisfactory appearance. No aspiration or penetration during this  examination. Intermittent tertiary wave formation of the mid esophagus.  No restriction to the flow of thin liquid barium through the  esophagus  nor GE junction. Small sliding-type hiatal hernia. Minimal amount of  gastroesophageal reflux with patient supine.     Gastric rugal fold pattern is satisfactory. No gastric ulceration or  mass identified. The duodenal bulb is normal. No ulceration or mass  identified. The remainder of the duodenum within normal limits.     CONCLUSION:  1. Intermittent tertiary waves of the mid esophagus, small sliding-type  hiatal hernia with a minimal amount of gastroesophageal reflux.  2. Stomach and duodenum within normal limits.     This report was finalized on 10/19/2020 10:50 AM by Dr. Kemal Ragsdale M.D.     Addendum    Addendum:  Gallstones are noted     This report was finalized on 9/21/2020 9:14 AM by Dr. Hugo Pinto M.D.      Addended by Hugo Pinto MD on 9/21/2020  9:14 AM      Study Result    BARIUM ENEMA     INDICATION: Iron deficiency anemia, history of polyps     Total fluoroscopy time 2 minutes and 31 seconds.  23 images     FINDINGS:  Sigmoid diverticulosis. No obvious colon polyp or mass. No evidence of  colonic dilatation. Contrasted reflux into the distal ileum which  appeared within normal limits.     IMPRESSION:  Sigmoid diverticulosis. No obvious colon polyp or mass     This report was finalized on 9/21/2020 9:10 AM by Dr. Hugo Pinto M.D.            Assessment/Plan     Diagnoses and all orders for this visit:    1. Other iron deficiency anemia (Primary)  -     Comprehensive Metabolic Panel  -     Ferritin  -     Iron Profile  -     Retic With IRF & RET-He  -     Vitamin B12  -     Folate  -     Lactate Dehydrogenase  -     Sedimentation Rate  -     CT Abdomen Pelvis Without Contrast; Future  -     TSH  -     T4, Free  -     CBC & Differential; Future  -     Comprehensive Metabolic Panel; Future  -     Ferritin; Future  -     Iron Profile; Future  -     Retic With IRF & RET-He; Future  -     CBC & Differential; Future  -     Iron Profile; Future  -     Ferritin; Future  -      Comprehensive Metabolic Panel; Future  -     Retic With IRF & RET-He; Future    2. Chronic diarrhea of unknown origin  -     Comprehensive Metabolic Panel  -     Ferritin  -     Iron Profile  -     Retic With IRF & RET-He  -     Vitamin B12  -     Folate  -     Lactate Dehydrogenase  -     Sedimentation Rate  -     CT Abdomen Pelvis Without Contrast; Future  -     TSH  -     T4, Free  -     CBC & Differential; Future  -     Comprehensive Metabolic Panel; Future  -     Ferritin; Future  -     Iron Profile; Future  -     Retic With IRF & RET-He; Future    3. Abdominal mass, right upper quadrant  -     Comprehensive Metabolic Panel  -     Ferritin  -     Iron Profile  -     Retic With IRF & RET-He  -     Vitamin B12  -     Folate  -     Lactate Dehydrogenase  -     Sedimentation Rate  -     CT Abdomen Pelvis Without Contrast; Future  -     TSH  -     T4, Free  -     CBC & Differential; Future  -     Comprehensive Metabolic Panel; Future  -     Ferritin; Future  -     Iron Profile; Future  -     Retic With IRF & RET-He; Future    4. Weight loss, abnormal  -     Comprehensive Metabolic Panel  -     Ferritin  -     Iron Profile  -     Retic With IRF & RET-He  -     Vitamin B12  -     Folate  -     Lactate Dehydrogenase  -     Sedimentation Rate  -     CT Abdomen Pelvis Without Contrast; Future  -     TSH  -     T4, Free  -     CBC & Differential; Future  -     Comprehensive Metabolic Panel; Future  -     Ferritin; Future  -     Iron Profile; Future  -     Retic With IRF & RET-He; Future    5. Acquired hypothyroidism  -     TSH  -     T4, Free  -     CBC & Differential; Future  -     Comprehensive Metabolic Panel; Future  -     Ferritin; Future  -     Iron Profile; Future  -     Retic With IRF & RET-He; Future    6. Mild protein-calorie malnutrition (CMS/HCC)    7. Chronic diarrhea    8. Malabsorption of iron     I have reviewed her peripheral blood today. Please review below.     Red blood cell morphology is very striking  in the fact that the patient has anisocytosis 2+, poikilocytosis 2+, microcytes 3+ and 3+ hypochromia. There is occasional macroovalocytes. In the white cell morphology there is occasional hypersegmented polys with normal granularity. No blasts or plasma cells in circulation. Platelet count morphology was normal.     In summary this patient has progressive anemia. I documented in the laboratory parameters above progressive drop in the hemoglobin, progressive drop in the MCV. Morphology of peripheral blood is typical of iron deficiency anemia and I would not be surprised if she has an associated B12 or folate deficiency. This patient has had upper GI series and barium enema by GI, Dr. Boyd, and no anatomical alteration has been found in her upper GI tract or colon besides diverticular disease. The symptoms that she has today she blames them, especially the diarrhea, since the time that she had the barium enema. I think that was not an association. The fact that she has so much diarrhea day and night and with weight loss, profound fatigue, sarcopenia leads me to believe that it is some other entity that is producing this. It sounds like to me she has a malabsorptive process of some sort.     The patient also has a palpable mass in the right upper quadrant and the way how it feels to my hands, it is probably a palpable gallbladder. She had no pain there. This is very striking. Her abdomen is very soft and it is very easy to find.     Under the present circumstances and knowing that the patient cannot tolerate oral iron because it makes her to throw up and produce profuse diarrhea and makes the diarrhea worse, I do believe that the patient needs to have the followin. For the workup for her anemia, she went back to the lab to obtain a comprehensive metabolic profile, ferritin, iron, TIBC, B12, folic acid level, reticulated hemoglobin.   2. Given the fact that the patient has had a TSH that was on the low side  maybe her thyroid medication is overcorrecting her and this maybe this is part of the diarrhea process. I will obtain a TSH and a T4.   3. I do believe that the patient needs to have a CT scan of the abdomen and pelvis. I do not think she can handle oral contrast and I do not want to give her IV contrast, I find no need. I want to be sure there is no other anatomical alteration in her small intestine and GI tract that is favoring for her to have all the issues pertinent to her clinical symptoms.     I wonder if she could have some other process in her bowel including inflammatory bowel disease, bacterial overgrowth syndrome, celiac disease and differential diagnosis is more than extensive. This information would require to be shared with Dr. Boyd, her Gastroenterologist, for further review after she returns back in a few days after she proceeds with proper management of her anemia. Given the fact that she cannot handle oral iron by mouth, absolutely the patient needs to have Injectafer on 2 different occasions and this will be scheduled to start to happen as early as next week. Hopefully when I see her back in a month her hemoglobin will be substantially improved and maybe the shortness of breath will be better.     The CT scan was scheduled to be done in the next week or 10 days and she will be called at home with the report of this once that this becomes available. If any other intervention is necessary this will be further discussed with GI or primary care.     I reviewed all the medicines that the patient is taking. None of them would be typically associated with diarrhea including the medicines that she takes for pulmonary hypertension and pulmonary fibrosis. I do not use these medicines and I reviewed the literature in [facility] and there are no issues in regard to profuse diarrhea. I do not know if the patient needs to be using Welchol. It is not very useful and I asked her to not even bother thinking about  iron by mouth because she cannot handle and she is not taking it anyway.    We discussed all these facts with the patient and her daughter in the room.          I DISCUSSED WITH PATIENT IN DETAIL FORMS TO DECREASE CHANCES OF CORONAVIRUS INFECTION INCLUDING ISOLATION, PROPER HAND HIGIENE, AVOID PUBLIC PLACES  WITH CROWDS, FOLLOW  CDC RECOMENDATIONS, AND KEEP PERSONAL AND SOCIAL RESPONSIBILITY, WARE A MASK IN PUBLIC PLACES.  PATIENT IS AWARE THIS INFECTION COULD HAVE SEVERE CONSEQUENCES TO PERSONAL HEALTH AND FAMILY RAMIFICATIONS OF THIS.

## 2020-11-30 DIAGNOSIS — K21.00 GASTROESOPHAGEAL REFLUX DISEASE WITH ESOPHAGITIS WITHOUT HEMORRHAGE: ICD-10-CM

## 2020-11-30 RX ORDER — FAMOTIDINE 20 MG/1
TABLET, FILM COATED ORAL
Qty: 28 TABLET | Refills: 0 | Status: SHIPPED | OUTPATIENT
Start: 2020-11-30 | End: 2021-05-19

## 2020-12-03 ENCOUNTER — HOSPITAL ENCOUNTER (OUTPATIENT)
Dept: PET IMAGING | Facility: HOSPITAL | Age: 80
Discharge: HOME | End: 2020-12-03
Admitting: INTERNAL MEDICINE

## 2020-12-03 DIAGNOSIS — K52.9 CHRONIC DIARRHEA OF UNKNOWN ORIGIN: ICD-10-CM

## 2020-12-03 DIAGNOSIS — R63.4 WEIGHT LOSS, ABNORMAL: ICD-10-CM

## 2020-12-03 DIAGNOSIS — R19.01 ABDOMINAL MASS, RIGHT UPPER QUADRANT: ICD-10-CM

## 2020-12-03 DIAGNOSIS — D50.8 OTHER IRON DEFICIENCY ANEMIA: ICD-10-CM

## 2020-12-03 PROCEDURE — 74176 CT ABD & PELVIS W/O CONTRAST: CPT

## 2020-12-04 ENCOUNTER — OFFICE VISIT (OUTPATIENT)
Dept: ONCOLOGY | Facility: CLINIC | Age: 80
End: 2020-12-04

## 2020-12-04 ENCOUNTER — INFUSION (OUTPATIENT)
Dept: ONCOLOGY | Facility: HOSPITAL | Age: 80
End: 2020-12-04

## 2020-12-04 ENCOUNTER — APPOINTMENT (OUTPATIENT)
Dept: LAB | Facility: HOSPITAL | Age: 80
End: 2020-12-04

## 2020-12-04 VITALS
RESPIRATION RATE: 20 BRPM | TEMPERATURE: 98.4 F | WEIGHT: 145.2 LBS | SYSTOLIC BLOOD PRESSURE: 123 MMHG | HEIGHT: 62 IN | BODY MASS INDEX: 26.72 KG/M2 | HEART RATE: 76 BPM | OXYGEN SATURATION: 98 % | DIASTOLIC BLOOD PRESSURE: 74 MMHG

## 2020-12-04 VITALS — DIASTOLIC BLOOD PRESSURE: 71 MMHG | SYSTOLIC BLOOD PRESSURE: 139 MMHG

## 2020-12-04 DIAGNOSIS — K52.9 CHRONIC DIARRHEA: ICD-10-CM

## 2020-12-04 DIAGNOSIS — K90.9 MALABSORPTION OF IRON: ICD-10-CM

## 2020-12-04 DIAGNOSIS — D50.0 IRON DEFICIENCY ANEMIA DUE TO CHRONIC BLOOD LOSS: ICD-10-CM

## 2020-12-04 DIAGNOSIS — I27.20 PULMONARY HYPERTENSION (HCC): ICD-10-CM

## 2020-12-04 DIAGNOSIS — K56.1 COLONIC INTUSSUSCEPTION (HCC): ICD-10-CM

## 2020-12-04 DIAGNOSIS — R19.01 RIGHT UPPER QUADRANT ABDOMINAL MASS: ICD-10-CM

## 2020-12-04 DIAGNOSIS — D50.8 OTHER IRON DEFICIENCY ANEMIA: Primary | ICD-10-CM

## 2020-12-04 DIAGNOSIS — J84.10 PULMONARY FIBROSIS (HCC): ICD-10-CM

## 2020-12-04 DIAGNOSIS — R97.0 ELEVATED CARCINOEMBRYONIC ANTIGEN (CEA): ICD-10-CM

## 2020-12-04 DIAGNOSIS — E44.1 MILD PROTEIN-CALORIE MALNUTRITION (HCC): ICD-10-CM

## 2020-12-04 DIAGNOSIS — R63.4 WEIGHT LOSS, ABNORMAL: Primary | ICD-10-CM

## 2020-12-04 PROCEDURE — 96365 THER/PROPH/DIAG IV INF INIT: CPT

## 2020-12-04 PROCEDURE — 25010000002 FERRIC CARBOXYMALTOSE 750 MG/15ML SOLUTION 15 ML VIAL: Performed by: INTERNAL MEDICINE

## 2020-12-04 PROCEDURE — 99215 OFFICE O/P EST HI 40 MIN: CPT | Performed by: INTERNAL MEDICINE

## 2020-12-04 PROCEDURE — 96374 THER/PROPH/DIAG INJ IV PUSH: CPT

## 2020-12-04 PROCEDURE — 63710000001 PROCHLORPERAZINE MALEATE PER 10 MG: Performed by: INTERNAL MEDICINE

## 2020-12-04 RX ORDER — SODIUM CHLORIDE 9 MG/ML
250 INJECTION, SOLUTION INTRAVENOUS ONCE
Status: COMPLETED | OUTPATIENT
Start: 2020-12-04 | End: 2020-12-04

## 2020-12-04 RX ORDER — PROCHLORPERAZINE MALEATE 10 MG
10 TABLET ORAL ONCE
Status: COMPLETED | OUTPATIENT
Start: 2020-12-04 | End: 2020-12-04

## 2020-12-04 RX ADMIN — SODIUM CHLORIDE 250 ML: 9 INJECTION, SOLUTION INTRAVENOUS at 15:08

## 2020-12-04 RX ADMIN — PROCHLORPERAZINE MALEATE 10 MG: 10 TABLET ORAL at 15:00

## 2020-12-04 RX ADMIN — FERRIC CARBOXYMALTOSE INJECTION 750 MG: 50 INJECTION, SOLUTION INTRAVENOUS at 15:08

## 2020-12-04 NOTE — PROGRESS NOTES
Subjective     PATIENT WAS CALLED THE DAY BEFORE BY THE OFFICE TO ASK FOR SYMPTOMS THAT COULD BE CONSISTENT WITH CORONAVIRUS INFECTION, AND BEING NEGATIVE WAS SCHEDULED TO BE SEEN IN THE OFFICE TODAY. SIMILAR QUESTIONING TODAY INCLUDING, CHILLS, FEVER, NEW COUGH, SHORTNESS OF BREATH, DIARRHEA,DIFFUSE BODY ACHES  AND CHANGES IN SMELL OR TASTE WERE NEGATIVE.THE PATIENT DENIED ANY CONTACT WITH PERSONS WHO WERE POSITIVE FOR COVID, AND PATIENT IS NOT IN CATEGORY OF HIGH RISK BEHAVIOR TO ACQUIRE COVID.    DURING THE VISIT WITH THE PATIENT TODAY , PATIENT HAD FACE MASK, MY MEDICAL ASSISTANT AND I  HAD PROPPER PROTECTIVE EQUIPMENT, AND I DID HAND HYGIENE WITH SOAP AND WATER BEFORE AND AFTER THE VISIT.     REASON FOR FOLLOW UP:  IRON DEFICIECNY ANEMIA, CHRONIC DIARRHEA, MALNUTRITION, WEIGHT LOSS, ABDOMINAL MASS RUQ      I had the opportunity to see this patient in consultation today along with her daughter, a 80-year-old delightful white female who has developed iron deficiency anemia for the last several months, more documented since the summer of 2019 and having very significant fatigue. Since she underwent upper GI series and barium enema, the patient has not had any day that she has not had at least 6 stools, diarrheic in nature, associated with episodes of incontinence and accidents. She has not had any passage of blood in the stool or mucus. She has minimal abdominal pain. It does not matter what she eats, she has diarrhea. She has diarrhea through the day and through the night. She has had episodes of accidents in the middle of the night when she realizes that she has defecated in her diaper. The patient obviously is afraid of eating anything because she has not found a food that will settle her stomach. She has no nausea, vomiting, distention, jaundice, chills or fever. She has significant degree of fatigue for any activity and her shortness of breath associated with pulmonary fibrosis has become worse obviously  because of the development of her anemia. She depends on oxygen. The patient is sitting in the house all day long not willing to do anything because of the profound fatigue and shortness of breath.     The patient denies any rashes in the skin. No joint pain. No swelling in lower extremities. She has lost muscle mass. She has had cough associated with her pulmonary fibrosis. She relates no hemoptysis. She has occasional palpitations. No evidence of congestive heart failure.  The patient was called this morning by me at home after I reviewed the CT scan report with the radiologist at [facility] yesterday, Dr. Pinto, including the discovery of intussusception in the colon and a mass in that anatomical site that goes along with the clinical documented mass in my physical examination during the original consultation. The patient at this time remains on oxygen. She has fatigue and obviously she is anxious about the phone call and she wants to hear from me along with her daughter what I have to say. She continues having diarrhea. She feels that her abdomen in her own words is empty and she feels afraid of eating anything because she continues having runny bowels and passage of blood in the stool from time to time. She has no distention. No nausea or vomiting. Urination is appropriate. She remains on oxygen due to her pulmonary fibrosis and pulmonary hypertension. She has no swelling in her lower extremities. She is also ready to proceed with Injectafer today in the office to correct her profound iron deficiency anemia.        Past Medical History:   Diagnosis Date   • Benign essential hypertension    • CHF (congestive heart failure) (CMS/HCC)    • Colonic polyp    • Esophageal reflux    • H/O complete eye exam 03/2018   • Hx of bone density study 02/15/2016   • Hyperlipidemia    • Hypothyroidism, acquired    • Major depressive disorder, recurrent episode, in full remission (CMS/HCC)    • Pulmonary fibrosis (CMS/HCC)     • Pulmonary hypertension (CMS/HCC)    • Sleep apnea, obstructive    • Vertigo, peripheral         Past Surgical History:   Procedure Laterality Date   • COLONOSCOPY  2012   • INCONTINENCE SURGERY      Dr. Espinal   • MAMMO BILATERAL      dr de la cruz    • PAP SMEAR      dr de la cruz        Current Outpatient Medications on File Prior to Visit   Medication Sig Dispense Refill   • ADCIRCA 20 MG tablet      • aspirin 81 MG EC tablet      • atorvastatin (LIPITOR) 10 MG tablet Take 1 tablet by mouth Daily. 90 tablet 1   • colesevelam (Welchol) 625 MG tablet Take 3 tablets by mouth 2 (Two) Times a Day With Meals. 84 tablet 0   • desonide (DESOWEN) 0.05 % cream Apply  topically to the appropriate area as directed 2 (Two) Times a Day. 60 g 1   • ESBRIET 267 MG capsule      • famotidine (PEPCID) 20 MG tablet TAKE ONE TABLET BY MOUTH TWICE DAILY 28 tablet 0   • furosemide (LASIX) 40 MG tablet TK 1 T PO QD FOR 2 TO 3 DAYS PRN FOR LEG SWELLING.     • levothyroxine (SYNTHROID, LEVOTHROID) 125 MCG tablet Take 1 tablet by mouth Daily. 90 tablet 1   • meclizine (ANTIVERT) 25 MG tablet Take 25 mg by mouth 3 (three) times a day as needed for dizziness.     • sertraline (ZOLOFT) 50 MG tablet Take 1 tablet by mouth Daily. 90 tablet 1   • UPTRAVI 800 MCG tablet 2 (two) times a day.     • verapamil SR (CALAN-SR) 120 MG CR tablet   3     No current facility-administered medications on file prior to visit.         ALLERGIES:    Allergies   Allergen Reactions   • Sulfa Antibiotics         Social History     Socioeconomic History   • Marital status:      Spouse name: Not on file   • Number of children: Not on file   • Years of education: Not on file   • Highest education level: Not on file   Tobacco Use   • Smoking status: Former Smoker     Packs/day: 2.00     Types: Cigarettes     Start date:      Quit date:      Years since quittin.9   • Smokeless tobacco: Never Used   Substance and Sexual Activity   •  Alcohol use: Yes     Comment: rare   • Drug use: No   • Sexual activity: Never     Partners: Male        Family History   Problem Relation Age of Onset   • Breast cancer Cousin    • Lung cancer Father    • Colon cancer Brother    • Prostate cancer Brother    • Lung disease Brother         Review of Systems       General: no fever, no chills, SEVERE fatigue,NEGATIVE weight changes,  lack of appetite.  Eyes: no epiphora, xerophthalmia,conjunctivitis, pain, glaucoma, blurred vision, blindness, secretion, photophobia, proptosis, diplopia.  Ears: no otorrhea, tinnitus, otorrhagia, deafness, pain, vertigo.  Nose: no rhinorrhea, no epistaxis, no alteration in perception of odors, no sinuses pressure.  Mouth: no alteration in gums or teeth,  No ulcers, no difficulty with mastication or deglut ion, no odynophagia.  Neck: no masses or pain, no thyroid alterations, no pain in muscles or arteries, no carotid odynia, no crepitation.  Respiratory: no cough, no sputum production,no dyspnea,no trepopnea, no pleuritic pain,no hemoptysis.  Heart: no syncope, no irregularity, no palpitations, no angina,no orthopnea,no paroxysmal nocturnal dyspnea.  Vascular Venous: no tenderness,no edema,no palpable cords,no postphlebitic syndrome, no skin changes no ulcerations.  Vascular Arterial: no distal ischemia, noclaudication, no gangrene, no neuropathic ischemic pain, no skin ulcers, no paleness no cyanosis.  GI: no dysphagia, no odynophagia, no regurgitation, no heartburn,no indigestion, nausea,no vomiting,no hematemesis ,no melena,no jaundice,no distention, no obstipation, enterorrhagia,no proctalgia,no anal  lesions, STATED changes in bowel habits.  : no frequency, no hesitancy, no hematuria, no discharge,no  pain.  Musculoskeletal: no muscle or tendon pain or inflammation,no  joint pain, no edema, SEVERE functional limitation,no fasciculations, no mass.  Neurologic: no headache, no seizures, noalterations on Craneal nerves, no motor  "deficit, no sensory deficit, normal coordination, no alteration in memory,normal orientation, calculation,normal writting, verbal and written language.  Skin: no rashes,no pruritus no localized lesions.  Psychiatric: no anxiety, no depression,no agitation, no delusions, proper insight.      Objective     Vitals:    12/04/20 1409   BP: 123/74   Pulse: 76   Resp: 20   Temp: 98.4 °F (36.9 °C)   TempSrc: Temporal   SpO2: 98%   Weight: 65.9 kg (145 lb 3.2 oz)   Height: 157.5 cm (62.01\")   PainSc: 0-No pain     Current Status 12/4/2020   ECOG score 1       Physical Exam  I HAVE PERSONALLY REVIEWED THE HISTORY OF THE PRESENT ILLNESS, PAST MEDICAL HISTORY, FAMILY HISTORY, SOCIAL HISTORY, ALLERGIES, MEDICATIONS STATED ABOVE IN THE OFFICE NOTE FROM TODAY.     Patient screened  for depression based on a PHQ-9 score of 0 on 6/11/2020.       GENERAL:  Well-developed, POORLY nourished  Patient  in no acute distress. ON OXYGEN  SKIN:  Warm, dry ,NO rashes,NO purpura ,NO petechiae.  HEENT:  Pupils were equal and reactive to light and accomodation, conjunctivae noninjected, no pterygium, normal extraocular movements, normal visual acuity.   NECK:  Supple with good range of motion; no thyromegaly or masses, no JVD or bruits, no cervical adenopathies.No carotid artery pain, no carotid abnormal pulsation , NO arterial dance.  LYMPHATICS:  No cervical, NO supraclavicular, NO axillary,NO epitrochlear , NO inguinal adenopathy.  CARDIAC   normal rate and regular rhythm, without murmur,NO rubs NO S3 NO S4 right or left . Normal femoral, popliteal, pedis, brachial and carotid pulses.  VASCULAR ARTERIAL: normal carotids,brachial,radial,femoral,popliteal, pedis pulses , no bruits.no paleness or cyanosis, no pain, no edema, no numbness, no gangrene.  VASCULAR VENOUS: no cyanosis, collateral circulation, varicosities, edema, palpable cords, pain, erythema.  ABDOMEN:  Soft, nontender with no hepatomegaly, no splenomegaly, no ascites, no " collateral circulation,no distention,no Surprise sign, no abdominal pain, no inguinal hernias,no umbilical hernia, no abdominal bruits. Upon deep palpation of her abdomen in the right upper quadrant she has a mass that measures close to 6 cm in diameter, soft, mobile, nontender that is mobilized right to left, up and down with no difficulties. If this represents a bowel loop or a gallbladder that is dilated and descended to this area that is above the umbilical towards the right and diagonal to the right axillae, it is probably representation of gallbladder distention or some other anatomical alteration. I have the belief that her liver is mildly enlarged, nodular, nontender, 2 cm below the right costal margin. There is no ascites. There is no collateral circulation and there is no splenomegaly.  GENITAL: Not  Performed.  EXTREMITIES  AND SPINE:  FINGER  clubbing, NO cyanosis or edema, no deformities or pain .MINIMAL T SPINE kyphosis, NO scoliosis, deformities or pain in spine, ribs or pelvic bone.SARCOPENIA  NEUROLOGICAL:  Patient was awake, alert, oriented to time, person and place.    EXAM REMAINS THE SAME STATED BY STERLING SOUSA MD ON 12/4/20    RECENT LABS:  Hematology WBC   Date Value Ref Range Status   11/24/2020 11.45 (H) 3.40 - 10.80 10*3/mm3 Final   11/04/2020 8.14 3.40 - 10.80 10*3/mm3 Final   03/18/2018 8.6 4.5 - 11.0 10*3/uL Final     RBC   Date Value Ref Range Status   11/24/2020 4.39 3.77 - 5.28 10*6/mm3 Final   11/04/2020 4.33 3.77 - 5.28 10*6/mm3 Final   03/18/2018 4.81 4.0 - 6.0 10*6/uL Final     Hemoglobin   Date Value Ref Range Status   11/24/2020 9.3 (L) 12.0 - 15.9 g/dL Final   03/18/2018 12.8 12.0 - 16.0 g/dL Final     Hematocrit   Date Value Ref Range Status   11/24/2020 31.1 (L) 34.0 - 46.6 % Final   03/18/2018 39.8 33.0 - 51.0 % Final     Platelets   Date Value Ref Range Status   11/24/2020 321 140 - 450 10*3/mm3 Final   03/18/2018 253 150 - 450 10*3/uL Final       CBC:    WBC   Date Value  Ref Range Status   11/24/2020 11.45 (H) 3.40 - 10.80 10*3/mm3 Final   11/04/2020 8.14 3.40 - 10.80 10*3/mm3 Final   03/18/2018 8.6 4.5 - 11.0 10*3/uL Final     RBC   Date Value Ref Range Status   11/24/2020 4.39 3.77 - 5.28 10*6/mm3 Final   11/04/2020 4.33 3.77 - 5.28 10*6/mm3 Final   03/18/2018 4.81 4.0 - 6.0 10*6/uL Final     Hemoglobin   Date Value Ref Range Status   11/24/2020 9.3 (L) 12.0 - 15.9 g/dL Final   03/18/2018 12.8 12.0 - 16.0 g/dL Final     Hematocrit   Date Value Ref Range Status   11/24/2020 31.1 (L) 34.0 - 46.6 % Final   03/18/2018 39.8 33.0 - 51.0 % Final     MCV   Date Value Ref Range Status   11/24/2020 70.8 (L) 79.0 - 97.0 fL Final   03/18/2018 82.7 80.0 - 100.0 fL Final     MCH   Date Value Ref Range Status   11/24/2020 21.2 (L) 26.6 - 33.0 pg Final   03/18/2018 26.5 (L) 27.0 - 31.0 pg Final     MCHC   Date Value Ref Range Status   11/24/2020 29.9 (L) 31.5 - 35.7 g/dL Final   03/18/2018 32.0 32.0 - 37.0 g/dL Final     RDW   Date Value Ref Range Status   11/24/2020 17.1 (H) 12.3 - 15.4 % Final   03/18/2018 12.5 11.6 - 13.7 % Final     RDW-SD   Date Value Ref Range Status   11/24/2020 43.8 37.0 - 54.0 fl Final     MPV   Date Value Ref Range Status   11/24/2020 8.7 6.0 - 12.0 fL Final   03/18/2018 6.0 (L) 7.8 - 11.0 fL Final     Platelets   Date Value Ref Range Status   11/24/2020 321 140 - 450 10*3/mm3 Final   03/18/2018 253 150 - 450 10*3/uL Final     Neutrophil %   Date Value Ref Range Status   11/24/2020 68.6 42.7 - 76.0 % Final     Lymphocyte Rel %   Date Value Ref Range Status   03/18/2018 14.3 (L) 20 - 51 % Final     Lymphocyte %   Date Value Ref Range Status   11/24/2020 20.2 19.6 - 45.3 % Final     Monocyte Rel %   Date Value Ref Range Status   03/18/2018 4.6 0 - 15 % Final     Monocyte %   Date Value Ref Range Status   11/24/2020 7.5 5.0 - 12.0 % Final     Eosinophil %   Date Value Ref Range Status   11/24/2020 1.9 0.3 - 6.2 % Final     Basophil %   Date Value Ref Range Status    11/24/2020 0.6 0.0 - 1.5 % Final     Immature Grans %   Date Value Ref Range Status   11/24/2020 1.2 (H) 0.0 - 0.5 % Final   03/18/2018 81.1 (H) 42 - 75 % Final     Neutrophils, Absolute   Date Value Ref Range Status   11/24/2020 7.85 (H) 1.70 - 7.00 10*3/mm3 Final     Lymphocytes Absolute   Date Value Ref Range Status   03/18/2018 1.20 1.2 - 3.4 10*3/uL Final     Lymphocytes, Absolute   Date Value Ref Range Status   11/24/2020 2.31 0.70 - 3.10 10*3/mm3 Final     Monocytes Absolute   Date Value Ref Range Status   03/18/2018 0.40 0.1 - 0.6 10*3/uL Final     Monocytes, Absolute   Date Value Ref Range Status   11/24/2020 0.86 0.10 - 0.90 10*3/mm3 Final     Eosinophils, Absolute   Date Value Ref Range Status   11/24/2020 0.22 0.00 - 0.40 10*3/mm3 Final     Basophils, Absolute   Date Value Ref Range Status   11/24/2020 0.07 0.00 - 0.20 10*3/mm3 Final     Immature Grans, Absolute   Date Value Ref Range Status   11/24/2020 0.14 (H) 0.00 - 0.05 10*3/mm3 Final   03/18/2018 7.00 (H) 1.4 - 6.5 10*3/uL Final     nRBC   Date Value Ref Range Status   11/24/2020 0.0 0.0 - 0.2 /100 WBC Final        CMP:    Glucose   Date Value Ref Range Status   11/24/2020 114 74 - 124 mg/dL Final     BUN   Date Value Ref Range Status   11/24/2020 11 6 - 20 mg/dL Final     Creatinine   Date Value Ref Range Status   11/24/2020 0.64 0.60 - 1.10 mg/dL Final     Sodium   Date Value Ref Range Status   11/24/2020 140 134 - 145 mmol/L Final     Potassium   Date Value Ref Range Status   11/24/2020 4.0 3.5 - 4.7 mmol/L Final     Chloride   Date Value Ref Range Status   11/24/2020 102 98 - 107 mmol/L Final     CO2   Date Value Ref Range Status   11/24/2020 30.9 (H) 22.0 - 29.0 mmol/L Final     Calcium   Date Value Ref Range Status   11/24/2020 9.1 8.5 - 10.2 mg/dL Final     Total Protein   Date Value Ref Range Status   11/24/2020 5.9 (L) 6.3 - 8.0 g/dL Final     Albumin   Date Value Ref Range Status   11/24/2020 3.30 (L) 3.50 - 5.20 g/dL Final     ALT  (SGPT)   Date Value Ref Range Status   11/24/2020 6 0 - 33 U/L Final     AST (SGOT)   Date Value Ref Range Status   11/24/2020 16 0 - 32 U/L Final     Alkaline Phosphatase   Date Value Ref Range Status   11/24/2020 71 38 - 116 U/L Final     Total Bilirubin   Date Value Ref Range Status   11/24/2020 <0.2 (L) 0.2 - 1.2 mg/dL Final     eGFR  Am   Date Value Ref Range Status   06/05/2020 91 >60 mL/min/1.73 Final     Globulin   Date Value Ref Range Status   11/24/2020 2.6 1.8 - 3.5 gm/dL Final     A/G Ratio   Date Value Ref Range Status   11/24/2020 1.3 1.1 - 2.4 g/dL Final     BUN/Creatinine Ratio   Date Value Ref Range Status   11/24/2020 17.2 7.3 - 30.0 Final     Anion Gap   Date Value Ref Range Status   11/24/2020 7.1 5.0 - 15.0 mmol/L Final               CT ABDOMEN AND PELVIS WITHOUT IV CONTRAST     HISTORY: Anemia, diarrhea, weight loss, right upper quadrant abdominal  mass     TECHNIQUE: Radiation dose reduction techniques were utilized, including  automated exposure control and exposure modulation based on body size.   3 mm images were obtained through the abdomen and pelvis without the  administration of IV contrast.      COMPARISON: Chest CT 05/27/2015     FINDINGS:      ABDOMEN:  There is interstitial fibrosis within the lung bases. There is a  calcification in the dome of the liver. Scattered tiny granulomatous  calcifications elsewhere in the liver. Cholelithiasis. Spleen is  unremarkable. Kidneys are unremarkable. The adrenal glands are  unremarkable. Pancreas is unremarkable.     PELVIS:  There is a focal intussusception of the colon in the region of the  hepatic flexure. There is some mild adjacent inflammation but no  evidence of an obstruction. There is concern for underlying mass and  recommend further evaluation with a colonoscopy. Sigmoid diverticulosis.  Bladder is unremarkable. The bone windows show degenerative changes of  the lumbar spine.     IMPRESSION:  1. There is a focal  intussusception of the colon at the hepatic flexure  with some mild adjacent inflammation but no evidence for an obstruction.  There is concern for an underlying mass. Recommend further evaluation  with a colonoscopy.  2. Cholelithiasis  3. Sigmoid diverticulosis     Findings were discussed with Dr. Lara at 1617 hours on 12/03/2020     Radiation dose reduction techniques were utilized, including automated  exposure control and exposure modulation based on body size.     This report was finalized on 12/3/2020 4:17 PM by Dr. Hugo Pinto M.D.             Assessment/Plan     Diagnoses and all orders for this visit:    1. Weight loss, abnormal (Primary)    2. Pulmonary hypertension (CMS/HCC)    3. Pulmonary fibrosis (CMS/HCC)    4. Mild protein-calorie malnutrition (CMS/HCC)    5. Iron deficiency anemia due to chronic blood loss    6. Chronic diarrhea    7. Right upper quadrant abdominal mass    8. Colonic intussusception (CMS/HCC)     I have reviewed her peripheral blood today. Please review below.     Red blood cell morphology is very striking in the fact that the patient has anisocytosis 2+, poikilocytosis 2+, microcytes 3+ and 3+ hypochromia. There is occasional macroovalocytes. In the white cell morphology there is occasional hypersegmented polys with normal granularity. No blasts or plasma cells in circulation. Platelet count morphology was normal.     In summary this patient has progressive anemia. I documented in the laboratory parameters above progressive drop in the hemoglobin, progressive drop in the MCV. Morphology of peripheral blood is typical of iron deficiency anemia and I would not be surprised if she has an associated B12 or folate deficiency. This patient has had upper GI series and barium enema by GI, Dr. Boyd, and no anatomical alteration has been found in her upper GI tract or colon besides diverticular disease. The symptoms that she has today she blames them, especially the diarrhea, since  the time that she had the barium enema. I think that was not an association. The fact that she has so much diarrhea day and night and with weight loss, profound fatigue, sarcopenia leads me to believe that it is some other entity that is producing this. It sounds like to me she has a malabsorptive process of some sort.     The patient also has a palpable mass in the right upper quadrant and the way how it feels to my hands, it is probably a palpable gallbladder. She had no pain there. This is very striking. Her abdomen is very soft and it is very easy to find.     Under the present circumstances and knowing that the patient cannot tolerate oral iron because it makes her to throw up and produce profuse diarrhea and makes the diarrhea worse, I do believe that the patient needs to have the followin. For the workup for her anemia, she went back to the lab to obtain a comprehensive metabolic profile, ferritin, iron, TIBC, B12, folic acid level, reticulated hemoglobin.   2. Given the fact that the patient has had a TSH that was on the low side maybe her thyroid medication is overcorrecting her and this maybe this is part of the diarrhea process. I will obtain a TSH and a T4.   3. I do believe that the patient needs to have a CT scan of the abdomen and pelvis. I do not think she can handle oral contrast and I do not want to give her IV contrast, I find no need. I want to be sure there is no other anatomical alteration in her small intestine and GI tract that is favoring for her to have all the issues pertinent to her clinical symptoms.     I wonder if she could have some other process in her bowel including inflammatory bowel disease, bacterial overgrowth syndrome, celiac disease and differential diagnosis is more than extensive. This information would require to be shared with Dr. Boyd, her Gastroenterologist, for further review after she returns back in a few days after she proceeds with proper management of her  anemia. Given the fact that she cannot handle oral iron by mouth, absolutely the patient needs to have Injectafer on 2 different occasions and this will be scheduled to start to happen as early as TODAY.   I discussed with the patient on 12/04/2020 after reviewing the case with the radiologist Dr. Pinto at [facility] in regards to her CT scan of the abdomen. This documents intussusception of the colon and there is a mass located in that that probably represents a colon cancer. Obviously, the question is how to proceed with all these issues on this patient. It is very obvious that she has profound iron deficiency anemia. Her ferritin level, iron level are low. She cannot handle oral iron therapy and she will initiate Injectafer today and she will have the 2nd infusion next week. I expect that in 3 weeks she will have very significant rebound in erythropoiesis and I expect that her pulmonary manifestations will be better in regard to her pulmonary hypertension and her pulmonary fibrosis. Typically patient's with this condition develop polycythemia instead of anemia.    The other issue is what we are going to do with her colon. I do not think that the patient is a normal surgical candidate under the present circumstances and we need to try to fix her up in the best way possible to see if she gets to the point to have a resection of this issue. I have made her an appointment to be seen by cardiology, Rambo Dasilva MD, and proceed with an echocardiogram in days. We have also made an appointment for her to see Alfonso Cesar MD, General Surgery to review the case with me.     I am going to place a phone call to talk to Zita Guy MD, the patient's Pulmonologist in regard to her pulmonary situation her ability to go under general anesthesia or local anesthesia.    I also reviewed during the previous visit thyroid function that was normal. I also reviewed B12, folic acid levels that were normal and  sedimentation rate that was normal.    I will review a CEA level next week with her blood draw next week.    I am planning to see the patient back in a week to see where we stand and how to proceed. I think at some point the patient needs to be admitted to the hospital depending on the agreement in all of the team of physicians involved in her care and decide how to proceed in the intussusception and probably contains a mass that is probably associated with colon cancer.    I dicussed all of these facts with the patient and her daughter in the room today.     The discussion of all of the events and review of the radiological and review of the radiological analysis in the PAC system [facility] took over 1 hours and 20 minutes.          I DISCUSSED WITH PATIENT IN DETAIL FORMS TO DECREASE CHANCES OF CORONAVIRUS INFECTION INCLUDING ISOLATION, PROPER HAND HIGIENE, AVOID PUBLIC PLACES  WITH CROWDS, FOLLOW  CDC RECOMENDATIONS, AND KEEP PERSONAL AND SOCIAL RESPONSIBILITY, WARE A MASK IN PUBLIC PLACES.  PATIENT IS AWARE THIS INFECTION COULD HAVE SEVERE CONSEQUENCES TO PERSONAL HEALTH AND FAMILY RAMIFICATIONS OF THIS.

## 2020-12-07 ENCOUNTER — TELEPHONE (OUTPATIENT)
Dept: CARDIOLOGY | Facility: CLINIC | Age: 80
End: 2020-12-07

## 2020-12-07 NOTE — TELEPHONE ENCOUNTER
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

## 2020-12-07 NOTE — TELEPHONE ENCOUNTER
Liliya, will you create a spot on Friday morning at 830AM and have patient come in for a new patient eval with me?    lila urbina

## 2020-12-09 ENCOUNTER — HOSPITAL ENCOUNTER (OUTPATIENT)
Dept: CARDIOLOGY | Facility: HOSPITAL | Age: 80
Discharge: HOME | End: 2020-12-09
Admitting: INTERNAL MEDICINE

## 2020-12-09 VITALS
DIASTOLIC BLOOD PRESSURE: 90 MMHG | SYSTOLIC BLOOD PRESSURE: 140 MMHG | BODY MASS INDEX: 28.13 KG/M2 | OXYGEN SATURATION: 100 % | HEART RATE: 67 BPM | WEIGHT: 149 LBS | HEIGHT: 61 IN

## 2020-12-09 DIAGNOSIS — K52.9 CHRONIC DIARRHEA: ICD-10-CM

## 2020-12-09 DIAGNOSIS — K56.1 COLONIC INTUSSUSCEPTION (HCC): ICD-10-CM

## 2020-12-09 DIAGNOSIS — E44.1 MILD PROTEIN-CALORIE MALNUTRITION (HCC): ICD-10-CM

## 2020-12-09 DIAGNOSIS — D50.0 IRON DEFICIENCY ANEMIA DUE TO CHRONIC BLOOD LOSS: ICD-10-CM

## 2020-12-09 DIAGNOSIS — J84.10 PULMONARY FIBROSIS (HCC): ICD-10-CM

## 2020-12-09 DIAGNOSIS — R19.01 RIGHT UPPER QUADRANT ABDOMINAL MASS: ICD-10-CM

## 2020-12-09 DIAGNOSIS — I27.20 PULMONARY HYPERTENSION (HCC): ICD-10-CM

## 2020-12-09 DIAGNOSIS — R63.4 WEIGHT LOSS, ABNORMAL: ICD-10-CM

## 2020-12-09 LAB
AORTIC ARCH: 2.1 CM
ASCENDING AORTA: 3.1 CM
BH CV ECHO MEAS - ACS: 1.8 CM
BH CV ECHO MEAS - AO ARCH DIAM (PROXIMAL TRANS.): 2.3 CM
BH CV ECHO MEAS - AO MAX PG (FULL): 7.8 MMHG
BH CV ECHO MEAS - AO MAX PG: 11 MMHG
BH CV ECHO MEAS - AO MEAN PG (FULL): 3.8 MMHG
BH CV ECHO MEAS - AO MEAN PG: 5.5 MMHG
BH CV ECHO MEAS - AO ROOT AREA (BSA CORRECTED): 1.8
BH CV ECHO MEAS - AO ROOT AREA: 7 CM^2
BH CV ECHO MEAS - AO ROOT DIAM: 3 CM
BH CV ECHO MEAS - AO V2 MAX: 165.7 CM/SEC
BH CV ECHO MEAS - AO V2 MEAN: 108.1 CM/SEC
BH CV ECHO MEAS - AO V2 VTI: 35.7 CM
BH CV ECHO MEAS - ASC AORTA: 3.1 CM
BH CV ECHO MEAS - AVA(I,A): 1.5 CM^2
BH CV ECHO MEAS - AVA(I,D): 1.5 CM^2
BH CV ECHO MEAS - AVA(V,A): 1.3 CM^2
BH CV ECHO MEAS - AVA(V,D): 1.3 CM^2
BH CV ECHO MEAS - BSA(HAYCOCK): 1.7 M^2
BH CV ECHO MEAS - BSA: 1.7 M^2
BH CV ECHO MEAS - BZI_BMI: 28.2 KILOGRAMS/M^2
BH CV ECHO MEAS - BZI_METRIC_HEIGHT: 154.9 CM
BH CV ECHO MEAS - BZI_METRIC_WEIGHT: 67.6 KG
BH CV ECHO MEAS - EDV(CUBED): 158.1 ML
BH CV ECHO MEAS - EDV(MOD-SP2): 83 ML
BH CV ECHO MEAS - EDV(MOD-SP4): 97 ML
BH CV ECHO MEAS - EDV(TEICH): 141.8 ML
BH CV ECHO MEAS - EF(CUBED): 47.3 %
BH CV ECHO MEAS - EF(MOD-BP): 41 %
BH CV ECHO MEAS - EF(MOD-SP2): 41 %
BH CV ECHO MEAS - EF(MOD-SP4): 41.2 %
BH CV ECHO MEAS - EF(TEICH): 39.2 %
BH CV ECHO MEAS - ESV(CUBED): 83.4 ML
BH CV ECHO MEAS - ESV(MOD-SP2): 49 ML
BH CV ECHO MEAS - ESV(MOD-SP4): 57 ML
BH CV ECHO MEAS - ESV(TEICH): 86.2 ML
BH CV ECHO MEAS - FS: 19.2 %
BH CV ECHO MEAS - IVS/LVPW: 0.91
BH CV ECHO MEAS - IVSD: 0.87 CM
BH CV ECHO MEAS - LA 3D VOL INDEX: 28
BH CV ECHO MEAS - LAT PEAK E' VEL: 8.6 CM/SEC
BH CV ECHO MEAS - LV DIASTOLIC VOL/BSA (35-75): 58.2 ML/M^2
BH CV ECHO MEAS - LV MASS(C)D: 182.2 GRAMS
BH CV ECHO MEAS - LV MASS(C)DI: 109.3 GRAMS/M^2
BH CV ECHO MEAS - LV MAX PG: 3.2 MMHG
BH CV ECHO MEAS - LV MEAN PG: 1.7 MMHG
BH CV ECHO MEAS - LV SYSTOLIC VOL/BSA (12-30): 34.2 ML/M^2
BH CV ECHO MEAS - LV V1 MAX: 89.7 CM/SEC
BH CV ECHO MEAS - LV V1 MEAN: 57.5 CM/SEC
BH CV ECHO MEAS - LV V1 VTI: 21.1 CM
BH CV ECHO MEAS - LVIDD: 5.4 CM
BH CV ECHO MEAS - LVIDS: 4.4 CM
BH CV ECHO MEAS - LVLD AP2: 6.3 CM
BH CV ECHO MEAS - LVLD AP4: 6.8 CM
BH CV ECHO MEAS - LVLS AP2: 5.9 CM
BH CV ECHO MEAS - LVLS AP4: 5.7 CM
BH CV ECHO MEAS - LVOT AREA (M): 2.5 CM^2
BH CV ECHO MEAS - LVOT AREA: 2.5 CM^2
BH CV ECHO MEAS - LVOT DIAM: 1.8 CM
BH CV ECHO MEAS - LVPWD: 0.95 CM
BH CV ECHO MEAS - MED PEAK E' VEL: 7.1 CM/SEC
BH CV ECHO MEAS - MR MAX PG: 106.6 MMHG
BH CV ECHO MEAS - MR MAX VEL: 516.3 CM/SEC
BH CV ECHO MEAS - MV A DUR: 0.11 SEC
BH CV ECHO MEAS - MV A MAX VEL: 126.4 CM/SEC
BH CV ECHO MEAS - MV DEC SLOPE: 281.6 CM/SEC^2
BH CV ECHO MEAS - MV DEC TIME: 0.18 SEC
BH CV ECHO MEAS - MV E MAX VEL: 72.7 CM/SEC
BH CV ECHO MEAS - MV E/A: 0.58
BH CV ECHO MEAS - MV MAX PG: 3.9 MMHG
BH CV ECHO MEAS - MV MEAN PG: 1.9 MMHG
BH CV ECHO MEAS - MV P1/2T MAX VEL: 88.2 CM/SEC
BH CV ECHO MEAS - MV P1/2T: 91.8 MSEC
BH CV ECHO MEAS - MV V2 MAX: 99.1 CM/SEC
BH CV ECHO MEAS - MV V2 MEAN: 66.2 CM/SEC
BH CV ECHO MEAS - MV V2 VTI: 25.5 CM
BH CV ECHO MEAS - MVA P1/2T LCG: 2.5 CM^2
BH CV ECHO MEAS - MVA(P1/2T): 2.4 CM^2
BH CV ECHO MEAS - MVA(VTI): 2.1 CM^2
BH CV ECHO MEAS - PA MAX PG (FULL): 5.5 MMHG
BH CV ECHO MEAS - PA MAX PG: 6.8 MMHG
BH CV ECHO MEAS - PA V2 MAX: 130.3 CM/SEC
BH CV ECHO MEAS - PI END-D VEL: 116.7 CM/SEC
BH CV ECHO MEAS - PULM A REVS DUR: 0.09 SEC
BH CV ECHO MEAS - PULM A REVS VEL: 32.4 CM/SEC
BH CV ECHO MEAS - PULM DIAS VEL: 38.6 CM/SEC
BH CV ECHO MEAS - PULM S/D: 1.4
BH CV ECHO MEAS - PULM SYS VEL: 52.8 CM/SEC
BH CV ECHO MEAS - PVA(V,A): 1.9 CM^2
BH CV ECHO MEAS - PVA(V,D): 1.9 CM^2
BH CV ECHO MEAS - QP/QS: 0.93
BH CV ECHO MEAS - RAP SYSTOLE: 8 MMHG
BH CV ECHO MEAS - RV MAX PG: 1.3 MMHG
BH CV ECHO MEAS - RV MEAN PG: 0.7 MMHG
BH CV ECHO MEAS - RV V1 MAX: 57 CM/SEC
BH CV ECHO MEAS - RV V1 MEAN: 37.7 CM/SEC
BH CV ECHO MEAS - RV V1 VTI: 11.1 CM
BH CV ECHO MEAS - RVOT AREA: 4.4 CM^2
BH CV ECHO MEAS - RVOT DIAM: 2.4 CM
BH CV ECHO MEAS - RVSP: 25.9 MMHG
BH CV ECHO MEAS - SI(AO): 150.5 ML/M^2
BH CV ECHO MEAS - SI(CUBED): 44.8 ML/M^2
BH CV ECHO MEAS - SI(LVOT): 31.5 ML/M^2
BH CV ECHO MEAS - SI(MOD-SP2): 20.4 ML/M^2
BH CV ECHO MEAS - SI(MOD-SP4): 24 ML/M^2
BH CV ECHO MEAS - SI(TEICH): 33.3 ML/M^2
BH CV ECHO MEAS - SUP REN AO DIAM: 1.9 CM
BH CV ECHO MEAS - SV(AO): 250.8 ML
BH CV ECHO MEAS - SV(CUBED): 74.7 ML
BH CV ECHO MEAS - SV(LVOT): 52.4 ML
BH CV ECHO MEAS - SV(MOD-SP2): 34 ML
BH CV ECHO MEAS - SV(MOD-SP4): 40 ML
BH CV ECHO MEAS - SV(RVOT): 49 ML
BH CV ECHO MEAS - SV(TEICH): 55.5 ML
BH CV ECHO MEAS - TAPSE (>1.6): 1.7 CM
BH CV ECHO MEAS - TR MAX VEL: 211.8 CM/SEC
BH CV ECHO MEASUREMENTS AVERAGE E/E' RATIO: 9.26
BH CV XLRA - RV BASE: 3.1 CM
BH CV XLRA - RV LENGTH: 6.8 CM
BH CV XLRA - RV MID: 2 CM
BH CV XLRA - TDI S': 21.1 CM/SEC
LEFT ATRIUM VOLUME: 46 CM3
MAXIMAL PREDICTED HEART RATE: 141 BPM
SINUS: 2.6 CM
STJ: 3.2 CM
STRESS TARGET HR: 120 BPM

## 2020-12-09 PROCEDURE — 93356 MYOCRD STRAIN IMG SPCKL TRCK: CPT | Performed by: INTERNAL MEDICINE

## 2020-12-09 PROCEDURE — 93306 TTE W/DOPPLER COMPLETE: CPT | Performed by: INTERNAL MEDICINE

## 2020-12-09 PROCEDURE — 93306 TTE W/DOPPLER COMPLETE: CPT

## 2020-12-09 PROCEDURE — 93356 MYOCRD STRAIN IMG SPCKL TRCK: CPT

## 2020-12-09 NOTE — PROGRESS NOTES
RM:________    Referral Provider: Emmanuel Lara MD Wheeler, James, MD    NEW PATIENT/ CONSULT  PREVIOUS CARDIOLOGIST:   CARDIAC TESTING:     : 1940   AGE: 80 y.o.    2020  REASON FOR VISIT/  CC:      WT: ____________ BP: __________L __________R/ HR_______________    CHEST PAIN: _____________    SOA: ____________PALPS: __________      LIGHTHEADED: ___________ FATIGUE: _______________ EDEMA______________  ALLERGIES:  Sulfa antibiotics  SMOKING HISTORY  Social History     Tobacco Use   • Smoking status: Former Smoker     Packs/day: 2.00     Types: Cigarettes     Start date:      Quit date:      Years since quittin.9   • Smokeless tobacco: Never Used   Substance Use Topics   • Alcohol use: Yes     Comment: rare   • Drug use: No          CHILDREN: _______________________       CAFFEINE USE________  ALCOHOL _____________  OCCUPATION_____________  Past Surgical History:   Procedure Laterality Date   • COLONOSCOPY  2012   • INCONTINENCE SURGERY      Dr. Espinal   • MAMMO BILATERAL      dr de la cruz    • PAP SMEAR      dr de la cruz                FAMILY HISTORY  HEART DISEASE  CHF  DIABETES  CARDIAC ARREST  STROKE  CANCER  ANEURYSM                                                             H/O: MI_____   STROKE________   GOUT_____   ANEMIA______     CAROTID________ HIV____ CAD_______ HYPERCHOL _____    H/O: CHF _____   RF____ DM___ HTN_______PVD____THYROID DISEASE_______    PMH: GI ____   HEPATITIS ___ KIDNEY DISEASE ___ LUNG DISEASE _______     SLEEP APNEA ____ BLOOD CLOTS ____ DVT ____ VEIN STRIPPING ___________     CANCER _________________________________ CHEMO/ RADIATION__________

## 2020-12-10 ENCOUNTER — LAB (OUTPATIENT)
Dept: LAB | Facility: HOSPITAL | Age: 80
End: 2020-12-10

## 2020-12-10 ENCOUNTER — OFFICE VISIT (OUTPATIENT)
Dept: ONCOLOGY | Facility: CLINIC | Age: 80
End: 2020-12-10

## 2020-12-10 VITALS
OXYGEN SATURATION: 98 % | HEART RATE: 60 BPM | BODY MASS INDEX: 26.33 KG/M2 | TEMPERATURE: 98 F | WEIGHT: 143.1 LBS | HEIGHT: 62 IN | RESPIRATION RATE: 20 BRPM | SYSTOLIC BLOOD PRESSURE: 141 MMHG | DIASTOLIC BLOOD PRESSURE: 62 MMHG

## 2020-12-10 DIAGNOSIS — K56.1 COLONIC INTUSSUSCEPTION (HCC): Primary | ICD-10-CM

## 2020-12-10 DIAGNOSIS — R19.01 RIGHT UPPER QUADRANT ABDOMINAL MASS: ICD-10-CM

## 2020-12-10 DIAGNOSIS — R63.4 WEIGHT LOSS, ABNORMAL: ICD-10-CM

## 2020-12-10 DIAGNOSIS — D50.8 OTHER IRON DEFICIENCY ANEMIA: ICD-10-CM

## 2020-12-10 DIAGNOSIS — E44.1 MILD PROTEIN-CALORIE MALNUTRITION (HCC): ICD-10-CM

## 2020-12-10 DIAGNOSIS — K56.1 COLONIC INTUSSUSCEPTION (HCC): ICD-10-CM

## 2020-12-10 DIAGNOSIS — J84.10 PULMONARY FIBROSIS (HCC): ICD-10-CM

## 2020-12-10 DIAGNOSIS — R97.0 ELEVATED CARCINOEMBRYONIC ANTIGEN (CEA): ICD-10-CM

## 2020-12-10 DIAGNOSIS — D50.0 IRON DEFICIENCY ANEMIA DUE TO CHRONIC BLOOD LOSS: ICD-10-CM

## 2020-12-10 DIAGNOSIS — I27.20 PULMONARY HYPERTENSION (HCC): ICD-10-CM

## 2020-12-10 DIAGNOSIS — K52.9 CHRONIC DIARRHEA: ICD-10-CM

## 2020-12-10 LAB
ALBUMIN SERPL-MCNC: 3.2 G/DL (ref 3.5–5.2)
ALBUMIN/GLOB SERPL: 1.2 G/DL (ref 1.1–2.4)
ALP SERPL-CCNC: 70 U/L (ref 38–116)
ALT SERPL W P-5'-P-CCNC: 7 U/L (ref 0–33)
ANION GAP SERPL CALCULATED.3IONS-SCNC: 5.8 MMOL/L (ref 5–15)
AST SERPL-CCNC: 19 U/L (ref 0–32)
BASOPHILS # BLD AUTO: 0.04 10*3/MM3 (ref 0–0.2)
BASOPHILS NFR BLD AUTO: 0.6 % (ref 0–1.5)
BILIRUB SERPL-MCNC: <0.2 MG/DL (ref 0.2–1.2)
BUN SERPL-MCNC: 11 MG/DL (ref 6–20)
BUN/CREAT SERPL: 15.9 (ref 7.3–30)
CALCIUM SPEC-SCNC: 9.2 MG/DL (ref 8.5–10.2)
CEA SERPL-MCNC: 6.79 NG/ML
CHLORIDE SERPL-SCNC: 102 MMOL/L (ref 98–107)
CO2 SERPL-SCNC: 35.2 MMOL/L (ref 22–29)
CREAT SERPL-MCNC: 0.69 MG/DL (ref 0.6–1.1)
DEPRECATED RDW RBC AUTO: 46.5 FL (ref 37–54)
EOSINOPHIL # BLD AUTO: 0.22 10*3/MM3 (ref 0–0.4)
EOSINOPHIL NFR BLD AUTO: 3.4 % (ref 0.3–6.2)
ERYTHROCYTE [DISTWIDTH] IN BLOOD BY AUTOMATED COUNT: 17.3 % (ref 12.3–15.4)
GFR SERPL CREATININE-BSD FRML MDRD: 82 ML/MIN/1.73
GLOBULIN UR ELPH-MCNC: 2.6 GM/DL (ref 1.8–3.5)
GLUCOSE SERPL-MCNC: 107 MG/DL (ref 74–124)
HCT VFR BLD AUTO: 31.7 % (ref 34–46.6)
HGB BLD-MCNC: 9.1 G/DL (ref 12–15.9)
IMM GRANULOCYTES # BLD AUTO: 0.01 10*3/MM3 (ref 0–0.05)
IMM GRANULOCYTES NFR BLD AUTO: 0.2 % (ref 0–0.5)
LYMPHOCYTES # BLD AUTO: 0.85 10*3/MM3 (ref 0.7–3.1)
LYMPHOCYTES NFR BLD AUTO: 13 % (ref 19.6–45.3)
MCH RBC QN AUTO: 22.1 PG (ref 26.6–33)
MCHC RBC AUTO-ENTMCNC: 28.7 G/DL (ref 31.5–35.7)
MCV RBC AUTO: 76.9 FL (ref 79–97)
MONOCYTES # BLD AUTO: 0.57 10*3/MM3 (ref 0.1–0.9)
MONOCYTES NFR BLD AUTO: 8.7 % (ref 5–12)
NEUTROPHILS NFR BLD AUTO: 4.84 10*3/MM3 (ref 1.7–7)
NEUTROPHILS NFR BLD AUTO: 74.1 % (ref 42.7–76)
NRBC BLD AUTO-RTO: 0 /100 WBC (ref 0–0.2)
PLATELET # BLD AUTO: 200 10*3/MM3 (ref 140–450)
PMV BLD AUTO: 8.4 FL (ref 6–12)
POTASSIUM SERPL-SCNC: 4.2 MMOL/L (ref 3.5–4.7)
PROT SERPL-MCNC: 5.8 G/DL (ref 6.3–8)
RBC # BLD AUTO: 4.12 10*6/MM3 (ref 3.77–5.28)
SODIUM SERPL-SCNC: 143 MMOL/L (ref 134–145)
WBC # BLD AUTO: 6.53 10*3/MM3 (ref 3.4–10.8)

## 2020-12-10 PROCEDURE — 99214 OFFICE O/P EST MOD 30 MIN: CPT | Performed by: INTERNAL MEDICINE

## 2020-12-10 PROCEDURE — 36415 COLL VENOUS BLD VENIPUNCTURE: CPT

## 2020-12-10 PROCEDURE — 80053 COMPREHEN METABOLIC PANEL: CPT

## 2020-12-10 PROCEDURE — 82378 CARCINOEMBRYONIC ANTIGEN: CPT | Performed by: INTERNAL MEDICINE

## 2020-12-10 PROCEDURE — 85025 COMPLETE CBC W/AUTO DIFF WBC: CPT

## 2020-12-10 NOTE — PROGRESS NOTES
Subjective     PATIENT WAS CALLED THE DAY BEFORE BY THE OFFICE TO ASK FOR SYMPTOMS THAT COULD BE CONSISTENT WITH CORONAVIRUS INFECTION, AND BEING NEGATIVE WAS SCHEDULED TO BE SEEN IN THE OFFICE TODAY. SIMILAR QUESTIONING TODAY INCLUDING, CHILLS, FEVER, NEW COUGH, SHORTNESS OF BREATH, DIARRHEA,DIFFUSE BODY ACHES  AND CHANGES IN SMELL OR TASTE WERE NEGATIVE.THE PATIENT DENIED ANY CONTACT WITH PERSONS WHO WERE POSITIVE FOR COVID, AND PATIENT IS NOT IN CATEGORY OF HIGH RISK BEHAVIOR TO ACQUIRE COVID.    DURING THE VISIT WITH THE PATIENT TODAY , PATIENT HAD FACE MASK, MY MEDICAL ASSISTANT AND I  HAD PROPPER PROTECTIVE EQUIPMENT, AND I DID HAND HYGIENE WITH SOAP AND WATER BEFORE AND AFTER THE VISIT.     REASON FOR FOLLOW UP:  IRON DEFICIECNY ANEMIA, CHRONIC DIARRHEA, MALNUTRITION, WEIGHT LOSS, ABDOMINAL MASS RUQ      I had the opportunity to see this patient in consultation  along with her daughter, a 80-year-old delightful white female who has developed iron deficiency anemia for the last several months, more documented since the summer of 2019 and having very significant fatigue. Since she underwent upper GI series and barium enema, the patient has not had any day that she has not had at least 6 stools, diarrheic in nature, associated with episodes of incontinence and accidents. She has not had any passage of blood in the stool or mucus. She has minimal abdominal pain. It does not matter what she eats, she has diarrhea. She has diarrhea through the day and through the night. She has had episodes of accidents in the middle of the night when she realizes that she has defecated in her diaper. The patient obviously is afraid of eating anything because she has not found a food that will settle her stomach. She has no nausea, vomiting, distention, jaundice, chills or fever. She has significant degree of fatigue for any activity and her shortness of breath associated with pulmonary fibrosis has become worse obviously because  of the development of her anemia. She depends on oxygen. The patient is sitting in the house all day long not willing to do anything because of the profound fatigue and shortness of breath.     The patient denies any rashes in the skin. No joint pain. No swelling in lower extremities. She has lost muscle mass. She has had cough associated with her pulmonary fibrosis. She relates no hemoptysis. She has occasional palpitations. No evidence of congestive heart failure.  The patient was called  by me at home after I reviewed the CT scan report with the radiologist at [facility] yesterday, Dr. Pinto, including the discovery of intussusception in the colon and a mass in that anatomical site that goes along with the clinical documented mass in my physical examination during the original consultation. The patient at this time remains on oxygen. She has fatigue and obviously she is anxious about the phone call and she wants to hear from me along with her daughter what I have to say. She continues having diarrhea. She feels that her abdomen in her own words is empty and she feels afraid of eating anything because she continues having runny bowels and passage of blood in the stool from time to time. She has no distention. No nausea or vomiting. Urination is appropriate. She remains on oxygen due to her pulmonary fibrosis and pulmonary hypertension. She has no swelling in her lower extremities. She is also ready to proceed with Injectafer today in the office to correct her profound iron deficiency anemia.    I further reviewed the patient with the daughter in the room on 12/10/2020. The Injectafer infusion last week had no difficulties and she will have another one tomorrow. We had the opportunity to review the case with Zita Guy MD, the patient’s pulmonologist. Please review below. Also had the opportunity to review her most recent echocardiogram done at [facility]. Please review below. The patient now  admits that she is taking Nohelia-Mullins on regular schedule to minimize her abdominal pain and I think part of the GI bleeding is related to this process. As I stated to the patient and daughter today, no more Nohelia-Mullins at all. She has had less diarrhea during the last few days though. The stool remains pigmented most likely because of blood loss.        Past Medical History:   Diagnosis Date   • Benign essential hypertension    • CHF (congestive heart failure) (CMS/HCC)    • Colonic polyp    • Esophageal reflux    • H/O complete eye exam 03/2018   • Hx of bone density study 02/15/2016   • Hyperlipidemia    • Hypothyroidism, acquired    • Major depressive disorder, recurrent episode, in full remission (CMS/HCC)    • Pulmonary fibrosis (CMS/HCC)    • Pulmonary hypertension (CMS/HCC)    • Sleep apnea, obstructive    • Vertigo, peripheral         Past Surgical History:   Procedure Laterality Date   • COLONOSCOPY  02/20/2012   • INCONTINENCE SURGERY      Dr. Espinal   • MAMMO BILATERAL  2017    dr de la cruz    • PAP SMEAR  2014    dr de la cruz        Current Outpatient Medications on File Prior to Visit   Medication Sig Dispense Refill   • ADCIRCA 20 MG tablet      • aspirin 81 MG EC tablet      • atorvastatin (LIPITOR) 10 MG tablet Take 1 tablet by mouth Daily. 90 tablet 1   • colesevelam (Welchol) 625 MG tablet Take 3 tablets by mouth 2 (Two) Times a Day With Meals. 84 tablet 0   • desonide (DESOWEN) 0.05 % cream Apply  topically to the appropriate area as directed 2 (Two) Times a Day. 60 g 1   • ESBRIET 267 MG capsule      • famotidine (PEPCID) 20 MG tablet TAKE ONE TABLET BY MOUTH TWICE DAILY 28 tablet 0   • furosemide (LASIX) 40 MG tablet TK 1 T PO QD FOR 2 TO 3 DAYS PRN FOR LEG SWELLING.     • levothyroxine (SYNTHROID, LEVOTHROID) 125 MCG tablet Take 1 tablet by mouth Daily. 90 tablet 1   • meclizine (ANTIVERT) 25 MG tablet Take 25 mg by mouth 3 (three) times a day as needed for dizziness.     • sertraline  (ZOLOFT) 50 MG tablet Take 1 tablet by mouth Daily. 90 tablet 1   • UPTRAVI 800 MCG tablet 2 (two) times a day.     • verapamil SR (CALAN-SR) 120 MG CR tablet   3     No current facility-administered medications on file prior to visit.         ALLERGIES:    Allergies   Allergen Reactions   • Sulfa Antibiotics         Social History     Socioeconomic History   • Marital status:      Spouse name: Not on file   • Number of children: Not on file   • Years of education: Not on file   • Highest education level: Not on file   Tobacco Use   • Smoking status: Former Smoker     Packs/day: 2.00     Types: Cigarettes     Start date:      Quit date:      Years since quittin.9   • Smokeless tobacco: Never Used   Substance and Sexual Activity   • Alcohol use: Yes     Comment: rare   • Drug use: No   • Sexual activity: Never     Partners: Male        Family History   Problem Relation Age of Onset   • Breast cancer Cousin    • Lung cancer Father    • Colon cancer Brother    • Prostate cancer Brother    • Lung disease Brother         Review of Systems       General: no fever, no chills, SEVERE fatigue,NEGATIVE weight changes,  lack of appetite.  Eyes: no epiphora, xerophthalmia,conjunctivitis, pain, glaucoma, blurred vision, blindness, secretion, photophobia, proptosis, diplopia.  Ears: no otorrhea, tinnitus, otorrhagia, deafness, pain, vertigo.  Nose: no rhinorrhea, no epistaxis, no alteration in perception of odors, no sinuses pressure.  Mouth: no alteration in gums or teeth,  No ulcers, no difficulty with mastication or deglut ion, no odynophagia.  Neck: no masses or pain, no thyroid alterations, no pain in muscles or arteries, no carotid odynia, no crepitation.  Respiratory: no cough, no sputum production,no dyspnea,no trepopnea, no pleuritic pain,no hemoptysis.  Heart: no syncope, no irregularity, no palpitations, no angina,no orthopnea,no paroxysmal nocturnal dyspnea.  Vascular Venous: no tenderness,no  "edema,no palpable cords,no postphlebitic syndrome, no skin changes no ulcerations.  Vascular Arterial: no distal ischemia, noclaudication, no gangrene, no neuropathic ischemic pain, no skin ulcers, no paleness no cyanosis.  GI: no dysphagia, no odynophagia, no regurgitation, no heartburn,no indigestion, nausea,no vomiting,no hematemesis ,no melena,no jaundice,no distention, no obstipation, enterorrhagia,no proctalgia,no anal  lesions, STATED changes in bowel habits.  : no frequency, no hesitancy, no hematuria, no discharge,no  pain.  Musculoskeletal: no muscle or tendon pain or inflammation,no  joint pain, no edema, SEVERE functional limitation,no fasciculations, no mass.  Neurologic: no headache, no seizures, noalterations on Craneal nerves, no motor deficit, no sensory deficit, normal coordination, no alteration in memory,normal orientation, calculation,normal writting, verbal and written language.  Skin: no rashes,no pruritus no localized lesions.  Psychiatric: no anxiety, no depression,no agitation, no delusions, proper insight.  REVIEWED BY STERLING SOUSA 12/10/20 REMAINS THE SAME AS ABOVE    Objective     Vitals:    12/10/20 1500   BP: 141/62   Pulse: 60   Resp: 20   Temp: 98 °F (36.7 °C)   TempSrc: Temporal   SpO2: 98%  Comment: with oxygen   Weight: 64.9 kg (143 lb 1.6 oz)   Height: 157.5 cm (62.01\")   PainSc: 0-No pain     Current Status 12/10/2020   ECOG score 1       Physical Exam  I HAVE PERSONALLY REVIEWED THE HISTORY OF THE PRESENT ILLNESS, PAST MEDICAL HISTORY, FAMILY HISTORY, SOCIAL HISTORY, ALLERGIES, MEDICATIONS STATED ABOVE IN THE OFFICE NOTE FROM TODAY.     Patient screened  for depression based on a PHQ-9 score of 0 on 6/11/2020.       GENERAL:  Well-developed, POORLY nourished  Patient  in no acute distress. ON OXYGEN  SKIN:  Warm, dry ,NO rashes,NO purpura ,NO petechiae.  HEENT:  Pupils were equal and reactive to light and accomodation, conjunctivae noninjected, no pterygium, normal " extraocular movements, normal visual acuity.   NECK:  Supple with good range of motion; no thyromegaly or masses, no JVD or bruits, no cervical adenopathies.No carotid artery pain, no carotid abnormal pulsation , NO arterial dance.  LYMPHATICS:  No cervical, NO supraclavicular, NO axillary,NO epitrochlear , NO inguinal adenopathy.  CARDIAC   normal rate and regular rhythm, without murmur,NO rubs NO S3 NO S4 right or left . Normal femoral, popliteal, pedis, brachial and carotid pulses.  VASCULAR ARTERIAL: normal carotids,brachial,radial,femoral,popliteal, pedis pulses , no bruits.no paleness or cyanosis, no pain, no edema, no numbness, no gangrene.  VASCULAR VENOUS: no cyanosis, collateral circulation, varicosities, edema, palpable cords, pain, erythema.  ABDOMEN:  Soft, nontender with no hepatomegaly, no splenomegaly, no ascites, no collateral circulation,no distention,no Weldon sign, no abdominal pain, no inguinal hernias,no umbilical hernia, no abdominal bruits. Upon deep palpation of her abdomen in the right upper quadrant she has a mass that measures close to 6 cm in diameter, soft, mobile, nontender that is mobilized right to left, up and down with no difficulties. If this represents a bowel loop or a gallbladder that is dilated and descended to this area that is above the umbilical towards the right and diagonal to the right axillae, it is probably representation of gallbladder distention or some other anatomical alteration. I have the belief that her liver is mildly enlarged, nodular, nontender, 2 cm below the right costal margin. There is no ascites. There is no collateral circulation and there is no splenomegaly.  GENITAL: Not  Performed.  EXTREMITIES  AND SPINE:  FINGER  clubbing, NO cyanosis or edema, no deformities or pain .MINIMAL T SPINE kyphosis, NO scoliosis, deformities or pain in spine, ribs or pelvic bone.SARCOPENIA  NEUROLOGICAL:  Patient was awake, alert, oriented to time, person and  place.    EXAM REMAINS THE SAME STATED BY STERLING SOUSA MD ON 12/10/20    RECENT LABS:  Hematology WBC   Date Value Ref Range Status   12/10/2020 6.53 3.40 - 10.80 10*3/mm3 Final   11/04/2020 8.14 3.40 - 10.80 10*3/mm3 Final   03/18/2018 8.6 4.5 - 11.0 10*3/uL Final     RBC   Date Value Ref Range Status   12/10/2020 4.12 3.77 - 5.28 10*6/mm3 Final   11/04/2020 4.33 3.77 - 5.28 10*6/mm3 Final   03/18/2018 4.81 4.0 - 6.0 10*6/uL Final     Hemoglobin   Date Value Ref Range Status   12/10/2020 9.1 (L) 12.0 - 15.9 g/dL Final   03/18/2018 12.8 12.0 - 16.0 g/dL Final     Hematocrit   Date Value Ref Range Status   12/10/2020 31.7 (L) 34.0 - 46.6 % Final   03/18/2018 39.8 33.0 - 51.0 % Final     Platelets   Date Value Ref Range Status   12/10/2020 200 140 - 450 10*3/mm3 Final   03/18/2018 253 150 - 450 10*3/uL Final       CBC:    WBC   Date Value Ref Range Status   12/10/2020 6.53 3.40 - 10.80 10*3/mm3 Final   11/04/2020 8.14 3.40 - 10.80 10*3/mm3 Final   03/18/2018 8.6 4.5 - 11.0 10*3/uL Final     RBC   Date Value Ref Range Status   12/10/2020 4.12 3.77 - 5.28 10*6/mm3 Final   11/04/2020 4.33 3.77 - 5.28 10*6/mm3 Final   03/18/2018 4.81 4.0 - 6.0 10*6/uL Final     Hemoglobin   Date Value Ref Range Status   12/10/2020 9.1 (L) 12.0 - 15.9 g/dL Final   03/18/2018 12.8 12.0 - 16.0 g/dL Final     Hematocrit   Date Value Ref Range Status   12/10/2020 31.7 (L) 34.0 - 46.6 % Final   03/18/2018 39.8 33.0 - 51.0 % Final     MCV   Date Value Ref Range Status   12/10/2020 76.9 (L) 79.0 - 97.0 fL Final   03/18/2018 82.7 80.0 - 100.0 fL Final     MCH   Date Value Ref Range Status   12/10/2020 22.1 (L) 26.6 - 33.0 pg Final   03/18/2018 26.5 (L) 27.0 - 31.0 pg Final     MCHC   Date Value Ref Range Status   12/10/2020 28.7 (L) 31.5 - 35.7 g/dL Final   03/18/2018 32.0 32.0 - 37.0 g/dL Final     RDW   Date Value Ref Range Status   12/10/2020 17.3 (H) 12.3 - 15.4 % Final   03/18/2018 12.5 11.6 - 13.7 % Final     RDW-SD   Date Value Ref Range  Status   12/10/2020 46.5 37.0 - 54.0 fl Final     MPV   Date Value Ref Range Status   12/10/2020 8.4 6.0 - 12.0 fL Final   03/18/2018 6.0 (L) 7.8 - 11.0 fL Final     Platelets   Date Value Ref Range Status   12/10/2020 200 140 - 450 10*3/mm3 Final   03/18/2018 253 150 - 450 10*3/uL Final     Neutrophil %   Date Value Ref Range Status   12/10/2020 74.1 42.7 - 76.0 % Final     Lymphocyte Rel %   Date Value Ref Range Status   03/18/2018 14.3 (L) 20 - 51 % Final     Lymphocyte %   Date Value Ref Range Status   12/10/2020 13.0 (L) 19.6 - 45.3 % Final     Monocyte Rel %   Date Value Ref Range Status   03/18/2018 4.6 0 - 15 % Final     Monocyte %   Date Value Ref Range Status   12/10/2020 8.7 5.0 - 12.0 % Final     Eosinophil %   Date Value Ref Range Status   12/10/2020 3.4 0.3 - 6.2 % Final     Basophil %   Date Value Ref Range Status   12/10/2020 0.6 0.0 - 1.5 % Final     Immature Grans %   Date Value Ref Range Status   12/10/2020 0.2 0.0 - 0.5 % Final   03/18/2018 81.1 (H) 42 - 75 % Final     Neutrophils, Absolute   Date Value Ref Range Status   12/10/2020 4.84 1.70 - 7.00 10*3/mm3 Final     Lymphocytes Absolute   Date Value Ref Range Status   03/18/2018 1.20 1.2 - 3.4 10*3/uL Final     Lymphocytes, Absolute   Date Value Ref Range Status   12/10/2020 0.85 0.70 - 3.10 10*3/mm3 Final     Monocytes Absolute   Date Value Ref Range Status   03/18/2018 0.40 0.1 - 0.6 10*3/uL Final     Monocytes, Absolute   Date Value Ref Range Status   12/10/2020 0.57 0.10 - 0.90 10*3/mm3 Final     Eosinophils, Absolute   Date Value Ref Range Status   12/10/2020 0.22 0.00 - 0.40 10*3/mm3 Final     Basophils, Absolute   Date Value Ref Range Status   12/10/2020 0.04 0.00 - 0.20 10*3/mm3 Final     Immature Grans, Absolute   Date Value Ref Range Status   12/10/2020 0.01 0.00 - 0.05 10*3/mm3 Final   03/18/2018 7.00 (H) 1.4 - 6.5 10*3/uL Final     nRBC   Date Value Ref Range Status   12/10/2020 0.0 0.0 - 0.2 /100 WBC Final        CMP:    Glucose    Date Value Ref Range Status   12/10/2020 107 74 - 124 mg/dL Final     BUN   Date Value Ref Range Status   12/10/2020 11 6 - 20 mg/dL Final     Creatinine   Date Value Ref Range Status   12/10/2020 0.69 0.60 - 1.10 mg/dL Final     Sodium   Date Value Ref Range Status   12/10/2020 143 134 - 145 mmol/L Final     Potassium   Date Value Ref Range Status   12/10/2020 4.2 3.5 - 4.7 mmol/L Final     Chloride   Date Value Ref Range Status   12/10/2020 102 98 - 107 mmol/L Final     CO2   Date Value Ref Range Status   12/10/2020 35.2 (H) 22.0 - 29.0 mmol/L Final     Calcium   Date Value Ref Range Status   12/10/2020 9.2 8.5 - 10.2 mg/dL Final     Total Protein   Date Value Ref Range Status   12/10/2020 5.8 (L) 6.3 - 8.0 g/dL Final     Albumin   Date Value Ref Range Status   12/10/2020 3.20 (L) 3.50 - 5.20 g/dL Final     ALT (SGPT)   Date Value Ref Range Status   12/10/2020 7 0 - 33 U/L Final     AST (SGOT)   Date Value Ref Range Status   12/10/2020 19 0 - 32 U/L Final     Alkaline Phosphatase   Date Value Ref Range Status   12/10/2020 70 38 - 116 U/L Final     Total Bilirubin   Date Value Ref Range Status   12/10/2020 <0.2 (L) 0.2 - 1.2 mg/dL Final     eGFR  Am   Date Value Ref Range Status   06/05/2020 91 >60 mL/min/1.73 Final     Globulin   Date Value Ref Range Status   12/10/2020 2.6 1.8 - 3.5 gm/dL Final     A/G Ratio   Date Value Ref Range Status   12/10/2020 1.2 1.1 - 2.4 g/dL Final     BUN/Creatinine Ratio   Date Value Ref Range Status   12/10/2020 15.9 7.3 - 30.0 Final     Anion Gap   Date Value Ref Range Status   12/10/2020 5.8 5.0 - 15.0 mmol/L Final               CT ABDOMEN AND PELVIS WITHOUT IV CONTRAST     HISTORY: Anemia, diarrhea, weight loss, right upper quadrant abdominal  mass     TECHNIQUE: Radiation dose reduction techniques were utilized, including  automated exposure control and exposure modulation based on body size.   3 mm images were obtained through the abdomen and pelvis without  the  administration of IV contrast.      COMPARISON: Chest CT 05/27/2015     FINDINGS:      ABDOMEN:  There is interstitial fibrosis within the lung bases. There is a  calcification in the dome of the liver. Scattered tiny granulomatous  calcifications elsewhere in the liver. Cholelithiasis. Spleen is  unremarkable. Kidneys are unremarkable. The adrenal glands are  unremarkable. Pancreas is unremarkable.     PELVIS:  There is a focal intussusception of the colon in the region of the  hepatic flexure. There is some mild adjacent inflammation but no  evidence of an obstruction. There is concern for underlying mass and  recommend further evaluation with a colonoscopy. Sigmoid diverticulosis.  Bladder is unremarkable. The bone windows show degenerative changes of  the lumbar spine.     IMPRESSION:  1. There is a focal intussusception of the colon at the hepatic flexure  with some mild adjacent inflammation but no evidence for an obstruction.  There is concern for an underlying mass. Recommend further evaluation  with a colonoscopy.  2. Cholelithiasis  3. Sigmoid diverticulosis     Findings were discussed with Dr. Lara at 1617 hours on 12/03/2020     Radiation dose reduction techniques were utilized, including automated  exposure control and exposure modulation based on body size.     This report was finalized on 12/3/2020 4:17 PM by Dr. Hugo Pinto M.D.     Interpretation Summary ECHOCARDIOGRAM    · Calculated left ventricular EF = 41% Estimated left ventricular EF was in disagreement with the calculated left ventricular EF. Left ventricular ejection fraction appears to be 46 - 50%. Left ventricular systolic function is low normal. Normal global longitudinal LV strain (GLS) = -20.1%. Left ventricle strain data was reviewed by the physician. Strain images are foreshortened Normal left ventricular cavity size and wall thickness noted. All left ventricular wall segments contract normally. Left ventricular diastolic  function is consistent with (grade I) impaired relaxation.  · The left atrial cavity is mildly dilated.  · Estimated right ventricular systolic pressure from tricuspid regurgitation is normal (<35 mmHg).                Assessment/Plan     Diagnoses and all orders for this visit:    1. Colonic intussusception (CMS/HCC) (Primary)  -     CBC & Differential; Future  -     CBC & Differential; Future  -     CBC & Differential; Future  -     Comprehensive Metabolic Panel; Future  -     Ferritin; Future  -     Iron Profile; Future  -     Retic With IRF & RET-He; Future  -     Ambulatory Referral to Pulmonology    2. Other iron deficiency anemia  -     CBC & Differential; Future  -     CBC & Differential; Future  -     CBC & Differential; Future  -     Comprehensive Metabolic Panel; Future  -     Ferritin; Future  -     Iron Profile; Future  -     Retic With IRF & RET-He; Future  -     Ambulatory Referral to Pulmonology    3. Pulmonary hypertension (CMS/HCC)  -     CBC & Differential; Future  -     CBC & Differential; Future  -     CBC & Differential; Future  -     Comprehensive Metabolic Panel; Future  -     Ferritin; Future  -     Iron Profile; Future  -     Retic With IRF & RET-He; Future  -     Ambulatory Referral to Pulmonology    4. Weight loss, abnormal  -     CBC & Differential; Future  -     CBC & Differential; Future  -     CBC & Differential; Future  -     Comprehensive Metabolic Panel; Future  -     Ferritin; Future  -     Iron Profile; Future  -     Retic With IRF & RET-He; Future  -     Ambulatory Referral to Pulmonology    5. Chronic diarrhea  -     CBC & Differential; Future  -     CBC & Differential; Future  -     CBC & Differential; Future  -     Comprehensive Metabolic Panel; Future  -     Ferritin; Future  -     Iron Profile; Future  -     Retic With IRF & RET-He; Future  -     Ambulatory Referral to Pulmonology    6. Pulmonary fibrosis (CMS/HCC)  -     CBC & Differential; Future  -     CBC &  Differential; Future  -     CBC & Differential; Future  -     Comprehensive Metabolic Panel; Future  -     Ferritin; Future  -     Iron Profile; Future  -     Retic With IRF & RET-He; Future  -     Ambulatory Referral to Pulmonology    7. Right upper quadrant abdominal mass  -     CBC & Differential; Future  -     CBC & Differential; Future  -     CBC & Differential; Future  -     Comprehensive Metabolic Panel; Future  -     Ferritin; Future  -     Iron Profile; Future  -     Retic With IRF & RET-He; Future  -     Ambulatory Referral to Pulmonology     I have reviewed her peripheral blood today. Please review below.     Red blood cell morphology is very striking in the fact that the patient has anisocytosis 2+, poikilocytosis 2+, microcytes 3+ and 3+ hypochromia. There is occasional macroovalocytes. In the white cell morphology there is occasional hypersegmented polys with normal granularity. No blasts or plasma cells in circulation. Platelet count morphology was normal.     In summary this patient has progressive anemia. I documented in the laboratory parameters above progressive drop in the hemoglobin, progressive drop in the MCV. Morphology of peripheral blood is typical of iron deficiency anemia and I would not be surprised if she has an associated B12 or folate deficiency. This patient has had upper GI series and barium enema by GI, Dr. Boyd, and no anatomical alteration has been found in her upper GI tract or colon besides diverticular disease. The symptoms that she has today she blames them, especially the diarrhea, since the time that she had the barium enema. I think that was not an association. The fact that she has so much diarrhea day and night and with weight loss, profound fatigue, sarcopenia leads me to believe that it is some other entity that is producing this. It sounds like to me she has a malabsorptive process of some sort.     The patient also has a palpable mass in the right upper quadrant  and the way how it feels to my hands, it is probably a palpable gallbladder. She had no pain there. This is very striking. Her abdomen is very soft and it is very easy to find.     Under the present circumstances and knowing that the patient cannot tolerate oral iron because it makes her to throw up and produce profuse diarrhea and makes the diarrhea worse, I do believe that the patient needs to have the followin. For the workup for her anemia, she went back to the lab to obtain a comprehensive metabolic profile, ferritin, iron, TIBC, B12, folic acid level, reticulated hemoglobin.   2. Given the fact that the patient has had a TSH that was on the low side maybe her thyroid medication is overcorrecting her and this maybe this is part of the diarrhea process. I will obtain a TSH and a T4.   3. I do believe that the patient needs to have a CT scan of the abdomen and pelvis. I do not think she can handle oral contrast and I do not want to give her IV contrast, I find no need. I want to be sure there is no other anatomical alteration in her small intestine and GI tract that is favoring for her to have all the issues pertinent to her clinical symptoms.     I wonder if she could have some other process in her bowel including inflammatory bowel disease, bacterial overgrowth syndrome, celiac disease and differential diagnosis is more than extensive. This information would require to be shared with Dr. Boyd, her Gastroenterologist, for further review after she returns back in a few days after she proceeds with proper management of her anemia. Given the fact that she cannot handle oral iron by mouth, absolutely the patient needs to have Injectafer on 2 different occasions and this will be scheduled to start to happen as early as TODAY.   I discussed with the patient on 2020 after reviewing the case with the radiologist Dr. Pinto at [facility] in regards to her CT scan of the abdomen. This documents  intussusception of the colon and there is a mass located in that that probably represents a colon cancer. Obviously, the question is how to proceed with all these issues on this patient. It is very obvious that she has profound iron deficiency anemia. Her ferritin level, iron level are low. She cannot handle oral iron therapy and she will initiate Injectafer today and she will have the 2nd infusion next week. I expect that in 3 weeks she will have very significant rebound in erythropoiesis and I expect that her pulmonary manifestations will be better in regard to her pulmonary hypertension and her pulmonary fibrosis. Typically patient's with this condition develop polycythemia instead of anemia.    The other issue is what we are going to do with her colon. I do not think that the patient is a normal surgical candidate under the present circumstances and we need to try to fix her up in the best way possible to see if she gets to the point to have a resection of this issue. I have made her an appointment to be seen by cardiology, Rambo Dasilva MD, and proceed with an echocardiogram in days. We have also made an appointment for her to see Alfonso Cesar MD, General Surgery to review the case with me.       I reviewed the patient back with the daughter in the room on 12/10/2020. In the interim, I have had a conversation with Zita Guy MD, the patient’s pulmonologist, who strongly believes that the patient will be able to go through surgery as long as she has proper pulmonary assessment before intervention and she asked me to go ahead and consult Pulmonary Medicine, Dr. Ramos and associates for this purpose. A consult has been placed.     I reviewed her echocardiogram and actually it is better than I expected for somebody who has pulmonary fibrosis. The patient is going to be seen by Dr. Dasilva tomorrow.     In summary I think the patient’s intussusception is producing bleeding, chronically, leading to iron deficiency  anemia. She has had Injectafer a week ago, another one to be given to her tomorrow and thereafter she will require blood work on weekly basis. If the hemoglobin goes below 9, I think she will require transfusion of 2 units of red cells. She will have weekly appointment for CBC and nurse check and I will review her back on 12/30/2020.     I EMPHATICALLY POINTED OUT TO THE PATIENT’S DAUGHTER SHE CANNOT TAKE ANYMORE ZULMA-SELTZER. SHE HAS BEEN TAKING THIS ON A REGULAR SCHEDULE AND THIS IS PRODUCING PROBABLY ALSO AN ELEMENT OF GI BLEEDING ASSOCIATED WITH HER INTUSSUSCEPTION AND WHY NOT GASTRITIS AND SO FORTH.     I advised the patient to remain on her oxygen.     I placed a consult to be seen by Pulmonary Medicine, Dr. Ramos, as stated above.     I will review her back on 12/30/2020. The patient will be seen by Dr. Cesar on 12/15/2020 and it will be up to him when to schedule surgery if he thinks that is the way to go.     I discussed all these facts with the patient and her daughter in the room.          I DISCUSSED WITH PATIENT IN DETAIL FORMS TO DECREASE CHANCES OF CORONAVIRUS INFECTION INCLUDING ISOLATION, PROPER HAND HIGIENE, AVOID PUBLIC PLACES  WITH CROWDS, FOLLOW  CDC RECOMENDATIONS, AND KEEP PERSONAL AND SOCIAL RESPONSIBILITY, WARE A MASK IN PUBLIC PLACES.  PATIENT IS AWARE THIS INFECTION COULD HAVE SEVERE CONSEQUENCES TO PERSONAL HEALTH AND FAMILY RAMIFICATIONS OF THIS.

## 2020-12-11 ENCOUNTER — OFFICE VISIT (OUTPATIENT)
Dept: CARDIOLOGY | Facility: CLINIC | Age: 80
End: 2020-12-11

## 2020-12-11 ENCOUNTER — INFUSION (OUTPATIENT)
Dept: ONCOLOGY | Facility: HOSPITAL | Age: 80
End: 2020-12-11

## 2020-12-11 VITALS
SYSTOLIC BLOOD PRESSURE: 132 MMHG | RESPIRATION RATE: 16 BRPM | BODY MASS INDEX: 26.68 KG/M2 | HEART RATE: 64 BPM | HEIGHT: 62 IN | WEIGHT: 145 LBS | OXYGEN SATURATION: 98 % | DIASTOLIC BLOOD PRESSURE: 84 MMHG

## 2020-12-11 VITALS
DIASTOLIC BLOOD PRESSURE: 60 MMHG | HEART RATE: 59 BPM | TEMPERATURE: 96.4 F | OXYGEN SATURATION: 99 % | SYSTOLIC BLOOD PRESSURE: 145 MMHG

## 2020-12-11 DIAGNOSIS — J84.10 PULMONARY FIBROSIS (HCC): ICD-10-CM

## 2020-12-11 DIAGNOSIS — I10 ESSENTIAL HYPERTENSION: ICD-10-CM

## 2020-12-11 DIAGNOSIS — I27.20 PULMONARY HYPERTENSION (HCC): ICD-10-CM

## 2020-12-11 DIAGNOSIS — D50.8 OTHER IRON DEFICIENCY ANEMIA: Primary | ICD-10-CM

## 2020-12-11 DIAGNOSIS — I49.1 PREMATURE ATRIAL COMPLEXES: ICD-10-CM

## 2020-12-11 DIAGNOSIS — D50.0 IRON DEFICIENCY ANEMIA DUE TO CHRONIC BLOOD LOSS: ICD-10-CM

## 2020-12-11 DIAGNOSIS — K90.9 MALABSORPTION OF IRON: ICD-10-CM

## 2020-12-11 DIAGNOSIS — Z01.810 PREOP CARDIOVASCULAR EXAM: Primary | ICD-10-CM

## 2020-12-11 PROCEDURE — 93000 ELECTROCARDIOGRAM COMPLETE: CPT | Performed by: INTERNAL MEDICINE

## 2020-12-11 PROCEDURE — 63710000001 PROCHLORPERAZINE MALEATE PER 10 MG: Performed by: INTERNAL MEDICINE

## 2020-12-11 PROCEDURE — 96367 TX/PROPH/DG ADDL SEQ IV INF: CPT

## 2020-12-11 PROCEDURE — 99214 OFFICE O/P EST MOD 30 MIN: CPT | Performed by: INTERNAL MEDICINE

## 2020-12-11 PROCEDURE — 25010000002 FERRIC CARBOXYMALTOSE 750 MG/15ML SOLUTION 15 ML VIAL: Performed by: INTERNAL MEDICINE

## 2020-12-11 PROCEDURE — 96374 THER/PROPH/DIAG INJ IV PUSH: CPT

## 2020-12-11 PROCEDURE — 96365 THER/PROPH/DIAG IV INF INIT: CPT

## 2020-12-11 RX ORDER — PROCHLORPERAZINE MALEATE 10 MG
10 TABLET ORAL ONCE
Status: COMPLETED | OUTPATIENT
Start: 2020-12-11 | End: 2020-12-11

## 2020-12-11 RX ORDER — SODIUM CHLORIDE 9 MG/ML
250 INJECTION, SOLUTION INTRAVENOUS ONCE
Status: COMPLETED | OUTPATIENT
Start: 2020-12-11 | End: 2020-12-11

## 2020-12-11 RX ADMIN — SODIUM CHLORIDE 250 ML: 9 INJECTION, SOLUTION INTRAVENOUS at 15:02

## 2020-12-11 RX ADMIN — PROCHLORPERAZINE MALEATE 10 MG: 10 TABLET ORAL at 14:43

## 2020-12-11 RX ADMIN — FERRIC CARBOXYMALTOSE INJECTION 750 MG: 50 INJECTION, SOLUTION INTRAVENOUS at 15:02

## 2020-12-11 NOTE — PROGRESS NOTES
"Date of Office Visit: 20  Encounter Provider: Rambo Dasilva MD  Place of Service: [facility] CARDIOLOGY  Patient Name: Madalyn Galeas  :1940    Chief Complaint   Patient presents with   • Congestive Heart Failure     New patient consult    :     HPI:     Ms. Galeas is 80 y.o. and presents today at the request of Dr Lara for procedural risk evaluation.    She was seen by a cardiologist at [place] in  for \"diastolic congestive heart failure.\"  However, her proBNP was completely normal (<30) and it was noted that she was on furosemide for her lung disease/PHTN and not really CHF.     She has a long history of pulmonary fibrosis and a history of PHTN.  She is on pirfenidone for ILD.  She is on tadalafil for PHTN, and she is oxygen dependent.  She was on selexipag for PHTN but stopped it recently due to diarrhea. She had a right heart cath in  that reported \"mild-moderate pulmonary hypertension,\" but I don't have any numeric values.  She has regular echocardiograms at [place]; I can review the reports but not the images.  The last study there was in 2020 and reported an LVEF of 60%, mild left atrial dilation, and normal RVSP (30-35 mm Hg).      She had an echo in our office last week.  LV systolic function was a bit difficult to assess as she was having a lot of ectopy; however, her LVEF appeared to be low-normal/borderline reduced, 45-50%.  Her RVSP was also normal on that study.    She has undergone evaluation for her severe diarrhea.  Her prior pulmonologist felt that she was too high risk for a colonoscopy, so she had a VCE and a barium enema.  She then saw Dr Lara for anemia, and he ordered a CT scan.  This showed colonic intussusception and a suspected mass.  She has been referred to Dr Cesar.  She has also been referred to [facility] but does not have an appointment with them yet.    She is chronically short of breath due to her lung " disease.  She denies chest pain, orthopnea, leg swelling, palpitations, lightheadedness, or syncope. Despite being on oxygen, she still ambulates on her own and at a good pace.     Past Medical History:   Diagnosis Date   • Colonic polyp    • Esophageal reflux    • Essential hypertension    • H/O complete eye exam 2018   • Hx of bone density study 02/15/2016   • Hyperlipidemia    • Hypothyroidism, acquired    • Major depressive disorder, recurrent episode, in full remission (CMS/HCC)    • Pulmonary fibrosis (CMS/HCC)    • Pulmonary hypertension (CMS/HCC)    • Sleep apnea, obstructive    • Vertigo, peripheral        Past Surgical History:   Procedure Laterality Date   • COLONOSCOPY  2012   • INCONTINENCE SURGERY      Dr. Espinal   • MAMMO BILATERAL      dr de la cruz    • PAP SMEAR      dr de la cruz       Social History     Socioeconomic History   • Marital status:      Spouse name: Not on file   • Number of children: Not on file   • Years of education: Not on file   • Highest education level: Not on file   Tobacco Use   • Smoking status: Former Smoker     Packs/day: 2.00     Types: Cigarettes     Start date:      Quit date:      Years since quittin.9   • Smokeless tobacco: Never Used   • Tobacco comment: Caffeine use: Drinks Coca Cola    Substance and Sexual Activity   • Alcohol use: Yes     Comment: rare   • Drug use: No   • Sexual activity: Never     Partners: Male       Family History   Problem Relation Age of Onset   • Breast cancer Cousin    • Lung cancer Father    • Colon cancer Brother    • Prostate cancer Brother    • Lung disease Brother        Review of Systems   Constitution: Positive for decreased appetite and weight loss.   HENT: Positive for hearing loss.    Cardiovascular: Positive for dyspnea on exertion.   Gastrointestinal: Positive for abdominal pain and diarrhea.   All other systems reviewed and are negative.      Allergies   Allergen Reactions   • Sulfa  "Antibiotics          Current Outpatient Medications:   •  ADCIRCA 20 MG tablet, , Disp: , Rfl:   •  atorvastatin (LIPITOR) 10 MG tablet, Take 1 tablet by mouth Daily., Disp: 90 tablet, Rfl: 1  •  ESBRIET 267 MG capsule, , Disp: , Rfl:   •  famotidine (PEPCID) 20 MG tablet, TAKE ONE TABLET BY MOUTH TWICE DAILY, Disp: 28 tablet, Rfl: 0  •  levothyroxine (SYNTHROID, LEVOTHROID) 125 MCG tablet, Take 1 tablet by mouth Daily., Disp: 90 tablet, Rfl: 1  •  sertraline (ZOLOFT) 50 MG tablet, Take 1 tablet by mouth Daily., Disp: 90 tablet, Rfl: 1  •  verapamil SR (CALAN-SR) 120 MG CR tablet, , Disp: , Rfl: 3  •  aspirin 81 MG EC tablet, , Disp: , Rfl:   •  colesevelam (Welchol) 625 MG tablet, Take 3 tablets by mouth 2 (Two) Times a Day With Meals., Disp: 84 tablet, Rfl: 0  •  desonide (DESOWEN) 0.05 % cream, Apply  topically to the appropriate area as directed 2 (Two) Times a Day., Disp: 60 g, Rfl: 1  •  furosemide (LASIX) 40 MG tablet, TK 1 T PO QD FOR 2 TO 3 DAYS PRN FOR LEG SWELLING., Disp: , Rfl:   •  meclizine (ANTIVERT) 25 MG tablet, Take 25 mg by mouth 3 (three) times a day as needed for dizziness., Disp: , Rfl:   •  UPTRAVI 800 MCG tablet, 2 (two) times a day., Disp: , Rfl:       Objective:     Vitals:    12/11/20 0841   BP: 132/84   Pulse: 64   Resp: 16   SpO2: 98%   Weight: 65.8 kg (145 lb)   Height: 157.5 cm (62.01\")     Body mass index is 26.51 kg/m².    Vitals signs reviewed.   Constitutional:       Appearance: Well-developed and not in distress.   Eyes:      Conjunctiva/sclera: Conjunctivae normal.   HENT:      Head: Normocephalic.      Nose: Nose normal.         Comments: Masked  Neck:      Musculoskeletal: Normal range of motion.      Vascular: No JVD. JVD normal.   Pulmonary:      Effort: Pulmonary effort is normal.      Breath sounds: Decreased air movement present.   Cardiovascular:      Normal rate. Occasional ectopic beats. Regular rhythm.      Murmurs: There is no murmur.   Pulses:     Intact distal " pulses.   Abdominal:      Palpations: Abdomen is soft.      Tenderness: There is no abdominal tenderness.   Musculoskeletal: Normal range of motion.   Skin:     General: Skin is warm and dry.      Findings: No rash.   Neurological:      Mental Status: Alert, oriented to person, place, and time and oriented to person, place and time.      Cranial Nerves: No cranial nerve deficit.   Psychiatric:         Behavior: Behavior normal.         Thought Content: Thought content normal.         Judgment: Judgment normal.           ECG 12 Lead    Date/Time: 12/11/2020 11:01 AM  Performed by: Rambo Dasilva MD  Authorized by: Rambo Dasilva MD   Comparison: compared with previous ECG   Similar to previous ECG  Rhythm: sinus rhythm  Ectopy: atrial premature contractions  Conduction: left anterior fascicular block  ST Segments: ST segments normal  T Waves: T waves normal  QRS axis: left  Other: no other findings    Clinical impression: abnormal EKG              Assessment:       Diagnosis Plan   1. Preop cardiovascular exam  Stress Test With Myocardial Perfusion One Day   2. Pulmonary fibrosis (CMS/HCC)  Stress Test With Myocardial Perfusion One Day   3. Pulmonary hypertension (CMS/HCC)     4. Premature atrial complexes     5. Essential hypertension     6. 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  However, with tadalafil, her RVSP has been normal on two echocardiograms this year. She has a remotely reported history of diastolic CHF, but it doesn't seem like she actually does. Her proBNP was normal and her diastolic function has been normal for age.  She takes furosemide for PHTN.      An echo from June reported an EF of 60%.  I cannot personally review those images.  An echo from last week showed an EF of 45-50%; however, true assessment of systolic function was difficult as there  was frequent atrial ectopy, and as there was poor endocardial border visualization.  Contrast was not administered due to her lung disease.    Because of the discrepancy, as well as the need for probable endoscopy and potentially laparascopic colectomy, I will get a perfusion stress test.  This will also reassess LVSF.    Her PACs are due to LA dilation and lung disease. They are asymptomatic; she's on verapamil.    Her BP is within goal for age.    She is on aspirin for primary prevention.  I asked her to stop it, especially in the setting of chronic GI blood loss.     She has    Sincerely,       Rambo Dasilva MD

## 2020-12-14 ENCOUNTER — APPOINTMENT (OUTPATIENT)
Dept: MAMMOGRAPHY | Facility: HOSPITAL | Age: 80
End: 2020-12-14

## 2020-12-14 ENCOUNTER — TELEPHONE (OUTPATIENT)
Dept: ONCOLOGY | Facility: CLINIC | Age: 80
End: 2020-12-14

## 2020-12-14 NOTE — TELEPHONE ENCOUNTER
Pt asked to move lab and rn review tomorrow, 12.15.20 to around 3:30p, if possible.    [phone] PH

## 2020-12-15 ENCOUNTER — INFUSION (OUTPATIENT)
Dept: ONCOLOGY | Facility: HOSPITAL | Age: 80
End: 2020-12-15

## 2020-12-15 ENCOUNTER — OFFICE VISIT (OUTPATIENT)
Dept: SURGERY | Facility: CLINIC | Age: 80
End: 2020-12-15

## 2020-12-15 ENCOUNTER — LAB (OUTPATIENT)
Dept: LAB | Facility: HOSPITAL | Age: 80
End: 2020-12-15

## 2020-12-15 ENCOUNTER — APPOINTMENT (OUTPATIENT)
Dept: LAB | Facility: HOSPITAL | Age: 80
End: 2020-12-15

## 2020-12-15 ENCOUNTER — APPOINTMENT (OUTPATIENT)
Dept: ONCOLOGY | Facility: HOSPITAL | Age: 80
End: 2020-12-15

## 2020-12-15 VITALS — WEIGHT: 141 LBS | HEIGHT: 61 IN | BODY MASS INDEX: 26.62 KG/M2

## 2020-12-15 DIAGNOSIS — J84.10 PULMONARY FIBROSIS (HCC): ICD-10-CM

## 2020-12-15 DIAGNOSIS — I27.20 PULMONARY HYPERTENSION (HCC): ICD-10-CM

## 2020-12-15 DIAGNOSIS — K56.1 COLONIC INTUSSUSCEPTION (HCC): ICD-10-CM

## 2020-12-15 DIAGNOSIS — K59.1 FUNCTIONAL DIARRHEA: ICD-10-CM

## 2020-12-15 DIAGNOSIS — D50.8 OTHER IRON DEFICIENCY ANEMIA: ICD-10-CM

## 2020-12-15 DIAGNOSIS — K52.9 CHRONIC DIARRHEA: ICD-10-CM

## 2020-12-15 DIAGNOSIS — R63.4 WEIGHT LOSS, ABNORMAL: ICD-10-CM

## 2020-12-15 DIAGNOSIS — Z99.81 SUPPLEMENTAL OXYGEN DEPENDENT: ICD-10-CM

## 2020-12-15 DIAGNOSIS — E46 PROTEIN-CALORIE MALNUTRITION, UNSPECIFIED SEVERITY (HCC): ICD-10-CM

## 2020-12-15 DIAGNOSIS — D50.0 IRON DEFICIENCY ANEMIA DUE TO CHRONIC BLOOD LOSS: Primary | ICD-10-CM

## 2020-12-15 DIAGNOSIS — K80.20 CALCULUS OF GALLBLADDER WITHOUT CHOLECYSTITIS WITHOUT OBSTRUCTION: ICD-10-CM

## 2020-12-15 DIAGNOSIS — R19.01 RIGHT UPPER QUADRANT ABDOMINAL MASS: ICD-10-CM

## 2020-12-15 LAB
BASOPHILS # BLD AUTO: 0.05 10*3/MM3 (ref 0–0.2)
BASOPHILS NFR BLD AUTO: 0.6 % (ref 0–1.5)
DEPRECATED RDW RBC AUTO: 50.4 FL (ref 37–54)
EOSINOPHIL # BLD AUTO: 0.15 10*3/MM3 (ref 0–0.4)
EOSINOPHIL NFR BLD AUTO: 1.8 % (ref 0.3–6.2)
ERYTHROCYTE [DISTWIDTH] IN BLOOD BY AUTOMATED COUNT: 19 % (ref 12.3–15.4)
HCT VFR BLD AUTO: 33.9 % (ref 34–46.6)
HGB BLD-MCNC: 10.2 G/DL (ref 12–15.9)
IMM GRANULOCYTES # BLD AUTO: 0.16 10*3/MM3 (ref 0–0.05)
IMM GRANULOCYTES NFR BLD AUTO: 1.9 % (ref 0–0.5)
LYMPHOCYTES # BLD AUTO: 0.97 10*3/MM3 (ref 0.7–3.1)
LYMPHOCYTES NFR BLD AUTO: 11.4 % (ref 19.6–45.3)
MCH RBC QN AUTO: 22.8 PG (ref 26.6–33)
MCHC RBC AUTO-ENTMCNC: 30.1 G/DL (ref 31.5–35.7)
MCV RBC AUTO: 75.8 FL (ref 79–97)
MONOCYTES # BLD AUTO: 0.77 10*3/MM3 (ref 0.1–0.9)
MONOCYTES NFR BLD AUTO: 9.1 % (ref 5–12)
NEUTROPHILS NFR BLD AUTO: 6.38 10*3/MM3 (ref 1.7–7)
NEUTROPHILS NFR BLD AUTO: 75.2 % (ref 42.7–76)
NRBC BLD AUTO-RTO: 0 /100 WBC (ref 0–0.2)
PLATELET # BLD AUTO: 195 10*3/MM3 (ref 140–450)
PMV BLD AUTO: 9.6 FL (ref 6–12)
RBC # BLD AUTO: 4.47 10*6/MM3 (ref 3.77–5.28)
WBC # BLD AUTO: 8.48 10*3/MM3 (ref 3.4–10.8)

## 2020-12-15 PROCEDURE — 99205 OFFICE O/P NEW HI 60 MIN: CPT | Performed by: SURGERY

## 2020-12-15 PROCEDURE — 36415 COLL VENOUS BLD VENIPUNCTURE: CPT

## 2020-12-15 PROCEDURE — 85025 COMPLETE CBC W/AUTO DIFF WBC: CPT

## 2020-12-15 PROCEDURE — G0463 HOSPITAL OUTPT CLINIC VISIT: HCPCS

## 2020-12-15 NOTE — PROGRESS NOTES
CBC results reviewed with pt. HGB improved to 10.2. Pt states she has been feeling better with better appetite and less diarrhea. Pt denies any new complaints or concerns. She had surgical consult today but states MD is unsure if she will have surgery or not. Still waiting on pulmonary consult. She will return weekly for lab with RN review. Pt instructed to call with any new complaints. She v/u.     Lab Results   Component Value Date    WBC 8.48 12/15/2020    HGB 10.2 (L) 12/15/2020    HCT 33.9 (L) 12/15/2020    MCV 75.8 (L) 12/15/2020     12/15/2020

## 2020-12-15 NOTE — PROGRESS NOTES
CC: Iron deficiency anemia, colonic abnormality on CT scan     HPI: The patient is a very pleasant 80-year-old female that was referred by Dr. Lara for evaluation of colonic mass.  The patient was found to have iron deficiency anemia on September 2020.  She was referred to Dr. Boyd for further evaluation.  Patient is on 3 to 8 L of oxygen at home and was recommended to undergo barium enema as well as upper GI .  She underwent barium enema on 9/21/2020.  As per radiology report, there was no colonic normality.  She underwent upper GI that show small sliding hiatal hernia with minimal amount gastroesophageal reflux without any abnormality.  She then was seen by Dr. Lara for treatment of iron deficiency anemia.  He ordered CT scan abdomen and pelvis that show intussusception of the colon at the hepatic flexure with concern of a mass at the area.  He referred the patient to me for further evaluation.    Patient reports regular bowel movements with intermittent episode constipation before work-up for her anemia was started.  Since then she has been having diarrhea.  She was treated with loperamide and cholestyramine.  She did not take loperamide because was concerned about constipation.  She has been 4-5 bowel movements daily.  Denies any melanotic stools.  She has history of colon polyps her last colonoscopy was in 2011 and patient report it was normal.  She has history of brother with colon cancer that was diagnosed at 66 years old.  She reports weight loss of 30 pounds since September.  She denies any nausea and vomiting.  She has been avoiding eating because eating cause diarrhea.  Denies any abdominal pain     PMH: COPD on 3 to 8 L home oxygen due to pulmonary fibrosis, pulmonary hypertension, iron deficiency anemia, diverticulosis, colon polyp, hypertension, hypothyroidism, depression, sleep apnea, vertigo    PSH: Transvaginal bladder lift, colonoscopy 2011    MEDS: Famotidine, Lipitor, Synthroid, Zoloft,  "Bonyen, Esbriet, Adcirca, verapamil, gabapentin     ALL: Sulfa antibiotics    FH and SH: Brother with colon cancer at age 66, no other family member with colon cancer or inflammatory bowel disease.  Patient has gone through menopause and is not taking any hormone replacement therapy.  The patient is retired and does not smoke.  She quit in 1988.  Denies alcohol drinking     ROS:   Constitutional: Reports weight loss, fatigue and appetite change  HEENT: Reports hearing loss and rhinorrhea  Cardiovascular: Denies chest pain or palpitation.  Respiratory: Reports cough, sleep apnea and shortness of breath  Gastrointestinal: denies N&V, abd pain, reports diarrhea, denies constipation.  Genitourinary: denies dysuria, frequency.  Endocrine: denies cold intolerance, lethargy and flushing.  Hem: denies excessive bruising and postop bleeding.  Musculoskeletal: denies weakness, joint swelling, pain or stiffness.  Neuro: denies seizures, CVA, paresthesia, or peripheral neuropathy.   Skin: denies change in nevi, rashes, masses.     PE:   Vitals:    12/15/20 1445   Weight: 64 kg (141 lb)   Height: 154.9 cm (61\")     Body mass index is 26.64 kg/m².   Alert and oriented ×3, no acute distress.  Head is normocephalic and atraumatic.  Neck is supple there is no thyromegaly or lymphadenopathy  Chest is clear bilaterally there is no added sounds  Regular rate and rhythm, no murmurs  Abdomen is soft and nontender, is nondistended, bowel sounds are positive.  There is no rebound or guarding is and there is no peritoneal signs.  I do not feel any masses intra-abdominal  No clubbing cyanosis or edema in lower or upper extremities    Diagnostic studies:   Imaging personally reviewed and interpreted by me  Barium enema 9/14/2020:   To me there is an area of irregular lumen at the hepatic flexure of the colon.  Extensive diverticulosis with redundant sigmoid colon.    Upper GI 10/19/2020:   Small sliding hiatal hernia, esophageal dysmotility, " no polyps or ulcers    CT scan abdomen pelvis 12/3/2020:   There is focal intussusception at the hepatic flexure of the colon with mild inflammation without evidence of obstruction.  Thickening of the wall, cholelithiasis, sigmoid reticulosis    Labs;   12/15/2020 hemoglobin 10.2 from 9.1 12/10/2020 from 9.3 11/24/2020  12/10/2020 albumin 3.2, CO2 35.2, otherwise  normal CMP  CEA 6.7 abnormal    Assessment and plan    The patient is a very pleasant 80-year-old female with pulmonary fibrosis oxygen dependent with high oxygen requirements with activity and at home.  She was found to have iron deficiency anemia and likely a mass in the hepatic flexure of the colon.  She has cholelithiasis, no cholecystitis, I do not feel her symptoms are related to gallbladder disease but likely to intermittent obstruction of the colon due to intussusception.  Discussed with patient about further step.  I think she should undergo colonoscopy to establish diagnosis and pending those results decide further.   She has a very tortuous colon with extensive diverticulosis and I hope I can access the area of concern with scope.  Patient will undergo stress test next Wednesday and will see Dr. Desouza next Friday to evaluate her risk of general anesthesia and sedation.  I explained to the patient that she is extremely high risk of complications including the need for chronic mechanical ventilation and tracheostomy after any type of anesthetic.  If she is willing to accept those risk then we should proceed with colonoscopy.  If not, she may eventually perforate and obstruct and most likely will not survive.    Patient would like to discuss risk of chronic mechanical ventilation this Friday with Dr. Desouza.  I will send a message to Dr. Dasilva about her cardiac evaluation.     Patient to call when ready for colonoscopy    Alfonso Cesar MD  General, Minimally Invasive and Endoscopic Surgery  [facility]     [address], [address]  [place], [place], [address]  P: [phone]  F: [phone]

## 2020-12-23 ENCOUNTER — LAB (OUTPATIENT)
Dept: LAB | Facility: HOSPITAL | Age: 80
End: 2020-12-23

## 2020-12-23 ENCOUNTER — CLINICAL SUPPORT (OUTPATIENT)
Dept: ONCOLOGY | Facility: HOSPITAL | Age: 80
End: 2020-12-23

## 2020-12-23 DIAGNOSIS — K52.9 CHRONIC DIARRHEA: ICD-10-CM

## 2020-12-23 DIAGNOSIS — J84.10 PULMONARY FIBROSIS (HCC): ICD-10-CM

## 2020-12-23 DIAGNOSIS — R63.4 WEIGHT LOSS, ABNORMAL: ICD-10-CM

## 2020-12-23 DIAGNOSIS — R19.01 RIGHT UPPER QUADRANT ABDOMINAL MASS: ICD-10-CM

## 2020-12-23 DIAGNOSIS — I27.20 PULMONARY HYPERTENSION (HCC): ICD-10-CM

## 2020-12-23 DIAGNOSIS — D50.8 OTHER IRON DEFICIENCY ANEMIA: ICD-10-CM

## 2020-12-23 DIAGNOSIS — K56.1 COLONIC INTUSSUSCEPTION (HCC): ICD-10-CM

## 2020-12-23 LAB
BASOPHILS # BLD AUTO: 0.06 10*3/MM3 (ref 0–0.2)
BASOPHILS NFR BLD AUTO: 0.8 % (ref 0–1.5)
DEPRECATED RDW RBC AUTO: 56.1 FL (ref 37–54)
EOSINOPHIL # BLD AUTO: 0.1 10*3/MM3 (ref 0–0.4)
EOSINOPHIL NFR BLD AUTO: 1.3 % (ref 0.3–6.2)
ERYTHROCYTE [DISTWIDTH] IN BLOOD BY AUTOMATED COUNT: 20.8 % (ref 12.3–15.4)
HCT VFR BLD AUTO: 33.9 % (ref 34–46.6)
HGB BLD-MCNC: 10.5 G/DL (ref 12–15.9)
IMM GRANULOCYTES # BLD AUTO: 0.06 10*3/MM3 (ref 0–0.05)
IMM GRANULOCYTES NFR BLD AUTO: 0.8 % (ref 0–0.5)
LYMPHOCYTES # BLD AUTO: 1.12 10*3/MM3 (ref 0.7–3.1)
LYMPHOCYTES NFR BLD AUTO: 14.6 % (ref 19.6–45.3)
MCH RBC QN AUTO: 23.6 PG (ref 26.6–33)
MCHC RBC AUTO-ENTMCNC: 31 G/DL (ref 31.5–35.7)
MCV RBC AUTO: 76.4 FL (ref 79–97)
MONOCYTES # BLD AUTO: 0.49 10*3/MM3 (ref 0.1–0.9)
MONOCYTES NFR BLD AUTO: 6.4 % (ref 5–12)
NEUTROPHILS NFR BLD AUTO: 5.85 10*3/MM3 (ref 1.7–7)
NEUTROPHILS NFR BLD AUTO: 76.1 % (ref 42.7–76)
NRBC BLD AUTO-RTO: 0 /100 WBC (ref 0–0.2)
PLATELET # BLD AUTO: 152 10*3/MM3 (ref 140–450)
PMV BLD AUTO: 9.3 FL (ref 6–12)
RBC # BLD AUTO: 4.44 10*6/MM3 (ref 3.77–5.28)
WBC # BLD AUTO: 7.68 10*3/MM3 (ref 3.4–10.8)

## 2020-12-23 PROCEDURE — G0463 HOSPITAL OUTPT CLINIC VISIT: HCPCS

## 2020-12-23 PROCEDURE — 36415 COLL VENOUS BLD VENIPUNCTURE: CPT

## 2020-12-23 PROCEDURE — 85025 COMPLETE CBC W/AUTO DIFF WBC: CPT

## 2020-12-23 NOTE — NURSING NOTE
Pt here for CBC and RN review. CBC reviewed with pt. Hgb 10.5 today. Pt states she feels a little better since the iron infusion. Copy of labs given to pt. Told pt we would recheck iron, ferritin, CBC, and CMP next week before she sees Dr. Lara. Pt v/u. Pt v/u to call with any new concerns.       Lab Results   Component Value Date    WBC 7.68 12/23/2020    HGB 10.5 (L) 12/23/2020    HCT 33.9 (L) 12/23/2020    MCV 76.4 (L) 12/23/2020     12/23/2020

## 2020-12-30 ENCOUNTER — OFFICE VISIT (OUTPATIENT)
Dept: ONCOLOGY | Facility: CLINIC | Age: 80
End: 2020-12-30

## 2020-12-30 ENCOUNTER — LAB (OUTPATIENT)
Dept: LAB | Facility: HOSPITAL | Age: 80
End: 2020-12-30

## 2020-12-30 VITALS
SYSTOLIC BLOOD PRESSURE: 114 MMHG | BODY MASS INDEX: 25.03 KG/M2 | HEART RATE: 108 BPM | RESPIRATION RATE: 20 BRPM | OXYGEN SATURATION: 98 % | HEIGHT: 62 IN | TEMPERATURE: 97.9 F | DIASTOLIC BLOOD PRESSURE: 71 MMHG | WEIGHT: 136 LBS

## 2020-12-30 DIAGNOSIS — I27.20 PULMONARY HYPERTENSION (HCC): ICD-10-CM

## 2020-12-30 DIAGNOSIS — E03.9 ACQUIRED HYPOTHYROIDISM: ICD-10-CM

## 2020-12-30 DIAGNOSIS — R19.01 RIGHT UPPER QUADRANT ABDOMINAL MASS: Primary | ICD-10-CM

## 2020-12-30 DIAGNOSIS — R19.01 ABDOMINAL MASS, RIGHT UPPER QUADRANT: ICD-10-CM

## 2020-12-30 DIAGNOSIS — J84.10 PULMONARY FIBROSIS (HCC): ICD-10-CM

## 2020-12-30 DIAGNOSIS — K56.1 COLONIC INTUSSUSCEPTION (HCC): ICD-10-CM

## 2020-12-30 DIAGNOSIS — K52.9 CHRONIC DIARRHEA OF UNKNOWN ORIGIN: ICD-10-CM

## 2020-12-30 DIAGNOSIS — D50.8 OTHER IRON DEFICIENCY ANEMIA: ICD-10-CM

## 2020-12-30 DIAGNOSIS — K52.9 CHRONIC DIARRHEA: ICD-10-CM

## 2020-12-30 DIAGNOSIS — R63.4 WEIGHT LOSS, ABNORMAL: ICD-10-CM

## 2020-12-30 DIAGNOSIS — E44.1 MILD PROTEIN-CALORIE MALNUTRITION (HCC): ICD-10-CM

## 2020-12-30 DIAGNOSIS — R19.01 RIGHT UPPER QUADRANT ABDOMINAL MASS: ICD-10-CM

## 2020-12-30 LAB
ALBUMIN SERPL-MCNC: 3.4 G/DL (ref 3.5–5.2)
ALBUMIN/GLOB SERPL: 1.4 G/DL (ref 1.1–2.4)
ALP SERPL-CCNC: 74 U/L (ref 38–116)
ALT SERPL W P-5'-P-CCNC: 6 U/L (ref 0–33)
ANION GAP SERPL CALCULATED.3IONS-SCNC: 8 MMOL/L (ref 5–15)
AST SERPL-CCNC: 16 U/L (ref 0–32)
BASOPHILS # BLD AUTO: 0.04 10*3/MM3 (ref 0–0.2)
BASOPHILS NFR BLD AUTO: 0.5 % (ref 0–1.5)
BILIRUB SERPL-MCNC: 0.2 MG/DL (ref 0.2–1.2)
BUN SERPL-MCNC: 15 MG/DL (ref 6–20)
BUN/CREAT SERPL: 22.4 (ref 7.3–30)
CALCIUM SPEC-SCNC: 9.2 MG/DL (ref 8.5–10.2)
CHLORIDE SERPL-SCNC: 103 MMOL/L (ref 98–107)
CO2 SERPL-SCNC: 31 MMOL/L (ref 22–29)
CREAT SERPL-MCNC: 0.67 MG/DL (ref 0.6–1.1)
DEPRECATED RDW RBC AUTO: 55.8 FL (ref 37–54)
EOSINOPHIL # BLD AUTO: 0.08 10*3/MM3 (ref 0–0.4)
EOSINOPHIL NFR BLD AUTO: 1 % (ref 0.3–6.2)
ERYTHROCYTE [DISTWIDTH] IN BLOOD BY AUTOMATED COUNT: 20.3 % (ref 12.3–15.4)
FERRITIN SERPL-MCNC: 592.6 NG/ML (ref 13–150)
GFR SERPL CREATININE-BSD FRML MDRD: 85 ML/MIN/1.73
GLOBULIN UR ELPH-MCNC: 2.5 GM/DL (ref 1.8–3.5)
GLUCOSE SERPL-MCNC: 140 MG/DL (ref 74–124)
HCT VFR BLD AUTO: 35.7 % (ref 34–46.6)
HGB BLD-MCNC: 11.2 G/DL (ref 12–15.9)
HGB RETIC QN AUTO: 33 PG (ref 29.8–36.1)
IMM GRANULOCYTES # BLD AUTO: 0.01 10*3/MM3 (ref 0–0.05)
IMM GRANULOCYTES NFR BLD AUTO: 0.1 % (ref 0–0.5)
IMM RETICS NFR: 5.3 % (ref 3–15.8)
IRON 24H UR-MRATE: 37 MCG/DL (ref 37–145)
IRON SATN MFR SERPL: 17 % (ref 14–48)
LYMPHOCYTES # BLD AUTO: 0.8 10*3/MM3 (ref 0.7–3.1)
LYMPHOCYTES NFR BLD AUTO: 9.7 % (ref 19.6–45.3)
MCH RBC QN AUTO: 24.7 PG (ref 26.6–33)
MCHC RBC AUTO-ENTMCNC: 31.4 G/DL (ref 31.5–35.7)
MCV RBC AUTO: 78.6 FL (ref 79–97)
MONOCYTES # BLD AUTO: 0.59 10*3/MM3 (ref 0.1–0.9)
MONOCYTES NFR BLD AUTO: 7.2 % (ref 5–12)
NEUTROPHILS NFR BLD AUTO: 6.71 10*3/MM3 (ref 1.7–7)
NEUTROPHILS NFR BLD AUTO: 81.5 % (ref 42.7–76)
NRBC BLD AUTO-RTO: 0 /100 WBC (ref 0–0.2)
PLATELET # BLD AUTO: 133 10*3/MM3 (ref 140–450)
PMV BLD AUTO: 8.7 FL (ref 6–12)
POTASSIUM SERPL-SCNC: 3.5 MMOL/L (ref 3.5–4.7)
PROT SERPL-MCNC: 5.9 G/DL (ref 6.3–8)
RBC # BLD AUTO: 4.54 10*6/MM3 (ref 3.77–5.28)
RETICS # AUTO: 0.05 10*6/MM3 (ref 0.02–0.13)
RETICS/RBC NFR AUTO: 1.2 % (ref 0.7–1.9)
SODIUM SERPL-SCNC: 142 MMOL/L (ref 134–145)
TIBC SERPL-MCNC: 216 MCG/DL (ref 249–505)
TRANSFERRIN SERPL-MCNC: 154 MG/DL (ref 200–360)
WBC # BLD AUTO: 8.23 10*3/MM3 (ref 3.4–10.8)

## 2020-12-30 PROCEDURE — 85046 RETICYTE/HGB CONCENTRATE: CPT

## 2020-12-30 PROCEDURE — 82728 ASSAY OF FERRITIN: CPT

## 2020-12-30 PROCEDURE — 83540 ASSAY OF IRON: CPT

## 2020-12-30 PROCEDURE — 85025 COMPLETE CBC W/AUTO DIFF WBC: CPT

## 2020-12-30 PROCEDURE — 99215 OFFICE O/P EST HI 40 MIN: CPT | Performed by: INTERNAL MEDICINE

## 2020-12-30 PROCEDURE — 36415 COLL VENOUS BLD VENIPUNCTURE: CPT

## 2020-12-30 PROCEDURE — 84466 ASSAY OF TRANSFERRIN: CPT

## 2020-12-30 PROCEDURE — 80053 COMPREHEN METABOLIC PANEL: CPT

## 2020-12-30 NOTE — PROGRESS NOTES
Subjective     PATIENT WAS CALLED THE DAY BEFORE BY THE OFFICE TO ASK FOR SYMPTOMS THAT COULD BE CONSISTENT WITH CORONAVIRUS INFECTION, AND BEING NEGATIVE WAS SCHEDULED TO BE SEEN IN THE OFFICE TODAY. SIMILAR QUESTIONING TODAY INCLUDING, CHILLS, FEVER, NEW COUGH, SHORTNESS OF BREATH, DIARRHEA,DIFFUSE BODY ACHES  AND CHANGES IN SMELL OR TASTE WERE NEGATIVE.THE PATIENT DENIED ANY CONTACT WITH PERSONS WHO WERE POSITIVE FOR COVID, AND PATIENT IS NOT IN CATEGORY OF HIGH RISK BEHAVIOR TO ACQUIRE COVID.    DURING THE VISIT WITH THE PATIENT TODAY , PATIENT HAD FACE MASK, MY MEDICAL ASSISTANT AND I  HAD PROPPER PROTECTIVE EQUIPMENT, AND I DID HAND HYGIENE WITH SOAP AND WATER BEFORE AND AFTER THE VISIT.     REASON FOR FOLLOW UP:  IRON DEFICIECNY ANEMIA, CHRONIC DIARRHEA, MALNUTRITION, WEIGHT LOSS, ABDOMINAL MASS RUQ      I had the opportunity to see this patient in consultation  along with her daughter, a 80-year-old delightful white female who has developed iron deficiency anemia for the last several months, more documented since the summer of 2019 and having very significant fatigue. Since she underwent upper GI series and barium enema, the patient has not had any day that she has not had at least 6 stools, diarrheic in nature, associated with episodes of incontinence and accidents. She has not had any passage of blood in the stool or mucus. She has minimal abdominal pain. It does not matter what she eats, she has diarrhea. She has diarrhea through the day and through the night. She has had episodes of accidents in the middle of the night when she realizes that she has defecated in her diaper. The patient obviously is afraid of eating anything because she has not found a food that will settle her stomach. She has no nausea, vomiting, distention, jaundice, chills or fever. She has significant degree of fatigue for any activity and her shortness of breath associated with pulmonary fibrosis has become worse obviously because  of the development of her anemia. She depends on oxygen. The patient is sitting in the house all day long not willing to do anything because of the profound fatigue and shortness of breath.     The patient denies any rashes in the skin. No joint pain. No swelling in lower extremities. She has lost muscle mass. She has had cough associated with her pulmonary fibrosis. She relates no hemoptysis. She has occasional palpitations. No evidence of congestive heart failure.  The patient was called  by me at home after I reviewed the CT scan report with the radiologist at [facility] yesterday, Dr. Pinto, including the discovery of intussusception in the colon and a mass in that anatomical site that goes along with the clinical documented mass in my physical examination during the original consultation. The patient at this time remains on oxygen. She has fatigue and obviously she is anxious about the phone call and she wants to hear from me along with her daughter what I have to say. She continues having diarrhea. She feels that her abdomen in her own words is empty and she feels afraid of eating anything because she continues having runny bowels and passage of blood in the stool from time to time. She has no distention. No nausea or vomiting. Urination is appropriate. She remains on oxygen due to her pulmonary fibrosis and pulmonary hypertension. She has no swelling in her lower extremities. She is also ready to proceed with Injectafer today in the office to correct her profound iron deficiency anemia.    I further reviewed the patient with the daughter in the room on 12/10/2020. The Injectafer infusion last week had no difficulties and she will have another one tomorrow. We had the opportunity to review the case with Zita Guy MD, the patient’s pulmonologist. Please review below. Also had the opportunity to review her most recent echocardiogram done at [facility]. Please review below. The patient now  admits that she is taking Nohelia-[place] on regular schedule to minimize her abdominal pain and I think part of the GI bleeding is related to this process. As I stated to the patient and daughter today, no more Nohelia-[place] at all. She has had less diarrhea during the last few days though. The stool remains pigmented most likely because of blood loss.    The patient returned to the office on 12/30/2020 after she has been seen by Alfonso Cesar MD in consideration of colonoscopy and possible colectomy. No decision has been made until the patient was seen by Pulmonary Medicine. The report available to me by Dr. Moser, Pulmonary Medicine, states clearly that the patient has a high risk for general anesthesia given her pulmonary problems and he deferred the final decision to Dr. Guy, the patient’s primary pulmonologist. The patient at this time continues complaining of abdominal distention, sensation of fullness, pain. No obvious passage of blood in the stool. Not too much of an appetite. She has not had any nausea or vomiting but she has occasional diarrhea. The stool has not had any obvious dark color or purple color. Urination is okay. Her breathing is appropriate. Also she had cardiac assessment. Please review below.        Past Medical History:   Diagnosis Date   • Anemia    • Colonic polyp    • Congestive heart failure (CMS/HCC)    • Depression    • Diabetes mellitus (CMS/HCC)    • Diverticulosis    • Esophageal reflux    • Essential hypertension    • GERD (gastroesophageal reflux disease)    • H/O complete eye exam 03/2018   • Hx of bone density study 02/15/2016   • Hyperlipidemia    • Hypothyroidism, acquired    • Major depressive disorder, recurrent episode, in full remission (CMS/HCC)    • Nonsenile cataract 2016   • Pulmonary fibrosis (CMS/HCC)    • Pulmonary hypertension (CMS/HCC)    • Sleep apnea, obstructive    • Stenosis, spinal, lumbar    • Vertigo, peripheral         Past Surgical History:   Procedure  Laterality Date   • COLONOSCOPY  2012   • INCONTINENCE SURGERY      Dr. Espinal   • MAMMO BILATERAL      dr de la cruz    • PAP SMEAR      dr de la cruz        Current Outpatient Medications on File Prior to Visit   Medication Sig Dispense Refill   • ADCIRCA 20 MG tablet Take 40 mg by mouth Daily.     • atorvastatin (LIPITOR) 10 MG tablet Take 1 tablet by mouth Daily. 90 tablet 1   • ESBRIET 267 MG capsule Take 2,403 mg by mouth Daily.     • famotidine (PEPCID) 20 MG tablet TAKE ONE TABLET BY MOUTH TWICE DAILY 28 tablet 0   • levothyroxine (SYNTHROID, LEVOTHROID) 125 MCG tablet Take 1 tablet by mouth Daily. 90 tablet 1   • sertraline (ZOLOFT) 50 MG tablet Take 1 tablet by mouth Daily. 90 tablet 1   • verapamil SR (CALAN-SR) 120 MG CR tablet Take 120 mg by mouth Daily.  3   • GABAPENTIN PO Take 1,400 mg by mouth As Needed (for cough).     • [DISCONTINUED] desonide (DESOWEN) 0.05 % cream Apply  topically to the appropriate area as directed 2 (Two) Times a Day. 60 g 1     No current facility-administered medications on file prior to visit.         ALLERGIES:    Allergies   Allergen Reactions   • Sulfa Antibiotics Hives and Rash        Social History     Socioeconomic History   • Marital status:      Spouse name: Not on file   • Number of children: Not on file   • Years of education: Not on file   • Highest education level: Not on file   Tobacco Use   • Smoking status: Former Smoker     Packs/day: 2.00     Types: Cigarettes     Start date:      Quit date:      Years since quittin.0   • Smokeless tobacco: Never Used   • Tobacco comment: Caffeine use: Drinks Coca Cola    Substance and Sexual Activity   • Alcohol use: Yes     Comment: rare   • Drug use: No   • Sexual activity: Never     Partners: Male        Family History   Problem Relation Age of Onset   • Breast cancer Cousin    • Lung cancer Father    • Heart disease Father    • Colon cancer Brother    • Prostate cancer Brother    •  Lung disease Brother    • Emphysema Brother    • Heart disease Mother         Review of Systems           General: no fever, no chills,  fatigue, weight changes,  lack of appetite.  Eyes: no epiphora, xerophthalmia,conjunctivitis, pain, glaucoma, blurred vision, blindness, secretion, photophobia, proptosis, diplopia.  Ears: no otorrhea, tinnitus, otorrhagia, deafness, pain, vertigo.  Nose: no rhinorrhea, no epistaxis, no alteration in perception of odors, no sinuses pressure.  Mouth: no alteration in gums or teeth,  No ulcers, no difficulty with mastication or deglut ion, no odynophagia.  Neck: no masses or pain, no thyroid alterations, no pain in muscles or arteries, no carotid odynia, no crepitation.  Respiratory:  cough,  sputum production, dyspnea,no trepopnea, no pleuritic pain,no hemoptysis.  Heart: no syncope, no irregularity, no palpitations, no angina,no orthopnea,no paroxysmal nocturnal dyspnea.  Vascular Venous: no tenderness,no edema,no palpable cords,no postphlebitic syndrome, no skin changes no ulcerations.  Vascular Arterial: no distal ischemia, noclaudication, no gangrene, no neuropathic ischemic pain, no skin ulcers, no paleness no cyanosis.  GI: no dysphagia, no odynophagia, no regurgitation, no heartburn, indigestion, nausea,no vomiting,no hematemesis ,no melena,no jaundice, distention, no obstipation,no enterorrhagia,no proctalgia,no anal  lesions, STATED changes in bowel habits.  : no frequency, no hesitancy, no hematuria, no discharge,no  pain.  Musculoskeletal: no muscle or tendon pain or inflammation,no  joint pain, no edema, MODERATE functional limitation,no fasciculations, no mass.  Neurologic: no headache, no seizures, noalterations on Craneal nerves, no motor deficit, no sensory deficit, normal coordination, no alteration in memory,normal orientation, calculation,normal writting, verbal and written language.  Skin: no rashes,no pruritus no localized lesions.  Psychiatric:  anxiety, no  "depression,no agitation, no delusions, proper insight.    Objective     Vitals:    12/30/20 1533   BP: 114/71   Pulse: 108   Resp: 20   Temp: 97.9 °F (36.6 °C)   TempSrc: Temporal   SpO2: 98%   Weight: 61.7 kg (136 lb)   Height: 157.5 cm (62.01\")   PainSc: 0-No pain     Current Status 12/30/2020   ECOG score 1       Physical Exam I HAVE PERSONALLY REVIEWED THE HISTORY OF THE PRESENT ILLNESS, PAST MEDICAL HISTORY, FAMILY HISTORY, SOCIAL HISTORY, ALLERGIES, MEDICATIONS STATED ABOVE IN THE OFFICE NOTE FROM TODAY.        GENERAL:  Well-developed, well-nourished  Patient  in no acute distress.   SKIN:  Warm, dry ,NO rashes,NO purpura ,NO petechiae.  HEENT:  Pupils were equal and reactive to light and accomodation, conjunctivae noninjected, no pterygium, normal extraocular movements, normal visual acuity.   NECK:  Supple with good range of motion; no thyromegaly or masses, no JVD or bruits, no cervical adenopathies.No carotid artery pain, no carotid abnormal pulsation , NO arterial dance.  LYMPHATICS:  No cervical, NO supraclavicular, NO axillary,NO epitrochlear , NO inguinal adenopathy.  CARDIAC   normal rate and regular rhythm, without murmur,NO rubs NO S3 NO S4 right or left . Normal femoral, popliteal, pedis, brachial and carotid pulses.  VASCULAR ARTERIAL: normal carotids,brachial,radial,femoral,popliteal, pedis pulses , no bruits.no paleness or cyanosis, no pain, no edema, no numbness, no gangrene.  VASCULAR VENOUS: no cyanosis, collateral circulation, varicosities, edema, palpable cords, pain, erythema.  ABDOMEN:  Soft, tender with no hepatomegaly, no splenomegaly,RUQ MASS 4 TO 5 CM IN SIZE SOFT MOBILE NONE TENDER , no ascites, no collateral circulation,no distention,no Whitt sign, MILD abdominal pain, no inguinal hernias,no umbilical hernia, no abdominal bruits. Normal bowel sounds.  GENITAL: Not  Performed.  EXTREMITIES  AND SPINE:   clubbing, cyanosis or edema, no deformities or pain .No kyphosis, scoliosis, " deformities or pain in spine, ribs or pelvic bone.  NEUROLOGICAL:  Patient was awake, alert, oriented to time, person and place.        RECENT LABS:    Component      Latest Ref Rng & Units 9/14/2020 9/14/2020 9/30/2020 11/4/2020          11:42 AM 11:42 AM     WBC      3.40 - 10.80 10*3/mm3 7.31  6.7 8.14   RBC      3.77 - 5.28 10*6/mm3 3.96  4.37 4.33   Hemoglobin      12.0 - 15.9 g/dL 8.2 (L)  9.6 (L) 9.4 (L)   Hematocrit      34.0 - 46.6 % 28.3 (L)  31.9 (L) 32.3 (L)   MCV      79.0 - 97.0 fL 71.5 (L)  73 (L) 74.6 (L)   MCH      26.6 - 33.0 pg 20.7 (L)  22.0 (L) 21.7 (L)   MCHC      31.5 - 35.7 g/dL 29.0 (L)  30.1 (L) 29.1 (L)   RDW      12.3 - 15.4 % 13.9  17.2 (H) 17.6 (H)   RDW-SD      37.0 - 54.0 fl       MPV      6.0 - 12.0 fL       Platelets      140 - 450 10*3/mm3 242  291 218   Neutrophil Rel %      42.7 - 76.0 % CANCELED 76.0 73    Lymphocyte Rel %      19.6 - 45.3 % CANCELED 12.0 (L) 16    Monocyte Rel %      5.0 - 12.0 % CANCELED 9.0 7    Eosinophil Rel %      0.3 - 6.2 % CANCELED 3.0 3    Basophil Rel %      0.0 - 1.5 %  0.0 1    Immature Granulocyte Rel %      0.0 - 0.5 %   0    Neutrophils Absolute      1.70 - 7.00 10*3/mm3  5.56 4.9    Lymphocytes Absolute      0.70 - 3.10 10*3/mm3 CANCELED 0.88 1.1    Monocytes Absolute      0.10 - 0.90 10*3/mm3  0.66 0.5    Eosinophils Absolute      0.00 - 0.40 10*3/mm3 CANCELED  0.2    Basophils Absolute      0.00 - 0.20 10*3/mm3 CANCELED 0.00 0.1    Immature Grans, Absolute      0.00 - 0.05 10*3/mm3   0.0    nRBC      0.0 - 0.2 /100 WBC         Component      Latest Ref Rng & Units 11/24/2020 12/10/2020 12/15/2020 12/23/2020                WBC      3.40 - 10.80 10*3/mm3 11.45 (H) 6.53 8.48 7.68   RBC      3.77 - 5.28 10*6/mm3 4.39 4.12 4.47 4.44   Hemoglobin      12.0 - 15.9 g/dL 9.3 (L) 9.1 (L) 10.2 (L) 10.5 (L)   Hematocrit      34.0 - 46.6 % 31.1 (L) 31.7 (L) 33.9 (L) 33.9 (L)   MCV      79.0 - 97.0 fL 70.8 (L) 76.9 (L) 75.8 (L) 76.4 (L)   MCH      26.6 -  33.0 pg 21.2 (L) 22.1 (L) 22.8 (L) 23.6 (L)   MCHC      31.5 - 35.7 g/dL 29.9 (L) 28.7 (L) 30.1 (L) 31.0 (L)   RDW      12.3 - 15.4 % 17.1 (H) 17.3 (H) 19.0 (H) 20.8 (H)   RDW-SD      37.0 - 54.0 fl 43.8 46.5 50.4 56.1 (H)   MPV      6.0 - 12.0 fL 8.7 8.4 9.6 9.3   Platelets      140 - 450 10*3/mm3 321 200 195 152   Neutrophil Rel %      42.7 - 76.0 % 68.6 74.1 75.2 76.1 (H)   Lymphocyte Rel %      19.6 - 45.3 % 20.2 13.0 (L) 11.4 (L) 14.6 (L)   Monocyte Rel %      5.0 - 12.0 % 7.5 8.7 9.1 6.4   Eosinophil Rel %      0.3 - 6.2 % 1.9 3.4 1.8 1.3   Basophil Rel %      0.0 - 1.5 % 0.6 0.6 0.6 0.8   Immature Granulocyte Rel %      0.0 - 0.5 % 1.2 (H) 0.2 1.9 (H) 0.8 (H)   Neutrophils Absolute      1.70 - 7.00 10*3/mm3 7.85 (H) 4.84 6.38 5.85   Lymphocytes Absolute      0.70 - 3.10 10*3/mm3 2.31 0.85 0.97 1.12   Monocytes Absolute      0.10 - 0.90 10*3/mm3 0.86 0.57 0.77 0.49   Eosinophils Absolute      0.00 - 0.40 10*3/mm3 0.22 0.22 0.15 0.10   Basophils Absolute      0.00 - 0.20 10*3/mm3 0.07 0.04 0.05 0.06   Immature Grans, Absolute      0.00 - 0.05 10*3/mm3 0.14 (H) 0.01 0.16 (H) 0.06 (H)   nRBC      0.0 - 0.2 /100 WBC 0.0 0.0 0.0 0.0     Component      Latest Ref Rng & Units 12/30/2020             WBC      3.40 - 10.80 10*3/mm3 8.23   RBC      3.77 - 5.28 10*6/mm3 4.54   Hemoglobin      12.0 - 15.9 g/dL 11.2 (L)   Hematocrit      34.0 - 46.6 % 35.7   MCV      79.0 - 97.0 fL 78.6 (L)   MCH      26.6 - 33.0 pg 24.7 (L)   MCHC      31.5 - 35.7 g/dL 31.4 (L)   RDW      12.3 - 15.4 % 20.3 (H)   RDW-SD      37.0 - 54.0 fl 55.8 (H)   MPV      6.0 - 12.0 fL 8.7   Platelets      140 - 450 10*3/mm3 133 (L)   Neutrophil Rel %      42.7 - 76.0 % 81.5 (H)   Lymphocyte Rel %      19.6 - 45.3 % 9.7 (L)   Monocyte Rel %      5.0 - 12.0 % 7.2   Eosinophil Rel %      0.3 - 6.2 % 1.0   Basophil Rel %      0.0 - 1.5 % 0.5   Immature Granulocyte Rel %      0.0 - 0.5 % 0.1   Neutrophils Absolute      1.70 - 7.00 10*3/mm3 6.71    Lymphocytes Absolute      0.70 - 3.10 10*3/mm3 0.80   Monocytes Absolute      0.10 - 0.90 10*3/mm3 0.59   Eosinophils Absolute      0.00 - 0.40 10*3/mm3 0.08   Basophils Absolute      0.00 - 0.20 10*3/mm3 0.04   Immature Grans, Absolute      0.00 - 0.05 10*3/mm3 0.01   nRBC      0.0 - 0.2 /100 WBC 0.0             Assessment/Plan     Diagnoses and all orders for this visit:    1. Right upper quadrant abdominal mass (Primary)    2. Colonic intussusception (CMS/HCC)    3. Chronic diarrhea    4. Mild protein-calorie malnutrition (CMS/HCC)              In summary this patient has progressive anemia. I documented in the laboratory parameters above progressive drop in the hemoglobin, progressive drop in the MCV. Morphology of peripheral blood is typical of iron deficiency anemia and I would not be surprised if she has an associated B12 or folate deficiency. This patient has had upper GI series and barium enema by GI, Dr. Boyd, and no anatomical alteration has been found in her upper GI tract or colon besides diverticular disease. The symptoms that she has today she blames them, especially the diarrhea, since the time that she had the barium enema. I think that was not an association. The fact that she has so much diarrhea day and night and with weight loss, profound fatigue, sarcopenia leads me to believe that it is some other entity that is producing this. It sounds like to me she has a malabsorptive process of some sort.     The patient also has a palpable mass in the right upper quadrant and the way how it feels to my hands, it is probably a palpable gallbladder. She had no pain there. This is very striking. Her abdomen is very soft and it is very easy to find.     Under the present circumstances and knowing that the patient cannot tolerate oral iron because it makes her to throw up and produce profuse diarrhea and makes the diarrhea worse, I do believe that the patient needs to have the followin. For the  workup for her anemia, she went back to the lab to obtain a comprehensive metabolic profile, ferritin, iron, TIBC, B12, folic acid level, reticulated hemoglobin.   2. Given the fact that the patient has had a TSH that was on the low side maybe her thyroid medication is overcorrecting her and this maybe this is part of the diarrhea process. I will obtain a TSH and a T4.   3. I do believe that the patient needs to have a CT scan of the abdomen and pelvis. I do not think she can handle oral contrast and I do not want to give her IV contrast, I find no need. I want to be sure there is no other anatomical alteration in her small intestine and GI tract that is favoring for her to have all the issues pertinent to her clinical symptoms.     I wonder if she could have some other process in her bowel including inflammatory bowel disease, bacterial overgrowth syndrome, celiac disease and differential diagnosis is more than extensive. This information would require to be shared with Dr. Boyd, her Gastroenterologist, for further review after she returns back in a few days after she proceeds with proper management of her anemia. Given the fact that she cannot handle oral iron by mouth, absolutely the patient needs to have Injectafer on 2 different occasions and this will be scheduled to start to happen as early as TODAY.   I discussed with the patient on 12/04/2020 after reviewing the case with the radiologist Dr. Pinto at [facility] in regards to her CT scan of the abdomen. This documents intussusception of the colon and there is a mass located in that that probably represents a colon cancer. Obviously, the question is how to proceed with all these issues on this patient. It is very obvious that she has profound iron deficiency anemia. Her ferritin level, iron level are low. She cannot handle oral iron therapy and she will initiate Injectafer today and she will have the 2nd infusion next week. I expect that in 3  weeks she will have very significant rebound in erythropoiesis and I expect that her pulmonary manifestations will be better in regard to her pulmonary hypertension and her pulmonary fibrosis. Typically patient's with this condition develop polycythemia instead of anemia.    The other issue is what we are going to do with her colon. I do not think that the patient is a normal surgical candidate under the present circumstances and we need to try to fix her up in the best way possible to see if she gets to the point to have a resection of this issue. I have made her an appointment to be seen by cardiology, Rambo Dasilva MD, and proceed with an echocardiogram in days. We have also made an appointment for her to see Alfonso Cesar MD, General Surgery to review the case with me.       I reviewed the patient back with the daughter in the room on 12/10/2020. In the interim, I have had a conversation with Zita Guy MD, the patient’s pulmonologist, who strongly believes that the patient will be able to go through surgery as long as she has proper pulmonary assessment before intervention and she asked me to go ahead and consult Pulmonary Medicine, Dr. Ramos and associates for this purpose. A consult has been placed.     I reviewed her echocardiogram and actually it is better than I expected for somebody who has pulmonary fibrosis.     In summary I think the patient’s intussusception is producing bleeding, chronically, leading to iron deficiency anemia. She has had Injectafer a week ago, another one to be given to her tomorrow and thereafter she will require blood work on weekly basis. If the hemoglobin goes below 9, I think she will require transfusion of 2 units of red cells. She will have weekly appointment for CBC and nurse check and I will review her back on 12/30/2020.     I EMPHATICALLY POINTED OUT TO THE PATIENT’S DAUGHTER SHE CANNOT TAKE ANYMORE ZULMA-SELTZER. SHE HAS BEEN TAKING THIS ON A REGULAR SCHEDULE AND THIS  IS PRODUCING PROBABLY ALSO AN ELEMENT OF GI BLEEDING ASSOCIATED WITH HER INTUSSUSCEPTION AND WHY NOT GASTRITIS AND SO FORTH.     In summary the patient was reviewed on 12/30/2020 along with her daughter in the room. The patient has gone through cardiological assessment and actually her cardiac ejection fraction is not that bad, close to 50%, and with minimal evidence of pulmonary arterial hypertension. Normal contractility of the left ventricle. The patient also has been seen by Pulmonary Medicine. These records have been reviewed again and her pulmonary function tests is very marginal. The analysis is stated above by Dr. Moser who has advised the patient that she is a high risk for general anesthesia in regard to the need for ICU stay, intubation, mechanical ventilation and so forth given her pulmonary fibrosis. He has advised the patient to see Dr. Guy, Pulmonary Medicine, and she has an appointment to see her next week.     In regard to her iron deficiency anemia after she received IV iron infusions the hemoglobin keeps improving to 11.2 from 8.4. The patient is no longer taking Nohelia-[place]. The patient is a little bit stronger but her appetite is not good. Obviously the question is what is the cause of the intussusception. I have measured a CEA level that is elevated at 6.2 and leads me to believe that maybe this is something related to a primary colon cancer. Dr. Cesar has suggested for her to have a colonoscopy after reviewing his good notes. The patient is even more hesitant to proceed with this.     Therefore after so many visits we have not come with any conclusion about what she has besides an intussusception, iron deficiency anemia, GI bleeding and the anemia at least is the only thing that is getting better.     I would like to review her back in 3 weeks and repeat another CBC, ferritin, iron profile. By then she should have seen Dr. Guy and maybe at that time will decide how to proceed thereafter. I  have the belief that I will need to talk with all the physicians involved in her care to try to put them together and decide how to proceed about her care for this other medical condition.     It took me almost 45 minutes to review all the data by Cardiology, by Pulmonary Medicine, by Dr. Cesar and review all the data pertinent to her case and review the cardiological assessment and pulmonary medicine assessment.     I will send this note to Dr. Guy, the patient’s pulmonary medicine physician to make her aware of what goes on and what is in my mind in regard to the patient’s care.          I DISCUSSED WITH PATIENT IN DETAIL FORMS TO DECREASE CHANCES OF CORONAVIRUS INFECTION INCLUDING ISOLATION, PROPER HAND HIGIENE, AVOID PUBLIC PLACES  WITH CROWDS, FOLLOW  CDC RECOMENDATIONS, AND KEEP PERSONAL AND SOCIAL RESPONSIBILITY, WARE A MASK IN PUBLIC PLACES.  PATIENT IS AWARE THIS INFECTION COULD HAVE SEVERE CONSEQUENCES TO PERSONAL HEALTH AND FAMILY RAMIFICATIONS OF THIS.

## 2021-01-06 ENCOUNTER — TELEPHONE (OUTPATIENT)
Dept: SURGERY | Facility: CLINIC | Age: 81
End: 2021-01-06

## 2021-01-06 NOTE — TELEPHONE ENCOUNTER
I called Ms. Madalyn Adal, she said she decided she just wants to go ahead and have you do her colonoscopy. She doesn't want to put it off any longer.

## 2021-01-08 ENCOUNTER — TELEPHONE (OUTPATIENT)
Dept: SURGERY | Facility: CLINIC | Age: 81
End: 2021-01-08

## 2021-01-08 NOTE — TELEPHONE ENCOUNTER
Spoke with Ms. Mera regarding Pulmonologist and Cardiologist letters and needing a stress test completed before scheduling procedure. Patient voiced understanding and will call back when she has finished the stress test.

## 2021-01-11 ENCOUNTER — TRANSCRIBE ORDERS (OUTPATIENT)
Dept: ADMINISTRATIVE | Facility: HOSPITAL | Age: 81
End: 2021-01-11

## 2021-01-11 ENCOUNTER — TRANSCRIBE ORDERS (OUTPATIENT)
Dept: LAB | Facility: HOSPITAL | Age: 81
End: 2021-01-11

## 2021-01-11 ENCOUNTER — LAB (OUTPATIENT)
Dept: LAB | Facility: HOSPITAL | Age: 81
End: 2021-01-11

## 2021-01-11 DIAGNOSIS — Z01.818 OTHER SPECIFIED PRE-OPERATIVE EXAMINATION: ICD-10-CM

## 2021-01-11 DIAGNOSIS — Z01.818 OTHER SPECIFIED PRE-OPERATIVE EXAMINATION: Primary | ICD-10-CM

## 2021-01-11 PROCEDURE — C9803 HOPD COVID-19 SPEC COLLECT: HCPCS

## 2021-01-11 PROCEDURE — U0004 COV-19 TEST NON-CDC HGH THRU: HCPCS

## 2021-01-12 LAB — SARS-COV-2 RNA RESP QL NAA+PROBE: NOT DETECTED

## 2021-01-13 ENCOUNTER — HOSPITAL ENCOUNTER (OUTPATIENT)
Dept: CARDIOLOGY | Facility: HOSPITAL | Age: 81
Discharge: HOME | End: 2021-01-13
Admitting: INTERNAL MEDICINE

## 2021-01-13 VITALS — WEIGHT: 136.02 LBS | BODY MASS INDEX: 25.03 KG/M2 | HEIGHT: 62 IN

## 2021-01-13 DIAGNOSIS — Z01.810 PREOP CARDIOVASCULAR EXAM: ICD-10-CM

## 2021-01-13 DIAGNOSIS — D50.0 IRON DEFICIENCY ANEMIA DUE TO CHRONIC BLOOD LOSS: ICD-10-CM

## 2021-01-13 DIAGNOSIS — J84.10 PULMONARY FIBROSIS (HCC): ICD-10-CM

## 2021-01-13 LAB
BH CV NUCLEAR PRIOR STUDY: 3
BH CV STRESS BP STAGE 1: NORMAL
BH CV STRESS COMMENTS STAGE 1: NORMAL
BH CV STRESS DOSE REGADENOSON STAGE 1: 0.4
BH CV STRESS DURATION MIN STAGE 1: 0
BH CV STRESS DURATION SEC STAGE 1: 10
BH CV STRESS HR STAGE 1: 104
BH CV STRESS PROTOCOL 1: NORMAL
BH CV STRESS RECOVERY BP: NORMAL MMHG
BH CV STRESS RECOVERY HR: 102 BPM
BH CV STRESS STAGE 1: 1
LV EF NUC BP: 47 %
MAXIMAL PREDICTED HEART RATE: 141 BPM
PERCENT MAX PREDICTED HR: 73.76 %
STRESS BASELINE BP: NORMAL MMHG
STRESS BASELINE HR: 73 BPM
STRESS PERCENT HR: 87 %
STRESS POST EXERCISE DUR SEC: 10 SEC
STRESS POST PEAK BP: NORMAL MMHG
STRESS POST PEAK HR: 104 BPM
STRESS TARGET HR: 120 BPM

## 2021-01-13 PROCEDURE — 78452 HT MUSCLE IMAGE SPECT MULT: CPT

## 2021-01-13 PROCEDURE — 93017 CV STRESS TEST TRACING ONLY: CPT

## 2021-01-13 PROCEDURE — 0 TECHNETIUM TETROFOSMIN KIT: Performed by: INTERNAL MEDICINE

## 2021-01-13 PROCEDURE — 78452 HT MUSCLE IMAGE SPECT MULT: CPT | Performed by: INTERNAL MEDICINE

## 2021-01-13 PROCEDURE — 93018 CV STRESS TEST I&R ONLY: CPT | Performed by: INTERNAL MEDICINE

## 2021-01-13 PROCEDURE — 93016 CV STRESS TEST SUPVJ ONLY: CPT | Performed by: INTERNAL MEDICINE

## 2021-01-13 PROCEDURE — A9502 TC99M TETROFOSMIN: HCPCS | Performed by: INTERNAL MEDICINE

## 2021-01-13 PROCEDURE — 25010000002 REGADENOSON 0.4 MG/5ML SOLUTION: Performed by: INTERNAL MEDICINE

## 2021-01-13 RX ADMIN — TETROFOSMIN 1 DOSE: 1.38 INJECTION, POWDER, LYOPHILIZED, FOR SOLUTION INTRAVENOUS at 13:24

## 2021-01-13 RX ADMIN — REGADENOSON 0.4 MG: 0.08 INJECTION, SOLUTION INTRAVENOUS at 14:08

## 2021-01-13 RX ADMIN — TETROFOSMIN 1 DOSE: 1.38 INJECTION, POWDER, LYOPHILIZED, FOR SOLUTION INTRAVENOUS at 14:08

## 2021-01-13 NOTE — PROGRESS NOTES
Date of Office Visit:  2021  Encounter Provider:  Rambo Dasilva MD  Place of Service:  [facility] CARDIOLOGY  Patient Name: Madalyn Galeas  :  1940      Dear Dr Cesar,    Ms Mera recently had an echo and a stress test performed in our office.  Her stress test is normal.  She has mildly reduced LV systolic function, EF 45-50%.  Clinically she does not have left sided CHF. Her pulmonary pressure was normal on the echocardiogram as well.    Her perioperative risk is increased predominantly due to lung disease, but does not appear prohibitive.     I do not recommend any further cardiac testing at this time.    Please contact our office with any questions or concerns. As always, it has been a pleasure to participate in your patient's care.      Rambo Dasilva MD  [facility]

## 2021-01-14 DIAGNOSIS — K63.89 COLONIC MASS: Primary | ICD-10-CM

## 2021-01-15 ENCOUNTER — TRANSCRIBE ORDERS (OUTPATIENT)
Dept: SLEEP MEDICINE | Facility: HOSPITAL | Age: 81
End: 2021-01-15

## 2021-01-15 DIAGNOSIS — Z01.818 OTHER SPECIFIED PRE-OPERATIVE EXAMINATION: Primary | ICD-10-CM

## 2021-01-15 PROBLEM — K63.89 COLONIC MASS: Status: ACTIVE | Noted: 2021-01-15

## 2021-01-19 ENCOUNTER — LAB (OUTPATIENT)
Dept: LAB | Facility: HOSPITAL | Age: 81
End: 2021-01-19

## 2021-01-19 DIAGNOSIS — Z01.818 OTHER SPECIFIED PRE-OPERATIVE EXAMINATION: ICD-10-CM

## 2021-01-19 PROCEDURE — C9803 HOPD COVID-19 SPEC COLLECT: HCPCS

## 2021-01-19 PROCEDURE — U0004 COV-19 TEST NON-CDC HGH THRU: HCPCS

## 2021-01-20 LAB — SARS-COV-2 RNA RESP QL NAA+PROBE: NOT DETECTED

## 2021-01-21 ENCOUNTER — ANESTHESIA EVENT (OUTPATIENT)
Dept: GASTROENTEROLOGY | Facility: HOSPITAL | Age: 81
End: 2021-01-21

## 2021-01-21 ENCOUNTER — HOSPITAL ENCOUNTER (OUTPATIENT)
Facility: HOSPITAL | Age: 81
Setting detail: HOSPITAL OUTPATIENT SURGERY
Discharge: HOME | End: 2021-01-21
Attending: SURGERY | Admitting: SURGERY

## 2021-01-21 ENCOUNTER — ANESTHESIA (OUTPATIENT)
Dept: GASTROENTEROLOGY | Facility: HOSPITAL | Age: 81
End: 2021-01-21

## 2021-01-21 VITALS
OXYGEN SATURATION: 99 % | HEIGHT: 61 IN | BODY MASS INDEX: 24.58 KG/M2 | SYSTOLIC BLOOD PRESSURE: 128 MMHG | RESPIRATION RATE: 16 BRPM | HEART RATE: 54 BPM | WEIGHT: 130.2 LBS | TEMPERATURE: 97.9 F | DIASTOLIC BLOOD PRESSURE: 60 MMHG

## 2021-01-21 DIAGNOSIS — K63.89 COLONIC MASS: ICD-10-CM

## 2021-01-21 PROBLEM — K57.90 DIVERTICULOSIS: Status: ACTIVE | Noted: 2021-01-21

## 2021-01-21 PROBLEM — K64.9 HEMORRHOIDS: Status: ACTIVE | Noted: 2021-01-21

## 2021-01-21 PROBLEM — D12.5 ADENOMATOUS POLYP OF SIGMOID COLON: Status: ACTIVE | Noted: 2021-01-21

## 2021-01-21 PROCEDURE — 25010000002 PROPOFOL 10 MG/ML EMULSION: Performed by: ANESTHESIOLOGY

## 2021-01-21 PROCEDURE — 25010000002 PHENYLEPHRINE PER 1 ML: Performed by: ANESTHESIOLOGY

## 2021-01-21 PROCEDURE — 45380 COLONOSCOPY AND BIOPSY: CPT | Performed by: SURGERY

## 2021-01-21 PROCEDURE — 88305 TISSUE EXAM BY PATHOLOGIST: CPT | Performed by: SURGERY

## 2021-01-21 PROCEDURE — 45381 COLONOSCOPY SUBMUCOUS NJX: CPT | Performed by: SURGERY

## 2021-01-21 PROCEDURE — 45385 COLONOSCOPY W/LESION REMOVAL: CPT | Performed by: SURGERY

## 2021-01-21 PROCEDURE — S0260 H&P FOR SURGERY: HCPCS | Performed by: SURGERY

## 2021-01-21 DEVICE — DEV CLIP ENDO RESOLUTION360 CONTRL ROT 235CM: Type: IMPLANTABLE DEVICE | Site: DESCENDING COLON | Status: FUNCTIONAL

## 2021-01-21 RX ORDER — PROPOFOL 10 MG/ML
VIAL (ML) INTRAVENOUS CONTINUOUS PRN
Status: DISCONTINUED | OUTPATIENT
Start: 2021-01-21 | End: 2021-01-21 | Stop reason: SURG

## 2021-01-21 RX ORDER — LIDOCAINE HYDROCHLORIDE 10 MG/ML
0.5 INJECTION, SOLUTION INFILTRATION; PERINEURAL ONCE AS NEEDED
Status: DISCONTINUED | OUTPATIENT
Start: 2021-01-21 | End: 2021-01-21 | Stop reason: HOSPADM

## 2021-01-21 RX ORDER — LIDOCAINE HYDROCHLORIDE 20 MG/ML
INJECTION, SOLUTION INFILTRATION; PERINEURAL AS NEEDED
Status: DISCONTINUED | OUTPATIENT
Start: 2021-01-21 | End: 2021-01-21 | Stop reason: SURG

## 2021-01-21 RX ORDER — SODIUM CHLORIDE 0.9 % (FLUSH) 0.9 %
10 SYRINGE (ML) INJECTION AS NEEDED
Status: DISCONTINUED | OUTPATIENT
Start: 2021-01-21 | End: 2021-01-21 | Stop reason: HOSPADM

## 2021-01-21 RX ORDER — SODIUM CHLORIDE, SODIUM LACTATE, POTASSIUM CHLORIDE, CALCIUM CHLORIDE 600; 310; 30; 20 MG/100ML; MG/100ML; MG/100ML; MG/100ML
1000 INJECTION, SOLUTION INTRAVENOUS CONTINUOUS
Status: DISCONTINUED | OUTPATIENT
Start: 2021-01-21 | End: 2021-01-21 | Stop reason: HOSPADM

## 2021-01-21 RX ADMIN — PHENYLEPHRINE HYDROCHLORIDE 100 MCG: 10 INJECTION INTRAVENOUS at 10:15

## 2021-01-21 RX ADMIN — PROPOFOL 250 MCG/KG/MIN: 10 INJECTION, EMULSION INTRAVENOUS at 09:53

## 2021-01-21 RX ADMIN — LIDOCAINE HYDROCHLORIDE 60 MG: 20 INJECTION, SOLUTION INFILTRATION; PERINEURAL at 09:53

## 2021-01-21 RX ADMIN — SODIUM CHLORIDE, POTASSIUM CHLORIDE, SODIUM LACTATE AND CALCIUM CHLORIDE 1000 ML: 600; 310; 30; 20 INJECTION, SOLUTION INTRAVENOUS at 09:45

## 2021-01-21 RX ADMIN — PHENYLEPHRINE HYDROCHLORIDE 100 MCG: 10 INJECTION INTRAVENOUS at 10:35

## 2021-01-21 NOTE — ANESTHESIA POSTPROCEDURE EVALUATION
"Patient: Madalyn Galeas    Procedure Summary     Date: 01/21/21 Room / Location:  [facility]    Anesthesia Start: 0948 Anesthesia Stop: 1045    Procedure: COLONOSCOPY INTO CECUM AND TI WITH BIOPSIES, SPOT INJECTION TO COLON MASS (MID-ASCENDING), HOT SNARE POLYPECTOMY, COLD BX POLYPECTOMIES, SALINE LIFT, CLIP PLACEMENT X3 (N/A ) Diagnosis:       Colonic mass      (Colonic mass [K63.89])    Surgeon: Alfonso Cesar MD Provider: Parrish Nguyen MD    Anesthesia Type: MAC ASA Status: 3          Anesthesia Type: MAC    Vitals  Vitals Value Taken Time   /60 01/21/21 1107   Temp     Pulse 54 01/21/21 1107   Resp 16 01/21/21 1107   SpO2 99 % 01/21/21 1107           Post Anesthesia Care and Evaluation    Patient location during evaluation: bedside  Patient participation: complete - patient participated  Level of consciousness: awake and alert  Pain management: adequate  Airway patency: patent  Anesthetic complications: No anesthetic complications    Cardiovascular status: acceptable  Respiratory status: acceptable  Hydration status: acceptable    Comments: /60 (BP Location: Left arm, Patient Position: Lying)   Pulse 54   Temp 36.6 °C (97.9 °F) (Oral)   Resp 16   Ht 154.9 cm (61\")   Wt 59.1 kg (130 lb 3.2 oz)   LMP  (LMP Unknown)   SpO2 99%   BMI 24.60 kg/m²           "

## 2021-01-21 NOTE — ANESTHESIA PREPROCEDURE EVALUATION
Anesthesia Evaluation     Patient summary reviewed and Nursing notes reviewed   no history of anesthetic complications:  NPO Solid Status: > 6 hours  NPO Liquid Status: > 6 hours           Airway   Mallampati: II  TM distance: >3 FB  Neck ROM: full  no difficulty expected and No difficulty expected  Dental - normal exam     Pulmonary - normal exam    breath sounds clear to auscultation  (+) home oxygen, sleep apnea,   (-) rhonchi, decreased breath sounds, wheezes, rales, stridor    ROS comment: Pulmonary Hypertension  Pulmonary Fibrosis    Cardiovascular - normal exam    NYHA Classification: I  Rhythm: regular  Rate: normal    (+) hypertension, hyperlipidemia,   (-) murmur, weak pulses, friction rub, systolic click, carotid bruits, JVD, peripheral edema      Neuro/Psych  (+) psychiatric history Depression,     GI/Hepatic/Renal/Endo    (+)  GERD,  diabetes mellitus, thyroid problem hypothyroidism    Musculoskeletal (-) negative ROS    Abdominal  - normal exam    Abdomen: soft.   Substance History - negative use     OB/GYN negative ob/gyn ROS         Other   blood dyscrasia anemia,                     Anesthesia Plan    ASA 3     MAC     intravenous induction     Anesthetic plan, all risks, benefits, and alternatives have been provided, discussed and informed consent has been obtained with: patient.

## 2021-01-21 NOTE — H&P
CC: Iron deficiency anemia, colonic abnormality on CT scan     HPI: The patient is a very pleasant 80-year-old female that was referred by Dr. Lara for evaluation of colonic mass.  The patient was found to have iron deficiency anemia on September 2020.  She was referred to Dr. Boyd for further evaluation.  Patient is on 3 to 8 L of oxygen at home and was recommended to undergo barium enema as well as upper GI .  She underwent barium enema on 9/21/2020.  As per radiology report, there was no colonic normality.  She underwent upper GI that show small sliding hiatal hernia with minimal amount gastroesophageal reflux without any abnormality.  She then was seen by Dr. Lara for treatment of iron deficiency anemia.  He ordered CT scan abdomen and pelvis that show intussusception of the colon at the hepatic flexure with concern of a mass at the area.  He referred the patient to me for further evaluation.     Patient reports regular bowel movements with intermittent episode constipation before work-up for her anemia was started.  Since then she has been having diarrhea.  She was treated with loperamide and cholestyramine.  She did not take loperamide because was concerned about constipation.  She has been 4-5 bowel movements daily.  Denies any melanotic stools.  She has history of colon polyps her last colonoscopy was in 2011 and patient report it was normal.  She has history of brother with colon cancer that was diagnosed at 66 years old.  She reports weight loss of 30 pounds since September.  She denies any nausea and vomiting.  She has been avoiding eating because eating cause diarrhea.  Denies any abdominal pain     PMH: COPD on 3 to 8 L home oxygen due to pulmonary fibrosis, pulmonary hypertension, iron deficiency anemia, diverticulosis, colon polyp, hypertension, hypothyroidism, depression, sleep apnea, vertigo     PSH: Transvaginal bladder lift, colonoscopy 2011     MEDS: Famotidine, Lipitor, Synthroid, Zoloft,  "DesOwen, Esbriet, Adcirca, verapamil, gabapentin     ALL: Sulfa antibiotics     FH and SH: Brother with colon cancer at age 66, no other family member with colon cancer or inflammatory bowel disease.  Patient has gone through menopause and is not taking any hormone replacement therapy.  The patient is retired and does not smoke.  She quit in 1988.  Denies alcohol drinking     ROS:   Constitutional: Reports weight loss, fatigue and appetite change  HEENT: Reports hearing loss and rhinorrhea  Cardiovascular: Denies chest pain or palpitation.  Respiratory: Reports cough, sleep apnea and shortness of breath  Gastrointestinal: denies N&V, abd pain, reports diarrhea, denies constipation.  Genitourinary: denies dysuria, frequency.  Endocrine: denies cold intolerance, lethargy and flushing.  Hem: denies excessive bruising and postop bleeding.  Musculoskeletal: denies weakness, joint swelling, pain or stiffness.  Neuro: denies seizures, CVA, paresthesia, or peripheral neuropathy.   Skin: denies change in nevi, rashes, masses.     PE:   Vitals:    01/21/21 0942   BP: 115/71   BP Location: Left arm   Patient Position: Sitting   Pulse: 90   Resp: 16   Temp: 97.9 °F (36.6 °C)   TempSrc: Oral   SpO2: 100%   Weight: 59.1 kg (130 lb 3.2 oz)   Height: 154.9 cm (61\")     Body mass index is 26.64 kg/m².   Alert and oriented ×3, no acute distress.  Head is normocephalic and atraumatic.  Neck is supple there is no thyromegaly or lymphadenopathy  Chest is clear bilaterally there is no added sounds  Regular rate and rhythm, no murmurs  Abdomen is soft and nontender, is nondistended, bowel sounds are positive.  There is no rebound or guarding is and there is no peritoneal signs.  I do not feel any masses intra-abdominal  No clubbing cyanosis or edema in lower or upper extremities     Diagnostic studies:   Imaging personally reviewed and interpreted by me  Barium enema 9/14/2020:   To me there is an area of irregular lumen at the hepatic " flexure of the colon.  Extensive diverticulosis with redundant sigmoid colon.     Upper GI 10/19/2020:   Small sliding hiatal hernia, esophageal dysmotility, no polyps or ulcers     CT scan abdomen pelvis 12/3/2020:   There is focal intussusception at the hepatic flexure of the colon with mild inflammation without evidence of obstruction.  Thickening of the wall, cholelithiasis, sigmoid reticulosis     Labs;   12/15/2020 hemoglobin 10.2 from 9.1 12/10/2020 from 9.3 11/24/2020  12/10/2020 albumin 3.2, CO2 35.2, otherwise  normal CMP  CEA 6.7 abnormal    Stress test normal      Assessment and plan     The patient is a very pleasant 80-year-old female with pulmonary fibrosis oxygen dependent with high oxygen requirements with activity and at home.  She was found to have iron deficiency anemia and likely a mass in the hepatic flexure of the colon.  She has cholelithiasis, no cholecystitis, I do not feel her symptoms are related to gallbladder disease but likely to intermittent obstruction of the colon due to intussusception.  Discussed with patient about further step.  I think she should undergo colonoscopy to establish diagnosis and pending those results decide further.   She has a very tortuous colon with extensive diverticulosis and I hope I can access the area of concern with scope.  She was cleared by cardiology and pulmonology with high risk for complications.I explained to the patient that she is extremely high risk of complications including the need for chronic mechanical ventilation and tracheostomy after any type of anesthetic.  If she is willing to accept those risk then we should proceed with colonoscopy.  If not, she may eventually perforate and obstruct and most likely will not survive.     Alfonso Cesar MD, FACS  General, Minimally Invasive and Endoscopic Surgery  [facility]    [address], [address]  [place], [place], [address]  P: [phone]  F: [phone]

## 2021-01-24 LAB
LAB AP CASE REPORT: NORMAL
LAB AP DIAGNOSIS COMMENT: NORMAL
PATH REPORT.FINAL DX SPEC: NORMAL
PATH REPORT.GROSS SPEC: NORMAL

## 2021-01-25 ENCOUNTER — TELEPHONE (OUTPATIENT)
Dept: SURGERY | Facility: CLINIC | Age: 81
End: 2021-01-25

## 2021-01-25 ENCOUNTER — PREP FOR SURGERY (OUTPATIENT)
Dept: OTHER | Facility: HOSPITAL | Age: 81
End: 2021-01-25

## 2021-01-25 DIAGNOSIS — C18.9 COLON CANCER (HCC): Primary | ICD-10-CM

## 2021-01-25 DIAGNOSIS — C18.3 MALIGNANT NEOPLASM OF HEPATIC FLEXURE (HCC): Primary | ICD-10-CM

## 2021-01-25 RX ORDER — ACETAMINOPHEN 500 MG
1000 TABLET ORAL EVERY 6 HOURS
Status: CANCELLED | OUTPATIENT
Start: 2021-01-28

## 2021-01-25 RX ORDER — CHLORHEXIDINE GLUCONATE 4 G/100ML
SOLUTION TOPICAL 2 TIMES DAILY
Qty: 236 ML | Refills: 0 | Status: SHIPPED | OUTPATIENT
Start: 2021-01-25 | End: 2021-01-31 | Stop reason: HOSPADM

## 2021-01-25 RX ORDER — NEOMYCIN SULFATE 500 MG/1
1000 TABLET ORAL 3 TIMES DAILY
Qty: 6 TABLET | Refills: 0 | Status: ON HOLD | OUTPATIENT
Start: 2021-01-25 | End: 2021-01-29

## 2021-01-25 RX ORDER — PREGABALIN 75 MG/1
75 CAPSULE ORAL ONCE
Status: CANCELLED | OUTPATIENT
Start: 2021-01-28 | End: 2021-01-25

## 2021-01-25 RX ORDER — ALVIMOPAN 12 MG/1
12 CAPSULE ORAL ONCE
Status: CANCELLED | OUTPATIENT
Start: 2021-01-28 | End: 2021-01-25

## 2021-01-25 NOTE — TELEPHONE ENCOUNTER
Pt called to inquire about her biopsy results from 1/21/21.    I called patient regarding her pathology results.    Final Diagnosis   1. Mid Ascending Colon, Mass, Biopsy:                 A. AT LEAST INTRAMUCOSAL CARCINOMA (SEE COMMENT).      2. Colon, Splenic Flexure Polyps, Polypectomies:                A.  Tubular adenoma and fragments of hyperplastic polyp.       3. Proximal Descending Colon, Polyp, Polypectomy:                A.  Fragments of tubular adenoma and hyperplastic polyp.      4. Mid Descending Colon, Polyp, Polypectomy:                 A. Tubular adenoma and hyperplastic polyp.       5. Sigmoid Colon, Polyp, Polypectomy:                 A. Fragments of inflammatory polyp.       6. Distal Sigmoid Colon, Polyp, Polypectomy:               A. Fragments of hyperplastic polyp.         Comment    Biopsies taken of the mid ascending colon mass are largely superficial with marked ulceration. There is a small area demonstrating at least intramucosal carcinoma.     Patient has evidence of intramucosal carcinoma at the mass at the hepatic flexure.  Biopsies were superficial.  The mass is large enough that I do not have any doubt that she will have invasive carcinoma, therefore I recommend that she undergoes open colon resection.  I discussed this with the patient.  Risk and benefits of procedure including bleeding, infection, anastomotic leak, intra-abdominal injuries were discussed in detail with the patient that verbalized understanding and agree with the plan    She will need to have preop labs, Covid testing, CT scan of the chest that can be done after surgery if there is no time.  Her mass is extremely large and has been bleeding.  I think she need to have the surgery done soon.  She understood

## 2021-01-26 ENCOUNTER — LAB (OUTPATIENT)
Dept: LAB | Facility: HOSPITAL | Age: 81
End: 2021-01-26

## 2021-01-26 ENCOUNTER — TRANSCRIBE ORDERS (OUTPATIENT)
Dept: SLEEP MEDICINE | Facility: HOSPITAL | Age: 81
End: 2021-01-26

## 2021-01-26 DIAGNOSIS — Z01.818 OTHER SPECIFIED PRE-OPERATIVE EXAMINATION: ICD-10-CM

## 2021-01-26 DIAGNOSIS — Z01.818 OTHER SPECIFIED PRE-OPERATIVE EXAMINATION: Primary | ICD-10-CM

## 2021-01-26 PROCEDURE — C9803 HOPD COVID-19 SPEC COLLECT: HCPCS

## 2021-01-26 PROCEDURE — U0004 COV-19 TEST NON-CDC HGH THRU: HCPCS

## 2021-01-27 ENCOUNTER — APPOINTMENT (OUTPATIENT)
Dept: CT IMAGING | Facility: HOSPITAL | Age: 81
End: 2021-01-27

## 2021-01-27 LAB — SARS-COV-2 RNA RESP QL NAA+PROBE: NOT DETECTED

## 2021-01-28 ENCOUNTER — APPOINTMENT (OUTPATIENT)
Dept: LAB | Facility: HOSPITAL | Age: 81
End: 2021-01-28

## 2021-01-28 ENCOUNTER — HOSPITAL ENCOUNTER (INPATIENT)
Facility: HOSPITAL | Age: 81
LOS: 3 days | Discharge: HOME | End: 2021-01-31
Attending: SURGERY | Admitting: SURGERY

## 2021-01-28 ENCOUNTER — HOSPITAL ENCOUNTER (OUTPATIENT)
Dept: CT IMAGING | Facility: HOSPITAL | Age: 81
Discharge: HOME | End: 2021-01-28

## 2021-01-28 ENCOUNTER — ANESTHESIA (OUTPATIENT)
Dept: PERIOP | Facility: HOSPITAL | Age: 81
End: 2021-01-28

## 2021-01-28 ENCOUNTER — ANESTHESIA EVENT (OUTPATIENT)
Dept: PERIOP | Facility: HOSPITAL | Age: 81
End: 2021-01-28

## 2021-01-28 DIAGNOSIS — C18.9 COLON CANCER (HCC): Primary | ICD-10-CM

## 2021-01-28 DIAGNOSIS — C18.3 MALIGNANT NEOPLASM OF HEPATIC FLEXURE (HCC): ICD-10-CM

## 2021-01-28 PROBLEM — K63.89 COLONIC MASS: Status: RESOLVED | Noted: 2021-01-15 | Resolved: 2021-01-28

## 2021-01-28 LAB
CREAT BLDA-MCNC: 0.8 MG/DL (ref 0.6–1.3)
QT INTERVAL: 478 MS

## 2021-01-28 PROCEDURE — 25010000002 NEOSTIGMINE 0.5 MG/ML SOLUTION: Performed by: REGISTERED NURSE

## 2021-01-28 PROCEDURE — 93010 ELECTROCARDIOGRAM REPORT: CPT | Performed by: INTERNAL MEDICINE

## 2021-01-28 PROCEDURE — 88341 IMHCHEM/IMCYTCHM EA ADD ANTB: CPT

## 2021-01-28 PROCEDURE — 25010000002 PHENYLEPHRINE PER 1 ML: Performed by: REGISTERED NURSE

## 2021-01-28 PROCEDURE — P9041 ALBUMIN (HUMAN),5%, 50ML: HCPCS | Performed by: REGISTERED NURSE

## 2021-01-28 PROCEDURE — 25010000003 BUPIVACAINE LIPOSOME 1.3 % SUSPENSION: Performed by: ANESTHESIOLOGY

## 2021-01-28 PROCEDURE — 88309 TISSUE EXAM BY PATHOLOGIST: CPT | Performed by: SURGERY

## 2021-01-28 PROCEDURE — 25010000002 ONDANSETRON PER 1 MG: Performed by: REGISTERED NURSE

## 2021-01-28 PROCEDURE — 88381 MICRODISSECTION MANUAL: CPT

## 2021-01-28 PROCEDURE — 25010000002 DEXAMETHASONE PER 1 MG: Performed by: REGISTERED NURSE

## 2021-01-28 PROCEDURE — 82947 ASSAY GLUCOSE BLOOD QUANT: CPT

## 2021-01-28 PROCEDURE — C9290 INJ, BUPIVACAINE LIPOSOME: HCPCS | Performed by: ANESTHESIOLOGY

## 2021-01-28 PROCEDURE — 25010000002 PROPOFOL 10 MG/ML EMULSION: Performed by: REGISTERED NURSE

## 2021-01-28 PROCEDURE — 85018 HEMOGLOBIN: CPT

## 2021-01-28 PROCEDURE — 44160 REMOVAL OF COLON: CPT | Performed by: SPECIALIST/TECHNOLOGIST, OTHER

## 2021-01-28 PROCEDURE — 82565 ASSAY OF CREATININE: CPT

## 2021-01-28 PROCEDURE — 71260 CT THORAX DX C+: CPT

## 2021-01-28 PROCEDURE — 88342 IMHCHEM/IMCYTCHM 1ST ANTB: CPT

## 2021-01-28 PROCEDURE — 0DTF0ZZ RESECTION OF RIGHT LARGE INTESTINE, OPEN APPROACH: ICD-10-PCS | Performed by: SURGERY

## 2021-01-28 PROCEDURE — 25010000002 MAGNESIUM SULFATE PER 500 MG OF MAGNESIUM: Performed by: REGISTERED NURSE

## 2021-01-28 PROCEDURE — 25010000002 ALBUMIN HUMAN 5% PER 50 ML: Performed by: REGISTERED NURSE

## 2021-01-28 PROCEDURE — 82803 BLOOD GASES ANY COMBINATION: CPT

## 2021-01-28 PROCEDURE — 94799 UNLISTED PULMONARY SVC/PX: CPT

## 2021-01-28 PROCEDURE — 25010000002 MIDAZOLAM PER 1 MG: Performed by: ANESTHESIOLOGY

## 2021-01-28 PROCEDURE — 25010000002 FENTANYL CITRATE (PF) 100 MCG/2ML SOLUTION: Performed by: REGISTERED NURSE

## 2021-01-28 PROCEDURE — 44160 REMOVAL OF COLON: CPT | Performed by: SURGERY

## 2021-01-28 PROCEDURE — 25010000002 KETOROLAC TROMETHAMINE PER 15 MG: Performed by: SURGERY

## 2021-01-28 PROCEDURE — 25010000002 HYDRALAZINE PER 20 MG: Performed by: REGISTERED NURSE

## 2021-01-28 PROCEDURE — 25010000002 CEFOXITIN PER 1 G: Performed by: SURGERY

## 2021-01-28 PROCEDURE — 25010000002 IOPAMIDOL 61 % SOLUTION: Performed by: SURGERY

## 2021-01-28 PROCEDURE — 93005 ELECTROCARDIOGRAM TRACING: CPT | Performed by: ANESTHESIOLOGY

## 2021-01-28 PROCEDURE — 25010000002 SUCCINYLCHOLINE PER 20 MG: Performed by: REGISTERED NURSE

## 2021-01-28 PROCEDURE — 25010000002 LIDOCAINE PER 10 MG: Performed by: REGISTERED NURSE

## 2021-01-28 PROCEDURE — 85014 HEMATOCRIT: CPT

## 2021-01-28 DEVICE — ENDOPATH ECHELON ENDOSCOPIC LINEAR CUTTER RELOADS, WHITE, 60MM
Type: IMPLANTABLE DEVICE | Site: ABDOMEN | Status: FUNCTIONAL
Brand: ECHELON ENDOPATH

## 2021-01-28 DEVICE — ENDOPATH ECHELON ENDOSCOPIC LINEAR CUTTER RELOADS, BLUE, 60MM
Type: IMPLANTABLE DEVICE | Site: ABDOMEN | Status: FUNCTIONAL
Brand: ECHELON ENDOPATH

## 2021-01-28 RX ORDER — METOPROLOL TARTRATE 5 MG/5ML
INJECTION INTRAVENOUS AS NEEDED
Status: DISCONTINUED | OUTPATIENT
Start: 2021-01-28 | End: 2021-01-28 | Stop reason: SURG

## 2021-01-28 RX ORDER — ALVIMOPAN 12 MG/1
12 CAPSULE ORAL 2 TIMES DAILY
Status: DISCONTINUED | OUTPATIENT
Start: 2021-01-29 | End: 2021-01-30

## 2021-01-28 RX ORDER — HYDROCODONE BITARTRATE AND ACETAMINOPHEN 7.5; 325 MG/1; MG/1
1 TABLET ORAL ONCE AS NEEDED
Status: DISCONTINUED | OUTPATIENT
Start: 2021-01-28 | End: 2021-01-28 | Stop reason: HOSPADM

## 2021-01-28 RX ORDER — SODIUM CHLORIDE 0.9 % (FLUSH) 0.9 %
3 SYRINGE (ML) INJECTION EVERY 12 HOURS SCHEDULED
Status: DISCONTINUED | OUTPATIENT
Start: 2021-01-28 | End: 2021-01-28 | Stop reason: HOSPADM

## 2021-01-28 RX ORDER — ALVIMOPAN 12 MG/1
12 CAPSULE ORAL ONCE
Status: COMPLETED | OUTPATIENT
Start: 2021-01-28 | End: 2021-01-28

## 2021-01-28 RX ORDER — TADALAFIL 20 MG/1
40 TABLET ORAL
Status: DISCONTINUED | OUTPATIENT
Start: 2021-01-28 | End: 2021-01-31 | Stop reason: HOSPADM

## 2021-01-28 RX ORDER — ALBUMIN, HUMAN INJ 5% 5 %
SOLUTION INTRAVENOUS CONTINUOUS PRN
Status: DISCONTINUED | OUTPATIENT
Start: 2021-01-28 | End: 2021-01-28 | Stop reason: SURG

## 2021-01-28 RX ORDER — SODIUM CHLORIDE 0.9 % (FLUSH) 0.9 %
3-10 SYRINGE (ML) INJECTION AS NEEDED
Status: DISCONTINUED | OUTPATIENT
Start: 2021-01-28 | End: 2021-01-28 | Stop reason: HOSPADM

## 2021-01-28 RX ORDER — KETOROLAC TROMETHAMINE 30 MG/ML
15 INJECTION, SOLUTION INTRAMUSCULAR; INTRAVENOUS EVERY 6 HOURS
Status: DISCONTINUED | OUTPATIENT
Start: 2021-01-28 | End: 2021-01-29

## 2021-01-28 RX ORDER — LEVOTHYROXINE SODIUM 0.12 MG/1
125 TABLET ORAL DAILY
Status: DISCONTINUED | OUTPATIENT
Start: 2021-01-28 | End: 2021-01-31 | Stop reason: HOSPADM

## 2021-01-28 RX ORDER — HYDROMORPHONE HYDROCHLORIDE 1 MG/ML
0.5 INJECTION, SOLUTION INTRAMUSCULAR; INTRAVENOUS; SUBCUTANEOUS
Status: DISCONTINUED | OUTPATIENT
Start: 2021-01-28 | End: 2021-01-31 | Stop reason: HOSPADM

## 2021-01-28 RX ORDER — MIDAZOLAM HYDROCHLORIDE 1 MG/ML
0.5 INJECTION INTRAMUSCULAR; INTRAVENOUS
Status: DISCONTINUED | OUTPATIENT
Start: 2021-01-28 | End: 2021-01-28 | Stop reason: HOSPADM

## 2021-01-28 RX ORDER — FENTANYL CITRATE 50 UG/ML
50 INJECTION, SOLUTION INTRAMUSCULAR; INTRAVENOUS
Status: DISCONTINUED | OUTPATIENT
Start: 2021-01-28 | End: 2021-01-28 | Stop reason: HOSPADM

## 2021-01-28 RX ORDER — ONDANSETRON 4 MG/1
4 TABLET, FILM COATED ORAL EVERY 6 HOURS PRN
Status: DISCONTINUED | OUTPATIENT
Start: 2021-01-28 | End: 2021-01-31 | Stop reason: HOSPADM

## 2021-01-28 RX ORDER — DIPHENHYDRAMINE HCL 25 MG
25 CAPSULE ORAL
Status: DISCONTINUED | OUTPATIENT
Start: 2021-01-28 | End: 2021-01-28 | Stop reason: HOSPADM

## 2021-01-28 RX ORDER — ONDANSETRON 2 MG/ML
4 INJECTION INTRAMUSCULAR; INTRAVENOUS EVERY 6 HOURS PRN
Status: DISCONTINUED | OUTPATIENT
Start: 2021-01-28 | End: 2021-01-31 | Stop reason: HOSPADM

## 2021-01-28 RX ORDER — METOPROLOL TARTRATE 5 MG/5ML
INJECTION INTRAVENOUS
Status: COMPLETED
Start: 2021-01-28 | End: 2021-01-28

## 2021-01-28 RX ORDER — BUPIVACAINE HYDROCHLORIDE 2.5 MG/ML
INJECTION, SOLUTION EPIDURAL; INFILTRATION; INTRACAUDAL
Status: COMPLETED | OUTPATIENT
Start: 2021-01-28 | End: 2021-01-28

## 2021-01-28 RX ORDER — LIDOCAINE HYDROCHLORIDE 10 MG/ML
0.5 INJECTION, SOLUTION EPIDURAL; INFILTRATION; INTRACAUDAL; PERINEURAL ONCE AS NEEDED
Status: DISCONTINUED | OUTPATIENT
Start: 2021-01-28 | End: 2021-01-28 | Stop reason: HOSPADM

## 2021-01-28 RX ORDER — ROCURONIUM BROMIDE 10 MG/ML
INJECTION, SOLUTION INTRAVENOUS AS NEEDED
Status: DISCONTINUED | OUTPATIENT
Start: 2021-01-28 | End: 2021-01-28 | Stop reason: SURG

## 2021-01-28 RX ORDER — MIDAZOLAM HYDROCHLORIDE 1 MG/ML
1 INJECTION INTRAMUSCULAR; INTRAVENOUS
Status: DISCONTINUED | OUTPATIENT
Start: 2021-01-28 | End: 2021-01-28 | Stop reason: HOSPADM

## 2021-01-28 RX ORDER — FAMOTIDINE 20 MG/1
20 TABLET, FILM COATED ORAL 2 TIMES DAILY
Status: DISCONTINUED | OUTPATIENT
Start: 2021-01-28 | End: 2021-01-31 | Stop reason: HOSPADM

## 2021-01-28 RX ORDER — MAGNESIUM HYDROXIDE 1200 MG/15ML
LIQUID ORAL AS NEEDED
Status: DISCONTINUED | OUTPATIENT
Start: 2021-01-28 | End: 2021-01-28 | Stop reason: HOSPADM

## 2021-01-28 RX ORDER — SUCCINYLCHOLINE CHLORIDE 20 MG/ML
INJECTION INTRAMUSCULAR; INTRAVENOUS AS NEEDED
Status: DISCONTINUED | OUTPATIENT
Start: 2021-01-28 | End: 2021-01-28 | Stop reason: SURG

## 2021-01-28 RX ORDER — PROPOFOL 10 MG/ML
VIAL (ML) INTRAVENOUS AS NEEDED
Status: DISCONTINUED | OUTPATIENT
Start: 2021-01-28 | End: 2021-01-28 | Stop reason: SURG

## 2021-01-28 RX ORDER — FENTANYL CITRATE 50 UG/ML
INJECTION, SOLUTION INTRAMUSCULAR; INTRAVENOUS AS NEEDED
Status: DISCONTINUED | OUTPATIENT
Start: 2021-01-28 | End: 2021-01-28 | Stop reason: SURG

## 2021-01-28 RX ORDER — EPHEDRINE SULFATE 50 MG/ML
5 INJECTION, SOLUTION INTRAVENOUS ONCE AS NEEDED
Status: DISCONTINUED | OUTPATIENT
Start: 2021-01-28 | End: 2021-01-28 | Stop reason: HOSPADM

## 2021-01-28 RX ORDER — SODIUM CHLORIDE, SODIUM LACTATE, POTASSIUM CHLORIDE, CALCIUM CHLORIDE 600; 310; 30; 20 MG/100ML; MG/100ML; MG/100ML; MG/100ML
INJECTION, SOLUTION INTRAVENOUS CONTINUOUS PRN
Status: DISCONTINUED | OUTPATIENT
Start: 2021-01-28 | End: 2021-01-28 | Stop reason: SURG

## 2021-01-28 RX ORDER — GLYCOPYRROLATE 0.2 MG/ML
INJECTION INTRAMUSCULAR; INTRAVENOUS AS NEEDED
Status: DISCONTINUED | OUTPATIENT
Start: 2021-01-28 | End: 2021-01-28 | Stop reason: SURG

## 2021-01-28 RX ORDER — PREGABALIN 75 MG/1
75 CAPSULE ORAL ONCE
Status: COMPLETED | OUTPATIENT
Start: 2021-01-28 | End: 2021-01-28

## 2021-01-28 RX ORDER — FAMOTIDINE 10 MG/ML
20 INJECTION, SOLUTION INTRAVENOUS ONCE
Status: COMPLETED | OUTPATIENT
Start: 2021-01-28 | End: 2021-01-28

## 2021-01-28 RX ORDER — SODIUM CHLORIDE, SODIUM LACTATE, POTASSIUM CHLORIDE, CALCIUM CHLORIDE 600; 310; 30; 20 MG/100ML; MG/100ML; MG/100ML; MG/100ML
9 INJECTION, SOLUTION INTRAVENOUS CONTINUOUS
Status: DISCONTINUED | OUTPATIENT
Start: 2021-01-28 | End: 2021-01-28

## 2021-01-28 RX ORDER — KETAMINE HYDROCHLORIDE 10 MG/ML
INJECTION INTRAMUSCULAR; INTRAVENOUS AS NEEDED
Status: DISCONTINUED | OUTPATIENT
Start: 2021-01-28 | End: 2021-01-28 | Stop reason: SURG

## 2021-01-28 RX ORDER — ONDANSETRON 2 MG/ML
INJECTION INTRAMUSCULAR; INTRAVENOUS AS NEEDED
Status: DISCONTINUED | OUTPATIENT
Start: 2021-01-28 | End: 2021-01-28 | Stop reason: SURG

## 2021-01-28 RX ORDER — HYDRALAZINE HYDROCHLORIDE 20 MG/ML
5 INJECTION INTRAMUSCULAR; INTRAVENOUS
Status: DISCONTINUED | OUTPATIENT
Start: 2021-01-28 | End: 2021-01-28 | Stop reason: HOSPADM

## 2021-01-28 RX ORDER — LIDOCAINE HYDROCHLORIDE 20 MG/ML
INJECTION, SOLUTION INFILTRATION; PERINEURAL AS NEEDED
Status: DISCONTINUED | OUTPATIENT
Start: 2021-01-28 | End: 2021-01-28 | Stop reason: SURG

## 2021-01-28 RX ORDER — LIDOCAINE HYDROCHLORIDE ANHYDROUS AND DEXTROSE MONOHYDRATE 5; 400 G/100ML; MG/100ML
INJECTION, SOLUTION INTRAVENOUS CONTINUOUS PRN
Status: DISCONTINUED | OUTPATIENT
Start: 2021-01-28 | End: 2021-01-28 | Stop reason: SURG

## 2021-01-28 RX ORDER — PROMETHAZINE HYDROCHLORIDE 25 MG/1
25 TABLET ORAL ONCE AS NEEDED
Status: DISCONTINUED | OUTPATIENT
Start: 2021-01-28 | End: 2021-01-28 | Stop reason: HOSPADM

## 2021-01-28 RX ORDER — PROMETHAZINE HYDROCHLORIDE 25 MG/1
25 SUPPOSITORY RECTAL ONCE AS NEEDED
Status: DISCONTINUED | OUTPATIENT
Start: 2021-01-28 | End: 2021-01-28 | Stop reason: HOSPADM

## 2021-01-28 RX ORDER — PIRFENIDONE 267 MG/1
2403 CAPSULE ORAL DAILY
Status: DISCONTINUED | OUTPATIENT
Start: 2021-01-28 | End: 2021-01-31 | Stop reason: HOSPADM

## 2021-01-28 RX ORDER — DEXAMETHASONE SODIUM PHOSPHATE 10 MG/ML
INJECTION INTRAMUSCULAR; INTRAVENOUS AS NEEDED
Status: DISCONTINUED | OUTPATIENT
Start: 2021-01-28 | End: 2021-01-28 | Stop reason: SURG

## 2021-01-28 RX ORDER — MAGNESIUM SULFATE HEPTAHYDRATE 500 MG/ML
INJECTION, SOLUTION INTRAMUSCULAR; INTRAVENOUS AS NEEDED
Status: DISCONTINUED | OUTPATIENT
Start: 2021-01-28 | End: 2021-01-28 | Stop reason: SURG

## 2021-01-28 RX ORDER — FLUMAZENIL 0.1 MG/ML
0.2 INJECTION INTRAVENOUS AS NEEDED
Status: DISCONTINUED | OUTPATIENT
Start: 2021-01-28 | End: 2021-01-28 | Stop reason: HOSPADM

## 2021-01-28 RX ORDER — ACETAMINOPHEN 500 MG
1000 TABLET ORAL EVERY 6 HOURS
Status: DISCONTINUED | OUTPATIENT
Start: 2021-01-28 | End: 2021-01-28 | Stop reason: HOSPADM

## 2021-01-28 RX ORDER — SODIUM CHLORIDE 9 MG/ML
100 INJECTION, SOLUTION INTRAVENOUS CONTINUOUS
Status: DISCONTINUED | OUTPATIENT
Start: 2021-01-28 | End: 2021-01-29

## 2021-01-28 RX ORDER — OXYCODONE AND ACETAMINOPHEN 7.5; 325 MG/1; MG/1
1 TABLET ORAL ONCE AS NEEDED
Status: DISCONTINUED | OUTPATIENT
Start: 2021-01-28 | End: 2021-01-28 | Stop reason: HOSPADM

## 2021-01-28 RX ORDER — NALOXONE HCL 0.4 MG/ML
0.2 VIAL (ML) INJECTION AS NEEDED
Status: DISCONTINUED | OUTPATIENT
Start: 2021-01-28 | End: 2021-01-28 | Stop reason: HOSPADM

## 2021-01-28 RX ORDER — HYDROCODONE BITARTRATE AND ACETAMINOPHEN 5; 325 MG/1; MG/1
1 TABLET ORAL EVERY 4 HOURS PRN
Status: DISCONTINUED | OUTPATIENT
Start: 2021-01-28 | End: 2021-01-31 | Stop reason: HOSPADM

## 2021-01-28 RX ORDER — LABETALOL HYDROCHLORIDE 5 MG/ML
5 INJECTION, SOLUTION INTRAVENOUS
Status: DISCONTINUED | OUTPATIENT
Start: 2021-01-28 | End: 2021-01-28 | Stop reason: HOSPADM

## 2021-01-28 RX ORDER — ONDANSETRON 2 MG/ML
4 INJECTION INTRAMUSCULAR; INTRAVENOUS ONCE AS NEEDED
Status: DISCONTINUED | OUTPATIENT
Start: 2021-01-28 | End: 2021-01-28 | Stop reason: HOSPADM

## 2021-01-28 RX ORDER — HYDROMORPHONE HYDROCHLORIDE 1 MG/ML
0.5 INJECTION, SOLUTION INTRAMUSCULAR; INTRAVENOUS; SUBCUTANEOUS
Status: DISCONTINUED | OUTPATIENT
Start: 2021-01-28 | End: 2021-01-28 | Stop reason: HOSPADM

## 2021-01-28 RX ORDER — NALOXONE HCL 0.4 MG/ML
0.4 VIAL (ML) INJECTION
Status: DISCONTINUED | OUTPATIENT
Start: 2021-01-28 | End: 2021-01-31 | Stop reason: HOSPADM

## 2021-01-28 RX ORDER — DIPHENHYDRAMINE HYDROCHLORIDE 50 MG/ML
12.5 INJECTION INTRAMUSCULAR; INTRAVENOUS
Status: DISCONTINUED | OUTPATIENT
Start: 2021-01-28 | End: 2021-01-28 | Stop reason: HOSPADM

## 2021-01-28 RX ORDER — ACETAMINOPHEN 500 MG
1000 TABLET ORAL EVERY 6 HOURS
Status: DISPENSED | OUTPATIENT
Start: 2021-01-28 | End: 2021-01-30

## 2021-01-28 RX ADMIN — KETOROLAC TROMETHAMINE 15 MG: 30 INJECTION, SOLUTION INTRAMUSCULAR at 18:11

## 2021-01-28 RX ADMIN — FENTANYL CITRATE 50 MCG: 50 INJECTION, SOLUTION INTRAMUSCULAR; INTRAVENOUS at 15:11

## 2021-01-28 RX ADMIN — KETAMINE HYDROCHLORIDE 25 MG: 10 INJECTION INTRAMUSCULAR; INTRAVENOUS at 12:35

## 2021-01-28 RX ADMIN — SODIUM CHLORIDE, POTASSIUM CHLORIDE, SODIUM LACTATE AND CALCIUM CHLORIDE: 600; 310; 30; 20 INJECTION, SOLUTION INTRAVENOUS at 12:14

## 2021-01-28 RX ADMIN — MIDAZOLAM 1 MG: 1 INJECTION INTRAMUSCULAR; INTRAVENOUS at 11:45

## 2021-01-28 RX ADMIN — BUPIVACAINE 20 ML: 13.3 INJECTION, SUSPENSION, LIPOSOMAL INFILTRATION at 12:31

## 2021-01-28 RX ADMIN — GLYCOPYRROLATE 0.6 MG: 0.2 INJECTION INTRAMUSCULAR; INTRAVENOUS at 14:06

## 2021-01-28 RX ADMIN — FENTANYL CITRATE 50 MCG: 50 INJECTION INTRAMUSCULAR; INTRAVENOUS at 12:24

## 2021-01-28 RX ADMIN — IOPAMIDOL 75 ML: 612 INJECTION, SOLUTION INTRAVENOUS at 09:09

## 2021-01-28 RX ADMIN — SODIUM CHLORIDE 100 ML/HR: 9 INJECTION, SOLUTION INTRAVENOUS at 15:34

## 2021-01-28 RX ADMIN — ROCURONIUM BROMIDE 10 MG: 10 INJECTION INTRAVENOUS at 12:52

## 2021-01-28 RX ADMIN — PHENYLEPHRINE HYDROCHLORIDE 100 MCG: 10 INJECTION INTRAVENOUS at 12:48

## 2021-01-28 RX ADMIN — SUCCINYLCHOLINE CHLORIDE 100 MG: 20 INJECTION, SOLUTION INTRAMUSCULAR; INTRAVENOUS; PARENTERAL at 12:24

## 2021-01-28 RX ADMIN — ROCURONIUM BROMIDE 10 MG: 10 INJECTION INTRAVENOUS at 13:15

## 2021-01-28 RX ADMIN — BUPIVACAINE HYDROCHLORIDE 30 ML: 2.5 INJECTION, SOLUTION EPIDURAL; INFILTRATION; INTRACAUDAL; PERINEURAL at 12:31

## 2021-01-28 RX ADMIN — CEFOXITIN SODIUM 2 G: 2 POWDER, FOR SOLUTION INTRAVENOUS at 12:05

## 2021-01-28 RX ADMIN — LIDOCAINE HYDROCHLORIDE ANHYDROUS AND DEXTROSE MONOHYDRATE 2 MG/MIN: .4; 5 INJECTION, SOLUTION INTRAVENOUS at 12:35

## 2021-01-28 RX ADMIN — PHENYLEPHRINE HYDROCHLORIDE 100 MCG: 10 INJECTION INTRAVENOUS at 12:31

## 2021-01-28 RX ADMIN — LEVOTHYROXINE SODIUM 125 MCG: 125 TABLET ORAL at 20:10

## 2021-01-28 RX ADMIN — NEOSTIGMINE METHYLSULFATE 3 MG: 5 INJECTION, SOLUTION INTRAMUSCULAR; INTRAVENOUS; SUBCUTANEOUS at 14:06

## 2021-01-28 RX ADMIN — KETAMINE HYDROCHLORIDE 10 MG: 10 INJECTION INTRAMUSCULAR; INTRAVENOUS at 13:33

## 2021-01-28 RX ADMIN — ROCURONIUM BROMIDE 30 MG: 10 INJECTION INTRAVENOUS at 12:35

## 2021-01-28 RX ADMIN — ACETAMINOPHEN 1000 MG: 500 TABLET, FILM COATED ORAL at 23:49

## 2021-01-28 RX ADMIN — ONDANSETRON HYDROCHLORIDE 4 MG: 2 SOLUTION INTRAMUSCULAR; INTRAVENOUS at 14:05

## 2021-01-28 RX ADMIN — TADALAFIL 40 MG: 20 TABLET ORAL at 21:57

## 2021-01-28 RX ADMIN — ALBUMIN HUMAN: 0.05 INJECTION, SOLUTION INTRAVENOUS at 12:45

## 2021-01-28 RX ADMIN — METOPROLOL TARTRATE 2.5 MG: 1 INJECTION, SOLUTION INTRAVENOUS at 14:48

## 2021-01-28 RX ADMIN — SODIUM CHLORIDE, POTASSIUM CHLORIDE, SODIUM LACTATE AND CALCIUM CHLORIDE: 600; 310; 30; 20 INJECTION, SOLUTION INTRAVENOUS at 12:35

## 2021-01-28 RX ADMIN — DEXAMETHASONE SODIUM PHOSPHATE 8 MG: 10 INJECTION INTRAMUSCULAR; INTRAVENOUS at 12:35

## 2021-01-28 RX ADMIN — PHENYLEPHRINE HYDROCHLORIDE 100 MCG: 10 INJECTION INTRAVENOUS at 12:42

## 2021-01-28 RX ADMIN — ACETAMINOPHEN 1000 MG: 500 TABLET, FILM COATED ORAL at 18:11

## 2021-01-28 RX ADMIN — ALVIMOPAN 12 MG: 12 CAPSULE ORAL at 10:34

## 2021-01-28 RX ADMIN — VERAPAMIL HYDROCHLORIDE 120 MG: 120 TABLET, FILM COATED, EXTENDED RELEASE ORAL at 21:57

## 2021-01-28 RX ADMIN — LIDOCAINE HYDROCHLORIDE 100 MG: 20 INJECTION, SOLUTION INFILTRATION; PERINEURAL at 12:24

## 2021-01-28 RX ADMIN — SERTRALINE HYDROCHLORIDE 50 MG: 50 TABLET, FILM COATED ORAL at 20:10

## 2021-01-28 RX ADMIN — KETOROLAC TROMETHAMINE 15 MG: 30 INJECTION, SOLUTION INTRAMUSCULAR at 23:49

## 2021-01-28 RX ADMIN — ACETAMINOPHEN 1000 MG: 500 TABLET, FILM COATED ORAL at 10:34

## 2021-01-28 RX ADMIN — FAMOTIDINE 20 MG: 10 INJECTION INTRAVENOUS at 11:12

## 2021-01-28 RX ADMIN — FAMOTIDINE 20 MG: 20 TABLET, FILM COATED ORAL at 20:10

## 2021-01-28 RX ADMIN — PROPOFOL 120 MG: 10 INJECTION, EMULSION INTRAVENOUS at 12:24

## 2021-01-28 RX ADMIN — SUGAMMADEX 200 MG: 100 INJECTION, SOLUTION INTRAVENOUS at 14:26

## 2021-01-28 RX ADMIN — PHENYLEPHRINE HYDROCHLORIDE 100 MCG: 10 INJECTION INTRAVENOUS at 12:38

## 2021-01-28 RX ADMIN — PREGABALIN 75 MG: 75 CAPSULE ORAL at 10:34

## 2021-01-28 RX ADMIN — FENTANYL CITRATE 50 MCG: 50 INJECTION INTRAMUSCULAR; INTRAVENOUS at 13:07

## 2021-01-28 RX ADMIN — HYDRALAZINE HYDROCHLORIDE 5 MG: 20 INJECTION, SOLUTION INTRAMUSCULAR; INTRAVENOUS at 14:55

## 2021-01-28 RX ADMIN — MAGNESIUM SULFATE HEPTAHYDRATE 2 G: 500 INJECTION, SOLUTION INTRAMUSCULAR; INTRAVENOUS at 12:35

## 2021-01-28 NOTE — ANESTHESIA POSTPROCEDURE EVALUATION
"Patient: Madalyn Galeas    Procedure Summary     Date: 01/28/21 Room / Location: [facility] OR  / [facility] MAIN OR    Anesthesia Start: 1214 Anesthesia Stop: 1445    Procedure: COLON RESECTION RIGHT (N/A Abdomen) Diagnosis:       Colon cancer (CMS/HCC)      (Colon cancer (CMS/HCC) [C18.9])    Surgeon: Alfonso Cesar MD Provider: Lance Fraga MD    Anesthesia Type: general with block ASA Status: 4          Anesthesia Type: general with block    Vitals  Vitals Value Taken Time   /72 01/28/21 1615   Temp 36.5 °C (97.7 °F) 01/28/21 1615   Pulse 69 01/28/21 1620   Resp 14 01/28/21 1615   SpO2 100 % 01/28/21 1622   Vitals shown include unvalidated device data.        Post Anesthesia Care and Evaluation    Patient location during evaluation: bedside  Patient participation: complete - patient participated  Level of consciousness: awake and alert  Pain management: adequate  Airway patency: patent  Anesthetic complications: No anesthetic complications    Cardiovascular status: acceptable  Respiratory status: acceptable  Hydration status: acceptable    Comments: /62 (BP Location: Right arm, Patient Position: Lying)   Pulse 65   Temp 36.2 °C (97.1 °F) (Oral)   Resp 16   Ht 154.9 cm (61\")   Wt 58.3 kg (128 lb 8.5 oz)   LMP  (LMP Unknown)   SpO2 100%   BMI 24.29 kg/m²       "

## 2021-01-28 NOTE — ANESTHESIA PROCEDURE NOTES
Airway  Urgency: elective    Date/Time: 1/28/2021 12:26 PM  Airway not difficult    General Information and Staff    Patient location during procedure: OR  CRNA: Mira Valerio CRNA    Indications and Patient Condition  Indications for airway management: airway protection    Preoxygenated: yes  MILS maintained throughout  Mask difficulty assessment: 1 - vent by mask    Final Airway Details  Final airway type: endotracheal airway      Successful airway: ETT  Cuffed: yes   Successful intubation technique: direct laryngoscopy  Facilitating devices/methods: cricoid pressure  Endotracheal tube insertion site: oral  Blade: Magdiel  Blade size: 3  ETT size (mm): 7.0  Cormack-Lehane Classification: grade I - full view of glottis  Placement verified by: chest auscultation and capnometry   Measured from: lips  ETT/EBT  to lips (cm): 20  Number of attempts at approach: 1  Assessment: lips, teeth, and gum same as pre-op and atraumatic intubation    Additional Comments  Atraumatic insertion

## 2021-01-28 NOTE — ANESTHESIA PREPROCEDURE EVALUATION
Anesthesia Evaluation     Patient summary reviewed and Nursing notes reviewed                Airway   Mallampati: II  TM distance: >3 FB  Neck ROM: full  No difficulty expected  Dental      Pulmonary    (+) a smoker Former, COPD, home oxygen, sleep apnea on CPAP,   Cardiovascular     ECG reviewed  Rhythm: regular  Rate: normal    (+) hypertension, CHF , hyperlipidemia,       Neuro/Psych  (+) psychiatric history Depression,     GI/Hepatic/Renal/Endo    (+)  GERD,      Musculoskeletal (-) negative ROS    Abdominal    Substance History - negative use     OB/GYN negative ob/gyn ROS         Other      history of cancer                    Anesthesia Plan    ASA 4     general with block   (Possible continued ventilator requirement post off secondary to COPD    TAP blocks after induction with exparel PSR for POPC)  intravenous induction     Anesthetic plan, all risks, benefits, and alternatives have been provided, discussed and informed consent has been obtained with: patient.

## 2021-01-28 NOTE — ANESTHESIA PROCEDURE NOTES
Arterial Line    Pre-sedation assessment completed: 1/28/2021 11:35 AM    Patient reassessed immediately prior to procedure    Patient location during procedure: holding area  Start time: 1/28/2021 11:40 AM  Stop Time:1/28/2021 11:45 AM       Line placed for hemodynamic monitoring.  Performed By   Anesthesiologist: Lance Fraga MD  Preanesthetic Checklist  Completed: patient identified, site marked, surgical consent, pre-op evaluation, timeout performed, IV checked, risks and benefits discussed and monitors and equipment checked  Arterial Line Prep   Sterile Tech: cap, gloves, mask and sterile barriers  Prep: ChloraPrep  Patient monitoring: blood pressure monitoring, continuous pulse oximetry and EKG  Arterial Line Procedure   Laterality:left  Location:  radial artery  Catheter size: 20 G   Guidance: ultrasound guided  PROCEDURE NOTE/ULTRASOUND INTERPRETATION.  Using ultrasound guidance the potential vascular sites for insertion of the catheter were visualized to determine the patency of the vessel to be used for vascular access.  After selecting the appropriate site for insertion, the needle was visualized under ultrasound being inserted into the radial artery, followed by ultrasound confirmation of wire and catheter placement. There were no abnormalities seen on ultrasound; an image was taken; and the patient tolerated the procedure with no complications.   Number of attempts: 1  Successful placement: yes  Post Assessment   Dressing Type: occlusive dressing applied, secured with tape and wrist guard applied.   Complications no  Circ/Move/Sens Assessment: unchanged.   Patient Tolerance: patient tolerated the procedure well with no apparent complications  Additional Notes  Ultrasound used to view appropriate anatomy, for needle guidance into artery, and for confirmation of appropriate placement. A picture was taken and is available on the chart.

## 2021-01-28 NOTE — ANESTHESIA PROCEDURE NOTES
Peripheral Block      Patient reassessed immediately prior to procedure    Patient location during procedure: OR  Start time: 1/28/2021 12:28 PM  Stop time: 1/28/2021 12:31 PM  Reason for block: at surgeon's request and post-op pain management  Performed by  Anesthesiologist: Lance Fraga MD  Preanesthetic Checklist  Completed: patient identified, site marked, surgical consent, pre-op evaluation, timeout performed, IV checked, risks and benefits discussed and monitors and equipment checked  Prep:  Pt Position: supine  Sterile barriers:cap, gloves, mask and sterile barriers  Prep: ChloraPrep  Patient monitoring: blood pressure monitoring, continuous pulse oximetry and EKG  Procedure  Sedation:yes  Performed under: local infiltration  Guidance:ultrasound guided  Images:still images obtained    Laterality:Bilateral  Block Type:TAP  Injection Technique:single-shot  Needle Type:short-bevel and echogenic  Needle Gauge:21 G  Resistance on Injection: none    Medications Used: bupivacaine liposome (EXPAREL) 1.3 % injection, 20 mL  bupivacaine PF (MARCAINE) 0.25 % injection, 30 mL  Med admintered at 1/28/2021 12:31 PM      Medications  Comment:20ccs Exparel 1.3% mixed with 30ccs Bupivacaine 0.25% PF.  25ccs of this mixture was injected on each side.     Post Assessment  Injection Assessment: negative aspiration for heme, no paresthesia on injection and incremental injection  Patient Tolerance:comfortable throughout block  Complications:no  Additional Notes  Ultrasound guidance was used for target identification, needle guidance, appropriate placement confirmation, and medication injection. Good spread was seen between the internal oblique and transversus abdominis muscle layers bilaterally. The needle tip was visualized throughout the procedure and stayed above the transversus abdominus muscle throughout.

## 2021-01-29 LAB
ANION GAP SERPL CALCULATED.3IONS-SCNC: 4.4 MMOL/L (ref 5–15)
BASE EXCESS BLDA CALC-SCNC: 0 MMOL/L (ref -5–5)
BASOPHILS # BLD AUTO: 0.03 10*3/MM3 (ref 0–0.2)
BASOPHILS NFR BLD AUTO: 0.4 % (ref 0–1.5)
BUN SERPL-MCNC: 10 MG/DL (ref 8–23)
BUN/CREAT SERPL: 18.9 (ref 7–25)
CA-I BLDA-SCNC: ABNORMAL MMOL/L
CALCIUM SPEC-SCNC: 8.3 MG/DL (ref 8.6–10.5)
CHLORIDE SERPL-SCNC: 111 MMOL/L (ref 98–107)
CO2 BLDA-SCNC: 29 MMOL/L (ref 24–29)
CO2 SERPL-SCNC: 25.6 MMOL/L (ref 22–29)
CREAT SERPL-MCNC: 0.53 MG/DL (ref 0.57–1)
DEPRECATED RDW RBC AUTO: 50.6 FL (ref 37–54)
EOSINOPHIL # BLD AUTO: 0.02 10*3/MM3 (ref 0–0.4)
EOSINOPHIL NFR BLD AUTO: 0.3 % (ref 0.3–6.2)
ERYTHROCYTE [DISTWIDTH] IN BLOOD BY AUTOMATED COUNT: 17.6 % (ref 12.3–15.4)
GFR SERPL CREATININE-BSD FRML MDRD: 111 ML/MIN/1.73
GLUCOSE BLDC GLUCOMTR-MCNC: 102 MG/DL (ref 70–130)
GLUCOSE SERPL-MCNC: 93 MG/DL (ref 65–99)
HCO3 BLDA-SCNC: 27.1 MMOL/L (ref 22–26)
HCT VFR BLD AUTO: 30.7 % (ref 34–46.6)
HCT VFR BLDA CALC: 27 % (ref 38–51)
HGB BLD-MCNC: 9.8 G/DL (ref 12–15.9)
HGB BLDA-MCNC: 9.2 G/DL (ref 12–17)
IMM GRANULOCYTES # BLD AUTO: 0.02 10*3/MM3 (ref 0–0.05)
IMM GRANULOCYTES NFR BLD AUTO: 0.3 % (ref 0–0.5)
LYMPHOCYTES # BLD AUTO: 0.92 10*3/MM3 (ref 0.7–3.1)
LYMPHOCYTES NFR BLD AUTO: 11.8 % (ref 19.6–45.3)
MCH RBC QN AUTO: 25.7 PG (ref 26.6–33)
MCHC RBC AUTO-ENTMCNC: 31.9 G/DL (ref 31.5–35.7)
MCV RBC AUTO: 80.6 FL (ref 79–97)
MONOCYTES # BLD AUTO: 0.56 10*3/MM3 (ref 0.1–0.9)
MONOCYTES NFR BLD AUTO: 7.2 % (ref 5–12)
NEUTROPHILS NFR BLD AUTO: 6.23 10*3/MM3 (ref 1.7–7)
NEUTROPHILS NFR BLD AUTO: 80 % (ref 42.7–76)
NRBC BLD AUTO-RTO: 0 /100 WBC (ref 0–0.2)
PCO2 BLDA: 57.6 MM HG (ref 35–45)
PH BLDA: 7.28 PH UNITS (ref 7.35–7.6)
PLATELET # BLD AUTO: 186 10*3/MM3 (ref 140–450)
PMV BLD AUTO: 9.5 FL (ref 6–12)
PO2 BLDA: 473 MMHG (ref 80–105)
POTASSIUM BLDA-SCNC: 3.3 MMOL/L (ref 3.5–4.9)
POTASSIUM SERPL-SCNC: 4.7 MMOL/L (ref 3.5–5.2)
RBC # BLD AUTO: 3.81 10*6/MM3 (ref 3.77–5.28)
SAO2 % BLDA: 100 % (ref 95–98)
SODIUM SERPL-SCNC: 141 MMOL/L (ref 136–145)
WBC # BLD AUTO: 7.78 10*3/MM3 (ref 3.4–10.8)

## 2021-01-29 PROCEDURE — 94799 UNLISTED PULMONARY SVC/PX: CPT

## 2021-01-29 PROCEDURE — 25010000002 KETOROLAC TROMETHAMINE PER 15 MG: Performed by: SURGERY

## 2021-01-29 PROCEDURE — 85025 COMPLETE CBC W/AUTO DIFF WBC: CPT | Performed by: SURGERY

## 2021-01-29 PROCEDURE — 25010000002 ENOXAPARIN PER 10 MG: Performed by: SURGERY

## 2021-01-29 PROCEDURE — 80048 BASIC METABOLIC PNL TOTAL CA: CPT | Performed by: SURGERY

## 2021-01-29 PROCEDURE — 99024 POSTOP FOLLOW-UP VISIT: CPT | Performed by: SURGERY

## 2021-01-29 RX ORDER — DEXTROSE, SODIUM CHLORIDE, AND POTASSIUM CHLORIDE 5; .45; .15 G/100ML; G/100ML; G/100ML
50 INJECTION INTRAVENOUS CONTINUOUS
Status: DISCONTINUED | OUTPATIENT
Start: 2021-01-29 | End: 2021-01-30

## 2021-01-29 RX ORDER — ACETAMINOPHEN 500 MG
1000 TABLET ORAL EVERY 6 HOURS PRN
COMMUNITY

## 2021-01-29 RX ADMIN — FAMOTIDINE 20 MG: 20 TABLET, FILM COATED ORAL at 21:08

## 2021-01-29 RX ADMIN — ALVIMOPAN 12 MG: 12 CAPSULE ORAL at 10:13

## 2021-01-29 RX ADMIN — LEVOTHYROXINE SODIUM 125 MCG: 125 TABLET ORAL at 10:14

## 2021-01-29 RX ADMIN — ACETAMINOPHEN 1000 MG: 500 TABLET, FILM COATED ORAL at 06:41

## 2021-01-29 RX ADMIN — SODIUM CHLORIDE 100 ML/HR: 9 INJECTION, SOLUTION INTRAVENOUS at 11:56

## 2021-01-29 RX ADMIN — FAMOTIDINE 20 MG: 20 TABLET, FILM COATED ORAL at 10:14

## 2021-01-29 RX ADMIN — ACETAMINOPHEN 1000 MG: 500 TABLET, FILM COATED ORAL at 22:56

## 2021-01-29 RX ADMIN — ALVIMOPAN 12 MG: 12 CAPSULE ORAL at 21:08

## 2021-01-29 RX ADMIN — KETOROLAC TROMETHAMINE 15 MG: 30 INJECTION, SOLUTION INTRAMUSCULAR at 06:41

## 2021-01-29 RX ADMIN — KETOROLAC TROMETHAMINE 15 MG: 30 INJECTION, SOLUTION INTRAMUSCULAR at 12:02

## 2021-01-29 RX ADMIN — ENOXAPARIN SODIUM 30 MG: 30 INJECTION SUBCUTANEOUS at 10:14

## 2021-01-29 RX ADMIN — TADALAFIL 40 MG: 20 TABLET ORAL at 10:12

## 2021-01-29 RX ADMIN — VERAPAMIL HYDROCHLORIDE 120 MG: 120 TABLET, FILM COATED, EXTENDED RELEASE ORAL at 10:12

## 2021-01-29 RX ADMIN — POTASSIUM CHLORIDE, DEXTROSE MONOHYDRATE AND SODIUM CHLORIDE 50 ML/HR: 150; 5; 450 INJECTION, SOLUTION INTRAVENOUS at 16:51

## 2021-01-29 RX ADMIN — SERTRALINE HYDROCHLORIDE 50 MG: 50 TABLET, FILM COATED ORAL at 10:14

## 2021-01-29 RX ADMIN — ACETAMINOPHEN 1000 MG: 500 TABLET, FILM COATED ORAL at 12:01

## 2021-01-29 RX ADMIN — SODIUM CHLORIDE 100 ML/HR: 9 INJECTION, SOLUTION INTRAVENOUS at 02:19

## 2021-01-30 LAB
ANION GAP SERPL CALCULATED.3IONS-SCNC: 4.6 MMOL/L (ref 5–15)
BASOPHILS # BLD AUTO: 0.06 10*3/MM3 (ref 0–0.2)
BASOPHILS NFR BLD AUTO: 1 % (ref 0–1.5)
BUN SERPL-MCNC: 8 MG/DL (ref 8–23)
BUN/CREAT SERPL: 18.6 (ref 7–25)
CALCIUM SPEC-SCNC: 8.4 MG/DL (ref 8.6–10.5)
CHLORIDE SERPL-SCNC: 108 MMOL/L (ref 98–107)
CO2 SERPL-SCNC: 28.4 MMOL/L (ref 22–29)
CREAT SERPL-MCNC: 0.43 MG/DL (ref 0.57–1)
DEPRECATED RDW RBC AUTO: 53.4 FL (ref 37–54)
EOSINOPHIL # BLD AUTO: 0.29 10*3/MM3 (ref 0–0.4)
EOSINOPHIL NFR BLD AUTO: 4.8 % (ref 0.3–6.2)
ERYTHROCYTE [DISTWIDTH] IN BLOOD BY AUTOMATED COUNT: 17.9 % (ref 12.3–15.4)
GFR SERPL CREATININE-BSD FRML MDRD: 142 ML/MIN/1.73
GLUCOSE SERPL-MCNC: 142 MG/DL (ref 65–99)
HCT VFR BLD AUTO: 32.7 % (ref 34–46.6)
HGB BLD-MCNC: 10.3 G/DL (ref 12–15.9)
IMM GRANULOCYTES # BLD AUTO: 0.02 10*3/MM3 (ref 0–0.05)
IMM GRANULOCYTES NFR BLD AUTO: 0.3 % (ref 0–0.5)
LYMPHOCYTES # BLD AUTO: 0.81 10*3/MM3 (ref 0.7–3.1)
LYMPHOCYTES NFR BLD AUTO: 13.5 % (ref 19.6–45.3)
MCH RBC QN AUTO: 26 PG (ref 26.6–33)
MCHC RBC AUTO-ENTMCNC: 31.5 G/DL (ref 31.5–35.7)
MCV RBC AUTO: 82.6 FL (ref 79–97)
MONOCYTES # BLD AUTO: 0.44 10*3/MM3 (ref 0.1–0.9)
MONOCYTES NFR BLD AUTO: 7.4 % (ref 5–12)
NEUTROPHILS NFR BLD AUTO: 4.36 10*3/MM3 (ref 1.7–7)
NEUTROPHILS NFR BLD AUTO: 73 % (ref 42.7–76)
NRBC BLD AUTO-RTO: 0 /100 WBC (ref 0–0.2)
PLATELET # BLD AUTO: 193 10*3/MM3 (ref 140–450)
PMV BLD AUTO: 9.5 FL (ref 6–12)
POTASSIUM SERPL-SCNC: 3.9 MMOL/L (ref 3.5–5.2)
RBC # BLD AUTO: 3.96 10*6/MM3 (ref 3.77–5.28)
SODIUM SERPL-SCNC: 141 MMOL/L (ref 136–145)
WBC # BLD AUTO: 5.98 10*3/MM3 (ref 3.4–10.8)

## 2021-01-30 PROCEDURE — 25010000002 ENOXAPARIN PER 10 MG: Performed by: SURGERY

## 2021-01-30 PROCEDURE — 99024 POSTOP FOLLOW-UP VISIT: CPT | Performed by: SURGERY

## 2021-01-30 PROCEDURE — 85025 COMPLETE CBC W/AUTO DIFF WBC: CPT | Performed by: SURGERY

## 2021-01-30 PROCEDURE — 94799 UNLISTED PULMONARY SVC/PX: CPT

## 2021-01-30 PROCEDURE — 80048 BASIC METABOLIC PNL TOTAL CA: CPT | Performed by: SURGERY

## 2021-01-30 RX ORDER — LOPERAMIDE HYDROCHLORIDE 2 MG/1
2 CAPSULE ORAL 4 TIMES DAILY PRN
Status: DISCONTINUED | OUTPATIENT
Start: 2021-01-30 | End: 2021-01-31 | Stop reason: HOSPADM

## 2021-01-30 RX ADMIN — VERAPAMIL HYDROCHLORIDE 120 MG: 120 TABLET, FILM COATED, EXTENDED RELEASE ORAL at 09:17

## 2021-01-30 RX ADMIN — ACETAMINOPHEN 1000 MG: 500 TABLET, FILM COATED ORAL at 05:19

## 2021-01-30 RX ADMIN — ACETAMINOPHEN 1000 MG: 500 TABLET, FILM COATED ORAL at 12:20

## 2021-01-30 RX ADMIN — TADALAFIL 40 MG: 20 TABLET ORAL at 09:17

## 2021-01-30 RX ADMIN — FAMOTIDINE 20 MG: 20 TABLET, FILM COATED ORAL at 09:17

## 2021-01-30 RX ADMIN — LOPERAMIDE HYDROCHLORIDE 2 MG: 2 CAPSULE ORAL at 21:33

## 2021-01-30 RX ADMIN — FAMOTIDINE 20 MG: 20 TABLET, FILM COATED ORAL at 21:33

## 2021-01-30 RX ADMIN — LEVOTHYROXINE SODIUM 125 MCG: 125 TABLET ORAL at 09:17

## 2021-01-30 RX ADMIN — LOPERAMIDE HYDROCHLORIDE 2 MG: 2 CAPSULE ORAL at 15:24

## 2021-01-30 RX ADMIN — ENOXAPARIN SODIUM 30 MG: 30 INJECTION SUBCUTANEOUS at 09:18

## 2021-01-30 RX ADMIN — SERTRALINE HYDROCHLORIDE 50 MG: 50 TABLET, FILM COATED ORAL at 09:18

## 2021-01-31 ENCOUNTER — READMISSION MANAGEMENT (OUTPATIENT)
Dept: CALL CENTER | Facility: HOSPITAL | Age: 81
End: 2021-01-31

## 2021-01-31 VITALS
RESPIRATION RATE: 18 BRPM | HEART RATE: 90 BPM | OXYGEN SATURATION: 94 % | DIASTOLIC BLOOD PRESSURE: 80 MMHG | BODY MASS INDEX: 24.27 KG/M2 | TEMPERATURE: 97.9 F | SYSTOLIC BLOOD PRESSURE: 147 MMHG | HEIGHT: 61 IN | WEIGHT: 128.53 LBS

## 2021-01-31 PROCEDURE — 99024 POSTOP FOLLOW-UP VISIT: CPT | Performed by: SURGERY

## 2021-01-31 PROCEDURE — 25010000002 ENOXAPARIN PER 10 MG: Performed by: SURGERY

## 2021-01-31 PROCEDURE — 94799 UNLISTED PULMONARY SVC/PX: CPT

## 2021-01-31 RX ORDER — HYDROCODONE BITARTRATE AND ACETAMINOPHEN 5; 325 MG/1; MG/1
1 TABLET ORAL EVERY 4 HOURS PRN
Qty: 20 TABLET | Refills: 0 | Status: SHIPPED | OUTPATIENT
Start: 2021-01-31 | End: 2021-02-09

## 2021-01-31 RX ADMIN — LEVOTHYROXINE SODIUM 125 MCG: 125 TABLET ORAL at 09:10

## 2021-01-31 RX ADMIN — TADALAFIL 40 MG: 20 TABLET ORAL at 09:10

## 2021-01-31 RX ADMIN — FAMOTIDINE 20 MG: 20 TABLET, FILM COATED ORAL at 09:10

## 2021-01-31 RX ADMIN — PIRFENIDONE 2403 MG: 267 CAPSULE ORAL at 09:00

## 2021-01-31 RX ADMIN — SERTRALINE HYDROCHLORIDE 50 MG: 50 TABLET, FILM COATED ORAL at 09:10

## 2021-01-31 RX ADMIN — ENOXAPARIN SODIUM 30 MG: 30 INJECTION SUBCUTANEOUS at 09:10

## 2021-01-31 RX ADMIN — VERAPAMIL HYDROCHLORIDE 120 MG: 120 TABLET, FILM COATED, EXTENDED RELEASE ORAL at 09:10

## 2021-01-31 NOTE — OUTREACH NOTE
Prep Survey      Responses   [facility] facility patient discharged from?  [place]   Is LACE score < 7 ?  No   Emergency Room discharge w/ pulse ox?  No   Eligibility  [facility]   Date of Admission  01/28/21   Date of Discharge  01/31/21   Discharge Disposition  Home or Self Care   Discharge diagnosis  Colon cancer  COLON RESECTION   Does the patient have one of the following disease processes/diagnoses(primary or secondary)?  General Surgery   Does the patient have Home health ordered?  No   Is there a DME ordered?  No   Prep survey completed?  Yes          Vani Prado RN

## 2021-02-01 ENCOUNTER — TRANSITIONAL CARE MANAGEMENT TELEPHONE ENCOUNTER (OUTPATIENT)
Dept: CALL CENTER | Facility: HOSPITAL | Age: 81
End: 2021-02-01

## 2021-02-01 NOTE — PROGRESS NOTES
Case Management Discharge Note      Final Note: DC'd home. Mary Cortes RN         Selected Continued Care - Discharged on 1/31/2021 Admission date: 1/28/2021 - Discharge disposition: Home or Self Care    Destination    No services have been selected for the patient.              Durable Medical Equipment    No services have been selected for the patient.              Dialysis/Infusion    No services have been selected for the patient.              Home Medical Care    No services have been selected for the patient.              Therapy    No services have been selected for the patient.              Community Resources    No services have been selected for the patient.                  Transportation Services  Private: Car    Final Discharge Disposition Code: 01 - home or self-care

## 2021-02-01 NOTE — OUTREACH NOTE
Call Center TCM Note      Responses   [facility] patient discharged from?  [place]   Does the patient have one of the following disease processes/diagnoses(primary or secondary)?  General Surgery   TCM attempt successful?  Yes   Call start time  1318   Call end time  1324   Discharge diagnosis  Colon cancer  COLON RESECTION   Meds reviewed with patient/caregiver?  Yes   Is the patient having any side effects they believe may be caused by any medication additions or changes?  No   Does the patient have all medications related to this admission filled (includes all antibiotics, pain medications, etc.)  Yes   Is the patient taking all medications as directed (includes completed medication regime)?  No   What is preventing the patient from taking all medications as directed?  Other [Not delivered at time of call]   Nursing Interventions  Nurse provided patient education   Does the patient have a follow up appointment scheduled with their surgeon?  Yes [2/11/21]   Has the patient kept scheduled appointments due by today?  N/A   Comments  No appt with PCP that patient was agreeable to.    Psychosocial issues?  No   Did the patient receive a copy of their discharge instructions?  Yes   Nursing interventions  Reviewed instructions with patient   What is the patient's perception of their health status since discharge?  Improving   Nursing interventions  Nurse provided patient education   Is the patient /caregiver able to teach back basic post-op care?  Lifting as instructed by MD in discharge instructions, Drive as instructed by MD in discharge instructions   Is the patient/caregiver able to teach back signs and symptoms of incisional infection?  Fever   Is the patient/caregiver able to teach back steps to recovery at home?  Rest and rebuild strength, gradually increase activity, Set small, achievable goals for return to baseline health   If the patient is a current smoker, are they able to teach back resources for  cessation?  Not a smoker   Is the patient/caregiver able to teach back the hierarchy of who to call/visit for symptoms/problems? PCP, Specialist, Home health nurse, Urgent Care, ED, 911  Yes   TCM call completed?  Yes          Yen Galvin RN    2/1/2021, 13:24 EST

## 2021-02-09 ENCOUNTER — READMISSION MANAGEMENT (OUTPATIENT)
Dept: CALL CENTER | Facility: HOSPITAL | Age: 81
End: 2021-02-09

## 2021-02-09 ENCOUNTER — OFFICE VISIT (OUTPATIENT)
Dept: SURGERY | Facility: CLINIC | Age: 81
End: 2021-02-09

## 2021-02-09 DIAGNOSIS — Z09 POSTOP CHECK: Primary | ICD-10-CM

## 2021-02-09 DIAGNOSIS — Z51.89 VISIT FOR WOUND CHECK: ICD-10-CM

## 2021-02-09 PROCEDURE — 99024 POSTOP FOLLOW-UP VISIT: CPT | Performed by: SURGERY

## 2021-02-09 NOTE — OUTREACH NOTE
General Surgery Week 2 Survey      Responses   [facility] patient discharged from?  [place]   Does the patient have one of the following disease processes/diagnoses(primary or secondary)?  General Surgery   Week 2 attempt successful?  Yes   Call start time  0749   Call end time  0752   Discharge diagnosis  Colon cancer  COLON RESECTION   Meds reviewed with patient/caregiver?  Yes   Is the patient taking all medications as directed (includes completed medication regime)?  Yes   Has the patient kept scheduled appointments due by today?  N/A   Comments  Surgeon 02/10/2021   What is the patient's perception of their health status since discharge?  Improving   Week 2 call completed?  Yes   Wrap up additional comments  Doing really well.          Kristin Bueno RN

## 2021-02-09 NOTE — PROGRESS NOTES
CHIEF COMPLAINT:   Chief Complaint   Patient presents with   • Post-op     Open right hemicolectomy 1/28/21       HISTORY OF PRESENT ILLNESS:  This is a 80 y.o. female who presents for a post-operative visit after undergoing a open right hemicolectomy 1/28/2021     Patient reports she is doing well. Eating well without any significant nausea. Having good bowel function. No problems with constipation.  Had 2 days of loose stools but not diarrhea.  No urinary complaints. Denies fever. Ambulating well and slowly returning to normal activities.    Pathology:   Final Diagnosis   1. Terminal Ileum, Cecum, Appendix and Ascending Colon, Right Hemicolectomy:  INVASIVE MODERATELY       DIFFERENTIATED ADENOCARCINOMA.               A. Tumor site:  Ascending colon.               B. Tumor size:  7.5 cm.               C. Tumor extension:  Tumor focally invades through the muscularis propria.               D. Margins:  Uninvolved by invasive carcinoma.                            1. Tumor present 6 cm from the radial fat margin (closest margin).               E. No definitive lymphovascular space invasion identified.               F. Additional findings:  Tubular adenomas.               G. Lymph nodes:  Fourteen benign lymph nodes (0/14).               H. Pathologic stage: pT3, N0.     Swm/kds   Electronically signed by Christina Khan MD on 1/30/2021 at 1146   Synoptic Checklist   COLON AND RECTUM: Resection, Including Transanal Disk Excision of Rectal Neoplasms  COLON AND RECTUM: RESECTION - All Specimens  8th Edition - Protocol posted: 2/26/2020  SPECIMEN   Procedure  Right hemicolectomy    TUMOR   Tumor Site  Ascending colon    Histologic Type  Adenocarcinoma    Histologic Grade  G2: Moderately differentiated    Tumor Size  Greatest dimension (Centimeters): 7.5 cm   Tumor Extension  Tumor invades through the muscularis propria into pericolorectal tissue    Macroscopic Tumor Perforation  Not identified    Lymphovascular  Invasion  Not identified    Perineural Invasion  Not identified    Treatment Effect  No known presurgical therapy    MARGINS   Margins  All margins are uninvolved by invasive carcinoma, high grade dysplasia / intramucosal carcinoma, and low grade dysplasia    Margins Examined  Proximal      Distal      Radial (circumferential) or Mesenteric    Distance of Invasive Carcinoma from Closest Margin  6 cm   Closest Margin  Radial (circumferential) or Mesenteric    LYMPH NODES   Number of Lymph Nodes Involved  0    Number of Lymph Nodes Examined  14    Tumor Deposits  Not identified    PATHOLOGIC STAGE CLASSIFICATION (pTNM, AJCC 8th Edition)      Primary Tumor (pT)  pT3    Regional Lymph Nodes (pN)  pN0    ADDITIONAL FINDINGS   Additional Findings  Adenoma(s)         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 No hernias  Ext: no significant edema, calves nontender    A/P:  This is a 80 y.o. female who is s/p open right hemicolectomy.  Extremely happy for her since she had surgery and was in the hospital only 3 days.  She has been doing great.  She has been having only loose stools that is expected after her surgery.  Discussed with her about the need to do high-fiber diet as well as Metamucil as needed for bulking stool.  She understand that her dose may change from loose to constipation and she will need to find out which type of foods work for her.  Staples were removed and Steri-Strips were placed.  Instructed patient that she can shower, no bath tub until wound is completely healed, no heavy lifting    Pathology report reviewed and related to the patient.  The patient is stage IIa.  Is unlikely she will need to have chemotherapy but I will defer final decision to Dr. Lara    I advised the patient to continue to refrain from doing any heavy lifting of more than 15  pounds for a total of 6 weeks.  Patient may start doing an aerobic type exercise in 4 weeks after surgery.    Patient was given time to ask questions and all of them were answered appropriately.  The patient verbalized understanding and agreed with the plan.    Patient will follow-up at our office on a prn basis unless there are any problems.    Alfonso Cesar MD  General, Minimally Invasive and Endoscopic Surgery  [facility]    [address], [address]  [place], [place], [address]  P: [phone]  F: [phone]

## 2021-02-09 NOTE — PATIENT INSTRUCTIONS
A high fiber diet with plenty of fluids (up to 8 glasses of water daily) is suggested to relieve these symptoms.  Metamucil, 1 tablespoon once or twice daily can be used to keep bowels regular if needed.      High-Fiber Diet  Fiber, also called dietary fiber, is a type of carbohydrate found in fruits, vegetables, whole grains, and beans. A high-fiber diet can have many health benefits. Your health care provider may recommend a high-fiber diet to help:  · Prevent constipation. Fiber can make your bowel movements more regular.  · Lower your cholesterol.  · Relieve hemorrhoids, uncomplicated diverticulosis, or irritable bowel syndrome.  · Prevent overeating as part of a weight-loss plan.  · Prevent heart disease, type 2 diabetes, and certain cancers.    WHAT IS MY PLAN?  The recommended daily intake of fiber includes:  · 38 grams for men under age 50.  · 30 grams for men over age 50.  · 25 grams for women under age 50.  · 21 grams for women over age 50.  You can get the recommended daily intake of dietary fiber by eating a variety of fruits, vegetables, grains, and beans. Your health care provider may also recommend a fiber supplement if it is not possible to get enough fiber through your diet.    WHAT DO I NEED TO KNOW ABOUT A HIGH-FIBER DIET?  · Fiber supplements have not been widely studied for their effectiveness, so it is better to get fiber through food sources.  · Always check the fiber content on the nutrition facts label of any prepackaged food. Look for foods that contain at least 5 grams of fiber per serving.  · Ask your dietitian if you have questions about specific foods that are related to your condition, especially if those foods are not listed in the following section.  · Increase your daily fiber consumption gradually. Increasing your intake of dietary fiber too quickly may cause bloating, cramping, or gas.  · Drink plenty of water. Water helps you to digest fiber.    WHAT FOODS CAN I  EAT?  Grains  Whole-grain breads. Multigrain cereal. Oats and oatmeal. Brown rice. Barley. Bulgur wheat. Millet. Bran muffins. Popcorn. Rye wafer crackers.  Vegetables  Sweet potatoes. Spinach. Kale. Artichokes. Cabbage. Broccoli. Green peas. Carrots. Squash.  Fruits  Berries. Pears. Apples. Oranges. Avocados. Prunes and raisins. Dried figs.  Meats and Other Protein Sources  Navy, kidney, kaufman, and soy beans. Split peas. Lentils. Nuts and seeds.  Dairy  Fiber-fortified yogurt.  Beverages  Fiber-fortified soy milk. Fiber-fortified orange juice.  Other  Fiber bars.  The items listed above may not be a complete list of recommended foods or beverages. Contact your dietitian for more options.  WHAT FOODS ARE NOT RECOMMENDED?  Grains  White bread. Pasta made with refined flour. White rice.  Vegetables  Fried potatoes. Canned vegetables. Well-cooked vegetables.   Fruits  Fruit juice. Cooked, strained fruit.  Meats and Other Protein Sources  Fatty cuts of meat. Fried poultry or fried fish.  Dairy  Milk. Yogurt. Cream cheese. Sour cream.  Beverages  Soft drinks.  Other  Cakes and pastries. Butter and oils.  The items listed above may not be a complete list of foods and beverages to avoid. Contact your dietitian for more information.    WHAT ARE SOME TIPS FOR INCLUDING HIGH-FIBER FOODS IN MY DIET?  · Eat a wide variety of high-fiber foods.  · Make sure that half of all grains consumed each day are whole grains.  · Replace breads and cereals made from refined flour or white flour with whole-grain breads and cereals.  · Replace white rice with brown rice, bulgur wheat, or millet.  · Start the day with a breakfast that is high in fiber, such as a cereal that contains at least 5 grams of fiber per serving.  · Use beans in place of meat in soups, salads, or pasta.  · Eat high-fiber snacks, such as berries, raw vegetables, nuts, or popcorn.

## 2021-02-17 ENCOUNTER — READMISSION MANAGEMENT (OUTPATIENT)
Dept: CALL CENTER | Facility: HOSPITAL | Age: 81
End: 2021-02-17

## 2021-02-17 LAB
LAB AP CASE REPORT: NORMAL
LAB AP DIAGNOSIS COMMENT: NORMAL
LAB AP SYNOPTIC CHECKLIST: NORMAL
Lab: NORMAL
Lab: NORMAL
PATH REPORT.ADDENDUM SPEC: NORMAL
PATH REPORT.FINAL DX SPEC: NORMAL
PATH REPORT.GROSS SPEC: NORMAL

## 2021-02-17 NOTE — OUTREACH NOTE
General Surgery Week 3 Survey      Responses   [facility] patient discharged from?  [place]   Does the patient have one of the following disease processes/diagnoses(primary or secondary)?  General Surgery   Week 3 attempt successful?  Yes   Call start time  1042   Call end time  1044   Medication alerts for this patient  Staples have been removed   Meds reviewed with patient/caregiver?  Yes   Is the patient taking all medications as directed (includes completed medication regime)?  Yes   Has the patient kept scheduled appointments due by today?  Yes   Did the patient receive a copy of their discharge instructions?  Yes   What is the patient's perception of their health status since discharge?  Improving   Week 3 call completed?  Yes   Wrap up additional comments  Pt is doing very well. Pt's staples have been removed and appetite has improved. Pt has no questions or complaints at this time.          Alicia Sewell RN

## 2021-02-22 ENCOUNTER — LAB (OUTPATIENT)
Dept: LAB | Facility: HOSPITAL | Age: 81
End: 2021-02-22

## 2021-02-22 ENCOUNTER — OFFICE VISIT (OUTPATIENT)
Dept: ONCOLOGY | Facility: CLINIC | Age: 81
End: 2021-02-22

## 2021-02-22 VITALS
TEMPERATURE: 97.5 F | RESPIRATION RATE: 20 BRPM | SYSTOLIC BLOOD PRESSURE: 145 MMHG | HEIGHT: 61 IN | BODY MASS INDEX: 24.49 KG/M2 | DIASTOLIC BLOOD PRESSURE: 66 MMHG | WEIGHT: 129.7 LBS | HEART RATE: 81 BPM | OXYGEN SATURATION: 98 %

## 2021-02-22 DIAGNOSIS — C18.2 MALIGNANT NEOPLASM OF ASCENDING COLON (HCC): Primary | ICD-10-CM

## 2021-02-22 DIAGNOSIS — K56.1 COLONIC INTUSSUSCEPTION (HCC): ICD-10-CM

## 2021-02-22 DIAGNOSIS — K52.9 CHRONIC DIARRHEA: ICD-10-CM

## 2021-02-22 DIAGNOSIS — D50.0 IRON DEFICIENCY ANEMIA DUE TO CHRONIC BLOOD LOSS: ICD-10-CM

## 2021-02-22 DIAGNOSIS — D12.5 ADENOMATOUS POLYP OF SIGMOID COLON: ICD-10-CM

## 2021-02-22 DIAGNOSIS — R19.01 RIGHT UPPER QUADRANT ABDOMINAL MASS: ICD-10-CM

## 2021-02-22 DIAGNOSIS — D50.8 OTHER IRON DEFICIENCY ANEMIA: ICD-10-CM

## 2021-02-22 DIAGNOSIS — E44.1 MILD PROTEIN-CALORIE MALNUTRITION (HCC): ICD-10-CM

## 2021-02-22 LAB
ALBUMIN SERPL-MCNC: 3.8 G/DL (ref 3.5–5.2)
ALBUMIN/GLOB SERPL: 1.5 G/DL (ref 1.1–2.4)
ALP SERPL-CCNC: 85 U/L (ref 38–116)
ALT SERPL W P-5'-P-CCNC: 7 U/L (ref 0–33)
ANION GAP SERPL CALCULATED.3IONS-SCNC: 10.2 MMOL/L (ref 5–15)
AST SERPL-CCNC: 18 U/L (ref 0–32)
BASOPHILS # BLD AUTO: 0.04 10*3/MM3 (ref 0–0.2)
BASOPHILS NFR BLD AUTO: 0.7 % (ref 0–1.5)
BILIRUB SERPL-MCNC: 0.3 MG/DL (ref 0.2–1.2)
BUN SERPL-MCNC: 17 MG/DL (ref 6–20)
BUN/CREAT SERPL: 23.3 (ref 7.3–30)
CALCIUM SPEC-SCNC: 9.9 MG/DL (ref 8.5–10.2)
CHLORIDE SERPL-SCNC: 101 MMOL/L (ref 98–107)
CO2 SERPL-SCNC: 29.8 MMOL/L (ref 22–29)
CREAT SERPL-MCNC: 0.73 MG/DL (ref 0.6–1.1)
DEPRECATED RDW RBC AUTO: 46.2 FL (ref 37–54)
EOSINOPHIL # BLD AUTO: 0.13 10*3/MM3 (ref 0–0.4)
EOSINOPHIL NFR BLD AUTO: 2.1 % (ref 0.3–6.2)
ERYTHROCYTE [DISTWIDTH] IN BLOOD BY AUTOMATED COUNT: 14.8 % (ref 12.3–15.4)
FERRITIN SERPL-MCNC: 344.4 NG/ML (ref 13–150)
GFR SERPL CREATININE-BSD FRML MDRD: 77 ML/MIN/1.73
GLOBULIN UR ELPH-MCNC: 2.6 GM/DL (ref 1.8–3.5)
GLUCOSE SERPL-MCNC: 107 MG/DL (ref 74–124)
HCT VFR BLD AUTO: 37.1 % (ref 34–46.6)
HGB BLD-MCNC: 11.5 G/DL (ref 12–15.9)
HGB RETIC QN AUTO: 32.9 PG (ref 29.8–36.1)
IMM GRANULOCYTES # BLD AUTO: 0.02 10*3/MM3 (ref 0–0.05)
IMM GRANULOCYTES NFR BLD AUTO: 0.3 % (ref 0–0.5)
IMM RETICS NFR: 5.4 % (ref 3–15.8)
IRON 24H UR-MRATE: 83 MCG/DL (ref 37–145)
IRON SATN MFR SERPL: 31 % (ref 14–48)
LYMPHOCYTES # BLD AUTO: 0.95 10*3/MM3 (ref 0.7–3.1)
LYMPHOCYTES NFR BLD AUTO: 15.5 % (ref 19.6–45.3)
MCH RBC QN AUTO: 27.1 PG (ref 26.6–33)
MCHC RBC AUTO-ENTMCNC: 31 G/DL (ref 31.5–35.7)
MCV RBC AUTO: 87.3 FL (ref 79–97)
MONOCYTES # BLD AUTO: 0.48 10*3/MM3 (ref 0.1–0.9)
MONOCYTES NFR BLD AUTO: 7.9 % (ref 5–12)
NEUTROPHILS NFR BLD AUTO: 4.49 10*3/MM3 (ref 1.7–7)
NEUTROPHILS NFR BLD AUTO: 73.5 % (ref 42.7–76)
NRBC BLD AUTO-RTO: 0 /100 WBC (ref 0–0.2)
PLATELET # BLD AUTO: 160 10*3/MM3 (ref 140–450)
PMV BLD AUTO: 8.5 FL (ref 6–12)
POTASSIUM SERPL-SCNC: 4.5 MMOL/L (ref 3.5–4.7)
PROT SERPL-MCNC: 6.4 G/DL (ref 6.3–8)
RBC # BLD AUTO: 4.25 10*6/MM3 (ref 3.77–5.28)
RETICS # AUTO: 0.05 10*6/MM3 (ref 0.02–0.13)
RETICS/RBC NFR AUTO: 1.25 % (ref 0.7–1.9)
SODIUM SERPL-SCNC: 141 MMOL/L (ref 134–145)
TIBC SERPL-MCNC: 270 MCG/DL (ref 249–505)
TRANSFERRIN SERPL-MCNC: 193 MG/DL (ref 200–360)
WBC # BLD AUTO: 6.11 10*3/MM3 (ref 3.4–10.8)

## 2021-02-22 PROCEDURE — 80053 COMPREHEN METABOLIC PANEL: CPT

## 2021-02-22 PROCEDURE — 84466 ASSAY OF TRANSFERRIN: CPT

## 2021-02-22 PROCEDURE — 85025 COMPLETE CBC W/AUTO DIFF WBC: CPT

## 2021-02-22 PROCEDURE — 99215 OFFICE O/P EST HI 40 MIN: CPT | Performed by: INTERNAL MEDICINE

## 2021-02-22 PROCEDURE — 82728 ASSAY OF FERRITIN: CPT

## 2021-02-22 PROCEDURE — 85046 RETICYTE/HGB CONCENTRATE: CPT

## 2021-02-22 PROCEDURE — 36415 COLL VENOUS BLD VENIPUNCTURE: CPT

## 2021-02-22 PROCEDURE — 83540 ASSAY OF IRON: CPT

## 2021-02-22 NOTE — PROGRESS NOTES
Subjective     PATIENT WAS CALLED THE DAY BEFORE BY THE OFFICE TO ASK FOR SYMPTOMS THAT COULD BE CONSISTENT WITH CORONAVIRUS INFECTION, AND BEING NEGATIVE WAS SCHEDULED TO BE SEEN IN THE OFFICE TODAY. SIMILAR QUESTIONING TODAY INCLUDING, CHILLS, FEVER, NEW COUGH, SHORTNESS OF BREATH, DIARRHEA,DIFFUSE BODY ACHES  AND CHANGES IN SMELL OR TASTE WERE NEGATIVE.THE PATIENT DENIED ANY CONTACT WITH PERSONS WHO WERE POSITIVE FOR COVID, AND PATIENT IS NOT IN CATEGORY OF HIGH RISK BEHAVIOR TO ACQUIRE COVID.    DURING THE VISIT WITH THE PATIENT TODAY , PATIENT HAD FACE MASK, MY MEDICAL ASSISTANT AND I  HAD PROPPER PROTECTIVE EQUIPMENT, AND I DID HAND HYGIENE WITH SOAP AND WATER BEFORE AND AFTER THE VISIT.     REASON FOR FOLLOW UP: 1.IRON DEFICIECNY ANEMIA, CHRONIC DIARRHEA, MALNUTRITION, WEIGHT LOSS, ABDOMINAL MASS RUQ    2.Ascending colon cancer s/p colectomy.    This patient returns today to the office for followup in company of her daughter, delighted that she was able to go through her colonoscopy and colectomy subsequently by Dr. Cesar with no difficulties in the background of advanced pulmonary disease. She walked away from the hospital at the time of the colectomy 3 days later and she has not had any further issues at home. Obviously the colectomy was performed because she was found to have a cancer in the ascending colon not surprising that was clinically documented as palpable by me. We will review the pathology below. At this time the patient’s appetite is dramatically coming back. She has gained weight. She has not had any abdominal pain. No nausea, vomiting, distention, diarrhea, or passage of blood in the stools. She remains on her oxygen. She has minimal cough. No wheezing. No sputum production. She has not had any swelling in her lower extremities. She has not had any fevers, chills or infection. Surgical scar has healed perfectly fine.        Past Medical History:   Diagnosis Date   • Adenomatous polyp of  sigmoid colon 1/21/2021   • Anemia    • Colonic polyp    • Congestive heart failure (CMS/HCC)    • Depression    • Diverticulosis    • Esophageal reflux    • Essential hypertension    • GERD (gastroesophageal reflux disease)    • H/O complete eye exam 03/2018   • Hx of bone density study 02/15/2016   • Hyperlipidemia    • Hypothyroidism, acquired    • Major depressive disorder, recurrent episode, in full remission (CMS/HCC)    • Nonsenile cataract 2016   • Oxygen dependent     8L NC CONTINUOUS   • Pulmonary fibrosis (CMS/HCC)    • Pulmonary hypertension (CMS/HCC)    • Sleep apnea, obstructive     USES CPAP    • Stenosis, spinal, lumbar    • Vertigo, peripheral         Past Surgical History:   Procedure Laterality Date   • COLON RESECTION N/A 1/28/2021    Procedure: COLON RESECTION RIGHT;  Surgeon: Alfonso Cesar MD;  Location: [facility] MAIN OR;  Service: General;  Laterality: N/A;   • COLONOSCOPY  02/20/2012   • COLONOSCOPY N/A 1/21/2021    Procedure: COLONOSCOPY INTO CECUM AND TI WITH BIOPSIES, SPOT INJECTION TO COLON MASS (MID-ASCENDING), HOT SNARE POLYPECTOMY, COLD BX POLYPECTOMIES, SALINE LIFT, CLIP PLACEMENT X3;  Surgeon: Alfonso Cesar MD;  Location: [facility] ENDOSCOPY;  Service: General;  Laterality: N/A;  PRE:  ABNORMAL CT SCAN OF COLON  POST:  DIVERTICULOSIS, COLON MASS, POLYPS, HEMORRHOIDS   • INCONTINENCE SURGERY      Dr. Espinal   • MAMMO BILATERAL  2017    dr de la cruz    • PAP SMEAR  2014    dr de la cruz        Current Outpatient Medications on File Prior to Visit   Medication Sig Dispense Refill   • acetaminophen (TYLENOL) 500 MG tablet Take 1,000 mg by mouth Every 6 (Six) Hours As Needed for Mild Pain .     • ADCIRCA 20 MG tablet Take 40 mg by mouth Daily.     • atorvastatin (LIPITOR) 10 MG tablet Take 1 tablet by mouth Daily. 90 tablet 1   • ESBRIET 267 MG capsule Take 801 mg by mouth 3 (Three) Times a Day With Meals.     • famotidine (PEPCID) 20 MG tablet TAKE ONE TABLET BY  MOUTH TWICE DAILY 28 tablet 0   • levothyroxine (SYNTHROID, LEVOTHROID) 125 MCG tablet Take 1 tablet by mouth Daily. 90 tablet 1   • O2 (OXYGEN) Inhale 8 L/min Continuous.     • sertraline (ZOLOFT) 50 MG tablet Take 1 tablet by mouth Daily. 90 tablet 1   • verapamil SR (CALAN-SR) 120 MG CR tablet Take 120 mg by mouth Daily.  3     No current facility-administered medications on file prior to visit.         ALLERGIES:    Allergies   Allergen Reactions   • Sulfa Antibiotics Hives and Rash        Social History     Socioeconomic History   • Marital status:      Spouse name: Not on file   • Number of children: Not on file   • Years of education: Not on file   • Highest education level: Not on file   Tobacco Use   • Smoking status: Former Smoker     Packs/day: 2.00     Types: Cigarettes     Start date:      Quit date:      Years since quittin.1   • Smokeless tobacco: Never Used   • Tobacco comment: Caffeine use: Drinks Coca Cola    Substance and Sexual Activity   • Alcohol use: Yes     Comment: rare   • Drug use: No   • Sexual activity: Never     Partners: Male        Family History   Problem Relation Age of Onset   • Breast cancer Cousin    • Lung cancer Father    • Heart disease Father    • Colon cancer Brother    • Prostate cancer Brother    • Lung disease Brother    • Emphysema Brother    • Heart disease Mother    • Malig Hyperthermia Neg Hx       Current Outpatient Medications on File Prior to Visit   Medication Sig Dispense Refill   • acetaminophen (TYLENOL) 500 MG tablet Take 1,000 mg by mouth Every 6 (Six) Hours As Needed for Mild Pain .     • ADCIRCA 20 MG tablet Take 40 mg by mouth Daily.     • atorvastatin (LIPITOR) 10 MG tablet Take 1 tablet by mouth Daily. 90 tablet 1   • ESBRIET 267 MG capsule Take 801 mg by mouth 3 (Three) Times a Day With Meals.     • famotidine (PEPCID) 20 MG tablet TAKE ONE TABLET BY MOUTH TWICE DAILY 28 tablet 0   • levothyroxine (SYNTHROID, LEVOTHROID) 125 MCG  "tablet Take 1 tablet by mouth Daily. 90 tablet 1   • O2 (OXYGEN) Inhale 8 L/min Continuous.     • sertraline (ZOLOFT) 50 MG tablet Take 1 tablet by mouth Daily. 90 tablet 1   • verapamil SR (CALAN-SR) 120 MG CR tablet Take 120 mg by mouth Daily.  3     No current facility-administered medications on file prior to visit.        Objective    Vitals:    02/22/21 1158   BP: 145/66   Pulse: 81   Resp: 20   Temp: 97.5 °F (36.4 °C)   TempSrc: Temporal   SpO2: 98%   Weight: 58.8 kg (129 lb 11.2 oz)   Height: 154.9 cm (60.98\")           Physical Exam   I HAVE PERSONALLY REVIEWED THE HISTORY OF THE PRESENT ILLNESS, PAST MEDICAL HISTORY, FAMILY HISTORY, SOCIAL HISTORY, ALLERGIES, MEDICATIONS STATED ABOVE IN THE OFFICE NOTE FROM TODAY.        GENERAL:  Well-developed, well-nourished  Patient  in no acute distress. Using oxygen , not acute distress  SKIN:  Warm, dry ,NO rashes,NO purpura ,NO petechiae.  HEENT:  Pupils were equal and reactive to light and accomodation, conjunctivae noninjected, no pterygium, normal extraocular movements, normal visual acuity.   NECK:  Supple with good range of motion; no thyromegaly or masses, no JVD or bruits, no cervical adenopathies.No carotid artery pain, no carotid abnormal pulsation , NO arterial dance.  LYMPHATICS:  No cervical, NO supraclavicular, NO axillary,NO epitrochlear , NO inguinal adenopathy.  CARDIAC   normal rate and regular rhythm, without murmur,NO rubs NO S3 NO S4 right or left . Normal femoral, popliteal, pedis, brachial and carotid pulses.  VASCULAR ARTERIAL: normal carotids,brachial,radial,femoral,popliteal, pedis pulses , no bruits.no paleness or cyanosis, no pain, no edema, no numbness, no gangrene.  VASCULAR VENOUS: no cyanosis, collateral circulation, varicosities, edema, palpable cords, pain, erythema.  ABDOMEN:  Soft, nontender with no hepatomegaly, no splenomegaly,no masses, no ascites, no collateral circulation,no distention,no Dunbar sign, no abdominal pain, no " inguinal hernias,no umbilical hernia, no abdominal bruits. Normal bowel sounds.surgical site completely healed.  GENITAL: Not  Performed.  EXTREMITIES  AND SPINE:  No clubbing, cyanosis or edema, no deformities or pain .No kyphosis, scoliosis, deformities or pain in spine, ribs or pelvic bone.  NEUROLOGICAL:  Patient was awake, alert, oriented to time, person and place.          RECENT LABS:    Pathology Exam: [id number]  Order: [id number]  Status:  Edited Result - FINAL   Visible to patient:  Yes (MyChart) Next appt:  03/09/2021 at 02:30 PM in General Surgery (Alfonso Cesar MD) Dx:  Colon cancer (CMS/HCC)  Specimen Information: Large Intestine, Right / Ascending Colon; Tissue        Component    Addendum 3   Please see the completely scanned MSI by PCR report from [facility] below.    Addendum electronically signed by Christina Khan MD on 2/17/2021 at 1421   Addendum 2   Please see the completely scanned Molecular (BRAF) report from [facility] below.   Addendum electronically signed by Christina Khan MD on 2/5/2021 at 1133   Addendum   Please see the completely scanned MSI (IHC) consult report from [facility] below.   Addendum electronically signed by Christina Khan MD on 2/2/2021 at 1255   Case Report   Surgical Pathology Report                         Case: [id number]                                   Authorizing Provider:  Alfonso Cesar,  Collected:           01/28/2021 02:01 PM                                  MD                                                                            Ordering Location:     [facility]  Received:            01/28/2021 03:11 PM                                  MAIN OR                                                                       Pathologist:           Christina Khan MD                                                     Specimen:    Large Intestine, Right / Ascending Colon, RIGHT COLON                                       Final Diagnosis   1. Terminal Ileum, Cecum, Appendix and Ascending Colon, Right Hemicolectomy:  INVASIVE MODERATELY       DIFFERENTIATED ADENOCARCINOMA.               A. Tumor site:  Ascending colon.               B. Tumor size:  7.5 cm.               C. Tumor extension:  Tumor focally invades through the muscularis propria.               D. Margins:  Uninvolved by invasive carcinoma.                            1. Tumor present 6 cm from the radial fat margin (closest margin).               E. No definitive lymphovascular space invasion identified.               F. Additional findings:  Tubular adenomas.               G. Lymph nodes:  Fourteen benign lymph nodes (0/14).               H. Pathologic stage: pT3, N0.     Swm/kds   Electronically signed by Christina Khan MD on 1/30/2021 at 1146   Synoptic Checklist   COLON AND RECTUM: Resection, Including Transanal Disk Excision of Rectal Neoplasms  COLON AND RECTUM: RESECTION - All Specimens  8th Edition - Protocol posted: 2/26/2020  SPECIMEN   Procedure  Right hemicolectomy    TUMOR   Tumor Site  Ascending colon    Histologic Type  Adenocarcinoma    Histologic Grade  G2: Moderately differentiated    Tumor Size  Greatest dimension (Centimeters): 7.5 cm   Tumor Extension  Tumor invades through the muscularis propria into pericolorectal tissue    Macroscopic Tumor Perforation  Not identified    Lymphovascular Invasion  Not identified    Perineural Invasion  Not identified    Treatment Effect  No known presurgical therapy    MARGINS   Margins  All margins are uninvolved by invasive carcinoma, high grade dysplasia / intramucosal carcinoma, and low grade dysplasia    Margins Examined  Proximal      Distal      Radial (circumferential) or Mesenteric    Distance of Invasive Carcinoma from Closest Margin  6 cm   Closest Margin  Radial (circumferential) or Mesenteric    LYMPH NODES   Number of Lymph Nodes Involved  0    Number of Lymph Nodes Examined  14   "  Tumor Deposits  Not identified    PATHOLOGIC STAGE CLASSIFICATION (pTNM, AJCC 8th Edition)      Primary Tumor (pT)  pT3    Regional Lymph Nodes (pN)  pN0    ADDITIONAL FINDINGS   Additional Findings  Adenoma(s)    .      Comment    MSI studies are pending and will be issued in an addendum to this report.    Macym/kirstin   Gross Description    1. Received in formalin labeled \"right colon\" is a 5.0 cm segment of ileum with 20 cm of attached cecum and ascending colon and attached 6.0 x 0.6 cm vermiform appendix.  The serosal surface of the appendix, colon and ileum are smooth tan pink The tip of the appendix is amputated and bisected to reveal tan unremarkable mucosa.  The lumen is pin point and the wall thickness averages 0.2 cm.  The ileal mucosa is tan pink and glistening with normal folds and the wall thickness averages 0.3 cm.  Within the ascending colon is a fungating centrally ulcerative markedly necrotic friable mass measuring 7.5 x 7.2 x 3.5 cm and coming to within 7 cm of the distal resection margin.  The mass also comes to within 10 cm of the ileal cecal valve. The serosal surface overlying the mass is inked blue and the circumferential radial fat margin and mesenteric fat margin nearest to the mass are inked black.  The mass comes to within 14.5 cm of the mesenteric fat margin and 6 cm of the circumferential radial fat margin.  Sectioning through the mass reveals the tumor has a maximum depth of 1.8 cm, abutting the overlying serosal surface, however, the serosal surface is intact.  The mass does not grossly appear to invade through the muscularis propria into the subjacent adipose tissue.  There is a 0.7 x 0.6 x 0.5 cm sessile tan red polyp which lies 4.5 cm distal to the mass and comes to within 1.5 cm of the distal resection margin.  The remaining colonic mucosa is tan pink and glistening with normal folds and the wall thickness averages 0.5 cm.  There is a second sessile polyp in the cecum, measuring 0.6 x " 0.5 x 0.4 cm which lies 4 cm lateral to the ileal cecal valve and 11 cm proximal to the ascending colon mass.  Following dissection through the pericolic fat reveals multiple probable lymph nodes ranging form 0.2 x 0.2 x 0.1 cm to 1.0 x 0.8 x 0.6 cm.  Representative sections are submitted as follows:   1A - appendix.  1B - mesenteric fat margin perpendicular.  1C - circumferential radial fat margin perpendicular.    1D - proximal ileal and distal colonic mucosal margins en face.  1E - random ileum.  1F - ileal cecal valve.    1G - cecal polyp.   1H - polyp distal to mass.   1I - mucosa between the mass and the proximal polyp and mucosa between the mass and the distal polyp (2 sections).  1J-1M - mass overlying inked serosal surface.   1N - mass to subjacent adipose tissue.   1O - mass towards distal mucosa.  1P - mass towards proximal mucosa.  1Q - 4 possible lymph nodes en toto.  1R - 2 possible lymph nodes en toto.  1S - 4 possible lymph nodes en toto.  1T - 3 possible lymph nodes en toto.  1U - largest lymph node bisected.      jap/uso/swm/brb             CBC Auto Differential  Order: [id number] - Part of Panel Order [id number]  Status:  Final result   Visible to patient:  No (not released) Dx:  Other iron deficiency anemia  Specimen Information: Blood        Component   Ref Range & Units 11:13   WBC   3.40 - 10.80 10*3/mm3 6.11    RBC   3.77 - 5.28 10*6/mm3 4.25    Hemoglobin   12.0 - 15.9 g/dL 11.5Low     Hematocrit   34.0 - 46.6 % 37.1    MCV   79.0 - 97.0 fL 87.3    MCH   26.6 - 33.0 pg 27.1    MCHC   31.5 - 35.7 g/dL 31.0Low     RDW   12.3 - 15.4 % 14.8    RDW-SD   37.0 - 54.0 fl 46.2    MPV   6.0 - 12.0 fL 8.5    Platelets   140 - 450 10*3/mm3 160    Neutrophil %   42.7 - 76.0 % 73.5    Lymphocyte %   19.6 - 45.3 % 15.5Low     Monocyte %   5.0 - 12.0 % 7.9    Eosinophil %   0.3 - 6.2 % 2.1    Basophil %   0.0 - 1.5 % 0.7    Immature Grans %   0.0 - 0.5 % 0.3    Neutrophils, Absolute   1.70 - 7.00 10*3/mm3  4.49    Lymphocytes, Absolute   0.70 - 3.10 10*3/mm3 0.95                    Assessment/Plan     Diagnoses and all orders for this visit:    1. Malignant neoplasm of ascending colon (CMS/HCC) (Primary)    2. Iron deficiency anemia due to chronic blood loss    3. Colonic intussusception (CMS/HCC)    4. Adenomatous polyp of sigmoid colon              In summary this patient has progressive anemia. I documented in the laboratory parameters above progressive drop in the hemoglobin, progressive drop in the MCV. Morphology of peripheral blood is typical of iron deficiency anemia and I would not be surprised if she has an associated B12 or folate deficiency. This patient has had upper GI series and barium enema by GI, Dr. Boyd, and no anatomical alteration has been found in her upper GI tract or colon besides diverticular disease. The symptoms that she has today she blames them, especially the diarrhea, since the time that she had the barium enema. I think that was not an association. The fact that she has so much diarrhea day and night and with weight loss, profound fatigue, sarcopenia leads me to believe that it is some other entity that is producing this. It sounds like to me she has a malabsorptive process of some sort.     The patient also has a palpable mass in the right upper quadrant and the way how it feels to my hands, it is probably a palpable gallbladder. She had no pain there. This is very striking. Her abdomen is very soft and it is very easy to find.     Under the present circumstances and knowing that the patient cannot tolerate oral iron because it makes her to throw up and produce profuse diarrhea and makes the diarrhea worse, I do believe that the patient needs to have the followin. For the workup for her anemia, she went back to the lab to obtain a comprehensive metabolic profile, ferritin, iron, TIBC, B12, folic acid level, reticulated hemoglobin.   2. Given the fact that the patient has had a  TSH that was on the low side maybe her thyroid medication is overcorrecting her and this maybe this is part of the diarrhea process. I will obtain a TSH and a T4.   3. I do believe that the patient needs to have a CT scan of the abdomen and pelvis. I do not think she can handle oral contrast and I do not want to give her IV contrast, I find no need. I want to be sure there is no other anatomical alteration in her small intestine and GI tract that is favoring for her to have all the issues pertinent to her clinical symptoms.     I wonder if she could have some other process in her bowel including inflammatory bowel disease, bacterial overgrowth syndrome, celiac disease and differential diagnosis is more than extensive. This information would require to be shared with Dr. Boyd, her Gastroenterologist, for further review after she returns back in a few days after she proceeds with proper management of her anemia. Given the fact that she cannot handle oral iron by mouth, absolutely the patient needs to have Injectafer on 2 different occasions and this will be scheduled to start to happen as early as TODAY.   I discussed with the patient on 12/04/2020 after reviewing the case with the radiologist Dr. Pinto at [facility] in regards to her CT scan of the abdomen. This documents intussusception of the colon and there is a mass located in that that probably represents a colon cancer. Obviously, the question is how to proceed with all these issues on this patient. It is very obvious that she has profound iron deficiency anemia. Her ferritin level, iron level are low. She cannot handle oral iron therapy and she will initiate Injectafer today and she will have the 2nd infusion next week. I expect that in 3 weeks she will have very significant rebound in erythropoiesis and I expect that her pulmonary manifestations will be better in regard to her pulmonary hypertension and her pulmonary fibrosis. Typically  patient's with this condition develop polycythemia instead of anemia.    The other issue is what we are going to do with her colon. I do not think that the patient is a normal surgical candidate under the present circumstances and we need to try to fix her up in the best way possible to see if she gets to the point to have a resection of this issue. I have made her an appointment to be seen by cardiology, Rambo Dasilva MD, and proceed with an echocardiogram in days. We have also made an appointment for her to see Alfonso Cesar MD, General Surgery to review the case with me.       I reviewed the patient back with the daughter in the room on 12/10/2020. In the interim, I have had a conversation with Zita Guy MD, the patient’s pulmonologist, who strongly believes that the patient will be able to go through surgery as long as she has proper pulmonary assessment before intervention and she asked me to go ahead and consult Pulmonary Medicine, Dr. Ramos and associates for this purpose. A consult has been placed.     I reviewed her echocardiogram and actually it is better than I expected for somebody who has pulmonary fibrosis.     In summary I think the patient’s intussusception is producing bleeding, chronically, leading to iron deficiency anemia. She has had Injectafer a week ago, another one to be given to her tomorrow and thereafter she will require blood work on weekly basis. If the hemoglobin goes below 9, I think she will require transfusion of 2 units of red cells. She will have weekly appointment for CBC and nurse check and I will review her back on 12/30/2020.     I EMPHATICALLY POINTED OUT TO THE PATIENT’S DAUGHTER SHE CANNOT TAKE ANYMORE ZULMA-SELTZER. SHE HAS BEEN TAKING THIS ON A REGULAR SCHEDULE AND THIS IS PRODUCING PROBABLY ALSO AN ELEMENT OF GI BLEEDING ASSOCIATED WITH HER INTUSSUSCEPTION AND WHY NOT GASTRITIS AND SO FORTH.     In summary the patient was reviewed on 12/30/2020 along with her daughter  in the room. The patient has gone through cardiological assessment and actually her cardiac ejection fraction is not that bad, close to 50%, and with minimal evidence of pulmonary arterial hypertension. Normal contractility of the left ventricle. The patient also has been seen by Pulmonary Medicine. These records have been reviewed again and her pulmonary function tests is very marginal. The analysis is stated above by Dr. Mosre who has advised the patient that she is a high risk for general anesthesia in regard to the need for ICU stay, intubation, mechanical ventilation and so forth given her pulmonary fibrosis. He has advised the patient to see Dr. Guy, Pulmonary Medicine, and she has an appointment to see her next week.     In regard to her iron deficiency anemia after she received IV iron infusions the hemoglobin keeps improving to 11.2 from 8.4. The patient is no longer taking [place]. The patient is a little bit stronger but her appetite is not good. Obviously the question is what is the cause of the intussusception. I have measured a CEA level that is elevated at 6.2 and leads me to believe that maybe this is something related to a primary colon cancer. Dr. Cesar has suggested for her to have a colonoscopy after reviewing his good notes. The patient is even more hesitant to proceed with this.       The patient was further reviewed in the office on 02/22/2021. Since the previous visit and after I discussed the case with Dr. Zita Guy, the patient’s pulmonologist, I advised Dr. Cesar to go ahead and proceed with her surgery. This happened and actually to the surprise of everybody the patient walked away from the hospital 3 days later with no complications or issues pertinent to her lung disease. The patient had also excellent tolerance to her colonoscopy. Since she has been at home she has been gaining appetite and gaining weight. She is feeling substantially better. She has no abdominal pain. Her  surgical site has healed. Her bowel activity is normal and urination is normal. She remains on oxygen. She is walking around the house and she is doing more things that she was not doing before. She is not sitting down complaining of a bad stomach all the time. She is not taking [place] as I pointed out to them before.     I have reviewed the pathology of the surgical specimen and she had a T3N0M0 colonic cancer with 14 lymph nodes removed, negative, with no perineural, perivascular or perivenous invasion. The margins of resection were completely clear. On the CT scan of the abdomen, her liver was completely unremarkable.     We have a CEA level pending today.     Under the present circumstances in a patient who is chronically incapacitated with chronic lung disease and remains on oxygen, I am not going to advise her to proceed with any form of adjuvant therapy for her colon cancer for stage II colon cancer. I do not believe that adding an Oncotype DX to her present diagnosis will change my mind. I do not think this patient will be able to handle anything. This patient is MSI rich and she is BRAF positive and obviously with such a large tumor it can confirm her risk of recurrence. I would like for her to improve in regard to nutrition that she has been depleted of for months and months and review her back in 6 weeks. I will perform a CBC, CMP, ferritin, iron, TIBC and a CEA level.     The only real recommendation for the patient today was to stay away from her [place] first of all that she is no longer using and second to improve nutrition now that her bowel is no longer in turmoil and her GI tract is available for proper nutrition.     I discussed all these facts with the patient and her daughter in the room.     Otherwise, no other intervention. I think eventually all the family needs to have a colonoscopy and this was discussed with the patient’s daughter in the room as well today.          I DISCUSSED  WITH PATIENT IN DETAIL FORMS TO DECREASE CHANCES OF CORONAVIRUS INFECTION INCLUDING ISOLATION, PROPER HAND HIGIENE, AVOID PUBLIC PLACES  WITH CROWDS, FOLLOW  CDC RECOMENDATIONS, AND KEEP PERSONAL AND SOCIAL RESPONSIBILITY, WARE A MASK IN PUBLIC PLACES.  PATIENT IS AWARE THIS INFECTION COULD HAVE SEVERE CONSEQUENCES TO PERSONAL HEALTH AND FAMILY RAMIFICATIONS OF THIS.

## 2021-02-25 ENCOUNTER — READMISSION MANAGEMENT (OUTPATIENT)
Dept: CALL CENTER | Facility: HOSPITAL | Age: 81
End: 2021-02-25

## 2021-02-25 NOTE — OUTREACH NOTE
"General Surgery Week 4 Survey      Responses   List of hospitals in [place] patient discharged from?  [facility]   Does the patient have one of the following disease processes/diagnoses(primary or secondary)?  General Surgery   Week 4 attempt successful?  Yes   Call start time  1424   Call end time  1426   Discharge diagnosis  Colon cancer  COLON RESECTION   Is the patient taking all medications as directed (includes completed medication regime)?  Yes   Has the patient kept scheduled appointments due by today?  Yes   Comments  Patient saw Surgeon 02/10/2021.  Will f/u again \"in a few weeks\".    Is the patient still receiving Home Health Services?  No   Psychosocial issues?  No   What is the patient's perception of their health status since discharge?  Improving   If the patient is a current smoker, are they able to teach back resources for cessation?  Not a smoker   Is the patient/caregiver able to teach back the hierarchy of who to call/visit for symptoms/problems? PCP, Specialist, Home health nurse, Urgent Care, ED, 911  Yes   Additional teach back comments  Patient is \"eating again and doing well\".    Week 4 call completed?  Yes   Would the patient like one additional call?  No   Graduated  Yes   Did the patient feel the follow up calls were helpful during their recovery period?  Yes   Was the number of calls appropriate?  Yes   Wrap up additional comments  Pt states she is doing great.  She has f/u with providers.  She states she is very happy with calls made.            Radha Arguello RN  "

## 2021-03-02 DIAGNOSIS — Z23 IMMUNIZATION DUE: ICD-10-CM

## 2021-03-09 ENCOUNTER — OFFICE VISIT (OUTPATIENT)
Dept: SURGERY | Facility: CLINIC | Age: 81
End: 2021-03-09

## 2021-03-09 VITALS — BODY MASS INDEX: 24.47 KG/M2 | HEIGHT: 61 IN | WEIGHT: 129.63 LBS

## 2021-03-09 DIAGNOSIS — Z09 POSTOP CHECK: Primary | ICD-10-CM

## 2021-03-09 PROCEDURE — 99024 POSTOP FOLLOW-UP VISIT: CPT | Performed by: SURGERY

## 2021-03-09 RX ORDER — ASCORBIC ACID 500 MG
500 TABLET ORAL DAILY
COMMUNITY

## 2021-03-09 NOTE — PROGRESS NOTES
CC: Follow-up status post right hemicolectomy 1/28/2021    S: Patient underwent open right hemicolectomy on 1/28/2021.  She did great postop.  She has been having regular bowel movements with no constipation or diarrhea.  She follow-up with her oncologist Dr. Lara.recommend the patient not to have any type of further therapy.    O:   Alert oriented x3, no acute distress  Abdomen soft, nontender nondistended, well-healed midline incision, no hernias    Assessment and plan    80-year-old female status post open right hemicolectomy for stage IIa colon cancer.  She has been doing great with no new problems with bowel movement.  Ideally, patient should have colonoscopy in 1 to 2 years.  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  I do not think she should undergo further surveillance colonoscopy if there is no symptoms concerning for recurrence.  She understood    Alfonso Cesar MD, FACS  General, Minimally Invasive and Endoscopic Surgery  [facility]    [address], [address]  [place], [place], [address]  P: [phone]  F: [phone]

## 2021-03-22 ENCOUNTER — APPOINTMENT (OUTPATIENT)
Dept: MAMMOGRAPHY | Facility: HOSPITAL | Age: 81
End: 2021-03-22

## 2021-03-24 DIAGNOSIS — E78.2 MIXED HYPERLIPIDEMIA: ICD-10-CM

## 2021-03-24 DIAGNOSIS — F33.42 MAJOR DEPRESSIVE DISORDER, RECURRENT EPISODE, IN FULL REMISSION (HCC): ICD-10-CM

## 2021-03-24 DIAGNOSIS — K21.00 GASTROESOPHAGEAL REFLUX DISEASE WITH ESOPHAGITIS WITHOUT HEMORRHAGE: ICD-10-CM

## 2021-03-24 DIAGNOSIS — E03.9 ACQUIRED HYPOTHYROIDISM: ICD-10-CM

## 2021-03-24 RX ORDER — ATORVASTATIN CALCIUM 10 MG/1
TABLET, FILM COATED ORAL
Qty: 90 TABLET | Refills: 1 | OUTPATIENT
Start: 2021-03-24

## 2021-03-24 RX ORDER — LEVOTHYROXINE SODIUM 0.12 MG/1
TABLET ORAL
Qty: 90 TABLET | Refills: 1 | OUTPATIENT
Start: 2021-03-24

## 2021-03-24 RX ORDER — FAMOTIDINE 20 MG/1
TABLET, FILM COATED ORAL
Qty: 180 TABLET | OUTPATIENT
Start: 2021-03-24

## 2021-04-13 ENCOUNTER — LAB (OUTPATIENT)
Dept: LAB | Facility: HOSPITAL | Age: 81
End: 2021-04-13

## 2021-04-13 ENCOUNTER — OFFICE VISIT (OUTPATIENT)
Dept: ONCOLOGY | Facility: CLINIC | Age: 81
End: 2021-04-13

## 2021-04-13 DIAGNOSIS — C18.2 MALIGNANT NEOPLASM OF ASCENDING COLON (HCC): Primary | ICD-10-CM

## 2021-04-13 DIAGNOSIS — C18.2 MALIGNANT NEOPLASM OF ASCENDING COLON (HCC): ICD-10-CM

## 2021-04-13 DIAGNOSIS — E44.1 MILD PROTEIN-CALORIE MALNUTRITION (HCC): ICD-10-CM

## 2021-04-13 DIAGNOSIS — D12.5 ADENOMATOUS POLYP OF SIGMOID COLON: ICD-10-CM

## 2021-04-13 DIAGNOSIS — K56.1 COLONIC INTUSSUSCEPTION (HCC): ICD-10-CM

## 2021-04-13 DIAGNOSIS — D50.0 IRON DEFICIENCY ANEMIA DUE TO CHRONIC BLOOD LOSS: ICD-10-CM

## 2021-04-13 PROBLEM — R63.4 WEIGHT LOSS, ABNORMAL: Status: RESOLVED | Noted: 2020-11-24 | Resolved: 2021-04-13

## 2021-04-13 LAB
ALBUMIN SERPL-MCNC: 3.9 G/DL (ref 3.5–5.2)
ALBUMIN/GLOB SERPL: 1.5 G/DL (ref 1.1–2.4)
ALP SERPL-CCNC: 80 U/L (ref 38–116)
ALT SERPL W P-5'-P-CCNC: 8 U/L (ref 0–33)
ANION GAP SERPL CALCULATED.3IONS-SCNC: 7.1 MMOL/L (ref 5–15)
AST SERPL-CCNC: 16 U/L (ref 0–32)
BASOPHILS # BLD AUTO: 0.04 10*3/MM3 (ref 0–0.2)
BASOPHILS NFR BLD AUTO: 0.6 % (ref 0–1.5)
BILIRUB SERPL-MCNC: 0.2 MG/DL (ref 0.2–1.2)
BUN SERPL-MCNC: 21 MG/DL (ref 6–20)
BUN/CREAT SERPL: 26.3 (ref 7.3–30)
CALCIUM SPEC-SCNC: 9.7 MG/DL (ref 8.5–10.2)
CEA SERPL-MCNC: 8.87 NG/ML
CHLORIDE SERPL-SCNC: 102 MMOL/L (ref 98–107)
CO2 SERPL-SCNC: 32.9 MMOL/L (ref 22–29)
CREAT SERPL-MCNC: 0.8 MG/DL (ref 0.6–1.1)
DEPRECATED RDW RBC AUTO: 41.8 FL (ref 37–54)
EOSINOPHIL # BLD AUTO: 0.16 10*3/MM3 (ref 0–0.4)
EOSINOPHIL NFR BLD AUTO: 2.4 % (ref 0.3–6.2)
ERYTHROCYTE [DISTWIDTH] IN BLOOD BY AUTOMATED COUNT: 12.9 % (ref 12.3–15.4)
FERRITIN SERPL-MCNC: 206.7 NG/ML (ref 13–150)
GFR SERPL CREATININE-BSD FRML MDRD: 69 ML/MIN/1.73
GLOBULIN UR ELPH-MCNC: 2.6 GM/DL (ref 1.8–3.5)
GLUCOSE SERPL-MCNC: 107 MG/DL (ref 74–124)
HCT VFR BLD AUTO: 35.8 % (ref 34–46.6)
HGB BLD-MCNC: 11.3 G/DL (ref 12–15.9)
HGB RETIC QN AUTO: 33 PG (ref 29.8–36.1)
IMM GRANULOCYTES # BLD AUTO: 0.02 10*3/MM3 (ref 0–0.05)
IMM GRANULOCYTES NFR BLD AUTO: 0.3 % (ref 0–0.5)
IMM RETICS NFR: 3.9 % (ref 3–15.8)
IRON 24H UR-MRATE: 44 MCG/DL (ref 37–145)
IRON SATN MFR SERPL: 17 % (ref 14–48)
LYMPHOCYTES # BLD AUTO: 1.26 10*3/MM3 (ref 0.7–3.1)
LYMPHOCYTES NFR BLD AUTO: 19.1 % (ref 19.6–45.3)
MCH RBC QN AUTO: 28 PG (ref 26.6–33)
MCHC RBC AUTO-ENTMCNC: 31.6 G/DL (ref 31.5–35.7)
MCV RBC AUTO: 88.6 FL (ref 79–97)
MONOCYTES # BLD AUTO: 0.45 10*3/MM3 (ref 0.1–0.9)
MONOCYTES NFR BLD AUTO: 6.8 % (ref 5–12)
NEUTROPHILS NFR BLD AUTO: 4.66 10*3/MM3 (ref 1.7–7)
NEUTROPHILS NFR BLD AUTO: 70.8 % (ref 42.7–76)
NRBC BLD AUTO-RTO: 0 /100 WBC (ref 0–0.2)
PLATELET # BLD AUTO: 161 10*3/MM3 (ref 140–450)
PMV BLD AUTO: 8.6 FL (ref 6–12)
POTASSIUM SERPL-SCNC: 4.1 MMOL/L (ref 3.5–4.7)
PROT SERPL-MCNC: 6.5 G/DL (ref 6.3–8)
RBC # BLD AUTO: 4.04 10*6/MM3 (ref 3.77–5.28)
RETICS # AUTO: 0.05 10*6/MM3 (ref 0.02–0.13)
RETICS/RBC NFR AUTO: 1.17 % (ref 0.7–1.9)
SODIUM SERPL-SCNC: 142 MMOL/L (ref 134–145)
TIBC SERPL-MCNC: 253 MCG/DL (ref 249–505)
TRANSFERRIN SERPL-MCNC: 181 MG/DL (ref 200–360)
WBC # BLD AUTO: 6.59 10*3/MM3 (ref 3.4–10.8)

## 2021-04-13 PROCEDURE — 83540 ASSAY OF IRON: CPT

## 2021-04-13 PROCEDURE — 82728 ASSAY OF FERRITIN: CPT

## 2021-04-13 PROCEDURE — 80053 COMPREHEN METABOLIC PANEL: CPT

## 2021-04-13 PROCEDURE — 36415 COLL VENOUS BLD VENIPUNCTURE: CPT

## 2021-04-13 PROCEDURE — 82378 CARCINOEMBRYONIC ANTIGEN: CPT | Performed by: INTERNAL MEDICINE

## 2021-04-13 PROCEDURE — 85025 COMPLETE CBC W/AUTO DIFF WBC: CPT

## 2021-04-13 PROCEDURE — 84466 ASSAY OF TRANSFERRIN: CPT

## 2021-04-13 PROCEDURE — 99213 OFFICE O/P EST LOW 20 MIN: CPT | Performed by: INTERNAL MEDICINE

## 2021-04-13 PROCEDURE — 85046 RETICYTE/HGB CONCENTRATE: CPT

## 2021-04-13 NOTE — PROGRESS NOTES
Subjective         DURING THE VISIT WITH THE PATIENT TODAY , PATIENT HAD FACE MASK, MY MEDICAL ASSISTANT AND I  HAD PROPPER PROTECTIVE EQUIPMENT, AND I DID HAND HYGIENE WITH SOAP AND WATER BEFORE AND AFTER THE VISIT.     REASON FOR FOLLOW UP: 1.IRON DEFICIECNY ANEMIA, CHRONIC DIARRHEA, MALNUTRITION, WEIGHT LOSS, ABDOMINAL MASS RUQ    2.Ascending colon cancer s/p colectomy.      This patient returns today to the office in company of her daughter stating that she continues doing better and better as time goes by. Her appetite is good. She has been able to gain a couple of pounds. She has not had any nausea, vomiting, abdominal pain, distention, diarrhea or passage of blood in the stool. Surgical site in her abdomen has healed perfectly well. She still has the need for oxygen and they are checking her CPAP machine because seems that is malfunctioning. She has no swelling. She continues having her oxygen. She has minimal if any cough and she has lesser degree of shortness of breath.          Past Medical History:   Diagnosis Date   • Adenomatous polyp of sigmoid colon 1/21/2021   • Anemia    • Colonic polyp    • Congestive heart failure (CMS/HCC)    • Depression    • Diverticulosis    • Esophageal reflux    • Essential hypertension    • GERD (gastroesophageal reflux disease)    • H/O complete eye exam 03/2018   • Hx of bone density study 02/15/2016   • Hyperlipidemia    • Hypothyroidism, acquired    • Major depressive disorder, recurrent episode, in full remission (CMS/HCC)    • Nonsenile cataract 2016   • Oxygen dependent     8L NC CONTINUOUS   • Pulmonary fibrosis (CMS/HCC)    • Pulmonary hypertension (CMS/HCC)    • Sleep apnea, obstructive     USES CPAP    • Stenosis, spinal, lumbar    • Vertigo, peripheral         Past Surgical History:   Procedure Laterality Date   • COLON RESECTION N/A 1/28/2021    Procedure: COLON RESECTION RIGHT;  Surgeon: Alfonso Cesar MD;  Location: [facility] OR;  Service:  General;  Laterality: N/A;   • COLONOSCOPY  02/20/2012   • COLONOSCOPY N/A 1/21/2021    Procedure: COLONOSCOPY INTO CECUM AND TI WITH BIOPSIES, SPOT INJECTION TO COLON MASS (MID-ASCENDING), HOT SNARE POLYPECTOMY, COLD BX POLYPECTOMIES, SALINE LIFT, CLIP PLACEMENT X3;  Surgeon: Alfonso Cesar MD;  Location: [facility] ENDOSCOPY;  Service: General;  Laterality: N/A;  PRE:  ABNORMAL CT SCAN OF COLON  POST:  DIVERTICULOSIS, COLON MASS, POLYPS, HEMORRHOIDS   • INCONTINENCE SURGERY      Dr. Espinal   • MAMMO BILATERAL  2017    dr de la cruz    • PAP SMEAR  2014    dr de la cruz        Current Outpatient Medications on File Prior to Visit   Medication Sig Dispense Refill   • acetaminophen (TYLENOL) 500 MG tablet Take 1,000 mg by mouth Every 6 (Six) Hours As Needed for Mild Pain .     • ADCIRCA 20 MG tablet Take 40 mg by mouth Daily.     • ascorbic acid (VITAMIN C) 500 MG tablet Take 500 mg by mouth Daily.     • atorvastatin (LIPITOR) 10 MG tablet Take 1 tablet by mouth Daily. 90 tablet 1   • ESBRIET 267 MG capsule Take 801 mg by mouth 3 (Three) Times a Day With Meals.     • famotidine (PEPCID) 20 MG tablet TAKE ONE TABLET BY MOUTH TWICE DAILY 28 tablet 0   • levothyroxine (SYNTHROID, LEVOTHROID) 125 MCG tablet Take 1 tablet by mouth Daily. 90 tablet 1   • O2 (OXYGEN) Inhale 8 L/min Continuous.     • sertraline (ZOLOFT) 50 MG tablet Take 1 tablet by mouth Daily. 90 tablet 1   • verapamil SR (CALAN-SR) 120 MG CR tablet Take 120 mg by mouth Daily.  3     No current facility-administered medications on file prior to visit.        ALLERGIES:    Allergies   Allergen Reactions   • Sulfa Antibiotics Hives and Rash        Social History     Socioeconomic History   • Marital status:      Spouse name: Not on file   • Number of children: Not on file   • Years of education: Not on file   • Highest education level: Not on file   Tobacco Use   • Smoking status: Former Smoker     Packs/day: 2.00     Types: Cigarettes      Start date:      Quit date:      Years since quittin.3   • Smokeless tobacco: Never Used   • Tobacco comment: Caffeine use: Drinks Coca Cola    Vaping Use   • Vaping Use: Never used   Substance and Sexual Activity   • Alcohol use: Yes     Comment: rare   • Drug use: No   • Sexual activity: Never     Partners: Male        Family History   Problem Relation Age of Onset   • Breast cancer Cousin    • Lung cancer Father    • Heart disease Father    • Colon cancer Brother    • Prostate cancer Brother    • Lung disease Brother    • Emphysema Brother    • Heart disease Mother    • Malig Hyperthermia Neg Hx       Current Outpatient Medications on File Prior to Visit   Medication Sig Dispense Refill   • acetaminophen (TYLENOL) 500 MG tablet Take 1,000 mg by mouth Every 6 (Six) Hours As Needed for Mild Pain .     • ADCIRCA 20 MG tablet Take 40 mg by mouth Daily.     • ascorbic acid (VITAMIN C) 500 MG tablet Take 500 mg by mouth Daily.     • atorvastatin (LIPITOR) 10 MG tablet Take 1 tablet by mouth Daily. 90 tablet 1   • ESBRIET 267 MG capsule Take 801 mg by mouth 3 (Three) Times a Day With Meals.     • famotidine (PEPCID) 20 MG tablet TAKE ONE TABLET BY MOUTH TWICE DAILY 28 tablet 0   • levothyroxine (SYNTHROID, LEVOTHROID) 125 MCG tablet Take 1 tablet by mouth Daily. 90 tablet 1   • O2 (OXYGEN) Inhale 8 L/min Continuous.     • sertraline (ZOLOFT) 50 MG tablet Take 1 tablet by mouth Daily. 90 tablet 1   • verapamil SR (CALAN-SR) 120 MG CR tablet Take 120 mg by mouth Daily.  3     No current facility-administered medications on file prior to visit.       Objective    There were no vitals filed for this visit.        Physical Exam     I HAVE PERSONALLY REVIEWED THE HISTORY OF THE PRESENT ILLNESS, PAST MEDICAL HISTORY, FAMILY HISTORY, SOCIAL HISTORY, ALLERGIES, MEDICATIONS STATED ABOVE IN THE OFFICE NOTE FROM TODAY.    Patient screened  for depression based on a PHQ-9 score of 0 on 2021.     GENERAL:   Well-developed, well-nourished  Patient  in no acute distress.   SKIN:  Warm, dry ,NO rashes,NO purpura ,NO petechiae.  HEENT:  Pupils were equal and reactive to light and accomodation, conjunctivae noninjected, no pterygium, normal extraocular movements, normal visual acuity.   NECK:  Supple with good range of motion; no thyromegaly or masses, no JVD or bruits, no cervical adenopathies.No carotid artery pain, no carotid abnormal pulsation , NO arterial dance.  LYMPHATICS:  No cervical, NO supraclavicular, NO axillary,NO epitrochlear , NO inguinal adenopathy.  CARDIAC   normal rate and regular rhythm, without murmur,NO rubs NO S3 NO S4 right or left . Normal femoral, popliteal, pedis, brachial and carotid pulses.  VASCULAR ARTERIAL: normal carotids,brachial,radial,femoral,popliteal, pedis pulses , no bruits.no paleness or cyanosis, no pain, no edema, no numbness, no gangrene.  VASCULAR VENOUS: no cyanosis, collateral circulation, varicosities, edema, palpable cords, pain, erythema.  ABDOMEN:  Soft, nontender with no hepatomegaly, no splenomegaly,no masses, no ascites, no collateral circulation,no distention,no [place] sign, no abdominal pain, no inguinal hernias,no umbilical hernia, no abdominal bruits. Normal bowel sounds.  GENITAL: Not  Performed.  EXTREMITIES  AND SPINE:  No clubbing, cyanosis or edema, no deformities or pain .No kyphosis, scoliosis, deformities or pain in spine, ribs or pelvic bone.  NEUROLOGICAL:  Patient was awake, alert, oriented to time, person and place.            RECENT LABS:  Component      Latest Ref Rng & Units 1/30/2021 2/22/2021 4/13/2021               WBC      3.40 - 10.80 10*3/mm3 5.98 6.11 6.59   RBC      3.77 - 5.28 10*6/mm3 3.96 4.25 4.04   Hemoglobin      12.0 - 15.9 g/dL 10.3 (L) 11.5 (L) 11.3 (L)   Hematocrit      34.0 - 46.6 % 32.7 (L) 37.1 35.8   MCV      79.0 - 97.0 fL 82.6 87.3 88.6   MCH      26.6 - 33.0 pg 26.0 (L) 27.1 28.0   MCHC      31.5 - 35.7 g/dL 31.5 31.0 (L)  31.6   RDW      12.3 - 15.4 % 17.9 (H) 14.8 12.9   RDW-SD      37.0 - 54.0 fl 53.4 46.2 41.8   MPV      6.0 - 12.0 fL 9.5 8.5 8.6   Platelets      140 - 450 10*3/mm3 193 160 161   Neutrophil Rel %      42.7 - 76.0 % 73.0 73.5 70.8   Lymphocyte Rel %      19.6 - 45.3 % 13.5 (L) 15.5 (L) 19.1 (L)   Monocyte Rel %      5.0 - 12.0 % 7.4 7.9 6.8   Eosinophil Rel %      0.3 - 6.2 % 4.8 2.1 2.4   Basophil Rel %      0.0 - 1.5 % 1.0 0.7 0.6   Immature Granulocyte Rel %      0.0 - 0.5 % 0.3 0.3 0.3   Neutrophils Absolute      1.70 - 7.00 10*3/mm3 4.36 4.49 4.66   Lymphocytes Absolute      0.70 - 3.10 10*3/mm3 0.81 0.95 1.26   Monocytes Absolute      0.10 - 0.90 10*3/mm3 0.44 0.48 0.45                         Assessment/Plan     Diagnoses and all orders for this visit:    1. Malignant neoplasm of ascending colon (CMS/HCC) (Primary)  -     Comprehensive Metabolic Panel; Future  -     CBC & Differential; Future  -     CEA; Future  -     Ferritin; Future  -     Iron Profile; Future  -     Retic With IRF & RET-He; Future    2. Iron deficiency anemia due to chronic blood loss    3. Mild protein-calorie malnutrition (CMS/HCC)              In summary this patient has progressive anemia. I documented in the laboratory parameters above progressive drop in the hemoglobin, progressive drop in the MCV. Morphology of peripheral blood is typical of iron deficiency anemia and I would not be surprised if she has an associated B12 or folate deficiency. This patient has had upper GI series and barium enema by GI, Dr. Boyd, and no anatomical alteration has been found in her upper GI tract or colon besides diverticular disease. The symptoms that she has today she blames them, especially the diarrhea, since the time that she had the barium enema. I think that was not an association. The fact that she has so much diarrhea day and night and with weight loss, profound fatigue, sarcopenia leads me to believe that it is some other entity that is  producing this. It sounds like to me she has a malabsorptive process of some sort.     The patient also has a palpable mass in the right upper quadrant and the way how it feels to my hands, it is probably a palpable gallbladder. She had no pain there. This is very striking. Her abdomen is very soft and it is very easy to find.     Under the present circumstances and knowing that the patient cannot tolerate oral iron because it makes her to throw up and produce profuse diarrhea and makes the diarrhea worse, I do believe that the patient needs to have the followin. For the workup for her anemia, she went back to the lab to obtain a comprehensive metabolic profile, ferritin, iron, TIBC, B12, folic acid level, reticulated hemoglobin.   2. Given the fact that the patient has had a TSH that was on the low side maybe her thyroid medication is overcorrecting her and this maybe this is part of the diarrhea process. I will obtain a TSH and a T4.   3. I do believe that the patient needs to have a CT scan of the abdomen and pelvis. I do not think she can handle oral contrast and I do not want to give her IV contrast, I find no need. I want to be sure there is no other anatomical alteration in her small intestine and GI tract that is favoring for her to have all the issues pertinent to her clinical symptoms.     I wonder if she could have some other process in her bowel including inflammatory bowel disease, bacterial overgrowth syndrome, celiac disease and differential diagnosis is more than extensive. This information would require to be shared with Dr. Boyd, her Gastroenterologist, for further review after she returns back in a few days after she proceeds with proper management of her anemia. Given the fact that she cannot handle oral iron by mouth, absolutely the patient needs to have Injectafer on 2 different occasions and this will be scheduled to start to happen as early as TODAY.   I discussed with the patient on  12/04/2020 after reviewing the case with the radiologist Dr. Pinto at [facility] in regards to her CT scan of the abdomen. This documents intussusception of the colon and there is a mass located in that that probably represents a colon cancer. Obviously, the question is how to proceed with all these issues on this patient. It is very obvious that she has profound iron deficiency anemia. Her ferritin level, iron level are low. She cannot handle oral iron therapy and she will initiate Injectafer today and she will have the 2nd infusion next week. I expect that in 3 weeks she will have very significant rebound in erythropoiesis and I expect that her pulmonary manifestations will be better in regard to her pulmonary hypertension and her pulmonary fibrosis. Typically patient's with this condition develop polycythemia instead of anemia.    The other issue is what we are going to do with her colon. I do not think that the patient is a normal surgical candidate under the present circumstances and we need to try to fix her up in the best way possible to see if she gets to the point to have a resection of this issue. I have made her an appointment to be seen by cardiology, Rambo Dasilva MD, and proceed with an echocardiogram in days. We have also made an appointment for her to see Alfonso Cesar MD, General Surgery to review the case with me.       I reviewed the patient back with the daughter in the room on 12/10/2020. In the interim, I have had a conversation with Zita Guy MD, the patient’s pulmonologist, who strongly believes that the patient will be able to go through surgery as long as she has proper pulmonary assessment before intervention and she asked me to go ahead and consult Pulmonary Medicine, Dr. Ramos and associates for this purpose. A consult has been placed.     I reviewed her echocardiogram and actually it is better than I expected for somebody who has pulmonary fibrosis.     In summary I  think the patient’s intussusception is producing bleeding, chronically, leading to iron deficiency anemia. She has had Injectafer a week ago, another one to be given to her tomorrow and thereafter she will require blood work on weekly basis. If the hemoglobin goes below 9, I think she will require transfusion of 2 units of red cells. She will have weekly appointment for CBC and nurse check and I will review her back on 12/30/2020.     I EMPHATICALLY POINTED OUT TO THE PATIENT’S DAUGHTER SHE CANNOT TAKE ANYMORE ZULMA-SELTZER. SHE HAS BEEN TAKING THIS ON A REGULAR SCHEDULE AND THIS IS PRODUCING PROBABLY ALSO AN ELEMENT OF GI BLEEDING ASSOCIATED WITH HER INTUSSUSCEPTION AND WHY NOT GASTRITIS AND SO FORTH.     In summary the patient was reviewed on 12/30/2020 along with her daughter in the room. The patient has gone through cardiological assessment and actually her cardiac ejection fraction is not that bad, close to 50%, and with minimal evidence of pulmonary arterial hypertension. Normal contractility of the left ventricle. The patient also has been seen by Pulmonary Medicine. These records have been reviewed again and her pulmonary function tests is very marginal. The analysis is stated above by Dr. Moser who has advised the patient that she is a high risk for general anesthesia in regard to the need for ICU stay, intubation, mechanical ventilation and so forth given her pulmonary fibrosis. He has advised the patient to see Dr. Guy, Pulmonary Medicine, and she has an appointment to see her next week.     In regard to her iron deficiency anemia after she received IV iron infusions the hemoglobin keeps improving to 11.2 from 8.4. The patient is no longer taking Zulma-[place]den. The patient is a little bit stronger but her appetite is not good. Obviously the question is what is the cause of the intussusception. I have measured a CEA level that is elevated at 6.2 and leads me to believe that maybe this is something related  to a primary colon cancer. Dr. Cesar has suggested for her to have a colonoscopy after reviewing his good notes. The patient is even more hesitant to proceed with this.       The patient was further reviewed in the office on 02/22/2021. Since the previous visit and after I discussed the case with Dr. Zita Guy, the patient’s pulmonologist, I advised Dr. Cesar to go ahead and proceed with her surgery. This happened and actually to the surprise of everybody the patient walked away from the hospital 3 days later with no complications or issues pertinent to her lung disease. The patient had also excellent tolerance to her colonoscopy. Since she has been at home she has been gaining appetite and gaining weight. She is feeling substantially better. She has no abdominal pain. Her surgical site has healed. Her bowel activity is normal and urination is normal. She remains on oxygen. She is walking around the house and she is doing more things that she was not doing before. She is not sitting down complaining of a bad stomach all the time. She is not taking Nohelia-[place] as I pointed out to them before.     I have reviewed the pathology of the surgical specimen and she had a [id number] colonic cancer with 14 lymph nodes removed, negative, with no perineural, perivascular or perivenous invasion. The margins of resection were completely clear. On the CT scan of the abdomen, her liver was completely unremarkable.     We have a CEA level pending today.     Under the present circumstances in a patient who is chronically incapacitated with chronic lung disease and remains on oxygen, I am not going to advise her to proceed with any form of adjuvant therapy for her colon cancer for stage II colon cancer. I do not believe that adding an Oncotype DX to her present diagnosis will change my mind. I do not think this patient will be able to handle anything. This patient is MSI rich and she is BRAF positive and obviously with such a large  tumor it can confirm her risk of recurrence. I would like for her to improve in regard to nutrition that she has been depleted of for months and months and review her back in 6 weeks. I will perform a CBC, CMP, ferritin, iron, TIBC and a CEA level.       The patient was further reviewed on 04/13/2021. She is feeling better and better as time goes by in regard to her ability to eat. Her nutrition has improved. She has gained a couple of pounds. Even her cough has gotten better and she has lesser degree of shortness of breath. She has not had any passage of blood in the stool. Urine is appropriate. There is no swelling. Her CPAP machine is in the process of being rechecked to be sure that it is functioning correctly.     The patient's hemoglobin is 11.3. We have a ferritin, iron profile pending today as well as a chemistry profile. She will be informed about these numbers once that these become available.     Overall I think the patient is doing fine from her colon cancer surgery. Her surgical healing is fine. I will review her back in 3 months with a CBC, ferritin, iron, TIBC and a CEA level. I find no need for radiological assessment at this time. I discussed all these facts with the patient and her daughter in the room.

## 2021-04-16 ENCOUNTER — TELEPHONE (OUTPATIENT)
Dept: ONCOLOGY | Facility: CLINIC | Age: 81
End: 2021-04-16

## 2021-04-16 NOTE — TELEPHONE ENCOUNTER
I called this patient at home and I asked her to come back and see us in a month with a repeat CBC, ferritin, iron, TIBC, CMP and a CEA level. Her most recent CEA was minimally elevated extremely unusual for somebody who has had a colectomy recently. Nobody has been smoking around her. She is not smoking cigarettes therefore I need to repeat this number before we need to do anything else. Actually she says that she is feeling fantastic, her bowels and appetite are normal, she has no pain and she is functioning much better. She agrees to proceed.

## 2021-04-20 ENCOUNTER — HOSPITAL ENCOUNTER (OUTPATIENT)
Dept: MAMMOGRAPHY | Facility: HOSPITAL | Age: 81
Discharge: HOME | End: 2021-04-20
Admitting: FAMILY MEDICINE

## 2021-04-20 DIAGNOSIS — Z12.31 VISIT FOR SCREENING MAMMOGRAM: ICD-10-CM

## 2021-04-20 PROCEDURE — 77067 SCR MAMMO BI INCL CAD: CPT

## 2021-04-20 PROCEDURE — 77063 BREAST TOMOSYNTHESIS BI: CPT

## 2021-05-17 ENCOUNTER — TELEPHONE (OUTPATIENT)
Dept: ONCOLOGY | Facility: CLINIC | Age: 81
End: 2021-05-17

## 2021-05-17 NOTE — TELEPHONE ENCOUNTER
CALLED PT AS SHE ASKED IF THERE WAS ANY AVAILABILITY ON DR SOUSA SCHEDULE FOR A 2:00PM ADVISED PT THAT THERE WAS NOTHING ON HIS SCHEDULE AT THIS TIME -PT STATED THAT THIS WAS OK THAT SHE WOULD KEEP HER APPT WITH DR SOUSA

## 2021-05-17 NOTE — TELEPHONE ENCOUNTER
Caller: MICHA     Relationship: SELF     Best call back number: [phone]    PATIENT IS WANTING TO KNOW IF SHE CAN R/S THE TIME OF HER APPT TO 2P.   PLEASE CALL AND ADVISE   THANK YOU

## 2021-05-19 ENCOUNTER — LAB (OUTPATIENT)
Dept: LAB | Facility: HOSPITAL | Age: 81
End: 2021-05-19

## 2021-05-19 ENCOUNTER — OFFICE VISIT (OUTPATIENT)
Dept: ONCOLOGY | Facility: CLINIC | Age: 81
End: 2021-05-19

## 2021-05-19 VITALS — HEIGHT: 61 IN | WEIGHT: 133.3 LBS | BODY MASS INDEX: 25.17 KG/M2 | RESPIRATION RATE: 16 BRPM

## 2021-05-19 DIAGNOSIS — C18.2 MALIGNANT NEOPLASM OF ASCENDING COLON (HCC): Primary | ICD-10-CM

## 2021-05-19 DIAGNOSIS — D50.0 IRON DEFICIENCY ANEMIA DUE TO CHRONIC BLOOD LOSS: ICD-10-CM

## 2021-05-19 DIAGNOSIS — C18.2 MALIGNANT NEOPLASM OF ASCENDING COLON (HCC): ICD-10-CM

## 2021-05-19 DIAGNOSIS — R97.0 ELEVATED CEA: ICD-10-CM

## 2021-05-19 PROBLEM — E44.1 MILD PROTEIN-CALORIE MALNUTRITION (HCC): Status: RESOLVED | Noted: 2020-11-24 | Resolved: 2021-05-19

## 2021-05-19 LAB
ALBUMIN SERPL-MCNC: 3.9 G/DL (ref 3.5–5.2)
ALBUMIN/GLOB SERPL: 1.4 G/DL (ref 1.1–2.4)
ALP SERPL-CCNC: 74 U/L (ref 38–116)
ALT SERPL W P-5'-P-CCNC: 8 U/L (ref 0–33)
ANION GAP SERPL CALCULATED.3IONS-SCNC: 7.3 MMOL/L (ref 5–15)
AST SERPL-CCNC: 18 U/L (ref 0–32)
BASOPHILS # BLD AUTO: 0.05 10*3/MM3 (ref 0–0.2)
BASOPHILS NFR BLD AUTO: 0.6 % (ref 0–1.5)
BILIRUB SERPL-MCNC: 0.2 MG/DL (ref 0.2–1.2)
BUN SERPL-MCNC: 19 MG/DL (ref 6–20)
BUN/CREAT SERPL: 27.1 (ref 7.3–30)
CALCIUM SPEC-SCNC: 9.4 MG/DL (ref 8.5–10.2)
CEA SERPL-MCNC: 9.12 NG/ML
CHLORIDE SERPL-SCNC: 101 MMOL/L (ref 98–107)
CO2 SERPL-SCNC: 32.7 MMOL/L (ref 22–29)
CREAT SERPL-MCNC: 0.7 MG/DL (ref 0.6–1.1)
DEPRECATED RDW RBC AUTO: 40.6 FL (ref 37–54)
EOSINOPHIL # BLD AUTO: 0.12 10*3/MM3 (ref 0–0.4)
EOSINOPHIL NFR BLD AUTO: 1.4 % (ref 0.3–6.2)
ERYTHROCYTE [DISTWIDTH] IN BLOOD BY AUTOMATED COUNT: 12.8 % (ref 12.3–15.4)
FERRITIN SERPL-MCNC: 172.4 NG/ML (ref 13–150)
GFR SERPL CREATININE-BSD FRML MDRD: 81 ML/MIN/1.73
GLOBULIN UR ELPH-MCNC: 2.8 GM/DL (ref 1.8–3.5)
GLUCOSE SERPL-MCNC: 112 MG/DL (ref 74–124)
HCT VFR BLD AUTO: 36.1 % (ref 34–46.6)
HGB BLD-MCNC: 11.6 G/DL (ref 12–15.9)
IMM GRANULOCYTES # BLD AUTO: 0.02 10*3/MM3 (ref 0–0.05)
IMM GRANULOCYTES NFR BLD AUTO: 0.2 % (ref 0–0.5)
IRON 24H UR-MRATE: 37 MCG/DL (ref 37–145)
IRON SATN MFR SERPL: 13 % (ref 14–48)
LYMPHOCYTES # BLD AUTO: 1.17 10*3/MM3 (ref 0.7–3.1)
LYMPHOCYTES NFR BLD AUTO: 13.8 % (ref 19.6–45.3)
MCH RBC QN AUTO: 28.2 PG (ref 26.6–33)
MCHC RBC AUTO-ENTMCNC: 32.1 G/DL (ref 31.5–35.7)
MCV RBC AUTO: 87.6 FL (ref 79–97)
MONOCYTES # BLD AUTO: 0.62 10*3/MM3 (ref 0.1–0.9)
MONOCYTES NFR BLD AUTO: 7.3 % (ref 5–12)
NEUTROPHILS NFR BLD AUTO: 6.48 10*3/MM3 (ref 1.7–7)
NEUTROPHILS NFR BLD AUTO: 76.7 % (ref 42.7–76)
NRBC BLD AUTO-RTO: 0 /100 WBC (ref 0–0.2)
PLATELET # BLD AUTO: 170 10*3/MM3 (ref 140–450)
PMV BLD AUTO: 8.5 FL (ref 6–12)
POTASSIUM SERPL-SCNC: 4.2 MMOL/L (ref 3.5–4.7)
PROT SERPL-MCNC: 6.7 G/DL (ref 6.3–8)
RBC # BLD AUTO: 4.12 10*6/MM3 (ref 3.77–5.28)
SODIUM SERPL-SCNC: 141 MMOL/L (ref 134–145)
TIBC SERPL-MCNC: 281 MCG/DL (ref 249–505)
TRANSFERRIN SERPL-MCNC: 201 MG/DL (ref 200–360)
WBC # BLD AUTO: 8.46 10*3/MM3 (ref 3.4–10.8)

## 2021-05-19 PROCEDURE — 85025 COMPLETE CBC W/AUTO DIFF WBC: CPT

## 2021-05-19 PROCEDURE — 82728 ASSAY OF FERRITIN: CPT

## 2021-05-19 PROCEDURE — 82378 CARCINOEMBRYONIC ANTIGEN: CPT | Performed by: INTERNAL MEDICINE

## 2021-05-19 PROCEDURE — 80053 COMPREHEN METABOLIC PANEL: CPT

## 2021-05-19 PROCEDURE — 83540 ASSAY OF IRON: CPT

## 2021-05-19 PROCEDURE — 99214 OFFICE O/P EST MOD 30 MIN: CPT | Performed by: INTERNAL MEDICINE

## 2021-05-19 PROCEDURE — 84466 ASSAY OF TRANSFERRIN: CPT

## 2021-05-19 PROCEDURE — 36415 COLL VENOUS BLD VENIPUNCTURE: CPT

## 2021-05-19 RX ORDER — OMEPRAZOLE 40 MG/1
40 CAPSULE, DELAYED RELEASE ORAL 2 TIMES DAILY
Qty: 180 CAPSULE | Refills: 3 | Status: SHIPPED | OUTPATIENT
Start: 2021-05-19 | End: 2022-02-14

## 2021-05-19 NOTE — PROGRESS NOTES
Subjective         DURING THE VISIT WITH THE PATIENT TODAY , PATIENT HAD FACE MASK, MY MEDICAL ASSISTANT AND I  HAD PROPPER PROTECTIVE EQUIPMENT, AND I DID HAND HYGIENE WITH SOAP AND WATER BEFORE AND AFTER THE VISIT.     REASON FOR FOLLOW UP: 1.IRON DEFICIENCY ANEMIA,  DUE TO CHRONIC GI BLOOD LOSS, CHRONIC DIARRHEA, MALNUTRITION, WEIGHT LOSS, ABDOMINAL MASS RUQ    2.Ascending colon cancer s/p colectomy.      This patient returns today to the office complaining of pain in her suprapubic abdomen in the colon especially when she eats Oreo cookies and milk before she goes to bed. As long as this does not happen she has no discomfort. Her appetite is very good. She has gained 3 pounds. No nausea, vomiting, abdominal distention, jaundice or passage of blood in the stools. Energy level is appropriate and her breathing actually is better. She has minimal cough. She remains on oxygen. She has not had any fever or infection.  She has not noticed any hematuria or vaginal bleeding. Her strength is better and she is functioning at very good level under the circumstances of her advanced lung disease.             Past Medical History:   Diagnosis Date   • Adenomatous polyp of sigmoid colon 1/21/2021   • Anemia    • Colonic polyp    • Congestive heart failure (CMS/HCC)    • Depression    • Diverticulosis    • Esophageal reflux    • Essential hypertension    • GERD (gastroesophageal reflux disease)    • H/O complete eye exam 03/2018   • Hx of bone density study 02/15/2016   • Hyperlipidemia    • Hypothyroidism, acquired    • Major depressive disorder, recurrent episode, in full remission (CMS/HCC)    • Nonsenile cataract 2016   • Oxygen dependent     8L NC CONTINUOUS   • Pulmonary fibrosis (CMS/HCC)    • Pulmonary hypertension (CMS/HCC)    • Sleep apnea, obstructive     USES CPAP    • Stenosis, spinal, lumbar    • Vertigo, peripheral         Past Surgical History:   Procedure Laterality Date   • COLON RESECTION N/A 1/28/2021     Procedure: COLON RESECTION RIGHT;  Surgeon: Alfonso Cesar MD;  Location: [facility] MAIN OR;  Service: General;  Laterality: N/A;   • COLONOSCOPY  02/20/2012   • COLONOSCOPY N/A 1/21/2021    Procedure: COLONOSCOPY INTO CECUM AND TI WITH BIOPSIES, SPOT INJECTION TO COLON MASS (MID-ASCENDING), HOT SNARE POLYPECTOMY, COLD BX POLYPECTOMIES, SALINE LIFT, CLIP PLACEMENT X3;  Surgeon: Alfonso Cesar MD;  Location: [facility] ENDOSCOPY;  Service: General;  Laterality: N/A;  PRE:  ABNORMAL CT SCAN OF COLON  POST:  DIVERTICULOSIS, COLON MASS, POLYPS, HEMORRHOIDS   • INCONTINENCE SURGERY      Dr. Espinal   • MAMMO BILATERAL  2017    dr de la cruz    • PAP SMEAR  2014    dr de la cruz        Current Outpatient Medications on File Prior to Visit   Medication Sig Dispense Refill   • acetaminophen (TYLENOL) 500 MG tablet Take 1,000 mg by mouth Every 6 (Six) Hours As Needed for Mild Pain .     • ADCIRCA 20 MG tablet Take 40 mg by mouth Daily.     • ascorbic acid (VITAMIN C) 500 MG tablet Take 500 mg by mouth Daily.     • atorvastatin (LIPITOR) 10 MG tablet Take 1 tablet by mouth Daily. 90 tablet 1   • ESBRIET 267 MG capsule Take 801 mg by mouth 3 (Three) Times a Day With Meals.     • levothyroxine (SYNTHROID, LEVOTHROID) 125 MCG tablet Take 1 tablet by mouth Daily. 90 tablet 1   • O2 (OXYGEN) Inhale 8 L/min Continuous.     • sertraline (ZOLOFT) 50 MG tablet Take 1 tablet by mouth Daily. 90 tablet 1   • verapamil SR (CALAN-SR) 120 MG CR tablet Take 120 mg by mouth Daily.  3   • [DISCONTINUED] famotidine (PEPCID) 20 MG tablet TAKE ONE TABLET BY MOUTH TWICE DAILY 28 tablet 0     No current facility-administered medications on file prior to visit.        ALLERGIES:    Allergies   Allergen Reactions   • Sulfa Antibiotics Hives and Rash        Social History     Socioeconomic History   • Marital status:      Spouse name: Not on file   • Number of children: Not on file   • Years of education: Not on file   • Highest  "education level: Not on file   Tobacco Use   • Smoking status: Former Smoker     Packs/day: 2.00     Types: Cigarettes     Start date:      Quit date:      Years since quittin.4   • Smokeless tobacco: Never Used   • Tobacco comment: Caffeine use: Drinks Coca Cola    Vaping Use   • Vaping Use: Never used   Substance and Sexual Activity   • Alcohol use: Yes     Comment: rare   • Drug use: No   • Sexual activity: Never     Partners: Male        Family History   Problem Relation Age of Onset   • Breast cancer Cousin    • Lung cancer Father    • Heart disease Father    • Colon cancer Brother    • Prostate cancer Brother    • Lung disease Brother    • Emphysema Brother    • Heart disease Mother    • Malig Hyperthermia Neg Hx       Current Outpatient Medications on File Prior to Visit   Medication Sig Dispense Refill   • acetaminophen (TYLENOL) 500 MG tablet Take 1,000 mg by mouth Every 6 (Six) Hours As Needed for Mild Pain .     • ADCIRCA 20 MG tablet Take 40 mg by mouth Daily.     • ascorbic acid (VITAMIN C) 500 MG tablet Take 500 mg by mouth Daily.     • atorvastatin (LIPITOR) 10 MG tablet Take 1 tablet by mouth Daily. 90 tablet 1   • ESBRIET 267 MG capsule Take 801 mg by mouth 3 (Three) Times a Day With Meals.     • levothyroxine (SYNTHROID, LEVOTHROID) 125 MCG tablet Take 1 tablet by mouth Daily. 90 tablet 1   • O2 (OXYGEN) Inhale 8 L/min Continuous.     • sertraline (ZOLOFT) 50 MG tablet Take 1 tablet by mouth Daily. 90 tablet 1   • verapamil SR (CALAN-SR) 120 MG CR tablet Take 120 mg by mouth Daily.  3   • [DISCONTINUED] famotidine (PEPCID) 20 MG tablet TAKE ONE TABLET BY MOUTH TWICE DAILY 28 tablet 0     No current facility-administered medications on file prior to visit.       Objective    Vitals:    21 1539   Resp: 16   Weight: 60.5 kg (133 lb 4.8 oz)   Height: 154.9 cm (60.98\")           Physical Exam     I HAVE PERSONALLY REVIEWED THE HISTORY OF THE PRESENT ILLNESS, PAST MEDICAL HISTORY, " FAMILY HISTORY, SOCIAL HISTORY, ALLERGIES, MEDICATIONS STATED ABOVE IN THE OFFICE NOTE FROM TODAY.        GENERAL:  Well-developed, well-nourished  Patient  in no acute distress.   SKIN:  Warm, dry ,NO rashes,NO purpura ,NO petechiae.  HEENT:  Pupils were equal and reactive to light and accomodation, conjunctivae noninjected, no pterygium, normal extraocular movements, normal visual acuity.   NECK:  Supple with good range of motion; no thyromegaly or masses, no JVD or bruits, no cervical adenopathies.No carotid artery pain, no carotid abnormal pulsation , NO arterial dance.  LYMPHATICS:  No cervical, NO supraclavicular, NO axillary,NO epitrochlear , NO inguinal adenopathy.  CARDIAC   normal rate and regular rhythm, without murmur,NO rubs NO S3 NO S4 right or left .   VASCULAR VENOUS: no cyanosis, collateral circulation, varicosities, edema, palpable cords, pain, erythema.  ABDOMEN:  Soft, nontender with no hepatomegaly, no splenomegaly,no masses, no ascites, no collateral circulation,no distention,no Bib sign, no abdominal pain, no inguinal hernias,no umbilical hernia, no abdominal bruits. Normal bowel sounds.  GENITAL: Not  Performed.  EXTREMITIES  AND SPINE:  No clubbing, cyanosis or edema, no deformities or pain .No kyphosis, scoliosis, deformities or pain in spine, ribs or pelvic bone.  NEUROLOGICAL:  Patient was awake, alert, oriented to time, person and place.          RECENT LABS:    Auto Differential  Order: [id number] - Part of Panel Order [id number]  Status:  Final result   Visible to patient:  No (scheduled for 5/19/2021  3:37 PM)   Dx:  Malignant neoplasm of ascending colon...  Specimen Information: Blood        Component   Ref Range & Units 15:31   WBC   3.40 - 10.80 10*3/mm3 8.46    RBC   3.77 - 5.28 10*6/mm3 4.12    Hemoglobin   12.0 - 15.9 g/dL 11.6Low     Hematocrit   34.0 - 46.6 % 36.1    MCV   79.0 - 97.0 fL 87.6    MCH   26.6 - 33.0 pg 28.2    MCHC   31.5 - 35.7 g/dL 32.1    RDW   12.3 - 15.4 %  12.8    RDW-SD   37.0 - 54.0 fl 40.6    MPV   6.0 - 12.0 fL 8.5    Platelets   140 - 450 10*3/mm3 170    Neutrophil %   42.7 - 76.0 % 76.7High     Lymphocyte %   19.6 - 45.3 % 13.8Low     Monocyte %   5.0 - 12.0 % 7.3    Eosinophil %   0.3 - 6.2 % 1.4    Basophil %   0.0 - 1.5 % 0.6    Immature Grans %   0.0 - 0.5 % 0.2    Neutrophils, Absolute   1.70 - 7.00 10*3/mm3 6.48    Lymphocytes, Absolute   0.70 - 3.10 10*3/mm3 1.17            Component      Latest Ref Rng & Units 12/10/2020 4/13/2021   CEA      ng/mL 6.79 8.87                       Assessment/Plan     Diagnoses and all orders for this visit:    1. Malignant neoplasm of ascending colon (CMS/HCC) (Primary)    2. Elevated CEA    3. Iron deficiency anemia due to chronic blood loss              In summary this patient has progressive anemia. I documented in the laboratory parameters above progressive drop in the hemoglobin, progressive drop in the MCV. Morphology of peripheral blood is typical of iron deficiency anemia and I would not be surprised if she has an associated B12 or folate deficiency. This patient has had upper GI series and barium enema by GI, Dr. Boyd, and no anatomical alteration has been found in her upper GI tract or colon besides diverticular disease. The symptoms that she has today she blames them, especially the diarrhea, since the time that she had the barium enema. I think that was not an association. The fact that she has so much diarrhea day and night and with weight loss, profound fatigue, sarcopenia leads me to believe that it is some other entity that is producing this. It sounds like to me she has a malabsorptive process of some sort.     The patient also has a palpable mass in the right upper quadrant and the way how it feels to my hands, it is probably a palpable gallbladder. She had no pain there. This is very striking. Her abdomen is very soft and it is very easy to find.     Under the present circumstances and knowing that the  patient cannot tolerate oral iron because it makes her to throw up and produce profuse diarrhea and makes the diarrhea worse, I do believe that the patient needs to have the followin. For the workup for her anemia, she went back to the lab to obtain a comprehensive metabolic profile, ferritin, iron, TIBC, B12, folic acid level, reticulated hemoglobin.   2. Given the fact that the patient has had a TSH that was on the low side maybe her thyroid medication is overcorrecting her and this maybe this is part of the diarrhea process. I will obtain a TSH and a T4.   3. I do believe that the patient needs to have a CT scan of the abdomen and pelvis. I do not think she can handle oral contrast and I do not want to give her IV contrast, I find no need. I want to be sure there is no other anatomical alteration in her small intestine and GI tract that is favoring for her to have all the issues pertinent to her clinical symptoms.     I wonder if she could have some other process in her bowel including inflammatory bowel disease, bacterial overgrowth syndrome, celiac disease and differential diagnosis is more than extensive. This information would require to be shared with Dr. Boyd, her Gastroenterologist, for further review after she returns back in a few days after she proceeds with proper management of her anemia. Given the fact that she cannot handle oral iron by mouth, absolutely the patient needs to have Injectafer on 2 different occasions and this will be scheduled to start to happen as early as TODAY.   I discussed with the patient on 2020 after reviewing the case with the radiologist Dr. Pinto at [facility] in regards to her CT scan of the abdomen. This documents intussusception of the colon and there is a mass located in that that probably represents a colon cancer. Obviously, the question is how to proceed with all these issues on this patient. It is very obvious that she has profound iron  deficiency anemia. Her ferritin level, iron level are low. She cannot handle oral iron therapy and she will initiate Injectafer today and she will have the 2nd infusion next week. I expect that in 3 weeks she will have very significant rebound in erythropoiesis and I expect that her pulmonary manifestations will be better in regard to her pulmonary hypertension and her pulmonary fibrosis. Typically patient's with this condition develop polycythemia instead of anemia.    The other issue is what we are going to do with her colon. I do not think that the patient is a normal surgical candidate under the present circumstances and we need to try to fix her up in the best way possible to see if she gets to the point to have a resection of this issue. I have made her an appointment to be seen by cardiology, Rambo Dasilva MD, and proceed with an echocardiogram in days. We have also made an appointment for her to see Alfonso Cesar MD, General Surgery to review the case with me.       I reviewed the patient back with the daughter in the room on 12/10/2020. In the interim, I have had a conversation with Zita Guy MD, the patient’s pulmonologist, who strongly believes that the patient will be able to go through surgery as long as she has proper pulmonary assessment before intervention and she asked me to go ahead and consult Pulmonary Medicine, Dr. Ramos and associates for this purpose. A consult has been placed.     I reviewed her echocardiogram and actually it is better than I expected for somebody who has pulmonary fibrosis.     In summary I think the patient’s intussusception is producing bleeding, chronically, leading to iron deficiency anemia. She has had Injectafer a week ago, another one to be given to her tomorrow and thereafter she will require blood work on weekly basis. If the hemoglobin goes below 9, I think she will require transfusion of 2 units of red cells. She will have weekly appointment for CBC and nurse  check and I will review her back on 12/30/2020.     I EMPHATICALLY POINTED OUT TO THE PATIENT’S DAUGHTER SHE CANNOT TAKE ANYMORE ZULMA-SELTZER. SHE HAS BEEN TAKING THIS ON A REGULAR SCHEDULE AND THIS IS PRODUCING PROBABLY ALSO AN ELEMENT OF GI BLEEDING ASSOCIATED WITH HER INTUSSUSCEPTION AND WHY NOT GASTRITIS AND SO FORTH.     In summary the patient was reviewed on 12/30/2020 along with her daughter in the room. The patient has gone through cardiological assessment and actually her cardiac ejection fraction is not that bad, close to 50%, and with minimal evidence of pulmonary arterial hypertension. Normal contractility of the left ventricle. The patient also has been seen by Pulmonary Medicine. These records have been reviewed again and her pulmonary function tests is very marginal. The analysis is stated above by Dr. Moser who has advised the patient that she is a high risk for general anesthesia in regard to the need for ICU stay, intubation, mechanical ventilation and so forth given her pulmonary fibrosis. He has advised the patient to see Dr. Guy, Pulmonary Medicine, and she has an appointment to see her next week.     In regard to her iron deficiency anemia after she received IV iron infusions the hemoglobin keeps improving to 11.2 from 8.4. The patient is no longer taking Z[place]ma-[place]. The patient is a little bit stronger but her appetite is not good. Obviously the question is what is the cause of the intussusception. I have measured a CEA level that is elevated at 6.2 and leads me to believe that maybe this is something related to a primary colon cancer. Dr. Cesar has suggested for her to have a colonoscopy after reviewing his good notes. The patient is even more hesitant to proceed with this.       The patient was further reviewed in the office on 02/22/2021. Since the previous visit and after I discussed the case with Dr. Zita Guy, the patient’s pulmonologist, I advised Dr. Cesar to go ahead and  proceed with her surgery. This happened and actually to the surprise of everybody the patient walked away from the hospital 3 days later with no complications or issues pertinent to her lung disease. The patient had also excellent tolerance to her colonoscopy. Since she has been at home she has been gaining appetite and gaining weight. She is feeling substantially better. She has no abdominal pain. Her surgical site has healed. Her bowel activity is normal and urination is normal. She remains on oxygen. She is walking around the house and she is doing more things that she was not doing before. She is not sitting down complaining of a bad stomach all the time. She is not taking [place]helia[place] as I pointed out to them before.     I have reviewed the pathology of the surgical specimen and she had a T3N0M0 colonic cancer with 14 lymph nodes removed, negative, with no perineural, perivascular or perivenous invasion. The margins of resection were completely clear. On the CT scan of the abdomen, her liver was completely unremarkable.        I discussed with the patient after the previous visit that the report of her CEA level that was elevated in the 8 category and I could not explain this in an easy way. The patient was not smoking at that time. Theoretically this patient should have a normal CEA level after the resection of her tumor in the column. That removal was complete. Obviously we need to have this number repeated today and we are waiting to review the analysis of this. On the other hand her hemoglobin continues improving now to 11.6 and the patient feels substantially better. She is sleeping better. She has gained weight, 3 pounds, she is stronger. She has lesser degree of shortness of breath and she has sense of well-being that she has not had in a good while. She is not taking any Nohelia[place]. She has suprapubic pain in the bowel, cramping in nature. She eats Oreo cookies at bedtime. I point out today that maybe  this is a mistake that she does not need to repeat. Also she has significant heartburn all the time and very likely this is significant reflux esophagitis. The Pepcid that she is taking is not making any difference for this symptom at all. During these findings I went ahead and prescribed her omeprazole 40 mg twice a day.     I will be calling her at home with the report of her number of CEA level. If that number remains elevated she will need to proceed with radiological analysis and then go from there.     I discussed all these facts with the patient and her daughter in the room.    We reminded ourselves that this patient has had pulmonary problems for a long time where a very abnormal radiological analysis was obtained by PET/CT chest.  What raises the question is the CEA elevation is the reflection of something going on in that anatomical site.  Sometimes, lung cancer can trigger this kind of abnormalities.

## 2021-05-21 ENCOUNTER — TELEPHONE (OUTPATIENT)
Dept: ONCOLOGY | Facility: CLINIC | Age: 81
End: 2021-05-21

## 2021-05-21 DIAGNOSIS — C18.2 MALIGNANT NEOPLASM OF ASCENDING COLON (HCC): Primary | ICD-10-CM

## 2021-05-21 DIAGNOSIS — R97.0 ELEVATED CEA: ICD-10-CM

## 2021-06-04 DIAGNOSIS — C18.2 MALIGNANT NEOPLASM OF ASCENDING COLON (HCC): Primary | ICD-10-CM

## 2021-06-07 ENCOUNTER — TELEPHONE (OUTPATIENT)
Dept: ONCOLOGY | Facility: CLINIC | Age: 81
End: 2021-06-07

## 2021-06-07 ENCOUNTER — HOSPITAL ENCOUNTER (OUTPATIENT)
Dept: PET IMAGING | Facility: HOSPITAL | Age: 81
End: 2021-06-07

## 2021-06-07 ENCOUNTER — APPOINTMENT (OUTPATIENT)
Dept: PET IMAGING | Facility: HOSPITAL | Age: 81
End: 2021-06-07

## 2021-06-07 NOTE — TELEPHONE ENCOUNTER
Caller: Madalyn Galeas    Relationship to patient: Self    Best call back number: [phone]    Chief complaint: PT'S PET SCAN WAS MOVED TO 06/14/21. PT IS CALLING TO HAVE HER APPT WITH DR SOUSA RESCHEDULED FOR AFTER HER PET SCAN SO THAT DR SOUSA WILL HAVE THE RESULTS.    Type of visit: LAB AND FOLLOW UP    Requested date: ANY AFTER 06/14/21    If rescheduling, when is the original appointment: 06/09/21

## 2021-06-09 ENCOUNTER — LAB (OUTPATIENT)
Dept: LAB | Facility: HOSPITAL | Age: 81
End: 2021-06-09

## 2021-06-09 ENCOUNTER — OFFICE VISIT (OUTPATIENT)
Dept: ONCOLOGY | Facility: CLINIC | Age: 81
End: 2021-06-09

## 2021-06-09 VITALS
HEART RATE: 60 BPM | OXYGEN SATURATION: 94 % | WEIGHT: 133.3 LBS | BODY MASS INDEX: 25.17 KG/M2 | HEIGHT: 61 IN | DIASTOLIC BLOOD PRESSURE: 56 MMHG | SYSTOLIC BLOOD PRESSURE: 135 MMHG | RESPIRATION RATE: 16 BRPM | TEMPERATURE: 97.3 F

## 2021-06-09 DIAGNOSIS — C18.2 MALIGNANT NEOPLASM OF ASCENDING COLON (HCC): ICD-10-CM

## 2021-06-09 DIAGNOSIS — C18.2 MALIGNANT NEOPLASM OF ASCENDING COLON (HCC): Primary | ICD-10-CM

## 2021-06-09 LAB
BASOPHILS # BLD AUTO: 0.08 10*3/MM3 (ref 0–0.2)
BASOPHILS NFR BLD AUTO: 0.8 % (ref 0–1.5)
DEPRECATED RDW RBC AUTO: 38.5 FL (ref 37–54)
EOSINOPHIL # BLD AUTO: 0.15 10*3/MM3 (ref 0–0.4)
EOSINOPHIL NFR BLD AUTO: 1.6 % (ref 0.3–6.2)
ERYTHROCYTE [DISTWIDTH] IN BLOOD BY AUTOMATED COUNT: 12.4 % (ref 12.3–15.4)
HCT VFR BLD AUTO: 36.8 % (ref 34–46.6)
HGB BLD-MCNC: 12.3 G/DL (ref 12–15.9)
IMM GRANULOCYTES # BLD AUTO: 0.06 10*3/MM3 (ref 0–0.05)
IMM GRANULOCYTES NFR BLD AUTO: 0.6 % (ref 0–0.5)
LYMPHOCYTES # BLD AUTO: 1.49 10*3/MM3 (ref 0.7–3.1)
LYMPHOCYTES NFR BLD AUTO: 15.7 % (ref 19.6–45.3)
MCH RBC QN AUTO: 28.7 PG (ref 26.6–33)
MCHC RBC AUTO-ENTMCNC: 33.4 G/DL (ref 31.5–35.7)
MCV RBC AUTO: 85.8 FL (ref 79–97)
MONOCYTES # BLD AUTO: 0.67 10*3/MM3 (ref 0.1–0.9)
MONOCYTES NFR BLD AUTO: 7.1 % (ref 5–12)
NEUTROPHILS NFR BLD AUTO: 7.03 10*3/MM3 (ref 1.7–7)
NEUTROPHILS NFR BLD AUTO: 74.2 % (ref 42.7–76)
NRBC BLD AUTO-RTO: 0 /100 WBC (ref 0–0.2)
PLATELET # BLD AUTO: 180 10*3/MM3 (ref 140–450)
PMV BLD AUTO: 9.8 FL (ref 6–12)
RBC # BLD AUTO: 4.29 10*6/MM3 (ref 3.77–5.28)
WBC # BLD AUTO: 9.48 10*3/MM3 (ref 3.4–10.8)

## 2021-06-09 PROCEDURE — 85025 COMPLETE CBC W/AUTO DIFF WBC: CPT

## 2021-06-09 PROCEDURE — 99213 OFFICE O/P EST LOW 20 MIN: CPT | Performed by: INTERNAL MEDICINE

## 2021-06-09 PROCEDURE — 36415 COLL VENOUS BLD VENIPUNCTURE: CPT

## 2021-06-09 NOTE — PROGRESS NOTES
Subjective         DURING THE VISIT WITH THE PATIENT TODAY , PATIENT HAD FACE MASK, MY MEDICAL ASSISTANT AND I  HAD PROPPER PROTECTIVE EQUIPMENT, AND I DID HAND HYGIENE WITH SOAP AND WATER BEFORE AND AFTER THE VISIT.     REASON FOR FOLLOW UP: 1.IRON DEFICIENCY ANEMIA,  DUE TO CHRONIC GI BLOOD LOSS, CHRONIC DIARRHEA, MALNUTRITION, WEIGHT LOSS, ABDOMINAL MASS RUQ    2.Ascending colon cancer s/p colectomy.        This patient returns today to the office accompanied by her daughter. She was supposed to have a PET scan last week. This had to be reviewed and she will have this next week. This was done with the purpose to follow up on a progressively elevated CEA level and previous history of colon cancer. The patient also has history of chronic lung disease. She remains on oxygen and we want to be sure that in the middle of so much radiological alteration in her lungs there is not going to be also an occult malignancy. The patient remains active at home. In fact she feels better than she has felt in a good while. Her appetite is good. Her bowel activity is okay with occasional loose bowel activity. No passage of blood in the stool. Urination is normal. No recent fever or infections. She remains on oxygen 24 hours a day.          Past Medical History:   Diagnosis Date   • Adenomatous polyp of sigmoid colon 1/21/2021   • Anemia    • Colonic polyp    • Congestive heart failure (CMS/HCC)    • Depression    • Diverticulosis    • Esophageal reflux    • Essential hypertension    • GERD (gastroesophageal reflux disease)    • H/O complete eye exam 03/2018   • Hx of bone density study 02/15/2016   • Hyperlipidemia    • Hypothyroidism, acquired    • Major depressive disorder, recurrent episode, in full remission (CMS/HCC)    • Nonsenile cataract 2016   • Oxygen dependent     8L NC CONTINUOUS   • Pulmonary fibrosis (CMS/HCC)    • Pulmonary hypertension (CMS/HCC)    • Sleep apnea, obstructive     USES CPAP    • Stenosis, spinal,  lumbar    • Vertigo, peripheral         Past Surgical History:   Procedure Laterality Date   • COLON RESECTION N/A 1/28/2021    Procedure: COLON RESECTION RIGHT;  Surgeon: Alfonso Cesar MD;  Location: [facility] MAIN OR;  Service: General;  Laterality: N/A;   • COLONOSCOPY  02/20/2012   • COLONOSCOPY N/A 1/21/2021    Procedure: COLONOSCOPY INTO CECUM AND TI WITH BIOPSIES, SPOT INJECTION TO COLON MASS (MID-ASCENDING), HOT SNARE POLYPECTOMY, COLD BX POLYPECTOMIES, SALINE LIFT, CLIP PLACEMENT X3;  Surgeon: Alfonso Cesar MD;  Location: [facility] ENDOSCOPY;  Service: General;  Laterality: N/A;  PRE:  ABNORMAL CT SCAN OF COLON  POST:  DIVERTICULOSIS, COLON MASS, POLYPS, HEMORRHOIDS   • INCONTINENCE SURGERY      Dr. Espinal   • MAMMO BILATERAL  2017    dr de la cruz    • PAP SMEAR  2014    dr de la cruz        Current Outpatient Medications on File Prior to Visit   Medication Sig Dispense Refill   • acetaminophen (TYLENOL) 500 MG tablet Take 1,000 mg by mouth Every 6 (Six) Hours As Needed for Mild Pain .     • ADCIRCA 20 MG tablet Take 40 mg by mouth Daily.     • ascorbic acid (VITAMIN C) 500 MG tablet Take 500 mg by mouth Daily.     • atorvastatin (LIPITOR) 10 MG tablet Take 1 tablet by mouth Daily. 90 tablet 1   • ESBRIET 267 MG capsule Take 801 mg by mouth 3 (Three) Times a Day With Meals.     • levothyroxine (SYNTHROID, LEVOTHROID) 125 MCG tablet Take 1 tablet by mouth Daily. 90 tablet 1   • O2 (OXYGEN) Inhale 8 L/min Continuous.     • omeprazole (priLOSEC) 40 MG capsule Take 1 capsule by mouth 2 (two) times a day. 180 capsule 3   • sertraline (ZOLOFT) 50 MG tablet Take 1 tablet by mouth Daily. 90 tablet 1   • verapamil SR (CALAN-SR) 120 MG CR tablet Take 120 mg by mouth Daily.  3     No current facility-administered medications on file prior to visit.        ALLERGIES:    Allergies   Allergen Reactions   • Sulfa Antibiotics Hives and Rash        Social History     Socioeconomic History   • Marital  status:      Spouse name: Not on file   • Number of children: Not on file   • Years of education: Not on file   • Highest education level: Not on file   Tobacco Use   • Smoking status: Former Smoker     Packs/day: 2.00     Types: Cigarettes     Start date:      Quit date:      Years since quittin.4   • Smokeless tobacco: Never Used   • Tobacco comment: Caffeine use: Drinks Coca Cola    Vaping Use   • Vaping Use: Never used   Substance and Sexual Activity   • Alcohol use: Yes     Comment: rare   • Drug use: No   • Sexual activity: Never     Partners: Male        Family History   Problem Relation Age of Onset   • Breast cancer Cousin    • Lung cancer Father    • Heart disease Father    • Colon cancer Brother    • Prostate cancer Brother    • Lung disease Brother    • Emphysema Brother    • Heart disease Mother    • Malig Hyperthermia Neg Hx       Current Outpatient Medications on File Prior to Visit   Medication Sig Dispense Refill   • acetaminophen (TYLENOL) 500 MG tablet Take 1,000 mg by mouth Every 6 (Six) Hours As Needed for Mild Pain .     • ADCIRCA 20 MG tablet Take 40 mg by mouth Daily.     • ascorbic acid (VITAMIN C) 500 MG tablet Take 500 mg by mouth Daily.     • atorvastatin (LIPITOR) 10 MG tablet Take 1 tablet by mouth Daily. 90 tablet 1   • ESBRIET 267 MG capsule Take 801 mg by mouth 3 (Three) Times a Day With Meals.     • levothyroxine (SYNTHROID, LEVOTHROID) 125 MCG tablet Take 1 tablet by mouth Daily. 90 tablet 1   • O2 (OXYGEN) Inhale 8 L/min Continuous.     • omeprazole (priLOSEC) 40 MG capsule Take 1 capsule by mouth 2 (two) times a day. 180 capsule 3   • sertraline (ZOLOFT) 50 MG tablet Take 1 tablet by mouth Daily. 90 tablet 1   • verapamil SR (CALAN-SR) 120 MG CR tablet Take 120 mg by mouth Daily.  3     No current facility-administered medications on file prior to visit.       Objective    Vitals:    21 1537   BP: 135/56   Pulse: 60   Resp: 16   Temp: 97.3 °F (36.3 °C)  "  [facility]   SpO2: 94%   Weight: 60.5 kg (133 lb 4.8 oz)   Height: 154.9 cm (60.98\")           Physical Exam     I HAVE PERSONALLY REVIEWED THE HISTORY OF THE PRESENT ILLNESS, PAST MEDICAL HISTORY, FAMILY HISTORY, SOCIAL HISTORY, ALLERGIES, MEDICATIONS STATED ABOVE IN THE OFFICE NOTE FROM TODAY.        GENERAL:  Well-developed, well-nourished  Patient  in no acute distress.   SKIN:  Warm, dry ,NO rashes,NO purpura ,NO petechiae.  HEENT:  Pupils were equal and reactive to light and accomodation, conjunctivae noninjected, no pterygium, normal extraocular movements, normal visual acuity.   NECK:  Supple with good range of motion; no thyromegaly or masses, no JVD or bruits, no cervical adenopathies.No carotid artery pain, no carotid abnormal pulsation , NO arterial dance.  LYMPHATICS:  No cervical, NO supraclavicular, NO axillary,NO epitrochlear , NO inguinal adenopathy.  CARDIAC   normal rate and regular rhythm, without murmur,NO rubs NO S3 NO S4 right or left .   VASCULAR VENOUS: no cyanosis, collateral circulation, varicosities, edema, palpable cords, pain, erythema.  ABDOMEN:  Soft, nontender with no hepatomegaly, no splenomegaly,no masses, no ascites, no collateral circulation,no distention,no Bib sign, no abdominal pain, no inguinal hernias,no umbilical hernia, no abdominal bruits. Normal bowel sounds.  GENITAL: Not  Performed.  EXTREMITIES  AND SPINE:  No clubbing, cyanosis or edema, no deformities or pain .No kyphosis, scoliosis, deformities or pain in spine, ribs or pelvic bone.  NEUROLOGICAL:  Patient was awake, alert, oriented to time, person and place.    Exam unchanged by tammy raymundo 6/8/21      RECENT LABS:    Component      Latest Ref Rng & Units 12/10/2020 4/13/2021 5/19/2021   CEA      ng/mL 6.79 8.87 9.12                             Assessment/Plan     Diagnoses and all orders for this visit:    1. Malignant neoplasm of ascending colon (CMS/HCC) (Primary)              In summary this patient " has progressive anemia. I documented in the laboratory parameters above progressive drop in the hemoglobin, progressive drop in the MCV. Morphology of peripheral blood is typical of iron deficiency anemia and I would not be surprised if she has an associated B12 or folate deficiency. This patient has had upper GI series and barium enema by [facility], Dr. Boyd, and no anatomical alteration has been found in her upper GI tract or colon besides diverticular disease. The symptoms that she has today she blames them, especially the diarrhea, since the time that she had the barium enema. I think that was not an association. The fact that she has so much diarrhea day and night and with weight loss, profound fatigue, sarcopenia leads me to believe that it is some other entity that is producing this. It sounds like to me she has a malabsorptive process of some sort.     The patient also has a palpable mass in the right upper quadrant and the way how it feels to my hands, it is probably a palpable gallbladder. She had no pain there. This is very striking. Her abdomen is very soft and it is very easy to find.     Under the present circumstances and knowing that the patient cannot tolerate oral iron because it makes her to throw up and produce profuse diarrhea and makes the diarrhea worse, I do believe that the patient needs to have the followin. For the workup for her anemia, she went back to the lab to obtain a comprehensive metabolic profile, ferritin, iron, TIBC, B12, folic acid level, reticulated hemoglobin.   2. Given the fact that the patient has had a TSH that was on the low side maybe her thyroid medication is overcorrecting her and this maybe this is part of the diarrhea process. I will obtain a TSH and a T4.   3. I do believe that the patient needs to have a CT scan of the abdomen and pelvis. I do not think she can handle oral contrast and I do not want to give her IV contrast, I find no need. I want to be sure there  is no other anatomical alteration in her small intestine and GI tract that is favoring for her to have all the issues pertinent to her clinical symptoms.     I wonder if she could have some other process in her bowel including inflammatory bowel disease, bacterial overgrowth syndrome, celiac disease and differential diagnosis is more than extensive. This information would require to be shared with Dr. Boyd, her Gastroenterologist, for further review after she returns back in a few days after she proceeds with proper management of her anemia. Given the fact that she cannot handle oral iron by mouth, absolutely the patient needs to have Injectafer on 2 different occasions and this will be scheduled to start to happen as early as TODAY.   I discussed with the patient on 12/04/2020 after reviewing the case with the radiologist Dr. Pinto at [facility] in regards to her CT scan of the abdomen. This documents intussusception of the colon and there is a mass located in that that probably represents a colon cancer. Obviously, the question is how to proceed with all these issues on this patient. It is very obvious that she has profound iron deficiency anemia. Her ferritin level, iron level are low. She cannot handle oral iron therapy and she will initiate Injectafer today and she will have the 2nd infusion next week. I expect that in 3 weeks she will have very significant rebound in erythropoiesis and I expect that her pulmonary manifestations will be better in regard to her pulmonary hypertension and her pulmonary fibrosis. Typically patient's with this condition develop polycythemia instead of anemia.    The other issue is what we are going to do with her colon. I do not think that the patient is a normal surgical candidate under the present circumstances and we need to try to fix her up in the best way possible to see if she gets to the point to have a resection of this issue. I have made her an  appointment to be seen by cardiology, Rambo Dasilva MD, and proceed with an echocardiogram in days. We have also made an appointment for her to see Alfonso Cesar MD, General Surgery to review the case with me.       I reviewed the patient back with the daughter in the room on 12/10/2020. In the interim, I have had a conversation with Zita Guy MD, the patient’s pulmonologist, who strongly believes that the patient will be able to go through surgery as long as she has proper pulmonary assessment before intervention and she asked me to go ahead and consult Pulmonary Medicine, Dr. Ramos and associates for this purpose. A consult has been placed.     I reviewed her echocardiogram and actually it is better than I expected for somebody who has pulmonary fibrosis.     In summary I think the patient’s intussusception is producing bleeding, chronically, leading to iron deficiency anemia. She has had Injectafer a week ago, another one to be given to her tomorrow and thereafter she will require blood work on weekly basis. If the hemoglobin goes below 9, I think she will require transfusion of 2 units of red cells. She will have weekly appointment for CBC and nurse check and I will review her back on 12/30/2020.     I EMPHATICALLY POINTED OUT TO THE PATIENT’S DAUGHTER SHE CANNOT TAKE ANYMORE ZULMA-SELTZER. SHE HAS BEEN TAKING THIS ON A REGULAR SCHEDULE AND THIS IS PRODUCING PROBABLY ALSO AN ELEMENT OF GI BLEEDING ASSOCIATED WITH HER INTUSSUSCEPTION AND WHY NOT GASTRITIS AND SO FORTH.     In summary the patient was reviewed on 12/30/2020 along with her daughter in the room. The patient has gone through cardiological assessment and actually her cardiac ejection fraction is not that bad, close to 50%, and with minimal evidence of pulmonary arterial hypertension. Normal contractility of the left ventricle. The patient also has been seen by Pulmonary Medicine. These records have been reviewed again and her pulmonary function tests  is very marginal. The analysis is stated above by Dr. Moser who has advised the patient that she is a high risk for general anesthesia in regard to the need for ICU stay, intubation, mechanical ventilation and so forth given her pulmonary fibrosis. He has advised the patient to see Dr. Guy, Pulmonary Medicine, and she has an appointment to see her next week.     In regard to her iron deficiency anemia after she received IV iron infusions the hemoglobin keeps improving to 11.2 from 8.4. The patient is no longer taking Nohelia-[place]. The patient is a little bit stronger but her appetite is not good. Obviously the question is what is the cause of the intussusception. I have measured a CEA level that is elevated at 6.2 and leads me to believe that maybe this is something related to a primary colon cancer. Dr. Cesar has suggested for her to have a colonoscopy after reviewing his good notes. The patient is even more hesitant to proceed with this.       The patient was further reviewed in the office on 02/22/2021. Since the previous visit and after I discussed the case with Dr. Zita Guy, the patient’s pulmonologist, I advised Dr. Cesar to go ahead and proceed with her surgery. This happened and actually to the surprise of everybody the patient walked away from the hospital 3 days later with no complications or issues pertinent to her lung disease. The patient had also excellent tolerance to her colonoscopy. Since she has been at home she has been gaining appetite and gaining weight. She is feeling substantially better. She has no abdominal pain. Her surgical site has healed. Her bowel activity is normal and urination is normal. She remains on oxygen. She is walking around the house and she is doing more things that she was not doing before. She is not sitting down complaining of a bad stomach all the time. She is not taking Nohelia-[place] as I pointed out to them before.     I have reviewed the pathology of the surgical  specimen and she had a [id number] colonic cancer with 14 lymph nodes removed, negative, with no perineural, perivascular or perivenous invasion. The margins of resection were completely clear. On the CT scan of the abdomen, her liver was completely unremarkable.        I discussed with the patient after the previous visit that the report of her CEA level that was elevated in the 8 category and I could not explain this in an easy way. The patient was not smoking at that time. Theoretically this patient should have a normal CEA level after the resection of her tumor in the column. That removal was complete. Obviously we need to have this number repeated today and we are waiting to review the analysis of this. On the other hand her hemoglobin continues improving now to 11.6 and the patient feels substantially better. She is sleeping better. She has gained weight, 3 pounds, she is stronger. She has lesser degree of shortness of breath and she has sense of well-being that she has not had in a good while. She is not taking any Nohelia-[place]. She has suprapubic pain in the bowel, cramping in nature. She eats Oreo cookies at bedtime. I point out today that maybe this is a mistake that she does not need to repeat. Also she has significant heartburn all the time and very likely this is significant reflux esophagitis. The Pepcid that she is taking is not making any difference for this symptom at all. During these findings I went ahead and prescribed her omeprazole 40 mg twice a day.     I discussed with the patient and daughter on 06/09/2021 the fact that her PET scan will be done next week and I cannot give her any significant idea about what to do in regard to the in crescendo CEA level. I doubt that this is representation of malignancy in the abdominal cavity given the fact that all her colon cancer was removed. Nevertheless, this is still a consideration. My attention is oriented to the dramatic changes in her CT scan of the  chest associated with her pulmonary disease. I wonder if in the middle of so much stuff in the lung, malignancy is hiding. Therefore, we will do a telephonic visit with her next week after the PET scan report becomes available. What to do at that point thereafter remains unknown depending on what is found in the PET scan.     I discussed all these facts with the patient and her daughter in the room.

## 2021-06-14 ENCOUNTER — HOSPITAL ENCOUNTER (OUTPATIENT)
Dept: PET IMAGING | Facility: HOSPITAL | Age: 81
Discharge: HOME | End: 2021-06-14

## 2021-06-14 DIAGNOSIS — C18.2 MALIGNANT NEOPLASM OF ASCENDING COLON (HCC): ICD-10-CM

## 2021-06-14 DIAGNOSIS — R97.0 ELEVATED CEA: ICD-10-CM

## 2021-06-14 LAB — GLUCOSE BLDC GLUCOMTR-MCNC: 107 MG/DL (ref 70–130)

## 2021-06-14 PROCEDURE — A9552 F18 FDG: HCPCS | Performed by: INTERNAL MEDICINE

## 2021-06-14 PROCEDURE — 78815 PET IMAGE W/CT SKULL-THIGH: CPT

## 2021-06-14 PROCEDURE — 0 FLUDEOXYGLUCOSE F18 SOLUTION: Performed by: INTERNAL MEDICINE

## 2021-06-14 PROCEDURE — 82962 GLUCOSE BLOOD TEST: CPT

## 2021-06-14 RX ADMIN — FLUDEOXYGLUCOSE F18 1 DOSE: 300 INJECTION INTRAVENOUS at 09:57

## 2021-06-17 ENCOUNTER — OFFICE VISIT (OUTPATIENT)
Dept: ONCOLOGY | Facility: CLINIC | Age: 81
End: 2021-06-17

## 2021-06-17 DIAGNOSIS — C18.2 MALIGNANT NEOPLASM OF ASCENDING COLON (HCC): Primary | ICD-10-CM

## 2021-06-17 DIAGNOSIS — R97.0 ELEVATED CEA: ICD-10-CM

## 2021-06-17 DIAGNOSIS — J84.10 PULMONARY FIBROSIS (HCC): ICD-10-CM

## 2021-06-17 PROCEDURE — 99213 OFFICE O/P EST LOW 20 MIN: CPT | Performed by: INTERNAL MEDICINE

## 2021-06-17 NOTE — PROGRESS NOTES
Subjective         DURING THE VISIT WITH THE PATIENT TODAY , PATIENT HAD FACE MASK, MY MEDICAL ASSISTANT AND I  HAD PROPPER PROTECTIVE EQUIPMENT, AND I DID HAND HYGIENE WITH SOAP AND WATER BEFORE AND AFTER THE VISIT.     REASON FOR FOLLOW UP: 1.IRON DEFICIENCY ANEMIA,  DUE TO CHRONIC GI BLOOD LOSS, CHRONIC DIARRHEA, MALNUTRITION, WEIGHT LOSS, ABDOMINAL MASS RUQ: colon cancer diagnosed, removed, no need for adjuvant therapy for stage 2 disease    2.Ascending colon cancer s/p colectomy.    3. ELEVATED CEA AFTER COLECTOMY: PET SCAN NEGATIVE IN 6/17/21    I HAVE CALLED THIS PATIENT ON THE PHONE TODAY AND VERBALLY HAS CONSENTED ABOUT TELEMEDICINE VISIT     I have reviewed this patient today by telemedicine after she has had a few days ago a PET scan in order to figure out why her CEA level is elevated in the background of previous colectomy last year for colon cancer stage II. I have been surprised by the elevation of the CEA having no other malignancy to blame but having chronic lung disease and interstitial pulmonary disease and on chronic oxygen therapy. The patient is not around anybody who smokes, she does not smoke anymore and obviously raised the question about the nature of the CEA elevation. The patient at this time actually feels well, her pulmonary disease remains about the same, she remains on oxygen. Her appetite is actually very good. She has no abdominal pain, nausea, vomiting, no diarrhea, no passage of blood in the stool, urination is normal. No rashes in the skin. No pain on any level. neurologically she remains intact carrying on normal activities. She remains very interactive with her group of friends.       Past Medical History:   Diagnosis Date   • Adenomatous polyp of sigmoid colon 1/21/2021   • Anemia    • Colonic polyp    • Congestive heart failure (CMS/HCC)    • Depression    • Diverticulosis    • Esophageal reflux    • Essential hypertension    • GERD (gastroesophageal reflux disease)    •  H/O complete eye exam 03/2018   • Hx of bone density study 02/15/2016   • Hyperlipidemia    • Hypothyroidism, acquired    • Major depressive disorder, recurrent episode, in full remission (CMS/HCC)    • Nonsenile cataract 2016   • Oxygen dependent     8L NC CONTINUOUS   • Pulmonary fibrosis (CMS/HCC)    • Pulmonary hypertension (CMS/HCC)    • Sleep apnea, obstructive     USES CPAP    • Stenosis, spinal, lumbar    • Vertigo, peripheral         Past Surgical History:   Procedure Laterality Date   • COLON RESECTION N/A 1/28/2021    Procedure: COLON RESECTION RIGHT;  Surgeon: Alfonso Cesar MD;  Location: [facility] MAIN OR;  Service: General;  Laterality: N/A;   • COLONOSCOPY  02/20/2012   • COLONOSCOPY N/A 1/21/2021    Procedure: COLONOSCOPY INTO CECUM AND TI WITH BIOPSIES, SPOT INJECTION TO COLON MASS (MID-ASCENDING), HOT SNARE POLYPECTOMY, COLD BX POLYPECTOMIES, SALINE LIFT, CLIP PLACEMENT X3;  Surgeon: Alfonso Cesar MD;  Location: [facility] ENDOSCOPY;  Service: General;  Laterality: N/A;  PRE:  ABNORMAL CT SCAN OF COLON  POST:  DIVERTICULOSIS, COLON MASS, POLYPS, HEMORRHOIDS   • INCONTINENCE SURGERY      Dr. Espinal   • MAMMO BILATERAL  2017    dr de la cruz    • PAP SMEAR  2014    dr de la cruz        Current Outpatient Medications on File Prior to Visit   Medication Sig Dispense Refill   • acetaminophen (TYLENOL) 500 MG tablet Take 1,000 mg by mouth Every 6 (Six) Hours As Needed for Mild Pain .     • ADCIRCA 20 MG tablet Take 40 mg by mouth Daily.     • ascorbic acid (VITAMIN C) 500 MG tablet Take 500 mg by mouth Daily.     • atorvastatin (LIPITOR) 10 MG tablet Take 1 tablet by mouth Daily. 90 tablet 1   • ESBRIET 267 MG capsule Take 801 mg by mouth 3 (Three) Times a Day With Meals.     • levothyroxine (SYNTHROID, LEVOTHROID) 125 MCG tablet Take 1 tablet by mouth Daily. 90 tablet 1   • O2 (OXYGEN) Inhale 8 L/min Continuous.     • omeprazole (priLOSEC) 40 MG capsule Take 1 capsule by mouth 2 (two)  times a day. 180 capsule 3   • sertraline (ZOLOFT) 50 MG tablet Take 1 tablet by mouth Daily. 90 tablet 1   • verapamil SR (CALAN-SR) 120 MG CR tablet Take 120 mg by mouth Daily.  3     No current facility-administered medications on file prior to visit.        ALLERGIES:    Allergies   Allergen Reactions   • Sulfa Antibiotics Hives and Rash        Social History     Socioeconomic History   • Marital status:      Spouse name: Not on file   • Number of children: Not on file   • Years of education: Not on file   • Highest education level: Not on file   Tobacco Use   • Smoking status: Former Smoker     Packs/day: 2.00     Types: Cigarettes     Start date:      Quit date:      Years since quittin.4   • Smokeless tobacco: Never Used   • Tobacco comment: Caffeine use: Drinks Coca Cola    Vaping Use   • Vaping Use: Never used   Substance and Sexual Activity   • Alcohol use: Yes     Comment: rare   • Drug use: No   • Sexual activity: Never     Partners: Male        Family History   Problem Relation Age of Onset   • Breast cancer Cousin    • Lung cancer Father    • Heart disease Father    • Colon cancer Brother    • Prostate cancer Brother    • Lung disease Brother    • Emphysema Brother    • Heart disease Mother    • Malig Hyperthermia Neg Hx       Current Outpatient Medications on File Prior to Visit   Medication Sig Dispense Refill   • acetaminophen (TYLENOL) 500 MG tablet Take 1,000 mg by mouth Every 6 (Six) Hours As Needed for Mild Pain .     • ADCIRCA 20 MG tablet Take 40 mg by mouth Daily.     • ascorbic acid (VITAMIN C) 500 MG tablet Take 500 mg by mouth Daily.     • atorvastatin (LIPITOR) 10 MG tablet Take 1 tablet by mouth Daily. 90 tablet 1   • ESBRIET 267 MG capsule Take 801 mg by mouth 3 (Three) Times a Day With Meals.     • levothyroxine (SYNTHROID, LEVOTHROID) 125 MCG tablet Take 1 tablet by mouth Daily. 90 tablet 1   • O2 (OXYGEN) Inhale 8 L/min Continuous.     • omeprazole (priLOSEC) 40  MG capsule Take 1 capsule by mouth 2 (two) times a day. 180 capsule 3   • sertraline (ZOLOFT) 50 MG tablet Take 1 tablet by mouth Daily. 90 tablet 1   • verapamil SR (CALAN-SR) 120 MG CR tablet Take 120 mg by mouth Daily.  3     No current facility-administered medications on file prior to visit.       Objective    There were no vitals filed for this visit.        Physical Exam       NONE    RECENT LABS:    Component      Latest Ref Rng & Units 12/10/2020 4/13/2021 5/19/2021   CEA      ng/mL 6.79 8.87 9.12     F-18 FDG PET FROM SKULL BASE TO MID THIGH WITH PET/CT FUSION     HISTORY: 80-year-old female status post right hemicolectomy for colon  cancer on 01/28/2021. Rising CEA. Restaging. TECHNIQUE: Radiation dose  reduction techniques were utilized, including automated exposure control  and exposure modulation based on body size.  Blood glucose level at time  of injection was 107 mg/dL.  6.5 mCi of F-18 FDG were injected and PET  was performed from skull base to mid thigh. CT was obtained for  localization and attenuation correction. Time at injection 9:48 AM. PET  start time 11:06 AM. Compared with preoperative CTs from 12/03/2020.     FINDINGS: There is no suspicious hypermetabolic activity within the  abdomen or pelvis. There is no suspicious activity within the liver.  There is low-level activity scattered throughout the bowel including the  colon at the ileocolonic anastomosis, but there are no suspicious foci.  There is no suspicious hypermetabolic activity within the chest. There  is advanced interstitial fibrosis bilaterally with low-level activity,  less than that of blood pool. There is no suspicious hypermetabolic  activity within the neck.     IMPRESSION:  There are no suspicious hypermetabolic findings for  metastatic disease.     This report was finalized on 6/15/2021 4:40 PM by Dr. Abigail Lorenzo M.D.                             Assessment/Plan     Diagnoses and all orders for this visit:    1. Malignant  neoplasm of ascending colon (CMS/HCC) (Primary)    2. Elevated CEA    3. Pulmonary fibrosis (CMS/HCC)              In summary this patient has progressive anemia. I documented in the laboratory parameters above progressive drop in the hemoglobin, progressive drop in the MCV. Morphology of peripheral blood is typical of iron deficiency anemia and I would not be surprised if she has an associated B12 or folate deficiency. This patient has had upper GI series and barium enema by GI, Dr. Boyd, and no anatomical alteration has been found in her upper GI tract or colon besides diverticular disease. The symptoms that she has today she blames them, especially the diarrhea, since the time that she had the barium enema. I think that was not an association. The fact that she has so much diarrhea day and night and with weight loss, profound fatigue, sarcopenia leads me to believe that it is some other entity that is producing this. It sounds like to me she has a malabsorptive process of some sort.     The patient also has a palpable mass in the right upper quadrant and the way how it feels to my hands, it is probably a palpable gallbladder. She had no pain there. This is very striking. Her abdomen is very soft and it is very easy to find.     Under the present circumstances and knowing that the patient cannot tolerate oral iron because it makes her to throw up and produce profuse diarrhea and makes the diarrhea worse, I do believe that the patient needs to have the followin. For the workup for her anemia, she went back to the lab to obtain a comprehensive metabolic profile, ferritin, iron, TIBC, B12, folic acid level, reticulated hemoglobin.   2. Given the fact that the patient has had a TSH that was on the low side maybe her thyroid medication is overcorrecting her and this maybe this is part of the diarrhea process. I will obtain a TSH and a T4.   3. I do believe that the patient needs to have a CT scan of the abdomen  and pelvis. I do not think she can handle oral contrast and I do not want to give her IV contrast, I find no need. I want to be sure there is no other anatomical alteration in her small intestine and GI tract that is favoring for her to have all the issues pertinent to her clinical symptoms.     I wonder if she could have some other process in her bowel including inflammatory bowel disease, bacterial overgrowth syndrome, celiac disease and differential diagnosis is more than extensive. This information would require to be shared with Dr. Boyd, her Gastroenterologist, for further review after she returns back in a few days after she proceeds with proper management of her anemia. Given the fact that she cannot handle oral iron by mouth, absolutely the patient needs to have Injectafer on 2 different occasions and this will be scheduled to start to happen as early as TODAY.   I discussed with the patient on 12/04/2020 after reviewing the case with the radiologist Dr. Pinto at [facility] in regards to her CT scan of the abdomen. This documents intussusception of the colon and there is a mass located in that that probably represents a colon cancer. Obviously, the question is how to proceed with all these issues on this patient. It is very obvious that she has profound iron deficiency anemia. Her ferritin level, iron level are low. She cannot handle oral iron therapy and she will initiate Injectafer today and she will have the 2nd infusion next week. I expect that in 3 weeks she will have very significant rebound in erythropoiesis and I expect that her pulmonary manifestations will be better in regard to her pulmonary hypertension and her pulmonary fibrosis. Typically patient's with this condition develop polycythemia instead of anemia.    The other issue is what we are going to do with her colon. I do not think that the patient is a normal surgical candidate under the present circumstances and we need to  try to fix her up in the best way possible to see if she gets to the point to have a resection of this issue. I have made her an appointment to be seen by cardiology, Rambo Dasilva MD, and proceed with an echocardiogram in days. We have also made an appointment for her to see Alfonso Cesar MD, General Surgery to review the case with me.       I reviewed the patient back with the daughter in the room on 12/10/2020. In the interim, I have had a conversation with Zita Guy MD, the patient’s pulmonologist, who strongly believes that the patient will be able to go through surgery as long as she has proper pulmonary assessment before intervention and she asked me to go ahead and consult Pulmonary Medicine, Dr. Ramos and associates for this purpose. A consult has been placed.     I reviewed her echocardiogram and actually it is better than I expected for somebody who has pulmonary fibrosis.     In summary I think the patient’s intussusception is producing bleeding, chronically, leading to iron deficiency anemia. She has had Injectafer a week ago, another one to be given to her tomorrow and thereafter she will require blood work on weekly basis. If the hemoglobin goes below 9, I think she will require transfusion of 2 units of red cells. She will have weekly appointment for CBC and nurse check and I will review her back on 12/30/2020.     I EMPHATICALLY POINTED OUT TO THE PATIENT’S DAUGHTER SHE CANNOT TAKE ANYMORE ZULMA-SELTZER. SHE HAS BEEN TAKING THIS ON A REGULAR SCHEDULE AND THIS IS PRODUCING PROBABLY ALSO AN ELEMENT OF GI BLEEDING ASSOCIATED WITH HER INTUSSUSCEPTION AND WHY NOT GASTRITIS AND SO FORTH.     In summary the patient was reviewed on 12/30/2020 along with her daughter in the room. The patient has gone through cardiological assessment and actually her cardiac ejection fraction is not that bad, close to 50%, and with minimal evidence of pulmonary arterial hypertension. Normal contractility of the left  ventricle. The patient also has been seen by Pulmonary Medicine. These records have been reviewed again and her pulmonary function tests is very marginal. The analysis is stated above by Dr. Moser who has advised the patient that she is a high risk for general anesthesia in regard to the need for ICU stay, intubation, mechanical ventilation and so forth given her pulmonary fibrosis. He has advised the patient to see Dr. Guy, Pulmonary Medicine, and she has an appointment to see her next week.     In regard to her iron deficiency anemia after she received IV iron infusions the hemoglobin keeps improving to 11.2 from 8.4. The patient is no longer taking Nohelia-Emerson. The patient is a little bit stronger but her appetite is not good. Obviously the question is what is the cause of the intussusception. I have measured a CEA level that is elevated at 6.2 and leads me to believe that maybe this is something related to a primary colon cancer. Dr. Cesar has suggested for her to have a colonoscopy after reviewing his good notes. The patient is even more hesitant to proceed with this.       The patient was further reviewed in the office on 02/22/2021. Since the previous visit and after I discussed the case with Dr. Zita Guy, the patient’s pulmonologist, I advised Dr. Cesar to go ahead and proceed with her surgery. This happened and actually to the surprise of everybody the patient walked away from the hospital 3 days later with no complications or issues pertinent to her lung disease. The patient had also excellent tolerance to her colonoscopy. Since she has been at home she has been gaining appetite and gaining weight. She is feeling substantially better. She has no abdominal pain. Her surgical site has healed. Her bowel activity is normal and urination is normal. She remains on oxygen. She is walking around the house and she is doing more things that she was not doing before. She is not sitting down complaining of a  bad stomach all the time. She is not taking Nohelia-Cascade as I pointed out to them before.     I have reviewed the pathology of the surgical specimen and she had a [id number] colonic cancer with 14 lymph nodes removed, negative, with no perineural, perivascular or perivenous invasion. The margins of resection were completely clear. On the CT scan of the abdomen, her liver was completely unremarkable.         I reviewed with the patient on the telephone on 06/17/2021 after personally reviewing the PET scan in the PAC system [facility] that shows no areas of abnormal uptake in her lungs, no areas of abnormal uptake in the liver, retroperitoneal lymph nodes or any other site as far as I can tell. The radiologists reading is about the same.     The good news to me is that the PET scan is negative, the patient is feeling better. She remains fully functional, her anemia is corrected after all of the bleeding that she did out of her colon cancer with improving hemoglobin.    Obviously raises the question why the CEA level is elevated. The only reasonable explanation could be the chronic inflammatory process associated with her chronic lung disease. She does not smoke. She is not around anybody who smokes and obviously it is the only logical explanation.     RECOMMENDATIONS: I have advised her about the report of the test. She is happy. She remains functional and I asked her to remain functional. I would like to review her back in 3 months with a CBC, CMP, ferritin, iron, TIBC and repeat CEA level.     Other than that I will not proceed with any other intervention at this point.     I have not prescribed any medicines. I have not removed any other medicines that she takes on a regular schedule.       DURATION PHONE VISIT 8 MINUTES

## 2021-07-01 ENCOUNTER — OFFICE VISIT (OUTPATIENT)
Dept: FAMILY MEDICINE CLINIC | Facility: CLINIC | Age: 81
End: 2021-07-01

## 2021-07-01 VITALS
WEIGHT: 133 LBS | OXYGEN SATURATION: 100 % | BODY MASS INDEX: 25.11 KG/M2 | HEART RATE: 46 BPM | TEMPERATURE: 97 F | RESPIRATION RATE: 14 BRPM | HEIGHT: 61 IN | DIASTOLIC BLOOD PRESSURE: 66 MMHG | SYSTOLIC BLOOD PRESSURE: 138 MMHG

## 2021-07-01 DIAGNOSIS — E78.2 MIXED HYPERLIPIDEMIA: Chronic | ICD-10-CM

## 2021-07-01 DIAGNOSIS — Z00.00 MEDICARE ANNUAL WELLNESS VISIT, SUBSEQUENT: Primary | ICD-10-CM

## 2021-07-01 DIAGNOSIS — E03.9 ACQUIRED HYPOTHYROIDISM: Chronic | ICD-10-CM

## 2021-07-01 DIAGNOSIS — F33.42 MAJOR DEPRESSIVE DISORDER, RECURRENT EPISODE, IN FULL REMISSION (HCC): Chronic | ICD-10-CM

## 2021-07-01 PROBLEM — J96.10 CHRONIC RESPIRATORY FAILURE (HCC): Status: ACTIVE | Noted: 2021-04-07

## 2021-07-01 PROBLEM — I27.21 PAH (PULMONARY ARTERY HYPERTENSION): Status: ACTIVE | Noted: 2021-04-07

## 2021-07-01 PROCEDURE — 96160 PT-FOCUSED HLTH RISK ASSMT: CPT | Performed by: FAMILY MEDICINE

## 2021-07-01 PROCEDURE — 1126F AMNT PAIN NOTED NONE PRSNT: CPT | Performed by: FAMILY MEDICINE

## 2021-07-01 PROCEDURE — 1160F RVW MEDS BY RX/DR IN RCRD: CPT | Performed by: FAMILY MEDICINE

## 2021-07-01 PROCEDURE — 1170F FXNL STATUS ASSESSED: CPT | Performed by: FAMILY MEDICINE

## 2021-07-01 PROCEDURE — G0439 PPPS, SUBSEQ VISIT: HCPCS | Performed by: FAMILY MEDICINE

## 2021-07-01 PROCEDURE — 99214 OFFICE O/P EST MOD 30 MIN: CPT | Performed by: FAMILY MEDICINE

## 2021-07-01 RX ORDER — ATORVASTATIN CALCIUM 10 MG/1
10 TABLET, FILM COATED ORAL DAILY
Qty: 90 TABLET | Refills: 1 | Status: SHIPPED | OUTPATIENT
Start: 2021-07-01 | End: 2021-11-03

## 2021-07-01 RX ORDER — LEVOTHYROXINE SODIUM 0.12 MG/1
125 TABLET ORAL DAILY
Qty: 90 TABLET | Refills: 1 | Status: SHIPPED | OUTPATIENT
Start: 2021-07-01 | End: 2021-09-29 | Stop reason: SDUPTHER

## 2021-07-01 NOTE — PROGRESS NOTES
The ABCs of the Annual Wellness Visit  Subsequent Medicare Wellness Visit    Chief Complaint   Patient presents with   • Hyperlipidemia     med refill  - no labs    • Hypothyroidism     meds reviewed with pt today   humana phamr    • Depression   • MEDICARE WELLNESS     DUE        Subjective   History of Present Illness:  Madalyn Galeas is a 81 y.o. female who presents for a Subsequent Medicare Wellness Visit.    HEALTH RISK ASSESSMENT    Recent Hospitalizations:  Recently treated at the following:  [facility]    Current Medical Providers:  Patient Care Team:  Enmanuel Berger MD as PCP - General (Family Medicine)  Zita Guy MD as Consulting Physician (Pulmonary Disease)  Anju Boyd MD as Consulting Physician (Gastroenterology)  Enmanuel Berger MD as Referring Physician (Family Medicine)  Emmanuel Lara MD as Consulting Physician (Hematology and Oncology)    Smoking Status:  Social History     Tobacco Use   Smoking Status Former Smoker   • Packs/day: 2.00   • Types: Cigarettes   • Start date:    • Quit date:    • Years since quittin.5   Smokeless Tobacco Never Used   Tobacco Comment    Caffeine use: Drinks Coca [employer]a        Alcohol Consumption:  Social History     Substance and Sexual Activity   Alcohol Use Yes    Comment: rare       Depression Screen:   PHQ-2/PHQ-9 Depression Screening 2021   Little interest or pleasure in doing things 0   Feeling down, depressed, or hopeless 0   Trouble falling or staying asleep, or sleeping too much 0   Feeling tired or having little energy 0   Poor appetite or overeating 0   Feeling bad about yourself - or that you are a failure or have let yourself or your family down 0   Trouble concentrating on things, such as reading the newspaper or watching television 0   Moving or speaking so slowly that other people could have noticed. Or the opposite - being so fidgety or restless that you have been moving around a lot more than usual 0    Thoughts that you would be better off dead, or of hurting yourself in some way 0   Total Score 0   If you checked off any problems, how difficult have these problems made it for you to do your work, take care of things at home, or get along with other people? -       Fall Risk Screen:  ELIAS Fall Risk Assessment has not been completed.    Health Habits and Functional and Cognitive Screening:  Functional & Cognitive Status 7/1/2021   Do you have difficulty preparing food and eating? No   Do you have difficulty bathing yourself, getting dressed or grooming yourself? No   Do you have difficulty using the toilet? No   Do you have difficulty moving around from place to place? No   Do you have trouble with steps or getting out of a bed or a chair? No   Current Diet Well Balanced Diet   Dental Exam Up to date   Eye Exam Up to date   Exercise (times per week) 0 times per week   Current Exercises Include No Regular Exercise   Current Exercise Activities Include -   Do you need help using the phone?  No   Are you deaf or do you have serious difficulty hearing?  No   Do you need help with transportation? No   Do you need help shopping? No   Do you need help preparing meals?  No   Do you need help with housework?  No   Do you need help with laundry? No   Do you need help taking your medications? No   Do you need help managing money? No   Do you ever drive or ride in a car without wearing a seat belt? No   Have you felt unusual stress, anger or loneliness in the last month? No   Who do you live with? Alone   If you need help, do you have trouble finding someone available to you? Yes   Have you been bothered in the last four weeks by sexual problems? No   Do you have difficulty concentrating, remembering or making decisions? No         Does the patient have evidence of cognitive impairment? No    Asprin use counseling:Does not need ASA (and currently is not on it)    Age-appropriate Screening Schedule:  Refer to the list below  for future screening recommendations based on patient's age, sex and/or medical conditions. Orders for these recommended tests are listed in the plan section. The patient has been provided with a written plan.    Health Maintenance   Topic Date Due   • URINE MICROALBUMIN  Never done   • HEMOGLOBIN A1C  12/05/2020   • DXA SCAN  04/10/2021   • LIPID PANEL  06/05/2021   • ZOSTER VACCINE (2 of 3) 11/25/2021 (Originally 9/20/2012)   • INFLUENZA VACCINE  08/01/2021   • DIABETIC EYE EXAM  06/03/2022   • TDAP/TD VACCINES (2 - Td or Tdap) 09/26/2022   • MAMMOGRAM  04/20/2023   • COLONOSCOPY  01/21/2031          The following portions of the patient's history were reviewed and updated as appropriate: allergies, current medications, past family history, past medical history, past social history, past surgical history and problem list.    Outpatient Medications Prior to Visit   Medication Sig Dispense Refill   • acetaminophen (TYLENOL) 500 MG tablet Take 1,000 mg by mouth Every 6 (Six) Hours As Needed for Mild Pain .     • ADCIRCA 20 MG tablet Take 40 mg by mouth Daily.     • ascorbic acid (VITAMIN C) 500 MG tablet Take 500 mg by mouth Daily.     • ESBRIET 267 MG capsule Take 801 mg by mouth 3 (Three) Times a Day With Meals.     • O2 (OXYGEN) Inhale 8 L/min Continuous.     • omeprazole (priLOSEC) 40 MG capsule Take 1 capsule by mouth 2 (two) times a day. 180 capsule 3   • verapamil SR (CALAN-SR) 120 MG CR tablet Take 120 mg by mouth Daily.  3   • atorvastatin (LIPITOR) 10 MG tablet Take 1 tablet by mouth Daily. 90 tablet 1   • levothyroxine (SYNTHROID, LEVOTHROID) 125 MCG tablet Take 1 tablet by mouth Daily. 90 tablet 1   • sertraline (ZOLOFT) 50 MG tablet Take 1 tablet by mouth Daily. 90 tablet 1     No facility-administered medications prior to visit.       Patient Active Problem List   Diagnosis   • Esophageal reflux   • Hyperlipidemia   • Hypothyroidism   • Major depressive disorder, recurrent episode, in full remission  "(CMS/[facility])   • Pulmonary fibrosis (CMS/HCC)   • Pulmonary hypertension (CMS/HCC)   • Sleep apnea, obstructive   • Vertigo, peripheral   • Impaired fasting glucose   • Iron deficiency anemia   • Malabsorption of iron   • Essential hypertension   • Diverticulosis   • Hemorrhoids   • Malignant neoplasm of ascending colon (CMS/HCC)   • Elevated CEA   • Diastolic congestive heart failure (CMS/HCC)   • Chronic respiratory failure (CMS/HCC)   • PAH (pulmonary artery hypertension) (CMS/[facility])       Advanced Care Planning:  ACP discussion was held with the patient during this visit. Patient has an advance directive in EMR which is still valid.     Review of Systems    Compared to one year ago, the patient feels her physical health is worse.  Compared to one year ago, the patient feels her mental health is the same.    Reviewed chart for potential of high risk medication in the elderly: yes  Reviewed chart for potential of harmful drug interactions in the elderly:yes    Objective         Vitals:    07/01/21 1035   BP: 138/66  Comment: candida bp done today left arm   Pulse: (!) 46   Resp: 14   Temp: 97 °F (36.1 °C)   TempSrc: Oral   SpO2: 100%   Weight: 60.3 kg (133 lb)   Height: 154.9 cm (60.98\")   PainSc: 0-No pain       Body mass index is 25.15 kg/m².  Discussed the patient's BMI with her. The BMI is in the acceptable range.    Physical Exam          Assessment/Plan   Medicare Risks and Personalized Health Plan  CMS Preventative Services Quick Reference  colon cancer    The above risks/problems have been discussed with the patient.  Pertinent information has been shared with the patient in the After Visit Summary.  Follow up plans and orders are seen below in the Assessment/Plan Section.    Diagnoses and all orders for this visit:    1. Medicare annual wellness visit, subsequent (Primary)    2. Mixed hyperlipidemia  -     atorvastatin (LIPITOR) 10 MG tablet; Take 1 tablet by mouth Daily.  Dispense: 90 tablet; Refill: 1  -     " Lipid Panel    3. Major depressive disorder, recurrent episode, in full remission (CMS/HCC)  -     sertraline (ZOLOFT) 50 MG tablet; Take 1 tablet by mouth Daily.  Dispense: 90 tablet; Refill: 1    4. Acquired hypothyroidism  -     levothyroxine (SYNTHROID, LEVOTHROID) 125 MCG tablet; Take 1 tablet by mouth Daily.  Dispense: 90 tablet; Refill: 1  -     TSH  -     T4, Free      Follow Up:  Return in about 6 months (around 1/1/2022) for Recheck.     An After Visit Summary and PPPS were given to the patient.

## 2021-07-01 NOTE — PATIENT INSTRUCTIONS
Medicare Wellness  Personal Prevention Plan of Service     Date of Office Visit:  2021  Encounter Provider:  Enmanuel Berger MD  Place of Service:  [facility]  Patient Name: Madalyn Galeas  :  1940    As part of the Medicare Wellness portion of your visit today, we are providing you with this personalized preventive plan of services (PPPS). This plan is based upon recommendations of the [place] Preventive Services Task Force (USPSTF) and the Advisory Committee on Immunization Practices (ACIP).    This lists the preventive care services that should be considered, and provides dates of when you are due. Items listed as completed are up-to-date and do not require any further intervention.    Health Maintenance   Topic Date Due   • URINE MICROALBUMIN  Never done   • HEMOGLOBIN A1C  2020   • DXA SCAN  04/10/2021   • LIPID PANEL  2021   • ZOSTER VACCINE (2 of 3) 2021 (Originally 2012)   • INFLUENZA VACCINE  2021   • DIABETIC EYE EXAM  2022   • ANNUAL WELLNESS VISIT  2022   • TDAP/TD VACCINES (2 - Td or Tdap) 2022   • MAMMOGRAM  2023   • COLONOSCOPY  2031   • COVID-19 Vaccine  Completed   • Pneumococcal Vaccine 65+  Completed       No orders of the defined types were placed in this encounter.      Return in about 6 months (around 2022) for Recheck.

## 2021-08-24 ENCOUNTER — TELEPHONE (OUTPATIENT)
Dept: ONCOLOGY | Facility: CLINIC | Age: 81
End: 2021-08-24

## 2021-08-24 NOTE — TELEPHONE ENCOUNTER
Caller: PT    Relationship to patient: SELF    Best call back number: [phone] OK TO LEAVE A  MSG     Chief complaint: DOES NOT WANT TO SEE NP ONLY WANTS TO SEE DR SOUSA    Type of visit: LABS AND F/U    Requested date: ANY DAY IS OK EXCEPT 9/14 OR 9/16 AND NEEDS A 3PM APPT    If rescheduling, when is the original appointment: 9/7

## 2021-09-28 ENCOUNTER — OFFICE VISIT (OUTPATIENT)
Dept: ONCOLOGY | Facility: CLINIC | Age: 81
End: 2021-09-28

## 2021-09-28 ENCOUNTER — LAB (OUTPATIENT)
Dept: LAB | Facility: HOSPITAL | Age: 81
End: 2021-09-28

## 2021-09-28 ENCOUNTER — TELEPHONE (OUTPATIENT)
Dept: ONCOLOGY | Facility: CLINIC | Age: 81
End: 2021-09-28

## 2021-09-28 VITALS
BODY MASS INDEX: 24.87 KG/M2 | OXYGEN SATURATION: 99 % | WEIGHT: 131.7 LBS | HEART RATE: 50 BPM | SYSTOLIC BLOOD PRESSURE: 156 MMHG | HEIGHT: 61 IN | TEMPERATURE: 97.8 F | DIASTOLIC BLOOD PRESSURE: 67 MMHG | RESPIRATION RATE: 18 BRPM

## 2021-09-28 DIAGNOSIS — J84.10 PULMONARY FIBROSIS (HCC): ICD-10-CM

## 2021-09-28 DIAGNOSIS — C18.2 MALIGNANT NEOPLASM OF ASCENDING COLON (HCC): Primary | ICD-10-CM

## 2021-09-28 DIAGNOSIS — D50.0 IRON DEFICIENCY ANEMIA DUE TO CHRONIC BLOOD LOSS: ICD-10-CM

## 2021-09-28 DIAGNOSIS — K90.9 MALABSORPTION OF IRON: ICD-10-CM

## 2021-09-28 DIAGNOSIS — C18.2 MALIGNANT NEOPLASM OF ASCENDING COLON (HCC): ICD-10-CM

## 2021-09-28 DIAGNOSIS — R97.0 ELEVATED CEA: ICD-10-CM

## 2021-09-28 LAB
ALBUMIN SERPL-MCNC: 3.8 G/DL (ref 3.5–5.2)
ALBUMIN/GLOB SERPL: 1.4 G/DL (ref 1.1–2.4)
ALP SERPL-CCNC: 77 U/L (ref 38–116)
ALT SERPL W P-5'-P-CCNC: <5 U/L (ref 0–33)
ANION GAP SERPL CALCULATED.3IONS-SCNC: 8.4 MMOL/L (ref 5–15)
AST SERPL-CCNC: 16 U/L (ref 0–32)
BASOPHILS # BLD AUTO: 0.05 10*3/MM3 (ref 0–0.2)
BASOPHILS NFR BLD AUTO: 0.6 % (ref 0–1.5)
BILIRUB SERPL-MCNC: 0.2 MG/DL (ref 0.2–1.2)
BUN SERPL-MCNC: 14 MG/DL (ref 6–20)
BUN/CREAT SERPL: 21.9 (ref 7.3–30)
CALCIUM SPEC-SCNC: 9.8 MG/DL (ref 8.5–10.2)
CEA SERPL-MCNC: 7.04 NG/ML
CHLORIDE SERPL-SCNC: 103 MMOL/L (ref 98–107)
CO2 SERPL-SCNC: 31.6 MMOL/L (ref 22–29)
CREAT SERPL-MCNC: 0.64 MG/DL (ref 0.6–1.1)
DEPRECATED RDW RBC AUTO: 40.1 FL (ref 37–54)
EOSINOPHIL # BLD AUTO: 0.1 10*3/MM3 (ref 0–0.4)
EOSINOPHIL NFR BLD AUTO: 1.2 % (ref 0.3–6.2)
ERYTHROCYTE [DISTWIDTH] IN BLOOD BY AUTOMATED COUNT: 12.6 % (ref 12.3–15.4)
FERRITIN SERPL-MCNC: 169.1 NG/ML (ref 13–150)
GFR SERPL CREATININE-BSD FRML MDRD: 89 ML/MIN/1.73
GLOBULIN UR ELPH-MCNC: 2.7 GM/DL (ref 1.8–3.5)
GLUCOSE SERPL-MCNC: 82 MG/DL (ref 74–124)
HCT VFR BLD AUTO: 37.3 % (ref 34–46.6)
HGB BLD-MCNC: 12.1 G/DL (ref 12–15.9)
HGB RETIC QN AUTO: 32.2 PG (ref 29.8–36.1)
IMM GRANULOCYTES # BLD AUTO: 0.02 10*3/MM3 (ref 0–0.05)
IMM GRANULOCYTES NFR BLD AUTO: 0.2 % (ref 0–0.5)
IMM RETICS NFR: 2.2 % (ref 3–15.8)
IRON 24H UR-MRATE: 43 MCG/DL (ref 37–145)
IRON SATN MFR SERPL: 15 % (ref 14–48)
LYMPHOCYTES # BLD AUTO: 1.27 10*3/MM3 (ref 0.7–3.1)
LYMPHOCYTES NFR BLD AUTO: 15.7 % (ref 19.6–45.3)
MCH RBC QN AUTO: 28.4 PG (ref 26.6–33)
MCHC RBC AUTO-ENTMCNC: 32.4 G/DL (ref 31.5–35.7)
MCV RBC AUTO: 87.6 FL (ref 79–97)
MONOCYTES # BLD AUTO: 0.69 10*3/MM3 (ref 0.1–0.9)
MONOCYTES NFR BLD AUTO: 8.5 % (ref 5–12)
NEUTROPHILS NFR BLD AUTO: 5.96 10*3/MM3 (ref 1.7–7)
NEUTROPHILS NFR BLD AUTO: 73.8 % (ref 42.7–76)
NRBC BLD AUTO-RTO: 0 /100 WBC (ref 0–0.2)
PLATELET # BLD AUTO: 180 10*3/MM3 (ref 140–450)
PMV BLD AUTO: 8.8 FL (ref 6–12)
POTASSIUM SERPL-SCNC: 3.9 MMOL/L (ref 3.5–4.7)
PROT SERPL-MCNC: 6.5 G/DL (ref 6.3–8)
RBC # BLD AUTO: 4.26 10*6/MM3 (ref 3.77–5.28)
RETICS # AUTO: 0.05 10*6/MM3 (ref 0.02–0.13)
RETICS/RBC NFR AUTO: 1.08 % (ref 0.7–1.9)
SODIUM SERPL-SCNC: 143 MMOL/L (ref 134–145)
TIBC SERPL-MCNC: 280 MCG/DL (ref 249–505)
TRANSFERRIN SERPL-MCNC: 200 MG/DL (ref 200–360)
WBC # BLD AUTO: 8.09 10*3/MM3 (ref 3.4–10.8)

## 2021-09-28 PROCEDURE — 84466 ASSAY OF TRANSFERRIN: CPT

## 2021-09-28 PROCEDURE — 80053 COMPREHEN METABOLIC PANEL: CPT

## 2021-09-28 PROCEDURE — 99214 OFFICE O/P EST MOD 30 MIN: CPT | Performed by: INTERNAL MEDICINE

## 2021-09-28 PROCEDURE — 85046 RETICYTE/HGB CONCENTRATE: CPT

## 2021-09-28 PROCEDURE — 83540 ASSAY OF IRON: CPT

## 2021-09-28 PROCEDURE — 82378 CARCINOEMBRYONIC ANTIGEN: CPT | Performed by: INTERNAL MEDICINE

## 2021-09-28 PROCEDURE — 36415 COLL VENOUS BLD VENIPUNCTURE: CPT

## 2021-09-28 PROCEDURE — 82728 ASSAY OF FERRITIN: CPT

## 2021-09-28 PROCEDURE — 85025 COMPLETE CBC W/AUTO DIFF WBC: CPT

## 2021-09-28 RX ORDER — INFLUENZA A VIRUS A/MICHIGAN/45/2015 X-275 (H1N1) ANTIGEN (FORMALDEHYDE INACTIVATED), INFLUENZA A VIRUS A/SINGAPORE/INFIMH-16-0019/2016 IVR-186 (H3N2) ANTIGEN (FORMALDEHYDE INACTIVATED), INFLUENZA B VIRUS B/PHUKET/3073/2013 ANTIGEN (FORMALDEHYDE INACTIVATED), AND INFLUENZA B VIRUS B/MARYLAND/15/2016 BX-69A ANTIGEN (FORMALDEHYDE INACTIVATED) 60; 60; 60; 60 UG/.7ML; UG/.7ML; UG/.7ML; UG/.7ML
INJECTION, SUSPENSION INTRAMUSCULAR
COMMUNITY
Start: 2021-09-17

## 2021-09-28 NOTE — TELEPHONE ENCOUNTER
Caller: Micha Galeas    Relationship: Self    Best call back number: [phone]    What is the best time to reach you: ANYTIME UNTIL 2:30PM TODAY     Who are you requesting to speak with (clinical staff, provider,  specific staff member):   OFFICE    Do you know the name of the person who called: MICHA    What was the call regarding:   WANTED TO KNOW IF DAUGHTER CAN SIT IN WAITING ROOM WHILE GETTING LABS DRAWN AND COME BACK WITH HER TO DR AGUIAR TODAY 09/28/21    Do you require a callback: YES

## 2021-09-29 ENCOUNTER — TELEPHONE (OUTPATIENT)
Dept: ONCOLOGY | Facility: CLINIC | Age: 81
End: 2021-09-29

## 2021-09-29 DIAGNOSIS — E03.9 ACQUIRED HYPOTHYROIDISM: Chronic | ICD-10-CM

## 2021-09-29 LAB
T4 FREE SERPL-MCNC: 1.77 NG/DL (ref 0.82–1.77)
TSH SERPL DL<=0.005 MIU/L-ACNC: 0.03 UIU/ML (ref 0.45–4.5)

## 2021-09-29 RX ORDER — LEVOTHYROXINE SODIUM 112 UG/1
112 TABLET ORAL DAILY
Qty: 90 TABLET | Refills: 1 | Status: SHIPPED | OUTPATIENT
Start: 2021-09-29 | End: 2022-01-08 | Stop reason: SDUPTHER

## 2021-09-29 NOTE — TELEPHONE ENCOUNTER
Called the patient to let her know her CEA or tumor marker was better and her ferritin and iron looked great. Patient v/u.

## 2021-09-29 NOTE — PROGRESS NOTES
Subjective         DURING THE VISIT WITH THE PATIENT TODAY , PATIENT HAD FACE MASK, MY MEDICAL ASSISTANT AND I  HAD PROPPER PROTECTIVE EQUIPMENT, AND I DID HAND HYGIENE WITH SOAP AND WATER BEFORE AND AFTER THE VISIT.     REASON FOR FOLLOW UP: 1.IRON DEFICIENCY ANEMIA,  DUE TO CHRONIC GI BLOOD LOSS, CHRONIC DIARRHEA, MALNUTRITION, WEIGHT LOSS, ABDOMINAL MASS RUQ: colon cancer diagnosed, removed, no need for adjuvant therapy for stage 2 disease    2.Ascending colon cancer s/p colectomy.    3. ELEVATED CEA AFTER COLECTOMY: PET SCAN NEGATIVE IN 6/17/21    This patient returns today to the office in company of her daughter stating that she has been feeling very well lately. She feels so well that she went to [employer] recently and she bought a bunch of bags of candy and junk food, she ate it all and she had terrible diarrhea and indigestion. She has learned a lesson. Other than that on routine basis when she eats normal food she has no issues in regard to bowel activity, abdominal pain or passage of blood. Her shortness of breath associated with her chronic lung disease is about the same. She has minimal cough, she remains on oxygen. Her weight is stable. Urination is normal. She has no pain. No rashes in the skin. She is functioning relatively well given the circumstances.       Past Medical History:   Diagnosis Date   • Adenomatous polyp of sigmoid colon 1/21/2021   • Anemia    • Colonic polyp    • Congestive heart failure (CMS/HCC)    • Depression    • Diverticulosis    • Esophageal reflux    • Essential hypertension    • GERD (gastroesophageal reflux disease)    • H/O complete eye exam 03/2018   • Hx of bone density study 02/15/2016   • Hyperlipidemia    • Hypothyroidism, acquired    • Major depressive disorder, recurrent episode, in full remission (CMS/HCC)    • Nonsenile cataract 2016   • Oxygen dependent     8L NC CONTINUOUS   • Pulmonary fibrosis (CMS/HCC)    • Pulmonary hypertension (CMS/HCC)    • Sleep  apnea, obstructive     USES CPAP    • Stenosis, spinal, lumbar    • Vertigo, peripheral         Past Surgical History:   Procedure Laterality Date   • COLON RESECTION N/A 1/28/2021    Procedure: COLON RESECTION RIGHT;  Surgeon: Alfonso Cesar MD;  Location: [facility] MAIN OR;  Service: General;  Laterality: N/A;   • COLONOSCOPY  02/20/2012   • COLONOSCOPY N/A 1/21/2021    Procedure: COLONOSCOPY INTO CECUM AND TI WITH BIOPSIES, SPOT INJECTION TO COLON MASS (MID-ASCENDING), HOT SNARE POLYPECTOMY, COLD BX POLYPECTOMIES, SALINE LIFT, CLIP PLACEMENT X3;  Surgeon: Alfonso Cesar MD;  Location: [facility] ENDOSCOPY;  Service: General;  Laterality: N/A;  PRE:  ABNORMAL CT SCAN OF COLON  POST:  DIVERTICULOSIS, COLON MASS, POLYPS, HEMORRHOIDS   • INCONTINENCE SURGERY      Dr. Espinal   • MAMMO BILATERAL  2017    dr de la cruz    • PAP SMEAR  2014    dr de la cruz        Current Outpatient Medications on File Prior to Visit   Medication Sig Dispense Refill   • acetaminophen (TYLENOL) 500 MG tablet Take 1,000 mg by mouth Every 6 (Six) Hours As Needed for Mild Pain .     • ADCIRCA 20 MG tablet Take 40 mg by mouth Daily.     • ascorbic acid (VITAMIN C) 500 MG tablet Take 500 mg by mouth Daily.     • atorvastatin (LIPITOR) 10 MG tablet Take 1 tablet by mouth Daily. 90 tablet 1   • ESBRIET 267 MG capsule Take 801 mg by mouth 3 (Three) Times a Day With Meals.     • Fluzone High-Dose Quadrivalent 0.7 ML suspension prefilled syringe injection      • levothyroxine (SYNTHROID, LEVOTHROID) 125 MCG tablet Take 1 tablet by mouth Daily. 90 tablet 1   • O2 (OXYGEN) Inhale 8 L/min Continuous.     • omeprazole (priLOSEC) 40 MG capsule Take 1 capsule by mouth 2 (two) times a day. 180 capsule 3   • sertraline (ZOLOFT) 50 MG tablet Take 1 tablet by mouth Daily. 90 tablet 1   • verapamil SR (CALAN-SR) 120 MG CR tablet Take 120 mg by mouth Daily.  3     No current facility-administered medications on file prior to visit.         ALLERGIES:    Allergies   Allergen Reactions   • Sulfa Antibiotics Hives and Rash        Social History     Socioeconomic History   • Marital status:      Spouse name: Not on file   • Number of children: Not on file   • Years of education: Not on file   • Highest education level: Not on file   Tobacco Use   • Smoking status: Former Smoker     Packs/day: 2.00     Types: Cigarettes     Start date:      Quit date:      Years since quittin.7   • Smokeless tobacco: Never Used   • Tobacco comment: Caffeine use: Drinks Coca Cola    Vaping Use   • Vaping Use: Never used   Substance and Sexual Activity   • Alcohol use: Yes     Comment: rare   • Drug use: No   • Sexual activity: Never     Partners: Male        Family History   Problem Relation Age of Onset   • Breast cancer Cousin    • Lung cancer Father    • Heart disease Father    • Colon cancer Brother    • Prostate cancer Brother    • Lung disease Brother    • Emphysema Brother    • Heart disease Mother    • Malig Hyperthermia Neg Hx       Current Outpatient Medications on File Prior to Visit   Medication Sig Dispense Refill   • acetaminophen (TYLENOL) 500 MG tablet Take 1,000 mg by mouth Every 6 (Six) Hours As Needed for Mild Pain .     • ADCIRCA 20 MG tablet Take 40 mg by mouth Daily.     • ascorbic acid (VITAMIN C) 500 MG tablet Take 500 mg by mouth Daily.     • atorvastatin (LIPITOR) 10 MG tablet Take 1 tablet by mouth Daily. 90 tablet 1   • ESBRIET 267 MG capsule Take 801 mg by mouth 3 (Three) Times a Day With Meals.     • Fluzone High-Dose Quadrivalent 0.7 ML suspension prefilled syringe injection      • levothyroxine (SYNTHROID, LEVOTHROID) 125 MCG tablet Take 1 tablet by mouth Daily. 90 tablet 1   • O2 (OXYGEN) Inhale 8 L/min Continuous.     • omeprazole (priLOSEC) 40 MG capsule Take 1 capsule by mouth 2 (two) times a day. 180 capsule 3   • sertraline (ZOLOFT) 50 MG tablet Take 1 tablet by mouth Daily. 90 tablet 1   • verapamil SR  "(CALAN-SR) 120 MG CR tablet Take 120 mg by mouth Daily.  3     No current facility-administered medications on file prior to visit.       Objective    Vitals:    09/28/21 1533   BP: 156/67   Pulse: 50   Resp: 18   Temp: 97.8 °F (36.6 °C)   TempSrc: Temporal   SpO2: 99%   Weight: 59.7 kg (131 lb 11.2 oz)   Height: 154.9 cm (60.98\")           Physical Exam I HAVE PERSONALLY REVIEWED THE HISTORY OF THE PRESENT ILLNESS, PAST MEDICAL HISTORY, FAMILY HISTORY, SOCIAL HISTORY, ALLERGIES, MEDICATIONS STATED ABOVE IN THE  NOTE FROM TODAY.        GENERAL:  Well-developed, well-nourished  Patient  in no acute distress.   SKIN:  Warm, dry ,NO rashes,NO purpura ,NO petechiae.  HEENT:  Pupils were equal and reactive to light and accomodation, conjunctivae noninjected, no pterygium, normal extraocular movements, normal visual acuity.   NECK:  Supple with good range of motion; no thyromegaly , no other masses, no JVD or bruits, no cervical adenopathies.No carotid artery pain, no carotid abnormal pulsation , NO arterial dance.  LYMPHATICS:  No cervical, NO supraclavicular, NO axillary,NO epitrochlear , NO inguinal adenopathy.  CARDIAC   normal rate and regular rhythm, without murmur,NO rubs NO S3 NO S4 right or left .  LUNGS: normal breath sounds bilateral, no wheezing, rhonchi, crackles or rubs.  VASCULAR VENOUS: no cyanosis, collateral circulation, varicosities, edema, palpable cords, pain, erythema.  ABDOMEN:  Soft, nontender with no hepatomegaly, no splenomegaly,no masses, no ascites, no collateral circulation,no distention,no Bib sign.  EXTREMITIES  AND SPINE:  No clubbing, cyanosis or edema, no deformities , no pain .No kyphosis, scoliosis, no other deformities, no pain in spine, no pain in ribs , no pain inpelvic bone.  NEUROLOGICAL:  Patient was awake, alert, oriented to time, person and place.              RECENT LABS:    Specimen Information: Blood         0 Result Notes  View Full Report  Component   Ref Range & Units 1 " d ago   WBC   3.40 - 10.80 10*3/mm3 8.09    RBC   3.77 - 5.28 10*6/mm3 4.26    Hemoglobin   12.0 - 15.9 g/dL 12.1    Hematocrit   34.0 - 46.6 % 37.3    MCV   79.0 - 97.0 fL 87.6    MCH   26.6 - 33.0 pg 28.4    MCHC   31.5 - 35.7 g/dL 32.4    RDW   12.3 - 15.4 % 12.6    RDW-SD   37.0 - 54.0 fl 40.1    MPV   6.0 - 12.0 fL 8.8    Platelets   140 - 450 10*3/mm3 180    Neutrophil %   42.7 - 76.0 % 73.8    Lymphocyte %   19.6 - 45.3 % 15.7Low     Monocyte %   5.0 - 12.0 % 8.5    Eosinophil %   0.3 - 6.2 % 1.2    Basophil %   0.0 - 1.5 % 0.6    Immature Grans %   0.0 - 0.5 % 0.2    Neutrophils, Absolute   1.70 - 7.00 10*3/mm3 5.96    Lymphocytes, Absolute   0.70 - 3.10 10*3/mm3 1.27    Monocytes, Absolute   0.10 - 0.90 10*3/mm3 0.69    Eosinophils, Absolute   0.00 - 0.40 10*3/mm3 0.10    Basophils, Absolute   0.00 - 0.20 10*3/mm3 0.05    Immature Grans, Absolute   0.00 - 0.05 10*3/mm3 0.02    nRBC   0.0 - 0.2 /100 WBC 0.0                Contains abnormal data Comprehensive Metabolic Panel  Order: [id number]  Collected:  9/28/2021 15:21  Specimen Information: Blood         0 Result Notes  View Full Report  Component   Ref Range & Units 1 d ago   Glucose   74 - 124 mg/dL 82    BUN   6 - 20 mg/dL 14    Creatinine   0.60 - 1.10 mg/dL 0.64    Sodium   134 - 145 mmol/L 143    Potassium   3.5 - 4.7 mmol/L 3.9    Chloride   98 - 107 mmol/L 103    CO2   22.0 - 29.0 mmol/L 31.6High     Calcium   8.5 - 10.2 mg/dL 9.8    Total Protein   6.3 - 8.0 g/dL 6.5    Albumin   3.50 - 5.20 g/dL 3.80    ALT (SGPT)   0 - 33 U/L <5    AST (SGOT)   0 - 32 U/L 16    Alkaline Phosphatase   38 - 116 U/L 77    Total Bilirubin   0.2 - 1.2 mg/dL 0.2    eGFR Non African Amer   >60 mL/min/1.73 89    Globulin   1.8 - 3.5 gm/dL 2.7    A/G Ratio   1.1 - 2.4 g/dL 1.4    BUN/Creatinine Ratio   7.3 - 30.0 21.9    Anion Gap   5.0 - 15.0 mmol/L 8.4       Narrative    The MDRD GFR formula is only valid for adults with stable renal function between ages 18 and 70.          Related Result Highlights     Ferritin  Final result 9/28/2021             Iron Profile  Final result 9/28/2021                  CEA  Order: [id number]  Collected:  9/28/2021 15:21  Specimen Information: Blood         0 Result Notes  View Full Report  Component   Ref Range & Units 1 d ago   CEA   ng/mL 7.04       Narrative    CEA Reference Range:     Non Smokers:   Less than 3 ng/mL   Smokers:       Less than 5 ng/mL   Results may be falsely decreased if patient taking Biotin.             Contains abnormal data Ferritin  Order: [id number]  Collected:  9/28/2021 15:21  Specimen Information: Blood         0 Result Notes  View Full Report  Component   Ref Range & Units 1 d ago   Ferritin   13.00 - 150.00 ng/mL 169.10High        Narrative    Results may be falsely decreased if patient taking Biotin.          Related Result Highlights     Comprehensive Metabolic Panel  Final result 9/28/2021             Iron Profile  Final result 9/28/2021                  Iron Profile  Order: [id number]  Collected:  9/28/2021 15:21  Specimen Information: Blood         0 Result Notes  View Full Report  Component   Ref Range & Units 1 d ago   Iron   37 - 145 mcg/dL 43    Iron Saturation   14 - 48 % 15    Transferrin   200 - 360 mg/dL 200    TIBC   249 - 505 mcg/dL 280             Related Result Highlights     Comprehensive Metabolic Panel  Final result 9/28/2021             Ferritin  Final result 9/28/2021                  Contains abnormal data Retic With IRF & RET-He  Order: [id number]  Collected:  9/28/2021 15:21  Specimen Information: Blood         0 Result Notes  View Full Report  Component   Ref Range & Units 1 d ago   Immature Reticulocyte Fraction   3.0 - 15.8 % 2.2Low     Reticulocyte %   0.70 - 1.90 % 1.08    Reticulocyte Absolute   0.0200 - 0.1300 10*6/mm3 0.0460    Reticulocyte Hgb   29.8 - 36.1 pg 32.2             Related Result Highlights     CBC Auto Differential  Final result 9/28/2021                  TSH  Order:  [id number]  Collected:  9/28/2021 15:23 Status:  In process  Specimen Information: Blood         0 Result Notes  View Full Report           Related Result Highlights     T4, Free  In process                               Assessment/Plan     Diagnoses and all orders for this visit:    1. Malignant neoplasm of ascending colon (CMS/HCC) (Primary)  -     Comprehensive Metabolic Panel; Future  -     CBC & Differential; Future  -     Retic With IRF & RET-He; Future  -     Iron Profile; Future  -     Ferritin; Future  -     CEA; Future    2. Elevated CEA  -     Comprehensive Metabolic Panel; Future  -     CBC & Differential; Future  -     Retic With IRF & RET-He; Future  -     Iron Profile; Future  -     Ferritin; Future  -     CEA; Future    3. Iron deficiency anemia due to chronic blood loss  -     Comprehensive Metabolic Panel; Future  -     CBC & Differential; Future  -     Retic With IRF & RET-He; Future  -     Iron Profile; Future  -     Ferritin; Future  -     CEA; Future    4. Malabsorption of iron  -     Comprehensive Metabolic Panel; Future  -     CBC & Differential; Future  -     Retic With IRF & RET-He; Future  -     Iron Profile; Future  -     Ferritin; Future  -     CEA; Future              In summary this patient has progressive anemia. I documented in the laboratory parameters above progressive drop in the hemoglobin, progressive drop in the MCV. Morphology of peripheral blood is typical of iron deficiency anemia and I would not be surprised if she has an associated B12 or folate deficiency. This patient has had upper GI series and barium enema by GI, Dr. Boyd, and no anatomical alteration has been found in her upper GI tract or colon besides diverticular disease. The symptoms that she has today she blames them, especially the diarrhea, since the time that she had the barium enema. I think that was not an association. The fact that she has so much diarrhea day and night and with weight loss, profound  fatigue, sarcopenia leads me to believe that it is some other entity that is producing this. It sounds like to me she has a malabsorptive process of some sort.     The patient also has a palpable mass in the right upper quadrant and the way how it feels to my hands, it is probably a palpable gallbladder. She had no pain there. This is very striking. Her abdomen is very soft and it is very easy to find.     Under the present circumstances and knowing that the patient cannot tolerate oral iron because it makes her to throw up and produce profuse diarrhea and makes the diarrhea worse, I do believe that the patient needs to have the followin. For the workup for her anemia, she went back to the lab to obtain a comprehensive metabolic profile, ferritin, iron, TIBC, B12, folic acid level, reticulated hemoglobin.   2. Given the fact that the patient has had a TSH that was on the low side maybe her thyroid medication is overcorrecting her and this maybe this is part of the diarrhea process. I will obtain a TSH and a T4.   3. I do believe that the patient needs to have a CT scan of the abdomen and pelvis. I do not think she can handle oral contrast and I do not want to give her IV contrast, I find no need. I want to be sure there is no other anatomical alteration in her small intestine and GI tract that is favoring for her to have all the issues pertinent to her clinical symptoms.     I wonder if she could have some other process in her bowel including inflammatory bowel disease, bacterial overgrowth syndrome, celiac disease and differential diagnosis is more than extensive. This information would require to be shared with Dr. Boyd, her Gastroenterologist, for further review after she returns back in a few days after she proceeds with proper management of her anemia. Given the fact that she cannot handle oral iron by mouth, absolutely the patient needs to have Injectafer on 2 different occasions and this will be  scheduled to start to happen as early as TODAY.   I discussed with the patient on 12/04/2020 after reviewing the case with the radiologist Dr. Pinto at [facility] in regards to her CT scan of the abdomen. This documents intussusception of the colon and there is a mass located in that that probably represents a colon cancer. Obviously, the question is how to proceed with all these issues on this patient. It is very obvious that she has profound iron deficiency anemia. Her ferritin level, iron level are low. She cannot handle oral iron therapy and she will initiate Injectafer today and she will have the 2nd infusion next week. I expect that in 3 weeks she will have very significant rebound in erythropoiesis and I expect that her pulmonary manifestations will be better in regard to her pulmonary hypertension and her pulmonary fibrosis. Typically patient's with this condition develop polycythemia instead of anemia.    The other issue is what we are going to do with her colon. I do not think that the patient is a normal surgical candidate under the present circumstances and we need to try to fix her up in the best way possible to see if she gets to the point to have a resection of this issue. I have made her an appointment to be seen by cardiology, Rambo Dasilva MD, and proceed with an echocardiogram in days. We have also made an appointment for her to see Alfonso Cesar MD, General Surgery to review the case with me.       I reviewed the patient back with the daughter in the room on 12/10/2020. In the interim, I have had a conversation with Zita Guy MD, the patient’s pulmonologist, who strongly believes that the patient will be able to go through surgery as long as she has proper pulmonary assessment before intervention and she asked me to go ahead and consult Pulmonary Medicine, Dr. Ramos and associates for this purpose. A consult has been placed.     I reviewed her echocardiogram and actually it is  better than I expected for somebody who has pulmonary fibrosis.     In summary I think the patient’s intussusception is producing bleeding, chronically, leading to iron deficiency anemia. She has had Injectafer a week ago, another one to be given to her tomorrow and thereafter she will require blood work on weekly basis. If the hemoglobin goes below 9, I think she will require transfusion of 2 units of red cells. She will have weekly appointment for CBC and nurse check and I will review her back on 12/30/2020.     I EMPHATICALLY POINTED OUT TO THE PATIENT’S DAUGHTER SHE CANNOT TAKE ANYMORE ZULMA-SELTZER. SHE HAS BEEN TAKING THIS ON A REGULAR SCHEDULE AND THIS IS PRODUCING PROBABLY ALSO AN ELEMENT OF GI BLEEDING ASSOCIATED WITH HER INTUSSUSCEPTION AND WHY NOT GASTRITIS AND SO FORTH.     In summary the patient was reviewed on 12/30/2020 along with her daughter in the room. The patient has gone through cardiological assessment and actually her cardiac ejection fraction is not that bad, close to 50%, and with minimal evidence of pulmonary arterial hypertension. Normal contractility of the left ventricle. The patient also has been seen by Pulmonary Medicine. These records have been reviewed again and her pulmonary function tests is very marginal. The analysis is stated above by Dr. Moser who has advised the patient that she is a high risk for general anesthesia in regard to the need for ICU stay, intubation, mechanical ventilation and so forth given her pulmonary fibrosis. He has advised the patient to see Dr. Guy, Pulmonary Medicine, and she has an appointment to see her next week.     In regard to her iron deficiency anemia after she received IV iron infusions the hemoglobin keeps improving to 11.2 from 8.4. The patient is no longer taking Zulma-Allison. The patient is a little bit stronger but her appetite is not good. Obviously the question is what is the cause of the intussusception. I have measured a CEA level that  is elevated at 6.2 and leads me to believe that maybe this is something related to a primary colon cancer. Dr. Cesar has suggested for her to have a colonoscopy after reviewing his good notes. The patient is even more hesitant to proceed with this.       The patient was further reviewed in the office on 02/22/2021. Since the previous visit and after I discussed the case with Dr. Zita Guy, the patient’s pulmonologist, I advised Dr. Cesar to go ahead and proceed with her surgery. This happened and actually to the surprise of everybody the patient walked away from the hospital 3 days later with no complications or issues pertinent to her lung disease. The patient had also excellent tolerance to her colonoscopy. Since she has been at home she has been gaining appetite and gaining weight. She is feeling substantially better. She has no abdominal pain. Her surgical site has healed. Her bowel activity is normal and urination is normal. She remains on oxygen. She is walking around the house and she is doing more things that she was not doing before. She is not sitting down complaining of a bad stomach all the time. She is not taking [place] as I pointed out to them before.     I have reviewed the pathology of the surgical specimen and she had a T3N0M0 colonic cancer with 14 lymph nodes removed, negative, with no perineural, perivascular or perivenous invasion. The margins of resection were completely clear. On the CT scan of the abdomen, her liver was completely unremarkable.         I reviewed with the patient on the telephone on 06/17/2021 after personally reviewing the PET scan in the PAC system [facility] that shows no areas of abnormal uptake in her lungs, no areas of abnormal uptake in the liver, retroperitoneal lymph nodes or any other site as far as I can tell. The radiologists reading is about the same.     The good news to me is that the PET scan is negative, the patient is feeling better.  She remains fully functional, her anemia is corrected after all of the bleeding that she did out of her colon cancer with improving hemoglobin.    Obviously raises the question why the CEA level is elevated. The only reasonable explanation could be the chronic inflammatory process associated with her chronic lung disease. She does not smoke. She is not around anybody who smokes and obviously it is the only logical explanation.       The patient was further reviewed on 09/28/2021. Her overall clinical condition is excellent. As long as she does not eat junk food she has no diarrhea, abdominal pain or discomfort. Her weight is stable. She feels substantially better in regard to her ability to function independently at home. She has no pain related to malignancy, urination is normal, her respiratory situation remains stable, she remains on oxygen and she has not had any other respiratory infections.     The clinical examination of the patient today is very much negative normal besides her chronic lung disease and her decreased pulmonary auscultation with crackles at the bases. Her heart function seems to be appropriate.     Her white count, hemoglobin and platelets are normal. The white count differential is normal. Her ferritin and iron profile remain normal. Her CEA level actually has dropped some and this in my opinion is related to her chronic lung disease more than anything else. As we have mentioned before we have performed multiple radiological analyses on her including CT scans and PET scans looking for recurrent disease and nothing has been found.     I do believe that the patient in my opinion from the point of view of her colon cancer and her iron deficiency anemia that has been corrected is doing perfectly well. Her chronic lung disease is her chronic lung disease and this will remain on oxygen and followed up by her pulmonologist.     I advised her to go ahead and get the booster shot for COVID and I  advised her also to proceed with flu vaccination.     I will review her back in 3 months with a CBC, CMP, ferritin, iron profile and a CEA level. I find no need for radiological assessment on her at this time.

## 2021-11-03 DIAGNOSIS — E78.2 MIXED HYPERLIPIDEMIA: Chronic | ICD-10-CM

## 2021-11-03 DIAGNOSIS — F33.42 MAJOR DEPRESSIVE DISORDER, RECURRENT EPISODE, IN FULL REMISSION (HCC): Chronic | ICD-10-CM

## 2021-11-03 RX ORDER — ATORVASTATIN CALCIUM 10 MG/1
TABLET, FILM COATED ORAL
Qty: 90 TABLET | Refills: 1 | Status: SHIPPED | OUTPATIENT
Start: 2021-11-03 | End: 2022-01-08 | Stop reason: SDUPTHER

## 2021-12-28 ENCOUNTER — OFFICE VISIT (OUTPATIENT)
Dept: ONCOLOGY | Facility: CLINIC | Age: 81
End: 2021-12-28

## 2021-12-28 ENCOUNTER — LAB (OUTPATIENT)
Dept: LAB | Facility: HOSPITAL | Age: 81
End: 2021-12-28

## 2021-12-28 VITALS
BODY MASS INDEX: 24.62 KG/M2 | WEIGHT: 130.4 LBS | RESPIRATION RATE: 17 BRPM | TEMPERATURE: 97.9 F | HEART RATE: 68 BPM | DIASTOLIC BLOOD PRESSURE: 74 MMHG | OXYGEN SATURATION: 100 % | HEIGHT: 61 IN | SYSTOLIC BLOOD PRESSURE: 127 MMHG

## 2021-12-28 DIAGNOSIS — K90.9 MALABSORPTION OF IRON: ICD-10-CM

## 2021-12-28 DIAGNOSIS — D50.0 IRON DEFICIENCY ANEMIA DUE TO CHRONIC BLOOD LOSS: ICD-10-CM

## 2021-12-28 DIAGNOSIS — C18.2 MALIGNANT NEOPLASM OF ASCENDING COLON (HCC): Primary | ICD-10-CM

## 2021-12-28 DIAGNOSIS — C18.2 MALIGNANT NEOPLASM OF ASCENDING COLON: ICD-10-CM

## 2021-12-28 DIAGNOSIS — R97.0 ELEVATED CEA: ICD-10-CM

## 2021-12-28 LAB
ALBUMIN SERPL-MCNC: 3.9 G/DL (ref 3.5–5.2)
ALBUMIN/GLOB SERPL: 1.3 G/DL (ref 1.1–2.4)
ALP SERPL-CCNC: 84 U/L (ref 38–116)
ALT SERPL W P-5'-P-CCNC: 8 U/L (ref 0–33)
ANION GAP SERPL CALCULATED.3IONS-SCNC: 8.6 MMOL/L (ref 5–15)
AST SERPL-CCNC: 16 U/L (ref 0–32)
BASOPHILS # BLD AUTO: 0.05 10*3/MM3 (ref 0–0.2)
BASOPHILS NFR BLD AUTO: 0.7 % (ref 0–1.5)
BILIRUB SERPL-MCNC: 0.3 MG/DL (ref 0.2–1.2)
BUN SERPL-MCNC: 14 MG/DL (ref 6–20)
BUN/CREAT SERPL: 21.5 (ref 7.3–30)
CALCIUM SPEC-SCNC: 9.9 MG/DL (ref 8.5–10.2)
CEA SERPL-MCNC: 9.6 NG/ML
CHLORIDE SERPL-SCNC: 99 MMOL/L (ref 98–107)
CO2 SERPL-SCNC: 31.4 MMOL/L (ref 22–29)
CREAT SERPL-MCNC: 0.65 MG/DL (ref 0.6–1.1)
DEPRECATED RDW RBC AUTO: 39.6 FL (ref 37–54)
EOSINOPHIL # BLD AUTO: 0.18 10*3/MM3 (ref 0–0.4)
EOSINOPHIL NFR BLD AUTO: 2.5 % (ref 0.3–6.2)
ERYTHROCYTE [DISTWIDTH] IN BLOOD BY AUTOMATED COUNT: 12.6 % (ref 12.3–15.4)
FERRITIN SERPL-MCNC: 212.7 NG/ML (ref 13–150)
GFR SERPL CREATININE-BSD FRML MDRD: 88 ML/MIN/1.73
GLOBULIN UR ELPH-MCNC: 3 GM/DL (ref 1.8–3.5)
GLUCOSE SERPL-MCNC: 101 MG/DL (ref 74–124)
HCT VFR BLD AUTO: 37.5 % (ref 34–46.6)
HGB BLD-MCNC: 11.8 G/DL (ref 12–15.9)
HGB RETIC QN AUTO: 32 PG (ref 29.8–36.1)
IMM GRANULOCYTES # BLD AUTO: 0.02 10*3/MM3 (ref 0–0.05)
IMM GRANULOCYTES NFR BLD AUTO: 0.3 % (ref 0–0.5)
IMM RETICS NFR: 4.4 % (ref 3–15.8)
IRON 24H UR-MRATE: 53 MCG/DL (ref 37–145)
IRON SATN MFR SERPL: 20 % (ref 14–48)
LYMPHOCYTES # BLD AUTO: 1.01 10*3/MM3 (ref 0.7–3.1)
LYMPHOCYTES NFR BLD AUTO: 13.8 % (ref 19.6–45.3)
MCH RBC QN AUTO: 27.3 PG (ref 26.6–33)
MCHC RBC AUTO-ENTMCNC: 31.5 G/DL (ref 31.5–35.7)
MCV RBC AUTO: 86.6 FL (ref 79–97)
MONOCYTES # BLD AUTO: 0.53 10*3/MM3 (ref 0.1–0.9)
MONOCYTES NFR BLD AUTO: 7.2 % (ref 5–12)
NEUTROPHILS NFR BLD AUTO: 5.54 10*3/MM3 (ref 1.7–7)
NEUTROPHILS NFR BLD AUTO: 75.5 % (ref 42.7–76)
NRBC BLD AUTO-RTO: 0 /100 WBC (ref 0–0.2)
PLATELET # BLD AUTO: 225 10*3/MM3 (ref 140–450)
PMV BLD AUTO: 8.6 FL (ref 6–12)
POTASSIUM SERPL-SCNC: 4.2 MMOL/L (ref 3.5–4.7)
PROT SERPL-MCNC: 6.9 G/DL (ref 6.3–8)
RBC # BLD AUTO: 4.33 10*6/MM3 (ref 3.77–5.28)
RETICS # AUTO: 0.04 10*6/MM3 (ref 0.02–0.13)
RETICS/RBC NFR AUTO: 0.99 % (ref 0.7–1.9)
SODIUM SERPL-SCNC: 139 MMOL/L (ref 134–145)
TIBC SERPL-MCNC: 267 MCG/DL (ref 249–505)
TRANSFERRIN SERPL-MCNC: 191 MG/DL (ref 200–360)
WBC NRBC COR # BLD: 7.33 10*3/MM3 (ref 3.4–10.8)

## 2021-12-28 PROCEDURE — 82728 ASSAY OF FERRITIN: CPT

## 2021-12-28 PROCEDURE — 99214 OFFICE O/P EST MOD 30 MIN: CPT | Performed by: INTERNAL MEDICINE

## 2021-12-28 PROCEDURE — 83540 ASSAY OF IRON: CPT

## 2021-12-28 PROCEDURE — 80053 COMPREHEN METABOLIC PANEL: CPT

## 2021-12-28 PROCEDURE — 84466 ASSAY OF TRANSFERRIN: CPT

## 2021-12-28 PROCEDURE — 82378 CARCINOEMBRYONIC ANTIGEN: CPT | Performed by: INTERNAL MEDICINE

## 2021-12-28 PROCEDURE — 85046 RETICYTE/HGB CONCENTRATE: CPT

## 2021-12-28 PROCEDURE — 36415 COLL VENOUS BLD VENIPUNCTURE: CPT

## 2021-12-28 PROCEDURE — 85025 COMPLETE CBC W/AUTO DIFF WBC: CPT

## 2021-12-28 RX ORDER — LEVOTHYROXINE SODIUM 0.12 MG/1
112 TABLET ORAL DAILY
COMMUNITY
End: 2022-01-03 | Stop reason: ALTCHOICE

## 2021-12-28 RX ORDER — ACETAMINOPHEN 500 MG
1000 TABLET ORAL
COMMUNITY
End: 2022-01-10

## 2021-12-28 NOTE — PROGRESS NOTES
Subjective         DURING THE VISIT WITH THE PATIENT TODAY , PATIENT HAD FACE MASK, MY MEDICAL ASSISTANT AND I  HAD PROPPER PROTECTIVE EQUIPMENT, AND I DID HAND HYGIENE WITH SOAP AND WATER BEFORE AND AFTER THE VISIT.     REASON FOR FOLLOW UP: 1.IRON DEFICIENCY ANEMIA,  DUE TO CHRONIC GI BLOOD LOSS, CHRONIC DIARRHEA, MALNUTRITION, WEIGHT LOSS, ABDOMINAL MASS RUQ: colon cancer diagnosed, removed, no need for adjuvant therapy for stage 2 disease    2.Ascending colon cancer s/p colectomy.    3. ELEVATED CEA AFTER COLECTOMY: PET SCAN NEGATIVE IN 6/17/21      This patient returns today to the office for followup in company of her daughter stating that she has been doing extremely well lately. In spite of her pulmonary fibrosis and she remains on oxygen for this she has been able to function relatively well. She has expected cough, clear sputum production, shortness of breath upon exercise but no paroxysmal nocturnal dyspnea or orthopnea. Her heart remains irregular with ups and downs in the numbers in regard to the pulse. She has not had any chest pain or symptoms that suggests congestive heart failure. No fluid accumulation in her legs. She has not had any fever or infections. Her appetite remains good as long as she eats the proper food. She has no abdominal pain, nausea, vomiting, diarrhea, distention or jaundice. Urination remains normal. No skeletal pain. Neurologically she remains sharp and intact.        Past Medical History:   Diagnosis Date   • Adenomatous polyp of sigmoid colon 1/21/2021   • Anemia    • Colonic polyp    • Congestive heart failure (HCC)    • Depression    • Diverticulosis    • Esophageal reflux    • Essential hypertension    • GERD (gastroesophageal reflux disease)    • H/O complete eye exam 03/2018   • Hx of bone density study 02/15/2016   • Hyperlipidemia    • Hypothyroidism, acquired    • Major depressive disorder, recurrent episode, in full remission (HCC)    • Nonsenile cataract 2016   •  Oxygen dependent     8L NC CONTINUOUS   • Pulmonary fibrosis (HCC)    • Pulmonary hypertension (HCC)    • Sleep apnea, obstructive     USES CPAP    • Stenosis, spinal, lumbar    • Vertigo, peripheral         Past Surgical History:   Procedure Laterality Date   • COLON RESECTION N/A 1/28/2021    Procedure: COLON RESECTION RIGHT;  Surgeon: Alfonso Cesar MD;  Location: [facility] OR;  Service: General;  Laterality: N/A;   • COLONOSCOPY  02/20/2012   • COLONOSCOPY N/A 1/21/2021    Procedure: COLONOSCOPY INTO CECUM AND TI WITH BIOPSIES, SPOT INJECTION TO COLON MASS (MID-ASCENDING), HOT SNARE POLYPECTOMY, COLD BX POLYPECTOMIES, SALINE LIFT, CLIP PLACEMENT X3;  Surgeon: Alfonso Cesar MD;  Location: [facility]OSCOPY;  Service: General;  Laterality: N/A;  PRE:  ABNORMAL CT SCAN OF COLON  POST:  DIVERTICULOSIS, COLON MASS, POLYPS, HEMORRHOIDS   • INCONTINENCE SURGERY      Dr. Espinal   • MAMMO BILATERAL  2017    dr de la cruz    • PAP SMEAR  2014    dr de la cruz        Current Outpatient Medications on File Prior to Visit   Medication Sig Dispense Refill   • acetaminophen (TYLENOL) 500 MG tablet Take 1,000 mg by mouth Every 6 (Six) Hours As Needed for Mild Pain .     • acetaminophen (TYLENOL) 500 MG tablet Take 1,000 mg by mouth.     • ADCIRCA 20 MG tablet Take 40 mg by mouth Daily.     • ascorbic acid (VITAMIN C) 500 MG tablet Take 500 mg by mouth Daily.     • atorvastatin (LIPITOR) 10 MG tablet TAKE 1 TABLET EVERY DAY 90 tablet 1   • ESBRIET 267 MG capsule Take 801 mg by mouth 3 (Three) Times a Day With Meals.     • Fluzone High-Dose Quadrivalent 0.7 ML suspension prefilled syringe injection      • levothyroxine (SYNTHROID, LEVOTHROID) 112 MCG tablet Take 1 tablet by mouth Daily. 90 tablet 1   • levothyroxine (SYNTHROID, LEVOTHROID) 125 MCG tablet Take 112 mcg by mouth Daily.     • O2 (OXYGEN) Inhale 8 L/min Continuous.     • omeprazole (priLOSEC) 40 MG capsule Take 1 capsule by mouth 2 (two) times  a day. 180 capsule 3   • PROBIOTIC PRODUCT PO Take  by mouth.     • sertraline (ZOLOFT) 50 MG tablet TAKE 1 TABLET EVERY DAY 90 tablet 1   • verapamil SR (CALAN-SR) 120 MG CR tablet Take 120 mg by mouth Daily.  3     No current facility-administered medications on file prior to visit.        ALLERGIES:    Allergies   Allergen Reactions   • Sulfa Antibiotics Hives and Rash        Social History     Socioeconomic History   • Marital status:    Tobacco Use   • Smoking status: Former Smoker     Packs/day: 2.00     Types: Cigarettes     Start date:      Quit date:      Years since quittin.0   • Smokeless tobacco: Never Used   • Tobacco comment: Caffeine use: Drinks Coca Cola    Vaping Use   • Vaping Use: Never used   Substance and Sexual Activity   • Alcohol use: Yes     Comment: rare   • Drug use: No   • Sexual activity: Never     Partners: Male        Family History   Problem Relation Age of Onset   • Breast cancer Cousin    • Lung cancer Father    • Heart disease Father    • Colon cancer Brother    • Prostate cancer Brother    • Lung disease Brother    • Emphysema Brother    • Heart disease Mother    • Malig Hyperthermia Neg Hx       Current Outpatient Medications on File Prior to Visit   Medication Sig Dispense Refill   • acetaminophen (TYLENOL) 500 MG tablet Take 1,000 mg by mouth Every 6 (Six) Hours As Needed for Mild Pain .     • acetaminophen (TYLENOL) 500 MG tablet Take 1,000 mg by mouth.     • ADCIRCA 20 MG tablet Take 40 mg by mouth Daily.     • ascorbic acid (VITAMIN C) 500 MG tablet Take 500 mg by mouth Daily.     • atorvastatin (LIPITOR) 10 MG tablet TAKE 1 TABLET EVERY DAY 90 tablet 1   • ESBRIET 267 MG capsule Take 801 mg by mouth 3 (Three) Times a Day With Meals.     • Fluzone High-Dose Quadrivalent 0.7 ML suspension prefilled syringe injection      • levothyroxine (SYNTHROID, LEVOTHROID) 112 MCG tablet Take 1 tablet by mouth Daily. 90 tablet 1   • levothyroxine (SYNTHROID,  "LEVOTHROID) 125 MCG tablet Take 112 mcg by mouth Daily.     • O2 (OXYGEN) Inhale 8 L/min Continuous.     • omeprazole (priLOSEC) 40 MG capsule Take 1 capsule by mouth 2 (two) times a day. 180 capsule 3   • PROBIOTIC PRODUCT PO Take  by mouth.     • sertraline (ZOLOFT) 50 MG tablet TAKE 1 TABLET EVERY DAY 90 tablet 1   • verapamil SR (CALAN-SR) 120 MG CR tablet Take 120 mg by mouth Daily.  3     No current facility-administered medications on file prior to visit.       Objective    Vitals:    12/28/21 1428   BP: 127/74   Pulse: (!) 210   Resp: 17   SpO2: (!) 88%   Weight: 59.1 kg (130 lb 6.4 oz)   Height: 154.9 cm (60.98\")           Physical Exam   I HAVE PERSONALLY REVIEWED THE HISTORY OF THE PRESENT ILLNESS, PAST MEDICAL HISTORY, FAMILY HISTORY, SOCIAL HISTORY, ALLERGIES, MEDICATIONS STATED ABOVE IN THE  NOTE FROM TODAY.    PULSE PER DR SOUSA 60 IRREGULAR IRREGULAR AFIB CVR, O2 SAT ON O2 NC 10 %    GENERAL:  Well-developed, well-nourished  Patient  in no acute distress.   SKIN:  Warm, dry ,NO rashes,NO purpura ,NO petechiae.  HEENT:  Pupils were equal and reactive to light and accomodation, conjunctivae noninjected, no pterygium, normal extraocular movements, normal visual acuity.   NECK:  Supple with good range of motion; no thyromegaly , no other masses, no JVD or bruits, no cervical adenopathies.No carotid artery pain, no carotid abnormal pulsation , NO arterial dance.  LYMPHATICS:  No cervical, NO supraclavicular, NO axillary,NO epitrochlear , NO inguinal adenopathy.  CARDIAC   normal rate and IRregular rhythm, without murmur,NO rubs NO S3 NO S4 right or left .  LUNGS: DECREASED breath sounds bilateral, no wheezing, rhonchi, NOcrackles or rubs.  VASCULAR VENOUS: no cyanosis, collateral circulation, varicosities, edema, palpable cords, pain, erythema.  ABDOMEN:  Soft, nontender with no hepatomegaly, no splenomegaly,no masses, no ascites, no collateral circulation,no distention,no [place] sign.  EXTREMITIES "  AND SPINE:  No clubbing, cyanosis or edema, no deformities , no pain .No kyphosis, scoliosis, no other deformities, no pain in spine, no pain in ribs , no pain inpelvic bone.  NEUROLOGICAL:  Patient was awake, alert, oriented to time, person and place.                RECENT LABS:CBC Auto Differential  Order: [id number] - Part of Panel Order [id number]   Status: Final result     Visible to patient: No (scheduled for 12/29/2021  4:34 AM)     Dx: Malignant neoplasm of ascending colon...    Specimen Information: Blood         0 Result Notes    Component   Ref Range & Units 14:24 3 mo ago 6 mo ago 7 mo ago 8 mo ago 10 mo ago 11 mo ago   WBC   3.40 - 10.80 10*3/mm3 7.33  8.09  9.48  8.46  6.59  6.11  5.98    RBC   3.77 - 5.28 10*6/mm3 4.33  4.26  4.29  4.12  4.04  4.25  3.96    Hemoglobin   12.0 - 15.9 g/dL 11.8 Low   12.1  12.3  11.6 Low   11.3 Low   11.5 Low   10.3 Low     Hematocrit   34.0 - 46.6 % 37.5  37.3  36.8  36.1  35.8  37.1  32.7 Low     MCV   79.0 - 97.0 fL 86.6  87.6  85.8  87.6  88.6  87.3  82.6    MCH   26.6 - 33.0 pg 27.3  28.4  28.7  28.2  28.0  27.1  26.0 Low     MCHC   31.5 - 35.7 g/dL 31.5  32.4  33.4  32.1  31.6  31.0 Low   31.5    RDW   12.3 - 15.4 % 12.6  12.6  12.4  12.8  12.9  14.8  17.9 High     RDW-SD   37.0 - 54.0 fl 39.6  40.1  38.5  40.6  41.8  46.2  53.4    MPV   6.0 - 12.0 fL 8.6  8.8  9.8  8.5  8.6  8.5  9.5    Platelets   140 - 450 10*3/mm3 225  180  180  170  161  160  193    Neutrophil %   42.7 - 76.0 % 75.5  73.8  74.2  76.7 High   70.8  73.5  73.0    Lymphocyte %   19.6 - 45.3 % 13.8 Low   15.7 Low   15.7 Low   13.8 Low   19.1 Low   15.5 Low   13.5 Low     Monocyte %   5.0 - 12.0 % 7.2  8.5  7.1  7.3  6.8  7.9  7.4    Eosinophil %   0.3 - 6.2 % 2.5  1.2  1.6  1.4  2.4  2.1  4.8    Basophil %   0.0 - 1.5 % 0.7  0.6  0.8  0.6  0.6  0.7  1.0    Immature Grans %   0.0 - 0.5 % 0.3  0.2  0.6 High   0.2                                    Assessment/Plan     Diagnoses and all orders for this  visit:    1. Malignant neoplasm of ascending colon (HCC) (Primary)    2. Iron deficiency anemia due to chronic blood loss    3. Elevated CEA              In summary this patient has progressive anemia. I documented in the laboratory parameters above progressive drop in the hemoglobin, progressive drop in the MCV. Morphology of peripheral blood is typical of iron deficiency anemia and I would not be surprised if she has an associated B12 or folate deficiency. This patient has had upper GI series and barium enema by GI, Dr. Boyd, and no anatomical alteration has been found in her upper GI tract or colon besides diverticular disease. The symptoms that she has today she blames them, especially the diarrhea, since the time that she had the barium enema. I think that was not an association. The fact that she has so much diarrhea day and night and with weight loss, profound fatigue, sarcopenia leads me to believe that it is some other entity that is producing this. It sounds like to me she has a malabsorptive process of some sort.     The patient also has a palpable mass in the right upper quadrant and the way how it feels to my hands, it is probably a palpable gallbladder. She had no pain there. This is very striking. Her abdomen is very soft and it is very easy to find.     Under the present circumstances and knowing that the patient cannot tolerate oral iron because it makes her to throw up and produce profuse diarrhea and makes the diarrhea worse, I do believe that the patient needs to have the followin. For the workup for her anemia, she went back to the lab to obtain a comprehensive metabolic profile, ferritin, iron, TIBC, B12, folic acid level, reticulated hemoglobin.   2. Given the fact that the patient has had a TSH that was on the low side maybe her thyroid medication is overcorrecting her and this maybe this is part of the diarrhea process. I will obtain a TSH and a T4.   3. I do believe that the patient  needs to have a CT scan of the abdomen and pelvis. I do not think she can handle oral contrast and I do not want to give her IV contrast, I find no need. I want to be sure there is no other anatomical alteration in her small intestine and GI tract that is favoring for her to have all the issues pertinent to her clinical symptoms.     I wonder if she could have some other process in her bowel including inflammatory bowel disease, bacterial overgrowth syndrome, celiac disease and differential diagnosis is more than extensive. This information would require to be shared with Dr. Boyd, her Gastroenterologist, for further review after she returns back in a few days after she proceeds with proper management of her anemia. Given the fact that she cannot handle oral iron by mouth, absolutely the patient needs to have Injectafer on 2 different occasions and this will be scheduled to start to happen as early as TODAY.   I discussed with the patient on 12/04/2020 after reviewing the case with the radiologist Dr. Pinto at [facility] in regards to her CT scan of the abdomen. This documents intussusception of the colon and there is a mass located in that that probably represents a colon cancer. Obviously, the question is how to proceed with all these issues on this patient. It is very obvious that she has profound iron deficiency anemia. Her ferritin level, iron level are low. She cannot handle oral iron therapy and she will initiate Injectafer today and she will have the 2nd infusion next week. I expect that in 3 weeks she will have very significant rebound in erythropoiesis and I expect that her pulmonary manifestations will be better in regard to her pulmonary hypertension and her pulmonary fibrosis. Typically patient's with this condition develop polycythemia instead of anemia.    The other issue is what we are going to do with her colon. I do not think that the patient is a normal surgical candidate under  the present circumstances and we need to try to fix her up in the best way possible to see if she gets to the point to have a resection of this issue. I have made her an appointment to be seen by cardiology, Rambo Dasilva MD, and proceed with an echocardiogram in days. We have also made an appointment for her to see Alfonso Cesar MD, General Surgery to review the case with me.       I reviewed the patient back with the daughter in the room on 12/10/2020. In the interim, I have had a conversation with Zita Guy MD, the patient’s pulmonologist, who strongly believes that the patient will be able to go through surgery as long as she has proper pulmonary assessment before intervention and she asked me to go ahead and consult Pulmonary Medicine, Dr. Ramos and associates for this purpose. A consult has been placed.     I reviewed her echocardiogram and actually it is better than I expected for somebody who has pulmonary fibrosis.     In summary I think the patient’s intussusception is producing bleeding, chronically, leading to iron deficiency anemia. She has had Injectafer a week ago, another one to be given to her tomorrow and thereafter she will require blood work on weekly basis. If the hemoglobin goes below 9, I think she will require transfusion of 2 units of red cells. She will have weekly appointment for CBC and nurse check and I will review her back on 12/30/2020.     I EMPHATICALLY POINTED OUT TO THE PATIENT’S DAUGHTER SHE CANNOT TAKE ANYMORE ZULMA-SELTZER. SHE HAS BEEN TAKING THIS ON A REGULAR SCHEDULE AND THIS IS PRODUCING PROBABLY ALSO AN ELEMENT OF GI BLEEDING ASSOCIATED WITH HER INTUSSUSCEPTION AND WHY NOT GASTRITIS AND SO FORTH.     In summary the patient was reviewed on 12/30/2020 along with her daughter in the room. The patient has gone through cardiological assessment and actually her cardiac ejection fraction is not that bad, close to 50%, and with minimal evidence of pulmonary arterial hypertension.  Normal contractility of the left ventricle. The patient also has been seen by Pulmonary Medicine. These records have been reviewed again and her pulmonary function tests is very marginal. The analysis is stated above by Dr. Moser who has advised the patient that she is a high risk for general anesthesia in regard to the need for ICU stay, intubation, mechanical ventilation and so forth given her pulmonary fibrosis. He has advised the patient to see Dr. Guy, Pulmonary Medicine, and she has an appointment to see her next week.     In regard to her iron deficiency anemia after she received IV iron infusions the hemoglobin keeps improving to 11.2 from 8.4. The patient is no longer taking Nohelia-[place]. The patient is a little bit stronger but her appetite is not good. Obviously the question is what is the cause of the intussusception. I have measured a CEA level that is elevated at 6.2 and leads me to believe that maybe this is something related to a primary colon cancer. Dr. Cesar has suggested for her to have a colonoscopy after reviewing his good notes. The patient is even more hesitant to proceed with this.       The patient was further reviewed in the office on 02/22/2021. Since the previous visit and after I discussed the case with Dr. Zita Guy, the patient’s pulmonologist, I advised Dr. Cesar to go ahead and proceed with her surgery. This happened and actually to the surprise of everybody the patient walked away from the hospital 3 days later with no complications or issues pertinent to her lung disease. The patient had also excellent tolerance to her colonoscopy. Since she has been at home she has been gaining appetite and gaining weight. She is feeling substantially better. She has no abdominal pain. Her surgical site has healed. Her bowel activity is normal and urination is normal. She remains on oxygen. She is walking around the house and she is doing more things that she was not doing before. She is not  sitting down complaining of a bad stomach all the time. She is not taking Nohelia-[place] as I pointed out to them before.     I have reviewed the pathology of the surgical specimen and she had a T3N0M0 colonic cancer with 14 lymph nodes removed, negative, with no perineural, perivascular or perivenous invasion. The margins of resection were completely clear. On the CT scan of the abdomen, her liver was completely unremarkable.         I reviewed with the patient on the telephone on 06/17/2021 after personally reviewing the PET scan in the PAC system [facility] that shows no areas of abnormal uptake in her lungs, no areas of abnormal uptake in the liver, retroperitoneal lymph nodes or any other site as far as I can tell. The radiologists reading is about the same.     The good news to me is that the PET scan is negative, the patient is feeling better. She remains fully functional, her anemia is corrected after all of the bleeding that she did out of her colon cancer with improving hemoglobin.    Obviously raises the question why the CEA level is elevated. The only reasonable explanation could be the chronic inflammatory process associated with her chronic lung disease. She does not smoke. She is not around anybody who smokes and obviously it is the only logical explanation.       The patient was further reviewed on 09/28/2021. Her overall clinical condition is excellent. As long as she does not eat junk food she has no diarrhea, abdominal pain or discomfort. Her weight is stable. She feels substantially better in regard to her ability to function independently at home. She has no pain related to malignancy, urination is normal, her respiratory situation remains stable, she remains on oxygen and she has not had any other respiratory infections.     The clinical examination of the patient today is very much negative normal besides her chronic lung disease and her decreased pulmonary auscultation with  crackles at the bases. Her heart function seems to be appropriate.     Her white count, hemoglobin and platelets are normal. The white count differential is normal. Her ferritin and iron profile remain normal. Her CEA level actually has dropped some and this in my opinion is related to her chronic lung disease more than anything else. As we have mentioned before we have performed multiple radiological analyses on her including CT scans and PET scans looking for recurrent disease and nothing has been found.     I do believe that the patient in my opinion from the point of view of her colon cancer and her iron deficiency anemia that has been corrected is doing perfectly well. Her chronic lung disease is her chronic lung disease and this will remain on oxygen and followed up by her pulmonologist.     I advised her to go ahead and get the booster shot for COVID and I advised her also to proceed with flu vaccination.     I will review her back in 3 months with a CBC, CMP, ferritin, iron profile and a CEA level. I find no need for radiological assessment on her at this time.   The patient was further reviewed on 12/28/2021. In regard to the previous history of colon cancer, she underwent resection of this. Since then her hemoglobin has stabilized around 12. Her ferritin level is pending today. She has improved her diet. She has gained a few pounds and she feels substantially better from this point of view. She has learned to put away foods that she knows that she will get sick with and now she is eating a variety of foods that she has not eaten in a long time. Surprising enough very likely her nutrition is better. She has no symptoms that wound indicate colon cancer recurrence. In spite of having a persistent elevation of the CEA level the patient has not had anything to suggest clinically, radiologically through CT scans and PET scans evidence of colon cancer recurrence. It raises the question if the pulmonary fibrosis  is the reason why her CEA level is minimally elevated. We are awaiting to review this and discuss this with the patient upon result.     She remains on oxygen for her pulmonary fibrosis. Today she has minimal if any crackles. Actually her lung auscultation is very benign. She has atrial fibrillation with controlled ventricular response and she will see Cardiology very soon.     I advised the patient to return to see me back in 3 months with a CBC, CMP, CEA, ferritin, iron, TIBC. I discussed all these facts with the patient and her daughter present in the room. I did not prescribe any new medicines.    ·

## 2021-12-29 ENCOUNTER — TELEPHONE (OUTPATIENT)
Dept: ONCOLOGY | Facility: CLINIC | Age: 81
End: 2021-12-29

## 2021-12-29 NOTE — TELEPHONE ENCOUNTER
Relayed Dr. Lara's message to pt. Iron and Ferritin are fine. CEA ~ the same as 7  Months ago and no need for XR @ this time. Pt v/u. Jennifer Mercado RN

## 2021-12-29 NOTE — TELEPHONE ENCOUNTER
----- Message from Emmanuel Lara MD sent at 12/28/2021  5:48 PM EST -----  CALL HER IRON AND FERRITIN ARE FINE, HER CEA IS ABOUT THE SAME THAN 7 M AGO 9 NO NEED FOR XR AT THIS POINT

## 2022-01-03 ENCOUNTER — OFFICE VISIT (OUTPATIENT)
Dept: CARDIOLOGY | Facility: CLINIC | Age: 82
End: 2022-01-03

## 2022-01-03 VITALS
DIASTOLIC BLOOD PRESSURE: 70 MMHG | HEIGHT: 61 IN | BODY MASS INDEX: 24.73 KG/M2 | SYSTOLIC BLOOD PRESSURE: 120 MMHG | HEART RATE: 87 BPM | WEIGHT: 131 LBS

## 2022-01-03 DIAGNOSIS — I49.9 IRREGULAR HEART RHYTHM: Primary | ICD-10-CM

## 2022-01-03 DIAGNOSIS — I49.1 APC (ATRIAL PREMATURE CONTRACTIONS): ICD-10-CM

## 2022-01-03 DIAGNOSIS — J96.11 CHRONIC RESPIRATORY FAILURE WITH HYPOXIA: ICD-10-CM

## 2022-01-03 DIAGNOSIS — I27.21 PAH (PULMONARY ARTERY HYPERTENSION): ICD-10-CM

## 2022-01-03 DIAGNOSIS — J84.10 PULMONARY FIBROSIS: ICD-10-CM

## 2022-01-03 DIAGNOSIS — R06.02 SHORTNESS OF BREATH: ICD-10-CM

## 2022-01-03 DIAGNOSIS — I10 ESSENTIAL HYPERTENSION: ICD-10-CM

## 2022-01-03 PROCEDURE — 93000 ELECTROCARDIOGRAM COMPLETE: CPT | Performed by: NURSE PRACTITIONER

## 2022-01-03 PROCEDURE — 99214 OFFICE O/P EST MOD 30 MIN: CPT | Performed by: NURSE PRACTITIONER

## 2022-01-03 NOTE — PROGRESS NOTES
"  Date of Office Visit: 2022  Encounter Provider: PAYAL Simmons  Place of Service: [facility] CARDIOLOGY  Patient Name: Madalyn Galeas  :1940  Primary Cardiologist: Dr. Rambo Dasilva     Chief Complaint   Patient presents with   • Shortness of Breath   • Irregular Heart Beat   :     HPI: Madalyn Galeas is a pleasant 81 y.o. female who presents today for evaluation of irregular heartbeat at the request of her pulmonologist, Dr. Zita Guy.  She is a new patient to me and I reviewed her medical records.    She has a longstanding history of pulmonary fibrosis, pulmonary hypertension, interstitial lung disease, and she is oxygen dependent.  In , she had a right heart cath which reported \"mild to moderate pulmonary hypertension).  She has had echocardiograms at [place] with her last one in 2020 which reported an LVEF of 60%, mild left atrial dilation, normal RVSP.    In 2020, she saw Dr. Rambo Dasilva for perioperative cardiac risk assessment for colonoscopy and colon surgery.  An echocardiogram had been obtained in our office which showed an LVEF of 45-50% and RVSP was normal.  Her pulmonary hypertension has improved on tadalafil.  Dr. Dasilva noted that her history said diastolic heart failure, but she did not feel that she had heart failure.  Her proBNP was normal and diastolic function was normal for her age.  She was taking the furosemide for pulmonary hypertension.  She has been noted to have premature atrial contractions due to left atrium dilation and lung disease.  She has been asymptomatic and treated with verapamil.  Dr. Dasilva recommended that she stop aspirin for primary prevention because she was having some blood loss.  In 2021, myocardial perfusion study showed no evidence of ischemia.    In 2021, she saw the nurse practitioner at her pulmonary office.  She has been noted to have an irregular heartbeat and a Holter monitor " "was obtained at [place].  This showed normal sinus rhythm, average heart rate 70 bpm, no 27.5% burden of APCs.  There were 9 atrial runs and no atrial fibrillation or sustained SVT noted.  Pulmonary referred her back to cardiology to see if this could be contributing to her worsening shortness of breath.    She presents today with her son accompanying her.  She continues to experience shortness of breath and a dry cough \"all the time\".  She has been recommended to wear 4-5 L of oxygen while sitting and 10 L of oxygen with activity.  For treatment of obstructive sleep apnea she uses a BiPAP and oxygen at nighttime.  She does not feel the premature atrial contractions.  She denies chest pain, edema, dizziness, syncope, or bleeding.  Pulmonary also ordered a 2D echocardiogram to be completed at [place] and the patient prefers to have this completed at [facility].      Past Medical History:   Diagnosis Date   • Adenomatous polyp of sigmoid colon 1/21/2021   • Anemia    • Colonic polyp    • Depression    • Diverticulosis    • Esophageal reflux    • Essential hypertension    • GERD (gastroesophageal reflux disease)    • H/O complete eye exam 03/2018   • Hx of bone density study 02/15/2016   • Hyperlipidemia    • Hypothyroidism, acquired    • Major depressive disorder, recurrent episode, in full remission (HCC)    • Nonsenile cataract 2016   • Oxygen dependent     8L NC CONTINUOUS   • Pulmonary fibrosis (HCC)    • Pulmonary hypertension (HCC)    • Sleep apnea, obstructive     USES CPAP    • Stenosis, spinal, lumbar    • Vertigo, peripheral        Past Surgical History:   Procedure Laterality Date   • COLON RESECTION N/A 1/28/2021    Procedure: COLON RESECTION RIGHT;  Surgeon: Alfonso Cesar MD;  Location: [facility];  Service: General;  Laterality: N/A;   • COLONOSCOPY  02/20/2012   • COLONOSCOPY N/A 1/21/2021    Procedure: COLONOSCOPY INTO CECUM AND TI WITH BIOPSIES, SPOT INJECTION TO COLON MASS (MID-ASCENDING), " HOT SNARE POLYPECTOMY, COLD BX POLYPECTOMIES, SALINE LIFT, CLIP PLACEMENT X3;  Surgeon: Alfonso Cesar MD;  Location: [facility] ENDOSCOPY;  Service: General;  Laterality: N/A;  PRE:  ABNORMAL CT SCAN OF COLON  POST:  DIVERTICULOSIS, COLON MASS, POLYPS, HEMORRHOIDS   • INCONTINENCE SURGERY      Dr. Espnial   • MAMMO BILATERAL      dr de la cruz    • PAP SMEAR      dr de la cruz       Social History     Socioeconomic History   • Marital status:    Tobacco Use   • Smoking status: Former Smoker     Packs/day: 2.00     Types: Cigarettes     Start date:      Quit date:      Years since quittin.0   • Smokeless tobacco: Never Used   • Tobacco comment: Caffeine use: Drinks Coca Cola    Vaping Use   • Vaping Use: Never used   Substance and Sexual Activity   • Alcohol use: Yes     Comment: rare   • Drug use: No   • Sexual activity: Never     Partners: Male       Family History   Problem Relation Age of Onset   • Breast cancer Cousin    • Lung cancer Father    • Heart disease Father    • Colon cancer Brother    • Prostate cancer Brother    • Lung disease Brother    • Emphysema Brother    • Heart disease Mother    • Malig Hyperthermia Neg Hx        The following portion of the patient's history were reviewed and updated as appropriate: past medical history, past surgical history, past social history, past family history, allergies, current medications, and problem list.    Review of Systems   Constitutional: Negative.   Cardiovascular: Positive for dyspnea on exertion.   Respiratory: Positive for cough and shortness of breath.    Hematologic/Lymphatic: Negative.    Neurological: Negative.        Allergies   Allergen Reactions   • Sulfa Antibiotics Hives and Rash         Current Outpatient Medications:   •  acetaminophen (TYLENOL) 500 MG tablet, Take 1,000 mg by mouth Every 6 (Six) Hours As Needed for Mild Pain ., Disp: , Rfl:   •  acetaminophen (TYLENOL) 500 MG tablet, Take 1,000 mg by mouth.,  "Disp: , Rfl:   •  ADCIRCA 20 MG tablet, Take 40 mg by mouth Daily., Disp: , Rfl:   •  ascorbic acid (VITAMIN C) 500 MG tablet, Take 500 mg by mouth Daily., Disp: , Rfl:   •  atorvastatin (LIPITOR) 10 MG tablet, TAKE 1 TABLET EVERY DAY, Disp: 90 tablet, Rfl: 1  •  ESBRIET 267 MG capsule, Take 801 mg by mouth 3 (Three) Times a Day With Meals., Disp: , Rfl:   •  Fluzone High-Dose Quadrivalent 0.7 ML suspension prefilled syringe injection, , Disp: , Rfl:   •  levothyroxine (SYNTHROID, LEVOTHROID) 112 MCG tablet, Take 1 tablet by mouth Daily., Disp: 90 tablet, Rfl: 1  •  O2 (OXYGEN), Inhale 8 L/min Continuous., Disp: , Rfl:   •  omeprazole (priLOSEC) 40 MG capsule, Take 1 capsule by mouth 2 (two) times a day., Disp: 180 capsule, Rfl: 3  •  PROBIOTIC PRODUCT PO, Take  by mouth., Disp: , Rfl:   •  sertraline (ZOLOFT) 50 MG tablet, TAKE 1 TABLET EVERY DAY, Disp: 90 tablet, Rfl: 1  •  verapamil SR (CALAN-SR) 120 MG CR tablet, Take 120 mg by mouth Daily., Disp: , Rfl: 3        Objective:     Vitals:    01/03/22 1017 01/03/22 1028   BP: 124/78 120/70   BP Location: Left arm Right arm   Pulse: 87    Weight: 59.4 kg (131 lb)    Height: 154.9 cm (61\")      Body mass index is 24.75 kg/m².    PHYSICAL EXAM:    Vitals Reviewed.   General Appearance: No acute distress, well developed and well nourished.  Thin.  Eyes: Conjunctiva and lids: No erythema, swelling, or discharge. Sclera non-icteric.   HENT: Atraumatic, normocephalic. External eyes, ears, and nose normal. No hearing loss noted. Mucous membranes normal. Lips not cyanotic. Neck supple with no tenderness. Wearing mask.   Respiratory: No signs of respiratory distress. Respiration rhythm and depth normal.   Diminished lung sounds.  Wearing oxygen.  Cardiovascular:  Jugular Venous Pressure: Normal  Heart Rate and Rhythm: Normal rhythm with ectopy noted.  Heart Sounds: Normal S1 and S2. No S3 or S4 noted.  Murmurs: No murmurs noted. No rubs, thrills, or gallops.   Arterial " Pulses: Carotid pulses normal. No carotid bruit noted.    Lower Extremities: No edema noted.  Gastrointestinal:  Abdomen soft, non-distended, non-tender.    Musculoskeletal: Normal movement of extremities.  Skin and Nails: General appearance normal. No pallor, cyanosis, diaphoresis. Skin temperature normal. No clubbing of fingernails.   Psychiatric: Patient alert and oriented to person, place, and time. Speech and behavior appropriate. Normal mood and affect.       ECG 12 Lead    Date/Time: 1/3/2022 10:18 AM  Performed by: Joceline Hopper APRN  Authorized by: Joceline Hopper APRN   Comparison: compared with previous ECG from 1/28/2021  Similar to previous ECG  Rhythm: sinus rhythm  Ectopy: atrial premature contractions  Rate: normal  BPM: 87  QRS axis: normal    Clinical impression: abnormal EKG              Assessment:       Diagnosis Plan   1. Irregular heart rhythm     2. APC (atrial premature contractions)  Adult Transthoracic Echo Complete W/ Cont if Necessary Per Protocol   3. Shortness of breath     4. PAH (pulmonary artery hypertension) (HCC)     5. Pulmonary fibrosis (HCC)     6. Chronic respiratory failure with hypoxia (HCC)     7. Essential hypertension            Plan:       1/2.  She has had known atrial premature contractions in the past.  She recently was experiencing more dyspnea and pulmonary ordered a Holter monitor which showed a 27.5% APC burden.  She is asymptomatic and taking verapamil.  I have low suspicion that this is contributing to her worsening shortness of breath, but I will further discuss with Dr. Rambo Dasilva.  A 2D echocardiogram was also ordered at [place] and the patient prefers to have this ordered at our office.  I have placed the echocardiogram order.    3-6.  She has known pulmonary artery hypertension which has resolved on tadalafil.  She has pulmonary fibrosis, interstitial lung disease, and chronic respiratory failure.  She is on high flow oxygen with activity (10 L).  She  remains chronically short of breath and has a dry cough.    7.  Hypertension: Blood pressure well controlled today.    8.  Further recommendations to follow after 2D echocardiogram is completed.    ADDENDUM 1/17/2022:  · Dr. Dasilva reviewed.  Echo showed: LVEF 46-50% just mildly reduced, frequent APCs, mild to moderate mitral regurgitation.   · We feel like her shortness of breath is coming from her lungs.  Follow-up with pulmonary.    · 6-month follow-up visit with Dr. Rambo Dasilva.        As always, it has been a pleasure to participate in your patient's care. Thank you.       Sincerely,         PAYAL Wharton  [facility] Cardiology      · Dictated utilizing Dragon Dictation  · COVID-19 Precautions - Patient was compliant in wearing a mask. When I saw the patient, I used appropriate personal protective equipment (PPE) including mask and eye shield (standard procedure).  Additionally, I used gown and gloves if indicated.  Hand hygiene was completed before and after seeing the patient.  · I spent 30 minutes reviewing her medical records/testing/previous office notes/labs, face-to-face interaction with patient, physical examination, formulating the plan of care, and discussion of plan of care with patient.

## 2022-01-08 NOTE — PROGRESS NOTES
Subjective   Madalyn Galeas is a 81 y.o. female.     History of Present Illness     Chief Complaint:   Chief Complaint   Patient presents with   • Hypothyroidism   • Hyperlipidemia   • Depression       Madalyn Galeas 81 y.o. female who presents today for Medical Management of the below listed issues and medication refills. She  has a problem list of   Patient Active Problem List   Diagnosis   • Esophageal reflux   • Hyperlipidemia   • Hypothyroidism   • Major depressive disorder, recurrent episode, in full remission (HCC)   • Pulmonary fibrosis (HCC)   • Pulmonary hypertension (HCC)   • Sleep apnea, obstructive   • Vertigo, peripheral   • Impaired fasting glucose   • Iron deficiency anemia   • Malabsorption of iron   • Essential hypertension   • Diverticulosis   • Hemorrhoids   • Malignant neoplasm of ascending colon (HCC)   • Elevated CEA   • Chronic respiratory failure (HCC)   • PAH (pulmonary artery hypertension) (HCC)   • Irregular heart rhythm   .  Since the last visit, She has been keeping up with her specialist appts well.   she has been compliant with   Current Outpatient Medications:   •  acetaminophen (TYLENOL) 500 MG tablet, Take 1,000 mg by mouth Every 6 (Six) Hours As Needed for Mild Pain ., Disp: , Rfl:   •  ADCIRCA 20 MG tablet, Take 40 mg by mouth Daily., Disp: , Rfl:   •  ascorbic acid (VITAMIN C) 500 MG tablet, Take 500 mg by mouth Daily., Disp: , Rfl:   •  ESBRIET 267 MG capsule, Take 801 mg by mouth 3 (Three) Times a Day With Meals., Disp: , Rfl:   •  Fluzone High-Dose Quadrivalent 0.7 ML suspension prefilled syringe injection, , Disp: , Rfl:   •  O2 (OXYGEN), Inhale 8 L/min Continuous., Disp: , Rfl:   •  omeprazole (priLOSEC) 40 MG capsule, Take 1 capsule by mouth 2 (two) times a day., Disp: 180 capsule, Rfl: 3  •  PROBIOTIC PRODUCT PO, Take  by mouth., Disp: , Rfl:   •  verapamil SR (CALAN-SR) 120 MG CR tablet, Take 120 mg by mouth Daily., Disp: , Rfl: 3  •  atorvastatin (LIPITOR) 10 MG  "tablet, Take 1 tablet by mouth Daily., Disp: 90 tablet, Rfl: 1  •  levothyroxine (SYNTHROID, LEVOTHROID) 112 MCG tablet, Take 1 tablet by mouth Daily., Disp: 90 tablet, Rfl: 1  •  sertraline (ZOLOFT) 50 MG tablet, Take 1 tablet by mouth Daily., Disp: 90 tablet, Rfl: 1.  She denies medication side effects.    All of the other chronic condition(s) listed above are stable w/o issues.    /73   Pulse 59   Temp 97.6 °F (36.4 °C)   Resp 16   Ht 154.9 cm (61\")   Wt 59.9 kg (132 lb)   LMP  (LMP Unknown)   SpO2 99%   BMI 24.94 kg/m²     Results for orders placed or performed in visit on 12/28/21   Comprehensive Metabolic Panel    Specimen: Blood   Result Value Ref Range    Glucose 101 74 - 124 mg/dL    BUN 14 6 - 20 mg/dL    Creatinine 0.65 0.60 - 1.10 mg/dL    Sodium 139 134 - 145 mmol/L    Potassium 4.2 3.5 - 4.7 mmol/L    Chloride 99 98 - 107 mmol/L    CO2 31.4 (H) 22.0 - 29.0 mmol/L    Calcium 9.9 8.5 - 10.2 mg/dL    Total Protein 6.9 6.3 - 8.0 g/dL    Albumin 3.90 3.50 - 5.20 g/dL    ALT (SGPT) 8 0 - 33 U/L    AST (SGOT) 16 0 - 32 U/L    Alkaline Phosphatase 84 38 - 116 U/L    Total Bilirubin 0.3 0.2 - 1.2 mg/dL    eGFR Non African Amer 88 >60 mL/min/1.73    Globulin 3.0 1.8 - 3.5 gm/dL    A/G Ratio 1.3 1.1 - 2.4 g/dL    BUN/Creatinine Ratio 21.5 7.3 - 30.0    Anion Gap 8.6 5.0 - 15.0 mmol/L   Retic With IRF & RET-He    Specimen: Blood   Result Value Ref Range    Immature Reticulocyte Fraction 4.4 3.0 - 15.8 %    Reticulocyte % 0.99 0.70 - 1.90 %    Reticulocyte Absolute 0.0429 0.0200 - 0.1300 10*6/mm3    Reticulocyte Hgb 32.0 29.8 - 36.1 pg   Iron Profile    Specimen: Blood   Result Value Ref Range    Iron 53 37 - 145 mcg/dL    Iron Saturation 20 14 - 48 %    Transferrin 191 (L) 200 - 360 mg/dL    TIBC 267 249 - 505 mcg/dL   Ferritin    Specimen: Blood   Result Value Ref Range    Ferritin 212.70 (H) 13.00 - 150.00 ng/mL   CEA    Specimen: Blood   Result Value Ref Range    CEA 9.60 ng/mL   CBC Auto " Differential    Specimen: Blood   Result Value Ref Range    WBC 7.33 3.40 - 10.80 10*3/mm3    RBC 4.33 3.77 - 5.28 10*6/mm3    Hemoglobin 11.8 (L) 12.0 - 15.9 g/dL    Hematocrit 37.5 34.0 - 46.6 %    MCV 86.6 79.0 - 97.0 fL    MCH 27.3 26.6 - 33.0 pg    MCHC 31.5 31.5 - 35.7 g/dL    RDW 12.6 12.3 - 15.4 %    RDW-SD 39.6 37.0 - 54.0 fl    MPV 8.6 6.0 - 12.0 fL    Platelets 225 140 - 450 10*3/mm3    Neutrophil % 75.5 42.7 - 76.0 %    Lymphocyte % 13.8 (L) 19.6 - 45.3 %    Monocyte % 7.2 5.0 - 12.0 %    Eosinophil % 2.5 0.3 - 6.2 %    Basophil % 0.7 0.0 - 1.5 %    Immature Grans % 0.3 0.0 - 0.5 %    Neutrophils, Absolute 5.54 1.70 - 7.00 10*3/mm3    Lymphocytes, Absolute 1.01 0.70 - 3.10 10*3/mm3    Monocytes, Absolute 0.53 0.10 - 0.90 10*3/mm3    Eosinophils, Absolute 0.18 0.00 - 0.40 10*3/mm3    Basophils, Absolute 0.05 0.00 - 0.20 10*3/mm3    Immature Grans, Absolute 0.02 0.00 - 0.05 10*3/mm3    nRBC 0.0 0.0 - 0.2 /100 WBC             The following portions of the patient's history were reviewed and updated as appropriate: allergies, current medications, past family history, past medical history, past social history, past surgical history, and problem list.    Review of Systems   Constitutional: Negative for activity change, chills and fever.   Respiratory: Negative for cough.    Cardiovascular: Negative for chest pain.   Psychiatric/Behavioral: Negative for dysphoric mood.       Objective   Physical Exam  Constitutional:       General: She is not in acute distress.     Appearance: She is well-developed.   Cardiovascular:      Rate and Rhythm: Normal rate and regular rhythm.   Pulmonary:      Effort: Pulmonary effort is normal.      Breath sounds: Normal breath sounds.   Neurological:      Mental Status: She is alert and oriented to person, place, and time.   Psychiatric:         Behavior: Behavior normal.         Thought Content: Thought content normal.     Thyroid labs ordered and pt to  complete.      Assessment/Plan   Diagnoses and all orders for this visit:    1. Mixed hyperlipidemia (Primary)  -     atorvastatin (LIPITOR) 10 MG tablet; Take 1 tablet by mouth Daily.  Dispense: 90 tablet; Refill: 1    2. Acquired hypothyroidism  -     levothyroxine (SYNTHROID, LEVOTHROID) 112 MCG tablet; Take 1 tablet by mouth Daily.  Dispense: 90 tablet; Refill: 1    3. Major depressive disorder, recurrent episode, in full remission (HCC)  -     sertraline (ZOLOFT) 50 MG tablet; Take 1 tablet by mouth Daily.  Dispense: 90 tablet; Refill: 1

## 2022-01-10 ENCOUNTER — OFFICE VISIT (OUTPATIENT)
Dept: FAMILY MEDICINE CLINIC | Facility: CLINIC | Age: 82
End: 2022-01-10

## 2022-01-10 VITALS
WEIGHT: 132 LBS | DIASTOLIC BLOOD PRESSURE: 73 MMHG | RESPIRATION RATE: 16 BRPM | TEMPERATURE: 97.6 F | HEIGHT: 61 IN | HEART RATE: 59 BPM | BODY MASS INDEX: 24.92 KG/M2 | OXYGEN SATURATION: 99 % | SYSTOLIC BLOOD PRESSURE: 111 MMHG

## 2022-01-10 DIAGNOSIS — E03.9 ACQUIRED HYPOTHYROIDISM: Chronic | ICD-10-CM

## 2022-01-10 DIAGNOSIS — F33.42 MAJOR DEPRESSIVE DISORDER, RECURRENT EPISODE, IN FULL REMISSION: Chronic | ICD-10-CM

## 2022-01-10 DIAGNOSIS — E78.2 MIXED HYPERLIPIDEMIA: Primary | Chronic | ICD-10-CM

## 2022-01-10 PROCEDURE — 99214 OFFICE O/P EST MOD 30 MIN: CPT | Performed by: FAMILY MEDICINE

## 2022-01-10 RX ORDER — LEVOTHYROXINE SODIUM 112 UG/1
112 TABLET ORAL DAILY
Qty: 90 TABLET | Refills: 1 | Status: SHIPPED | OUTPATIENT
Start: 2022-01-10 | End: 2022-06-27

## 2022-01-10 RX ORDER — ATORVASTATIN CALCIUM 10 MG/1
10 TABLET, FILM COATED ORAL DAILY
Qty: 90 TABLET | Refills: 1 | Status: SHIPPED | OUTPATIENT
Start: 2022-01-10 | End: 2022-06-01

## 2022-01-12 ENCOUNTER — HOSPITAL ENCOUNTER (OUTPATIENT)
Dept: CARDIOLOGY | Facility: HOSPITAL | Age: 82
Discharge: HOME | End: 2022-01-12
Admitting: NURSE PRACTITIONER

## 2022-01-12 VITALS
HEIGHT: 61 IN | BODY MASS INDEX: 24.92 KG/M2 | SYSTOLIC BLOOD PRESSURE: 130 MMHG | HEART RATE: 70 BPM | DIASTOLIC BLOOD PRESSURE: 80 MMHG | OXYGEN SATURATION: 100 % | WEIGHT: 132 LBS

## 2022-01-12 DIAGNOSIS — I49.1 APC (ATRIAL PREMATURE CONTRACTIONS): ICD-10-CM

## 2022-01-12 LAB
AORTIC ARCH: 2.1 CM
ASCENDING AORTA: 3.6 CM
BH CV ECHO MEAS - ACS: 1.7 CM
BH CV ECHO MEAS - AO MAX PG (FULL): 5.8 MMHG
BH CV ECHO MEAS - AO MAX PG: 10 MMHG
BH CV ECHO MEAS - AO MEAN PG (FULL): 2.7 MMHG
BH CV ECHO MEAS - AO MEAN PG: 5.5 MMHG
BH CV ECHO MEAS - AO ROOT AREA (BSA CORRECTED): 1.8
BH CV ECHO MEAS - AO ROOT AREA: 6.5 CM^2
BH CV ECHO MEAS - AO ROOT DIAM: 2.9 CM
BH CV ECHO MEAS - AO V2 MAX: 158.2 CM/SEC
BH CV ECHO MEAS - AO V2 MEAN: 111.4 CM/SEC
BH CV ECHO MEAS - AO V2 VTI: 35.3 CM
BH CV ECHO MEAS - ASC AORTA: 3.6 CM
BH CV ECHO MEAS - AVA(I,A): 2 CM^2
BH CV ECHO MEAS - AVA(I,D): 2 CM^2
BH CV ECHO MEAS - AVA(V,A): 2 CM^2
BH CV ECHO MEAS - AVA(V,D): 2 CM^2
BH CV ECHO MEAS - BSA(HAYCOCK): 1.6 M^2
BH CV ECHO MEAS - BSA: 1.6 M^2
BH CV ECHO MEAS - BZI_BMI: 24.9 KILOGRAMS/M^2
BH CV ECHO MEAS - BZI_METRIC_HEIGHT: 154.9 CM
BH CV ECHO MEAS - BZI_METRIC_WEIGHT: 59.9 KG
BH CV ECHO MEAS - EDV(MOD-SP2): 73 ML
BH CV ECHO MEAS - EDV(MOD-SP4): 95 ML
BH CV ECHO MEAS - EDV(TEICH): 140 ML
BH CV ECHO MEAS - EF(CUBED): 62.1 %
BH CV ECHO MEAS - EF(MOD-BP): 50 %
BH CV ECHO MEAS - EF(MOD-SP2): 37 %
BH CV ECHO MEAS - EF(MOD-SP4): 38.9 %
BH CV ECHO MEAS - EF(TEICH): 53.2 %
BH CV ECHO MEAS - ESV(MOD-SP2): 46 ML
BH CV ECHO MEAS - ESV(MOD-SP4): 58 ML
BH CV ECHO MEAS - ESV(TEICH): 65.6 ML
BH CV ECHO MEAS - FS: 27.7 %
BH CV ECHO MEAS - IVS/LVPW: 0.98
BH CV ECHO MEAS - IVSD: 1.1 CM
BH CV ECHO MEAS - LAT PEAK E' VEL: 7.8 CM/SEC
BH CV ECHO MEAS - LV DIASTOLIC VOL/BSA (35-75): 60 ML/M^2
BH CV ECHO MEAS - LV MASS(C)D: 230.7 GRAMS
BH CV ECHO MEAS - LV MASS(C)DI: 145.7 GRAMS/M^2
BH CV ECHO MEAS - LV MAX PG: 4.2 MMHG
BH CV ECHO MEAS - LV MEAN PG: 2.8 MMHG
BH CV ECHO MEAS - LV SYSTOLIC VOL/BSA (12-30): 36.6 ML/M^2
BH CV ECHO MEAS - LV V1 MAX: 102.4 CM/SEC
BH CV ECHO MEAS - LV V1 MEAN: 80.6 CM/SEC
BH CV ECHO MEAS - LV V1 VTI: 23.4 CM
BH CV ECHO MEAS - LVIDD: 5.4 CM
BH CV ECHO MEAS - LVIDS: 3.9 CM
BH CV ECHO MEAS - LVLD AP2: 6.3 CM
BH CV ECHO MEAS - LVLD AP4: 6.9 CM
BH CV ECHO MEAS - LVLS AP2: 5.9 CM
BH CV ECHO MEAS - LVLS AP4: 5.5 CM
BH CV ECHO MEAS - LVOT AREA (M): 3.1 CM^2
BH CV ECHO MEAS - LVOT AREA: 3.1 CM^2
BH CV ECHO MEAS - LVOT DIAM: 2 CM
BH CV ECHO MEAS - LVPWD: 1.1 CM
BH CV ECHO MEAS - MED PEAK E' VEL: 5.2 CM/SEC
BH CV ECHO MEAS - MR MAX PG: 142.2 MMHG
BH CV ECHO MEAS - MR MAX VEL: 596.3 CM/SEC
BH CV ECHO MEAS - MV A DUR: 0.11 SEC
BH CV ECHO MEAS - MV A MAX VEL: 109.7 CM/SEC
BH CV ECHO MEAS - MV DEC SLOPE: 337.9 CM/SEC^2
BH CV ECHO MEAS - MV DEC TIME: 0.14 SEC
BH CV ECHO MEAS - MV E MAX VEL: 66.4 CM/SEC
BH CV ECHO MEAS - MV E/A: 0.61
BH CV ECHO MEAS - MV MAX PG: 5.3 MMHG
BH CV ECHO MEAS - MV MEAN PG: 1.1 MMHG
BH CV ECHO MEAS - MV P1/2T MAX VEL: 85.3 CM/SEC
BH CV ECHO MEAS - MV P1/2T: 73.9 MSEC
BH CV ECHO MEAS - MV V2 MAX: 114.8 CM/SEC
BH CV ECHO MEAS - MV V2 MEAN: 46 CM/SEC
BH CV ECHO MEAS - MV V2 VTI: 33.1 CM
BH CV ECHO MEAS - MVA P1/2T LCG: 2.6 CM^2
BH CV ECHO MEAS - MVA(P1/2T): 3 CM^2
BH CV ECHO MEAS - MVA(VTI): 2.2 CM^2
BH CV ECHO MEAS - PA ACC TIME: 0.11 SEC
BH CV ECHO MEAS - PA MAX PG (FULL): 3.7 MMHG
BH CV ECHO MEAS - PA MAX PG: 5.2 MMHG
BH CV ECHO MEAS - PA PR(ACCEL): 27.9 MMHG
BH CV ECHO MEAS - PA V2 MAX: 114.4 CM/SEC
BH CV ECHO MEAS - PULM A REVS DUR: 0.1 SEC
BH CV ECHO MEAS - PULM A REVS VEL: 31.7 CM/SEC
BH CV ECHO MEAS - PULM DIAS VEL: 28.3 CM/SEC
BH CV ECHO MEAS - PULM S/D: 2
BH CV ECHO MEAS - PULM SYS VEL: 56.6 CM/SEC
BH CV ECHO MEAS - PVA(V,A): 1.8 CM^2
BH CV ECHO MEAS - PVA(V,D): 1.8 CM^2
BH CV ECHO MEAS - QP/QS: 0.4
BH CV ECHO MEAS - RAP SYSTOLE: 3 MMHG
BH CV ECHO MEAS - RV MAX PG: 1.5 MMHG
BH CV ECHO MEAS - RV MEAN PG: 0.88 MMHG
BH CV ECHO MEAS - RV V1 MAX: 61.3 CM/SEC
BH CV ECHO MEAS - RV V1 MEAN: 43.9 CM/SEC
BH CV ECHO MEAS - RV V1 VTI: 8.8 CM
BH CV ECHO MEAS - RVOT AREA: 3.3 CM^2
BH CV ECHO MEAS - RVOT DIAM: 2 CM
BH CV ECHO MEAS - RVSP: 26 MMHG
BH CV ECHO MEAS - SI(AO): 144.8 ML/M^2
BH CV ECHO MEAS - SI(CUBED): 61.1 ML/M^2
BH CV ECHO MEAS - SI(LVOT): 45.2 ML/M^2
BH CV ECHO MEAS - SI(MOD-SP2): 17.1 ML/M^2
BH CV ECHO MEAS - SI(MOD-SP4): 23.4 ML/M^2
BH CV ECHO MEAS - SI(TEICH): 47 ML/M^2
BH CV ECHO MEAS - SUP REN AO DIAM: 1.9 CM
BH CV ECHO MEAS - SV(AO): 229.2 ML
BH CV ECHO MEAS - SV(CUBED): 96.7 ML
BH CV ECHO MEAS - SV(LVOT): 71.6 ML
BH CV ECHO MEAS - SV(MOD-SP2): 27 ML
BH CV ECHO MEAS - SV(MOD-SP4): 37 ML
BH CV ECHO MEAS - SV(RVOT): 28.9 ML
BH CV ECHO MEAS - SV(TEICH): 74.5 ML
BH CV ECHO MEAS - TAPSE (>1.6): 2.5 CM
BH CV ECHO MEAS - TR MAX VEL: 240.5 CM/SEC
BH CV ECHO MEASUREMENTS AVERAGE E/E' RATIO: 10.22
BH CV XLRA - RV BASE: 3.5 CM
BH CV XLRA - RV LENGTH: 6.3 CM
BH CV XLRA - RV MID: 2.7 CM
BH CV XLRA - TDI S': 10.6 CM/SEC
LEFT ATRIUM VOLUME INDEX: 33 ML/M2
SINUS: 3.2 CM
STJ: 3 CM

## 2022-01-12 PROCEDURE — 93306 TTE W/DOPPLER COMPLETE: CPT

## 2022-01-12 PROCEDURE — 93306 TTE W/DOPPLER COMPLETE: CPT | Performed by: INTERNAL MEDICINE

## 2022-01-18 ENCOUNTER — TELEPHONE (OUTPATIENT)
Dept: ONCOLOGY | Facility: CLINIC | Age: 82
End: 2022-01-18

## 2022-01-18 NOTE — TELEPHONE ENCOUNTER
Caller: Madalyn Galeas    Relationship to patient: Self    Best call back number: [phone]    Chief complaint: PATIENT IS CURRENTLY SCHEDULED FOR A LAB/FU ON 3/22/22 AND NEEDS TO RESCHEDULE.    Type of visit: LAB/FU    Requested date: 3/29/22 AT 2:30 IF AVAILABLE.       If rescheduling, when is the original appointment: 3/22/22    Additional notes: PLEASE CALL PATIENT TO ADVISE OF NEW APPT DATE/TIME

## 2022-02-07 ENCOUNTER — TELEPHONE (OUTPATIENT)
Dept: FAMILY MEDICINE CLINIC | Facility: CLINIC | Age: 82
End: 2022-02-07

## 2022-02-07 NOTE — TELEPHONE ENCOUNTER
"I talked to pt. Pt told  \"Burning\" with nose blowing doesn't mean she has a sinus infection. Would use Flonase and maybe Allegra QD but, as always, if increasing symptoms, will need to see pt.   Pt told if no improvement to schedule a video visit for further evaluation  "

## 2022-02-07 NOTE — TELEPHONE ENCOUNTER
Caller: Madalyn Galeas    Relationship: Self    Best call back number: [phone] (H)    What medication are you requesting: SOMETHING FOR A SINUS INFECTION    What are your current symptoms: BURNS WHEN SHE BLOWS HER NOSE    How long have you been experiencing symptoms:  1 WEEKS    Have you had these symptoms before:    [x] Yes  [] No    Have you been treated for these symptoms before:   [x] Yes  [] No    If a prescription is needed, what is your preferred pharmacy and phone number: [facility] - [place], [place] - [address] - [phone]  - [phone] FX         Additional notes: PATIENT CALLED AND STATES THAT SHE HAS HAD COVID AND THINKS THAT THIS HAS SOMETHING TO DO WITH IT.          THANKS

## 2022-02-07 NOTE — TELEPHONE ENCOUNTER
"\"Burning\" with nose blowing doesn't mean she has a sinus infection. Would use Flonase and maybe Allegra QD but, as always, if increasing symptoms, will need to see pt.  "

## 2022-02-14 RX ORDER — OMEPRAZOLE 40 MG/1
CAPSULE, DELAYED RELEASE ORAL
Qty: 180 CAPSULE | Refills: 3 | Status: SHIPPED | OUTPATIENT
Start: 2022-02-14

## 2022-03-29 ENCOUNTER — LAB (OUTPATIENT)
Dept: LAB | Facility: HOSPITAL | Age: 82
End: 2022-03-29

## 2022-03-29 ENCOUNTER — OFFICE VISIT (OUTPATIENT)
Dept: ONCOLOGY | Facility: CLINIC | Age: 82
End: 2022-03-29

## 2022-03-29 VITALS
SYSTOLIC BLOOD PRESSURE: 134 MMHG | HEART RATE: 75 BPM | DIASTOLIC BLOOD PRESSURE: 79 MMHG | RESPIRATION RATE: 16 BRPM | HEIGHT: 61 IN | OXYGEN SATURATION: 98 % | TEMPERATURE: 97.1 F | BODY MASS INDEX: 24.96 KG/M2 | WEIGHT: 132.2 LBS

## 2022-03-29 DIAGNOSIS — C18.2 MALIGNANT NEOPLASM OF ASCENDING COLON: ICD-10-CM

## 2022-03-29 DIAGNOSIS — J84.10 PULMONARY FIBROSIS: ICD-10-CM

## 2022-03-29 DIAGNOSIS — D50.0 IRON DEFICIENCY ANEMIA DUE TO CHRONIC BLOOD LOSS: ICD-10-CM

## 2022-03-29 DIAGNOSIS — J96.11 CHRONIC RESPIRATORY FAILURE WITH HYPOXIA: ICD-10-CM

## 2022-03-29 DIAGNOSIS — R97.0 ELEVATED CEA: ICD-10-CM

## 2022-03-29 DIAGNOSIS — I27.20 PULMONARY HYPERTENSION: ICD-10-CM

## 2022-03-29 DIAGNOSIS — C18.2 MALIGNANT NEOPLASM OF ASCENDING COLON: Primary | ICD-10-CM

## 2022-03-29 LAB
ALBUMIN SERPL-MCNC: 4 G/DL (ref 3.5–5.2)
ALBUMIN/GLOB SERPL: 1.4 G/DL (ref 1.1–2.4)
ALP SERPL-CCNC: 90 U/L (ref 38–116)
ALT SERPL W P-5'-P-CCNC: 9 U/L (ref 0–33)
ANION GAP SERPL CALCULATED.3IONS-SCNC: 7.3 MMOL/L (ref 5–15)
AST SERPL-CCNC: 16 U/L (ref 0–32)
BASOPHILS # BLD AUTO: 0.04 10*3/MM3 (ref 0–0.2)
BASOPHILS NFR BLD AUTO: 0.5 % (ref 0–1.5)
BILIRUB SERPL-MCNC: 0.3 MG/DL (ref 0.2–1.2)
BUN SERPL-MCNC: 17 MG/DL (ref 6–20)
BUN/CREAT SERPL: 25.8 (ref 7.3–30)
CALCIUM SPEC-SCNC: 9.6 MG/DL (ref 8.5–10.2)
CEA SERPL-MCNC: 11.8 NG/ML
CHLORIDE SERPL-SCNC: 103 MMOL/L (ref 98–107)
CO2 SERPL-SCNC: 32.7 MMOL/L (ref 22–29)
CREAT SERPL-MCNC: 0.66 MG/DL (ref 0.6–1.1)
DEPRECATED RDW RBC AUTO: 41.4 FL (ref 37–54)
EGFRCR SERPLBLD CKD-EPI 2021: 88.8 ML/MIN/1.73
EOSINOPHIL # BLD AUTO: 0.16 10*3/MM3 (ref 0–0.4)
EOSINOPHIL NFR BLD AUTO: 2 % (ref 0.3–6.2)
ERYTHROCYTE [DISTWIDTH] IN BLOOD BY AUTOMATED COUNT: 13 % (ref 12.3–15.4)
FERRITIN SERPL-MCNC: 168.1 NG/ML (ref 13–150)
GLOBULIN UR ELPH-MCNC: 2.8 GM/DL (ref 1.8–3.5)
GLUCOSE SERPL-MCNC: 96 MG/DL (ref 74–124)
HCT VFR BLD AUTO: 37.5 % (ref 34–46.6)
HGB BLD-MCNC: 12.1 G/DL (ref 12–15.9)
HGB RETIC QN AUTO: 32.8 PG (ref 29.8–36.1)
IMM GRANULOCYTES # BLD AUTO: 0.02 10*3/MM3 (ref 0–0.05)
IMM GRANULOCYTES NFR BLD AUTO: 0.2 % (ref 0–0.5)
IMM RETICS NFR: 2.9 % (ref 3–15.8)
IRON 24H UR-MRATE: 63 MCG/DL (ref 37–145)
IRON SATN MFR SERPL: 23 % (ref 14–48)
LYMPHOCYTES # BLD AUTO: 1.1 10*3/MM3 (ref 0.7–3.1)
LYMPHOCYTES NFR BLD AUTO: 13.5 % (ref 19.6–45.3)
MCH RBC QN AUTO: 28.3 PG (ref 26.6–33)
MCHC RBC AUTO-ENTMCNC: 32.3 G/DL (ref 31.5–35.7)
MCV RBC AUTO: 87.6 FL (ref 79–97)
MONOCYTES # BLD AUTO: 0.6 10*3/MM3 (ref 0.1–0.9)
MONOCYTES NFR BLD AUTO: 7.4 % (ref 5–12)
NEUTROPHILS NFR BLD AUTO: 6.21 10*3/MM3 (ref 1.7–7)
NEUTROPHILS NFR BLD AUTO: 76.4 % (ref 42.7–76)
NRBC BLD AUTO-RTO: 0 /100 WBC (ref 0–0.2)
PLATELET # BLD AUTO: 172 10*3/MM3 (ref 140–450)
PMV BLD AUTO: 9.2 FL (ref 6–12)
POTASSIUM SERPL-SCNC: 4.4 MMOL/L (ref 3.5–4.7)
PROT SERPL-MCNC: 6.8 G/DL (ref 6.3–8)
RBC # BLD AUTO: 4.28 10*6/MM3 (ref 3.77–5.28)
RETICS # AUTO: 0.04 10*6/MM3 (ref 0.02–0.13)
RETICS/RBC NFR AUTO: 0.96 % (ref 0.7–1.9)
SODIUM SERPL-SCNC: 143 MMOL/L (ref 134–145)
TIBC SERPL-MCNC: 277 MCG/DL (ref 249–505)
TRANSFERRIN SERPL-MCNC: 198 MG/DL (ref 200–360)
WBC NRBC COR # BLD: 8.13 10*3/MM3 (ref 3.4–10.8)

## 2022-03-29 PROCEDURE — 83540 ASSAY OF IRON: CPT

## 2022-03-29 PROCEDURE — 36415 COLL VENOUS BLD VENIPUNCTURE: CPT

## 2022-03-29 PROCEDURE — 84466 ASSAY OF TRANSFERRIN: CPT

## 2022-03-29 PROCEDURE — 85025 COMPLETE CBC W/AUTO DIFF WBC: CPT

## 2022-03-29 PROCEDURE — 82378 CARCINOEMBRYONIC ANTIGEN: CPT | Performed by: INTERNAL MEDICINE

## 2022-03-29 PROCEDURE — 82728 ASSAY OF FERRITIN: CPT

## 2022-03-29 PROCEDURE — 85046 RETICYTE/HGB CONCENTRATE: CPT

## 2022-03-29 PROCEDURE — 80053 COMPREHEN METABOLIC PANEL: CPT

## 2022-03-29 PROCEDURE — 99214 OFFICE O/P EST MOD 30 MIN: CPT | Performed by: INTERNAL MEDICINE

## 2022-03-29 RX ORDER — TADALAFIL 20 MG/1
40 TABLET ORAL DAILY
COMMUNITY
Start: 2022-03-03 | End: 2022-04-02

## 2022-03-29 RX ORDER — LEVOTHYROXINE SODIUM 0.12 MG/1
112 TABLET ORAL DAILY
COMMUNITY
End: 2022-07-19 | Stop reason: SDDI

## 2022-03-29 NOTE — PROGRESS NOTES
Subjective         DURING THE VISIT WITH THE PATIENT TODAY , PATIENT HAD FACE MASK, MY MEDICAL ASSISTANT AND I  HAD PROPPER PROTECTIVE EQUIPMENT, AND I DID HAND HYGIENE WITH SOAP AND WATER BEFORE AND AFTER THE VISIT.     REASON FOR FOLLOW UP: 1.IRON DEFICIENCY ANEMIA,  DUE TO CHRONIC GI BLOOD LOSS, CHRONIC DIARRHEA, MALNUTRITION, WEIGHT LOSS, ABDOMINAL MASS RUQ: colon cancer diagnosed, removed, no need for adjuvant therapy for stage 2 disease    2.Ascending colon cancer s/p colectomy.    3. ELEVATED CEA AFTER COLECTOMY: PET SCAN NEGATIVE IN 6/17/21      This patient returns today to the office in company of her daughter stating that she has a mild case of COVID in the upper respiratory tract in 01/2022 without any obvious reason for acquisition and she was treated with dexamethasone through her pulmonologist improving in a few days and having no complications. She remains on oxygen and CPAP. Her appetite is very good. Her weight is stable. She has no abdominal pain, no diarrhea, no nausea or vomiting. No passage of blood in the stool. She has no abdominal distention or jaundice. Urination is normal. Her energy level is excellent. Her oxygen need has decreased after seeing her pulmonologist and she is now using 6L nasal cannula per minute.        Past Medical History:   Diagnosis Date   • Adenomatous polyp of sigmoid colon 1/21/2021   • Anemia    • Colonic polyp    • Depression    • Diverticulosis    • Esophageal reflux    • Essential hypertension    • GERD (gastroesophageal reflux disease)    • H/O complete eye exam 03/2018   • Hx of bone density study 02/15/2016   • Hyperlipidemia    • Hypothyroidism, acquired    • Major depressive disorder, recurrent episode, in full remission (HCC)    • Nonsenile cataract 2016   • Oxygen dependent     8L NC CONTINUOUS   • Pulmonary fibrosis (HCC)    • Pulmonary hypertension (HCC)    • Sleep apnea, obstructive     USES CPAP    • Stenosis, spinal, lumbar    • Vertigo, peripheral          Past Surgical History:   Procedure Laterality Date   • COLON RESECTION N/A 1/28/2021    Procedure: COLON RESECTION RIGHT;  Surgeon: Alfonso Cesar MD;  Location: [facility] MAIN OR;  Service: General;  Laterality: N/A;   • COLONOSCOPY  02/20/2012   • COLONOSCOPY N/A 1/21/2021    Procedure: COLONOSCOPY INTO CECUM AND TI WITH BIOPSIES, SPOT INJECTION TO COLON MASS (MID-ASCENDING), HOT SNARE POLYPECTOMY, COLD BX POLYPECTOMIES, SALINE LIFT, CLIP PLACEMENT X3;  Surgeon: Alfonso Cesar MD;  Location: [facility] ENDOSCOPY;  Service: General;  Laterality: N/A;  PRE:  ABNORMAL CT SCAN OF COLON  POST:  DIVERTICULOSIS, COLON MASS, POLYPS, HEMORRHOIDS   • INCONTINENCE SURGERY      Dr. Espinal   • MAMMO BILATERAL  2017    dr de la cruz    • PAP SMEAR  2014    dr de la cruz        Current Outpatient Medications on File Prior to Visit   Medication Sig Dispense Refill   • acetaminophen (TYLENOL) 500 MG tablet Take 1,000 mg by mouth Every 6 (Six) Hours As Needed for Mild Pain .     • ADCIRCA 20 MG tablet Take 40 mg by mouth Daily.     • ascorbic acid (VITAMIN C) 500 MG tablet Take 500 mg by mouth Daily.     • atorvastatin (LIPITOR) 10 MG tablet Take 1 tablet by mouth Daily. 90 tablet 1   • Cholecalciferol 25 MCG (1000 UT) capsule Take  by mouth Daily.     • ESBRIET 267 MG capsule Take 801 mg by mouth 3 (Three) Times a Day With Meals.     • Fluzone High-Dose Quadrivalent 0.7 ML suspension prefilled syringe injection      • levothyroxine (SYNTHROID, LEVOTHROID) 112 MCG tablet Take 1 tablet by mouth Daily. 90 tablet 1   • levothyroxine (SYNTHROID, LEVOTHROID) 125 MCG tablet Take 112 mcg by mouth Daily.     • O2 (OXYGEN) Inhale 8 L/min Continuous.     • omeprazole (priLOSEC) 40 MG capsule TAKE 1 CAPSULE TWICE DAILY 180 capsule 3   • PROBIOTIC PRODUCT PO Take  by mouth.     • sertraline (ZOLOFT) 50 MG tablet Take 1 tablet by mouth Daily. 90 tablet 1   • tadalafil (ADCIRCA) 20 MG tablet tablet Take 40 mg by mouth  Daily.     • verapamil SR (CALAN-SR) 120 MG CR tablet Take 120 mg by mouth Daily.  3     No current facility-administered medications on file prior to visit.        ALLERGIES:    Allergies   Allergen Reactions   • Sulfa Antibiotics Hives and Rash        Social History     Socioeconomic History   • Marital status:    Tobacco Use   • Smoking status: Former Smoker     Packs/day: 2.00     Types: Cigarettes     Start date:      Quit date:      Years since quittin.2   • Smokeless tobacco: Never Used   • Tobacco comment: Caffeine use: Drinks Coca Cola    Vaping Use   • Vaping Use: Never used   Substance and Sexual Activity   • Alcohol use: Yes     Comment: rare   • Drug use: No   • Sexual activity: Never     Partners: Male        Family History   Problem Relation Age of Onset   • Breast cancer Cousin    • Lung cancer Father    • Heart disease Father    • Colon cancer Brother    • Prostate cancer Brother    • Lung disease Brother    • Emphysema Brother    • Heart disease Mother    • Malig Hyperthermia Neg Hx       Current Outpatient Medications on File Prior to Visit   Medication Sig Dispense Refill   • acetaminophen (TYLENOL) 500 MG tablet Take 1,000 mg by mouth Every 6 (Six) Hours As Needed for Mild Pain .     • ADCIRCA 20 MG tablet Take 40 mg by mouth Daily.     • ascorbic acid (VITAMIN C) 500 MG tablet Take 500 mg by mouth Daily.     • atorvastatin (LIPITOR) 10 MG tablet Take 1 tablet by mouth Daily. 90 tablet 1   • Cholecalciferol 25 MCG (1000 UT) capsule Take  by mouth Daily.     • ESBRIET 267 MG capsule Take 801 mg by mouth 3 (Three) Times a Day With Meals.     • Fluzone High-Dose Quadrivalent 0.7 ML suspension prefilled syringe injection      • levothyroxine (SYNTHROID, LEVOTHROID) 112 MCG tablet Take 1 tablet by mouth Daily. 90 tablet 1   • levothyroxine (SYNTHROID, LEVOTHROID) 125 MCG tablet Take 112 mcg by mouth Daily.     • O2 (OXYGEN) Inhale 8 L/min Continuous.     • omeprazole (priLOSEC) 40  "MG capsule TAKE 1 CAPSULE TWICE DAILY 180 capsule 3   • PROBIOTIC PRODUCT PO Take  by mouth.     • sertraline (ZOLOFT) 50 MG tablet Take 1 tablet by mouth Daily. 90 tablet 1   • tadalafil (ADCIRCA) 20 MG tablet tablet Take 40 mg by mouth Daily.     • verapamil SR (CALAN-SR) 120 MG CR tablet Take 120 mg by mouth Daily.  3     No current facility-administered medications on file prior to visit.       Objective    Vitals:    03/29/22 1529   BP: 134/79   Pulse: 75   Resp: 16   Temp: 97.1 °F (36.2 °C)   TempSrc: Temporal   SpO2: 98%   Weight: 60 kg (132 lb 3.2 oz)   Height: 154.9 cm (60.98\")           Physical Exam     I HAVE PERSONALLY REVIEWED THE HISTORY OF THE PRESENT ILLNESS, PAST MEDICAL HISTORY, FAMILY HISTORY, SOCIAL HISTORY, ALLERGIES, MEDICATIONS STATED ABOVE IN THE  NOTE FROM TODAY.        GENERAL:  Well-developed, well-nourished  Patient  in no acute distress.   SKIN:  Warm, dry ,NO purpura ,NO petechiae, no rash.  HEENT:  Pupils were equal and reactive to light and accomodation, conjunctivae noninjected, no pterygium, normal extraocular movements, normal visual acuity.   NECK:  Supple with good range of motion; no thyromegaly , no other masses, no JVD or bruits,.No carotid artery pain, no carotid abnormal pulsation , NO arterial dance.  LYMPHATICS:  No cervical, NO supraclavicular, NO axillary,NO epitrochlear , NO inguinal adenopathies.  CARDIAC   normal rate , regular rhythm, without murmur,NO rubs NO S3 NO S4   LUNGS: DECREASED breath sounds bilateral, no wheezing, NO rhonchi, NO crackles ,NO rubs.  VASCULAR VENOUS: no cyanosis, NO collateral circulation, NO varicosities, NO edema, NO palpable cords, NO pain,NO erythema, NO pigmentation of the skin.  ABDOMEN:  Soft, NO pain,no hepatomegaly, no splenomegaly,no masses, no ascites, no collateral circulation,no distention,no Bib sign.  EXTREMITIES  AND SPINE:  No clubbing, no cyanosis ,no deformities , no pain .No kyphosis,  no pain in spine, no pain in " ribs , no pain in pelvic bone.  NEUROLOGICAL:  Patient was awake, alert, oriented to time, person and place.                  RECENT LABS:   Lab Results   Component Value Date    WBC 8.13 2022    HGB 12.1 2022    HCT 37.5 2022    MCV 87.6 2022     2022       REVIEWING YOUR TEST RESULTS IN [facility] IS NOT A SUBSTITUTE FOR DISCUSSING THOSE RESULTS WITH YOUR HEALTH CARE PROVIDER.   PLEASE CONTACT YOUR PROVIDER VIA [facility] TO DISCUSS ANY QUESTIONS OR CONCERNS YOU MAY HAVE REGARDING THESE TEST RESULTS.     RADIOLOGY REPORT     FACILITY[facility]   UNIT/AGE/GENDER: M.CT  OP      AGE:81 Y          SEX:F   PATIENT NAME/:  MICHA WOO L    1940   UNIT NUMBER:  [id number]   ACCOUNT NUMBER:  [id number]   ACCESSION NUMBER:  [id number]     EXAMINATION: CT of the chest without contrast (High resolution protocol).     DATE : 2022     HISTORY: Shortness of breath. History of interstitial lung disease.         TECHNIQUE: Helical CT of the chest was performed without the administration of intravenous contrast media according to the high resolution protocol.  High resolution images were also obtained in inspiration and expiration. CT examinations at this   facility use dose modulation, iterative reconstruction and/or weight based dosing when appropriate to reduce radiation dose to as low as reasonably achievable.     COMPARISON: 2020. 2019.     FINDINGS: There is moderately severe, symmetric, bilateral, predominantly peripheral chronic interstitial lung disease with basilar predominance. There is advanced pulmonary fibrosis with traction bronchiectasis. There is extensive honeycombing   throughout both lungs. The severity of the chronic interstitial changes have remained relatively stable since the previous examination performed 20 months ago.     A few noncalcified subcentimeter pulmonary nodules are  identified. A stable, mildly spiculated, 6 mm left apical nodule is identified on image #11 of series 3. An additional representative stable nodule measuring 4 mm is identified in the right lower   lobe on image #96. A stable peripheral right upper lobe nodule measuring 7 mm is identified on image #49. Calcified granulomatous disease is identified in the right lung and involves the calcified, right hilar lymph nodes.     The expiratory images of the lungs demonstrate no significant air trapping.     The images of the mediastinum show advanced coronary artery disease and mild cardiomegaly. There are prominent and mildly enlarged mediastinal lymph nodes with the largest of the lymph nodes measuring 11 mm in the short axis. There is dilatation of the   main pulmonary artery reaching 37 mm in diameter compatible with pulmonary arterial hypertension.     A few calcified granulomas are present in the spleen and liver. There are multiple gallstones in the lumen of the gallbladder. Portions of both adrenal glands imaged on the current exam are normal. There are no abnormalities of the imaged portions of the    pancreas or upper pole of the left kidney.     IMPRESSION:   1.Moderately severe, chronic, interstitial lung disease with peripheral involvement and basilar predominance along with extensive pulmonary fibrosis and honeycombing. Overall, no significant change since the most recent, previous CT studies performed   over the last 29 months. The findings are most likely related to relatively stable usual interstitial pneumonitis.   2.A few subcentimeter noncalcified pulmonary nodules which have remained stable since the previous exams. The long-term stability of the nodules is suggestive of a benign etiology.   3.Pulmonary and mediastinal old granulomatous disease.   4.Advanced coronary artery disease. Mild cardiomegaly.   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    6.No acute superimposed pulmonary infiltrates or pleural effusions are identified.     Dictated by: Jayjay Thompson M.D.     Images and Report reviewed and interpreted by: Jayjay Thompson M.D.     <PS><Electronically signed by: Jayjay Thompson M.D.>   03/09/2022 1646                              Assessment/Plan     Diagnoses and all orders for this visit:    1. Malignant neoplasm of ascending colon (HCC) (Primary)    2. Iron deficiency anemia due to chronic blood loss    3. Elevated CEA    4. Chronic respiratory failure with hypoxia (HCC)    5. Pulmonary fibrosis (HCC)    6. Pulmonary hypertension (HCC)              In summary this patient has progressive anemia. I documented in the laboratory parameters above progressive drop in the hemoglobin, progressive drop in the MCV. Morphology of peripheral blood is typical of iron deficiency anemia and I would not be surprised if she has an associated B12 or folate deficiency. This patient has had upper GI series and barium enema by GI, Dr. Boyd, and no anatomical alteration has been found in her upper GI tract or colon besides diverticular disease. The symptoms that she has today she blames them, especially the diarrhea, since the time that she had the barium enema. I think that was not an association. The fact that she has so much diarrhea day and night and with weight loss, profound fatigue, sarcopenia leads me to believe that it is some other entity that is producing this. It sounds like to me she has a malabsorptive process of some sort.     The patient also has a palpable mass in the right upper quadrant and the way how it feels to my hands, it is probably a palpable gallbladder. She had no pain there. This is very striking. Her abdomen is very soft and it is very easy to find.     Under the present circumstances and knowing that the patient cannot tolerate oral iron because it makes her to throw up and produce profuse diarrhea and makes the diarrhea  worse, I do believe that the patient needs to have the followin. For the workup for her anemia, she went back to the lab to obtain a comprehensive metabolic profile, ferritin, iron, TIBC, B12, folic acid level, reticulated hemoglobin.   2. Given the fact that the patient has had a TSH that was on the low side maybe her thyroid medication is overcorrecting her and this maybe this is part of the diarrhea process. I will obtain a TSH and a T4.   3. I do believe that the patient needs to have a CT scan of the abdomen and pelvis. I do not think she can handle oral contrast and I do not want to give her IV contrast, I find no need. I want to be sure there is no other anatomical alteration in her small intestine and GI tract that is favoring for her to have all the issues pertinent to her clinical symptoms.     I wonder if she could have some other process in her bowel including inflammatory bowel disease, bacterial overgrowth syndrome, celiac disease and differential diagnosis is more than extensive. This information would require to be shared with Dr. Boyd, her Gastroenterologist, for further review after she returns back in a few days after she proceeds with proper management of her anemia. Given the fact that she cannot handle oral iron by mouth, absolutely the patient needs to have Injectafer on 2 different occasions and this will be scheduled to start to happen as early as TODAY.   I discussed with the patient on 2020 after reviewing the case with the radiologist Dr. Pinto at [facility] in regards to her CT scan of the abdomen. This documents intussusception of the colon and there is a mass located in that that probably represents a colon cancer. Obviously, the question is how to proceed with all these issues on this patient. It is very obvious that she has profound iron deficiency anemia. Her ferritin level, iron level are low. She cannot handle oral iron therapy and she will initiate  Injectafer today and she will have the 2nd infusion next week. I expect that in 3 weeks she will have very significant rebound in erythropoiesis and I expect that her pulmonary manifestations will be better in regard to her pulmonary hypertension and her pulmonary fibrosis. Typically patient's with this condition develop polycythemia instead of anemia.    The other issue is what we are going to do with her colon. I do not think that the patient is a normal surgical candidate under the present circumstances and we need to try to fix her up in the best way possible to see if she gets to the point to have a resection of this issue. I have made her an appointment to be seen by cardiology, Rambo Dasilva MD, and proceed with an echocardiogram in days. We have also made an appointment for her to see Alfonso Cesar MD, General Surgery to review the case with me.       I reviewed the patient back with the daughter in the room on 12/10/2020. In the interim, I have had a conversation with Zita Guy MD, the patient’s pulmonologist, who strongly believes that the patient will be able to go through surgery as long as she has proper pulmonary assessment before intervention and she asked me to go ahead and consult Pulmonary Medicine, Dr. Ramos and associates for this purpose. A consult has been placed.     I reviewed her echocardiogram and actually it is better than I expected for somebody who has pulmonary fibrosis.     In summary I think the patient’s intussusception is producing bleeding, chronically, leading to iron deficiency anemia. She has had Injectafer a week ago, another one to be given to her tomorrow and thereafter she will require blood work on weekly basis. If the hemoglobin goes below 9, I think she will require transfusion of 2 units of red cells. She will have weekly appointment for CBC and nurse check and I will review her back on 12/30/2020.     I EMPHATICALLY POINTED OUT TO THE PATIENT’S DAUGHTER SHE CANNOT  TAKE ANYMORE ZULMA-SELTZER. SHE HAS BEEN TAKING THIS ON A REGULAR SCHEDULE AND THIS IS PRODUCING PROBABLY ALSO AN ELEMENT OF GI BLEEDING ASSOCIATED WITH HER INTUSSUSCEPTION AND WHY NOT GASTRITIS AND SO FORTH.     In summary the patient was reviewed on 12/30/2020 along with her daughter in the room. The patient has gone through cardiological assessment and actually her cardiac ejection fraction is not that bad, close to 50%, and with minimal evidence of pulmonary arterial hypertension. Normal contractility of the left ventricle. The patient also has been seen by Pulmonary Medicine. These records have been reviewed again and her pulmonary function tests is very marginal. The analysis is stated above by Dr. Moser who has advised the patient that she is a high risk for general anesthesia in regard to the need for ICU stay, intubation, mechanical ventilation and so forth given her pulmonary fibrosis. He has advised the patient to see Dr. Guy, Pulmonary Medicine, and she has an appointment to see her next week.     In regard to her iron deficiency anemia after she received IV iron infusions the hemoglobin keeps improving to 11.2 from 8.4. The patient is no longer taking Zulma-Rogers. The patient is a little bit stronger but her appetite is not good. Obviously the question is what is the cause of the intussusception. I have measured a CEA level that is elevated at 6.2 and leads me to believe that maybe this is something related to a primary colon cancer. Dr. Cesar has suggested for her to have a colonoscopy after reviewing his good notes. The patient is even more hesitant to proceed with this.       The patient was further reviewed in the office on 02/22/2021. Since the previous visit and after I discussed the case with Dr. Zita Guy, the patient’s pulmonologist, I advised Dr. Cesar to go ahead and proceed with her surgery. This happened and actually to the surprise of everybody the patient walked away from the  hospital 3 days later with no complications or issues pertinent to her lung disease. The patient had also excellent tolerance to her colonoscopy. Since she has been at home she has been gaining appetite and gaining weight. She is feeling substantially better. She has no abdominal pain. Her surgical site has healed. Her bowel activity is normal and urination is normal. She remains on oxygen. She is walking around the house and she is doing more things that she was not doing before. She is not sitting down complaining of a bad stomach all the time. She is not taking Nohelia-Biloxi as I pointed out to them before.     I have reviewed the pathology of the surgical specimen and she had a T3N0M0 colonic cancer with 14 lymph nodes removed, negative, with no perineural, perivascular or perivenous invasion. The margins of resection were completely clear. On the CT scan of the abdomen, her liver was completely unremarkable.         I reviewed with the patient on the telephone on 06/17/2021 after personally reviewing the PET scan in the PAC system [facility] that shows no areas of abnormal uptake in her lungs, no areas of abnormal uptake in the liver, retroperitoneal lymph nodes or any other site as far as I can tell. The radiologists reading is about the same.     The good news to me is that the PET scan is negative, the patient is feeling better. She remains fully functional, her anemia is corrected after all of the bleeding that she did out of her colon cancer with improving hemoglobin.    Obviously raises the question why the CEA level is elevated. The only reasonable explanation could be the chronic inflammatory process associated with her chronic lung disease. She does not smoke. She is not around anybody who smokes and obviously it is the only logical explanation.       The patient was further reviewed on 09/28/2021. Her overall clinical condition is excellent. As long as she does not eat junk food she has  no diarrhea, abdominal pain or discomfort. Her weight is stable. She feels substantially better in regard to her ability to function independently at home. She has no pain related to malignancy, urination is normal, her respiratory situation remains stable, she remains on oxygen and she has not had any other respiratory infections.     The clinical examination of the patient today is very much negative normal besides her chronic lung disease and her decreased pulmonary auscultation with crackles at the bases. Her heart function seems to be appropriate.     Her white count, hemoglobin and platelets are normal. The white count differential is normal. Her ferritin and iron profile remain normal. Her CEA level actually has dropped some and this in my opinion is related to her chronic lung disease more than anything else. As we have mentioned before we have performed multiple radiological analyses on her including CT scans and PET scans looking for recurrent disease and nothing has been found.     I do believe that the patient in my opinion from the point of view of her colon cancer and her iron deficiency anemia that has been corrected is doing perfectly well. Her chronic lung disease is her chronic lung disease and this will remain on oxygen and followed up by her pulmonologist.     I advised her to go ahead and get the booster shot for COVID and I advised her also to proceed with flu vaccination.     I will review her back in 3 months with a CBC, CMP, ferritin, iron profile and a CEA level. I find no need for radiological assessment on her at this time.   The patient was further reviewed on 12/28/2021. In regard to the previous history of colon cancer, she underwent resection of this. Since then her hemoglobin has stabilized around 12. Her ferritin level is pending today. She has improved her diet. She has gained a few pounds and she feels substantially better from this point of view. She has learned to put away foods  that she knows that she will get sick with and now she is eating a variety of foods that she has not eaten in a long time. Surprising enough very likely her nutrition is better. She has no symptoms that wound indicate colon cancer recurrence. In spite of having a persistent elevation of the CEA level the patient has not had anything to suggest clinically, radiologically through CT scans and PET scans evidence of colon cancer recurrence. It raises the question if the pulmonary fibrosis is the reason why her CEA level is minimally elevated. We are awaiting to review this and discuss this with the patient upon result.     She remains on oxygen for her pulmonary fibrosis. Today she has minimal if any crackles. Actually her lung auscultation is very benign. She has atrial fibrillation with controlled ventricular response and she will see Cardiology very soon.     I advised the patient to return to see me back in 3 months with a CBC, CMP, CEA, ferritin, iron, TIBC. I discussed all these facts with the patient and her daughter present in the room. I did not prescribe any new medicines.  I reviewed the patient on 03/29/2022. Since the previous visit she has had COVID infection in the upper respiratory tract treated by her pulmonologist with steroids and resolution and no complications. That is very surprising. In any event, she has had lesser need for oxygen. Her O2 saturation has improved and she is down to 6L nasal cannula continuously. She remains on CPAP at nighttime. She has seen her pulmonologist, Dr. Duarte. She has had a CT scan that shows no new alterations in her lung anatomy according to the patient.     Her white count, hemoglobin and platelets are excellent today. Her reticulated hemoglobin is normal. Her ferritin, iron profile are pending as well as her CEA and CMP.     I do believe that the patient does not have any symptoms to suggest colon cancer recurrence. Her anemia seems to have improved. Her hemoglobin  has stabilized and I think she looks very good to me.    RECOMMENDATIONS:  I have advised her to return to see us back in 4 months with a CBC, CMP, ferritin, iron, TIBC as well as CEA level. Otherwise, no other need for radiological assessment from my point of view.     I discussed all these facts with the patient and her daughter present in the room.  Addendum: due to cea of 11.4 I will proceed with ct abd pelvis in few days, her ct chest shows nothing to suggest a lung malignancy or mets    ·

## 2022-03-30 ENCOUNTER — TELEPHONE (OUTPATIENT)
Dept: ONCOLOGY | Facility: CLINIC | Age: 82
End: 2022-03-30

## 2022-03-30 DIAGNOSIS — C18.2 MALIGNANT NEOPLASM OF ASCENDING COLON: Primary | ICD-10-CM

## 2022-03-30 DIAGNOSIS — R97.0 ELEVATED CEA: ICD-10-CM

## 2022-03-30 NOTE — TELEPHONE ENCOUNTER
----- Message from Emmanuel Lara MD sent at 3/30/2022 12:27 PM EDT -----  Call her cea is up to 11, she needs a ct abd pelvis in days and phone visit after that

## 2022-03-30 NOTE — TELEPHONE ENCOUNTER
Called pt and relayed message below. Pt v/u. Orders placed. Message sen tto scheduling. Jennfier Mercado RN

## 2022-04-01 ENCOUNTER — HOSPITAL ENCOUNTER (OUTPATIENT)
Dept: PET IMAGING | Facility: HOSPITAL | Age: 82
Discharge: HOME | End: 2022-04-01
Admitting: INTERNAL MEDICINE

## 2022-04-01 DIAGNOSIS — C18.2 MALIGNANT NEOPLASM OF ASCENDING COLON: ICD-10-CM

## 2022-04-01 DIAGNOSIS — R97.0 ELEVATED CEA: ICD-10-CM

## 2022-04-01 LAB — CREAT BLDA-MCNC: 0.6 MG/DL (ref 0.6–1.3)

## 2022-04-01 PROCEDURE — 0 DIATRIZOATE MEGLUMINE & SODIUM PER 1 ML: Performed by: INTERNAL MEDICINE

## 2022-04-01 PROCEDURE — 25010000002 IOPAMIDOL 61 % SOLUTION: Performed by: INTERNAL MEDICINE

## 2022-04-01 PROCEDURE — 74177 CT ABD & PELVIS W/CONTRAST: CPT

## 2022-04-01 PROCEDURE — 82565 ASSAY OF CREATININE: CPT

## 2022-04-01 RX ADMIN — IOPAMIDOL 85 ML: 612 INJECTION, SOLUTION INTRAVENOUS at 15:05

## 2022-04-01 RX ADMIN — DIATRIZOATE MEGLUMINE AND DIATRIZOATE SODIUM 30 ML: 660; 100 LIQUID ORAL; RECTAL at 14:20

## 2022-06-01 DIAGNOSIS — F33.42 MAJOR DEPRESSIVE DISORDER, RECURRENT EPISODE, IN FULL REMISSION: Chronic | ICD-10-CM

## 2022-06-01 DIAGNOSIS — E78.2 MIXED HYPERLIPIDEMIA: Chronic | ICD-10-CM

## 2022-06-01 RX ORDER — ATORVASTATIN CALCIUM 10 MG/1
TABLET, FILM COATED ORAL
Qty: 30 TABLET | Refills: 0 | Status: SHIPPED | OUTPATIENT
Start: 2022-06-01 | End: 2022-07-26 | Stop reason: SDUPTHER

## 2022-06-27 DIAGNOSIS — E03.9 ACQUIRED HYPOTHYROIDISM: Chronic | ICD-10-CM

## 2022-06-27 RX ORDER — LEVOTHYROXINE SODIUM 112 UG/1
TABLET ORAL
Qty: 30 TABLET | Refills: 0 | Status: SHIPPED | OUTPATIENT
Start: 2022-06-27 | End: 2022-07-26 | Stop reason: SDUPTHER

## 2022-07-19 ENCOUNTER — OFFICE VISIT (OUTPATIENT)
Dept: ONCOLOGY | Facility: CLINIC | Age: 82
End: 2022-07-19

## 2022-07-19 ENCOUNTER — LAB (OUTPATIENT)
Dept: LAB | Facility: HOSPITAL | Age: 82
End: 2022-07-19

## 2022-07-19 VITALS
SYSTOLIC BLOOD PRESSURE: 139 MMHG | TEMPERATURE: 96.6 F | RESPIRATION RATE: 16 BRPM | OXYGEN SATURATION: 99 % | HEIGHT: 61 IN | WEIGHT: 135.1 LBS | DIASTOLIC BLOOD PRESSURE: 74 MMHG | BODY MASS INDEX: 25.51 KG/M2 | HEART RATE: 57 BPM

## 2022-07-19 DIAGNOSIS — D50.0 IRON DEFICIENCY ANEMIA DUE TO CHRONIC BLOOD LOSS: ICD-10-CM

## 2022-07-19 DIAGNOSIS — R97.0 ELEVATED CEA: ICD-10-CM

## 2022-07-19 DIAGNOSIS — C18.2 MALIGNANT NEOPLASM OF ASCENDING COLON: ICD-10-CM

## 2022-07-19 DIAGNOSIS — J84.10 PULMONARY FIBROSIS: ICD-10-CM

## 2022-07-19 DIAGNOSIS — J96.11 CHRONIC RESPIRATORY FAILURE WITH HYPOXIA: ICD-10-CM

## 2022-07-19 DIAGNOSIS — C18.2 MALIGNANT NEOPLASM OF ASCENDING COLON: Primary | ICD-10-CM

## 2022-07-19 DIAGNOSIS — I27.20 PULMONARY HYPERTENSION: ICD-10-CM

## 2022-07-19 LAB
ALBUMIN SERPL-MCNC: 4.1 G/DL (ref 3.5–5.2)
ALBUMIN/GLOB SERPL: 1.6 G/DL (ref 1.1–2.4)
ALP SERPL-CCNC: 92 U/L (ref 38–116)
ALT SERPL W P-5'-P-CCNC: 8 U/L (ref 0–33)
ANION GAP SERPL CALCULATED.3IONS-SCNC: 9.8 MMOL/L (ref 5–15)
AST SERPL-CCNC: 16 U/L (ref 0–32)
BASOPHILS # BLD AUTO: 0.04 10*3/MM3 (ref 0–0.2)
BASOPHILS NFR BLD AUTO: 0.5 % (ref 0–1.5)
BILIRUB SERPL-MCNC: 0.2 MG/DL (ref 0.2–1.2)
BUN SERPL-MCNC: 16 MG/DL (ref 6–20)
BUN/CREAT SERPL: 24.6 (ref 7.3–30)
CALCIUM SPEC-SCNC: 9.6 MG/DL (ref 8.5–10.2)
CEA SERPL-MCNC: 10.8 NG/ML
CHLORIDE SERPL-SCNC: 103 MMOL/L (ref 98–107)
CO2 SERPL-SCNC: 30.2 MMOL/L (ref 22–29)
CREAT SERPL-MCNC: 0.65 MG/DL (ref 0.6–1.1)
DEPRECATED RDW RBC AUTO: 39.2 FL (ref 37–54)
EGFRCR SERPLBLD CKD-EPI 2021: 88.6 ML/MIN/1.73
EOSINOPHIL # BLD AUTO: 0.22 10*3/MM3 (ref 0–0.4)
EOSINOPHIL NFR BLD AUTO: 2.9 % (ref 0.3–6.2)
ERYTHROCYTE [DISTWIDTH] IN BLOOD BY AUTOMATED COUNT: 12.3 % (ref 12.3–15.4)
FERRITIN SERPL-MCNC: 173.4 NG/ML (ref 13–150)
GLOBULIN UR ELPH-MCNC: 2.6 GM/DL (ref 1.8–3.5)
GLUCOSE SERPL-MCNC: 87 MG/DL (ref 74–124)
HCT VFR BLD AUTO: 35.9 % (ref 34–46.6)
HGB BLD-MCNC: 11.7 G/DL (ref 12–15.9)
HGB RETIC QN AUTO: 32.5 PG (ref 29.8–36.1)
IMM GRANULOCYTES # BLD AUTO: 0.02 10*3/MM3 (ref 0–0.05)
IMM GRANULOCYTES NFR BLD AUTO: 0.3 % (ref 0–0.5)
IMM RETICS NFR: 5.4 % (ref 3–15.8)
IRON 24H UR-MRATE: 55 MCG/DL (ref 37–145)
IRON SATN MFR SERPL: 20 % (ref 14–48)
LYMPHOCYTES # BLD AUTO: 1.28 10*3/MM3 (ref 0.7–3.1)
LYMPHOCYTES NFR BLD AUTO: 17 % (ref 19.6–45.3)
MCH RBC QN AUTO: 28.5 PG (ref 26.6–33)
MCHC RBC AUTO-ENTMCNC: 32.6 G/DL (ref 31.5–35.7)
MCV RBC AUTO: 87.6 FL (ref 79–97)
MONOCYTES # BLD AUTO: 0.58 10*3/MM3 (ref 0.1–0.9)
MONOCYTES NFR BLD AUTO: 7.7 % (ref 5–12)
NEUTROPHILS NFR BLD AUTO: 5.39 10*3/MM3 (ref 1.7–7)
NEUTROPHILS NFR BLD AUTO: 71.6 % (ref 42.7–76)
NRBC BLD AUTO-RTO: 0 /100 WBC (ref 0–0.2)
PLATELET # BLD AUTO: 178 10*3/MM3 (ref 140–450)
PMV BLD AUTO: 8.8 FL (ref 6–12)
POTASSIUM SERPL-SCNC: 4.3 MMOL/L (ref 3.5–4.7)
PROT SERPL-MCNC: 6.7 G/DL (ref 6.3–8)
RBC # BLD AUTO: 4.1 10*6/MM3 (ref 3.77–5.28)
RETICS # AUTO: 0.05 10*6/MM3 (ref 0.02–0.13)
RETICS/RBC NFR AUTO: 1.29 % (ref 0.7–1.9)
SODIUM SERPL-SCNC: 143 MMOL/L (ref 134–145)
TIBC SERPL-MCNC: 274 MCG/DL (ref 249–505)
TRANSFERRIN SERPL-MCNC: 196 MG/DL (ref 200–360)
WBC NRBC COR # BLD: 7.53 10*3/MM3 (ref 3.4–10.8)

## 2022-07-19 PROCEDURE — 82378 CARCINOEMBRYONIC ANTIGEN: CPT | Performed by: INTERNAL MEDICINE

## 2022-07-19 PROCEDURE — 82728 ASSAY OF FERRITIN: CPT

## 2022-07-19 PROCEDURE — 80053 COMPREHEN METABOLIC PANEL: CPT

## 2022-07-19 PROCEDURE — 85046 RETICYTE/HGB CONCENTRATE: CPT

## 2022-07-19 PROCEDURE — 84466 ASSAY OF TRANSFERRIN: CPT

## 2022-07-19 PROCEDURE — 85025 COMPLETE CBC W/AUTO DIFF WBC: CPT

## 2022-07-19 PROCEDURE — 83540 ASSAY OF IRON: CPT

## 2022-07-19 PROCEDURE — 36415 COLL VENOUS BLD VENIPUNCTURE: CPT

## 2022-07-19 PROCEDURE — 99214 OFFICE O/P EST MOD 30 MIN: CPT | Performed by: INTERNAL MEDICINE

## 2022-07-19 RX ORDER — CLOBETASOL PROPIONATE 0.5 MG/G
OINTMENT TOPICAL
COMMUNITY
Start: 2022-06-28

## 2022-07-20 ENCOUNTER — TELEPHONE (OUTPATIENT)
Dept: ONCOLOGY | Facility: CLINIC | Age: 82
End: 2022-07-20

## 2022-07-20 NOTE — TELEPHONE ENCOUNTER
----- Message from Emmanuel Lara MD sent at 7/20/2022  8:54 AM EDT -----  Call her cea is stable at 11 great nothing else to add

## 2022-07-22 ENCOUNTER — TELEPHONE (OUTPATIENT)
Dept: ONCOLOGY | Facility: CLINIC | Age: 82
End: 2022-07-22

## 2022-07-22 NOTE — TELEPHONE ENCOUNTER
Caller: Madalyn Galeas    Relationship to patient: Self    Best call back number: [phone]    Chief complaint: CONFLICT OF SCHEDULE     Type of visit: LAB AND FOLLOW UP     Requested date: 11/09 OR 11/15 NEEDING AROUND 2:30PM IF POSSIBLE.     If rescheduling, when is the original appointment: 11/08    Additional notes:

## 2022-07-26 ENCOUNTER — OFFICE VISIT (OUTPATIENT)
Dept: FAMILY MEDICINE CLINIC | Facility: CLINIC | Age: 82
End: 2022-07-26

## 2022-07-26 VITALS
SYSTOLIC BLOOD PRESSURE: 135 MMHG | HEART RATE: 78 BPM | WEIGHT: 135 LBS | RESPIRATION RATE: 16 BRPM | DIASTOLIC BLOOD PRESSURE: 72 MMHG | BODY MASS INDEX: 25.49 KG/M2 | HEIGHT: 61 IN | TEMPERATURE: 97.6 F

## 2022-07-26 DIAGNOSIS — E03.9 ACQUIRED HYPOTHYROIDISM: Chronic | ICD-10-CM

## 2022-07-26 DIAGNOSIS — E78.2 MIXED HYPERLIPIDEMIA: Primary | Chronic | ICD-10-CM

## 2022-07-26 DIAGNOSIS — F33.42 MAJOR DEPRESSIVE DISORDER, RECURRENT EPISODE, IN FULL REMISSION: Chronic | ICD-10-CM

## 2022-07-26 PROCEDURE — 99214 OFFICE O/P EST MOD 30 MIN: CPT | Performed by: FAMILY MEDICINE

## 2022-07-26 RX ORDER — LEVOTHYROXINE SODIUM 112 UG/1
112 TABLET ORAL DAILY
Qty: 90 TABLET | Refills: 1 | Status: SHIPPED | OUTPATIENT
Start: 2022-07-26 | End: 2023-01-16

## 2022-07-26 RX ORDER — ATORVASTATIN CALCIUM 10 MG/1
10 TABLET, FILM COATED ORAL DAILY
Qty: 90 TABLET | Refills: 1 | Status: SHIPPED | OUTPATIENT
Start: 2022-07-26 | End: 2022-12-14

## 2022-11-01 ENCOUNTER — TELEPHONE (OUTPATIENT)
Dept: ONCOLOGY | Facility: CLINIC | Age: 82
End: 2022-11-01

## 2022-11-01 NOTE — TELEPHONE ENCOUNTER
Caller: Madalyn Galeas    Relationship to patient: Self    Best call back number: [phone]    Chief complaint: PATIENT TO RESCHEDULE 11/9/22    Type of visit: LAB AND FU    Requested date: WILL NEED LATE AFTERNOON ON A DIFFERENT DAY

## 2022-11-15 ENCOUNTER — TELEPHONE (OUTPATIENT)
Dept: ONCOLOGY | Facility: CLINIC | Age: 82
End: 2022-11-15

## 2022-11-15 ENCOUNTER — OFFICE VISIT (OUTPATIENT)
Dept: ONCOLOGY | Facility: CLINIC | Age: 82
End: 2022-11-15

## 2022-11-15 ENCOUNTER — LAB (OUTPATIENT)
Dept: LAB | Facility: HOSPITAL | Age: 82
End: 2022-11-15

## 2022-11-15 VITALS
BODY MASS INDEX: 25.15 KG/M2 | RESPIRATION RATE: 16 BRPM | WEIGHT: 133.2 LBS | HEIGHT: 61 IN | DIASTOLIC BLOOD PRESSURE: 76 MMHG | OXYGEN SATURATION: 99 % | HEART RATE: 68 BPM | SYSTOLIC BLOOD PRESSURE: 123 MMHG | TEMPERATURE: 97.1 F

## 2022-11-15 DIAGNOSIS — D50.0 IRON DEFICIENCY ANEMIA DUE TO CHRONIC BLOOD LOSS: ICD-10-CM

## 2022-11-15 DIAGNOSIS — C18.2 MALIGNANT NEOPLASM OF ASCENDING COLON: Primary | ICD-10-CM

## 2022-11-15 DIAGNOSIS — R97.0 ELEVATED CEA: ICD-10-CM

## 2022-11-15 DIAGNOSIS — C18.2 MALIGNANT NEOPLASM OF ASCENDING COLON: ICD-10-CM

## 2022-11-15 LAB
ALBUMIN SERPL-MCNC: 4 G/DL (ref 3.5–5.2)
ALBUMIN/GLOB SERPL: 1.5 G/DL (ref 1.1–2.4)
ALP SERPL-CCNC: 82 U/L (ref 38–116)
ALT SERPL W P-5'-P-CCNC: 6 U/L (ref 0–33)
ANION GAP SERPL CALCULATED.3IONS-SCNC: 10.6 MMOL/L (ref 5–15)
AST SERPL-CCNC: 16 U/L (ref 0–32)
BASOPHILS # BLD AUTO: 0.04 10*3/MM3 (ref 0–0.2)
BASOPHILS NFR BLD AUTO: 0.6 % (ref 0–1.5)
BILIRUB SERPL-MCNC: 0.2 MG/DL (ref 0.2–1.2)
BUN SERPL-MCNC: 19 MG/DL (ref 6–20)
BUN/CREAT SERPL: 31.7 (ref 7.3–30)
CALCIUM SPEC-SCNC: 9.7 MG/DL (ref 8.5–10.2)
CEA SERPL-MCNC: 10.5 NG/ML
CHLORIDE SERPL-SCNC: 103 MMOL/L (ref 98–107)
CO2 SERPL-SCNC: 30.4 MMOL/L (ref 22–29)
CREAT SERPL-MCNC: 0.6 MG/DL (ref 0.6–1.1)
DEPRECATED RDW RBC AUTO: 38.2 FL (ref 37–54)
EGFRCR SERPLBLD CKD-EPI 2021: 90.3 ML/MIN/1.73
EOSINOPHIL # BLD AUTO: 0.17 10*3/MM3 (ref 0–0.4)
EOSINOPHIL NFR BLD AUTO: 2.5 % (ref 0.3–6.2)
ERYTHROCYTE [DISTWIDTH] IN BLOOD BY AUTOMATED COUNT: 12.3 % (ref 12.3–15.4)
FERRITIN SERPL-MCNC: 153.9 NG/ML (ref 13–150)
GLOBULIN UR ELPH-MCNC: 2.7 GM/DL (ref 1.8–3.5)
GLUCOSE SERPL-MCNC: 105 MG/DL (ref 74–124)
HCT VFR BLD AUTO: 36.1 % (ref 34–46.6)
HGB BLD-MCNC: 12 G/DL (ref 12–15.9)
HGB RETIC QN AUTO: 32.7 PG (ref 29.8–36.1)
IMM GRANULOCYTES # BLD AUTO: 0.02 10*3/MM3 (ref 0–0.05)
IMM GRANULOCYTES NFR BLD AUTO: 0.3 % (ref 0–0.5)
IMM RETICS NFR: 4.6 % (ref 3–15.8)
IRON 24H UR-MRATE: 56 MCG/DL (ref 37–145)
IRON SATN MFR SERPL: 21 % (ref 14–48)
LYMPHOCYTES # BLD AUTO: 1.05 10*3/MM3 (ref 0.7–3.1)
LYMPHOCYTES NFR BLD AUTO: 15.4 % (ref 19.6–45.3)
MCH RBC QN AUTO: 28.5 PG (ref 26.6–33)
MCHC RBC AUTO-ENTMCNC: 33.2 G/DL (ref 31.5–35.7)
MCV RBC AUTO: 85.7 FL (ref 79–97)
MONOCYTES # BLD AUTO: 0.51 10*3/MM3 (ref 0.1–0.9)
MONOCYTES NFR BLD AUTO: 7.5 % (ref 5–12)
NEUTROPHILS NFR BLD AUTO: 5.03 10*3/MM3 (ref 1.7–7)
NEUTROPHILS NFR BLD AUTO: 73.7 % (ref 42.7–76)
NRBC BLD AUTO-RTO: 0 /100 WBC (ref 0–0.2)
PLATELET # BLD AUTO: 155 10*3/MM3 (ref 140–450)
PMV BLD AUTO: 8.6 FL (ref 6–12)
POTASSIUM SERPL-SCNC: 4.2 MMOL/L (ref 3.5–4.7)
PROT SERPL-MCNC: 6.7 G/DL (ref 6.3–8)
RBC # BLD AUTO: 4.21 10*6/MM3 (ref 3.77–5.28)
RETICS # AUTO: 0.05 10*6/MM3 (ref 0.02–0.13)
RETICS/RBC NFR AUTO: 1.1 % (ref 0.7–1.9)
SODIUM SERPL-SCNC: 144 MMOL/L (ref 134–145)
TIBC SERPL-MCNC: 272 MCG/DL (ref 249–505)
TRANSFERRIN SERPL-MCNC: 194 MG/DL (ref 200–360)
WBC NRBC COR # BLD: 6.82 10*3/MM3 (ref 3.4–10.8)

## 2022-11-15 PROCEDURE — 99214 OFFICE O/P EST MOD 30 MIN: CPT | Performed by: NURSE PRACTITIONER

## 2022-11-15 PROCEDURE — 85025 COMPLETE CBC W/AUTO DIFF WBC: CPT

## 2022-11-15 PROCEDURE — 84466 ASSAY OF TRANSFERRIN: CPT

## 2022-11-15 PROCEDURE — 36415 COLL VENOUS BLD VENIPUNCTURE: CPT

## 2022-11-15 PROCEDURE — 85046 RETICYTE/HGB CONCENTRATE: CPT

## 2022-11-15 PROCEDURE — 82728 ASSAY OF FERRITIN: CPT

## 2022-11-15 PROCEDURE — 80053 COMPREHEN METABOLIC PANEL: CPT

## 2022-11-15 PROCEDURE — 82378 CARCINOEMBRYONIC ANTIGEN: CPT | Performed by: INTERNAL MEDICINE

## 2022-11-15 PROCEDURE — 83540 ASSAY OF IRON: CPT

## 2022-11-16 ENCOUNTER — TELEPHONE (OUTPATIENT)
Dept: ONCOLOGY | Facility: CLINIC | Age: 82
End: 2022-11-16

## 2022-11-16 LAB
CHOLEST SERPL-MCNC: 159 MG/DL (ref 100–199)
HDLC SERPL-MCNC: 45 MG/DL
LDLC SERPL CALC-MCNC: 95 MG/DL (ref 0–99)
T3FREE SERPL-MCNC: 2.7 PG/ML (ref 2–4.4)
T4 FREE SERPL-MCNC: 1.35 NG/DL (ref 0.82–1.77)
TRIGL SERPL-MCNC: 103 MG/DL (ref 0–149)
TSH SERPL DL<=0.005 MIU/L-ACNC: 0.03 UIU/ML (ref 0.45–4.5)
VLDLC SERPL CALC-MCNC: 19 MG/DL (ref 5–40)

## 2022-11-16 NOTE — TELEPHONE ENCOUNTER
Caller: Madalyn Galeas    Relationship: Self    Best call back number: [phone]    What is the best time to reach you: ANYTIME TODAY     CAN LEAVE VOICEMAIL IF UNABLE TO REACH WITH APPT DETAILS.     Who are you requesting to speak with (clinical staff, provider,  specific staff member): SCHEDULING      What was the call regarding:     WAS IN YESTERDAY FOR APPOINTMENT, AND SUPPOSED TO BE SCHEDULED FOR 4 MONTH FOLLOW UP AND LAB , WHICH WOULD HAVE BEEN MARCH BUT WAS SCHEDULED FOR MAY     NEEDING CORRECTED, AND CAN DO 03/21 OR 03/15 AROUND THE SAME TIME  AND WOULD PREFER 03/21 IF CAN     Do you require a callback: YES

## 2022-11-16 NOTE — PROGRESS NOTES
Subjective     REASON FOR FOLLOW UP:     1.IRON DEFICIENCY ANEMIA,  DUE TO CHRONIC GI BLOOD LOSS, CHRONIC DIARRHEA, MALNUTRITION, WEIGHT LOSS, ABDOMINAL MASS RUQ: colon cancer diagnosed, removed, no need for adjuvant therapy for stage 2 disease    2. Ascending colon cancer s/p colectomy.    3. ELEVATED CEA AFTER COLECTOMY: PET SCAN NEGATIVE IN 6/17/21, CT SCAN NEGATIVE 3/22      HISTORY OF PRESENT ILLNESS:  Ms. Ortega is a very pleasant 82-year-old female with the above-mentioned history who is here today for lab review and follow-up.  She reports that she has been doing quite well since her last follow-up visit.  She denies any complaints of pain.  She denies any worsening of her respiratory status.  She has a good appetite.  She remains active.  She has no new problems or concerns today.      Past Medical History:   Diagnosis Date   • Adenomatous polyp of sigmoid colon 1/21/2021   • Anemia    • Colonic polyp    • Depression    • Diverticulosis    • Esophageal reflux    • Essential hypertension    • GERD (gastroesophageal reflux disease)    • H/O complete eye exam 03/2018   • Hx of bone density study 02/15/2016   • Hyperlipidemia    • Hypothyroidism, acquired    • Major depressive disorder, recurrent episode, in full remission (HCC)    • Nonsenile cataract 2016   • Oxygen dependent     8L NC CONTINUOUS   • Pulmonary fibrosis (HCC)    • Pulmonary hypertension (HCC)    • Sleep apnea, obstructive     USES CPAP    • Stenosis, spinal, lumbar    • Vertigo, peripheral         Past Surgical History:   Procedure Laterality Date   • COLON RESECTION N/A 1/28/2021    Procedure: COLON RESECTION RIGHT;  Surgeon: Alfonso Cesar MD;  Location: [facility];  Service: General;  Laterality: N/A;   • COLONOSCOPY  02/20/2012   • COLONOSCOPY N/A 1/21/2021    Procedure: COLONOSCOPY INTO CECUM AND TI WITH BIOPSIES, SPOT INJECTION TO COLON MASS (MID-ASCENDING), HOT SNARE POLYPECTOMY, COLD BX POLYPECTOMIES, SALINE LIFT,  CLIP PLACEMENT X3;  Surgeon: Alfonso Cesar MD;  Location: [facility] ENDOSCOPY;  Service: General;  Laterality: N/A;  PRE:  ABNORMAL CT SCAN OF COLON  POST:  DIVERTICULOSIS, COLON MASS, POLYPS, HEMORRHOIDS   • INCONTINENCE SURGERY      Dr. Espinal   • MAMMO BILATERAL  2017    dr de la cruz    • PAP SMEAR  2014    dr de la cruz        Current Outpatient Medications on File Prior to Visit   Medication Sig Dispense Refill   • acetaminophen (TYLENOL) 500 MG tablet Take 1,000 mg by mouth Every 6 (Six) Hours As Needed for Mild Pain .     • ADCIRCA 20 MG tablet Take 40 mg by mouth Daily.     • ascorbic acid (VITAMIN C) 500 MG tablet Take 500 mg by mouth Daily.     • atorvastatin (LIPITOR) 10 MG tablet Take 1 tablet by mouth Daily. 90 tablet 1   • Cholecalciferol 25 MCG (1000 UT) capsule Take  by mouth Daily.     • clobetasol (TEMOVATE) 0.05 % ointment APPLY TO AFFECTED AREA TWICE DAILY FOR ONE WEEK THEN apply ONCE A DAY FOR ONE WEEK THEN apply TWO TO 3 TIMES WEEKLY     • ESBRIET 267 MG capsule Take 801 mg by mouth 3 (Three) Times a Day With Meals.     • Fluzone High-Dose Quadrivalent 0.7 ML suspension prefilled syringe injection      • levothyroxine (SYNTHROID, LEVOTHROID) 112 MCG tablet Take 1 tablet by mouth Daily. 90 tablet 1   • O2 (OXYGEN) Inhale 8 L/min Continuous.     • omeprazole (priLOSEC) 40 MG capsule TAKE 1 CAPSULE TWICE DAILY 180 capsule 3   • PROBIOTIC PRODUCT PO Take  by mouth.     • sertraline (ZOLOFT) 50 MG tablet Take 1 tablet by mouth Daily. 90 tablet 1   • verapamil SR (CALAN-SR) 120 MG CR tablet Take 120 mg by mouth Daily.  3     No current facility-administered medications on file prior to visit.        ALLERGIES:    Allergies   Allergen Reactions   • Sulfa Antibiotics Hives and Rash        Social History     Socioeconomic History   • Marital status:    Tobacco Use   • Smoking status: Former     Packs/day: 2.00     Types: Cigarettes     Start date: 1980     Quit date: 1988     Years  since quittin.8   • Smokeless tobacco: Never   • Tobacco comments:     Caffeine use: Drinks Coca Cola    Vaping Use   • Vaping Use: Never used   Substance and Sexual Activity   • Alcohol use: Yes     Comment: rare   • Drug use: No   • Sexual activity: Never     Partners: Male        Family History   Problem Relation Age of Onset   • Breast cancer Cousin    • Lung cancer Father    • Heart disease Father    • Colon cancer Brother    • Prostate cancer Brother    • Lung disease Brother    • Emphysema Brother    • Heart disease Mother    • Malig Hyperthermia Neg Hx       Current Outpatient Medications on File Prior to Visit   Medication Sig Dispense Refill   • acetaminophen (TYLENOL) 500 MG tablet Take 1,000 mg by mouth Every 6 (Six) Hours As Needed for Mild Pain .     • ADCIRCA 20 MG tablet Take 40 mg by mouth Daily.     • ascorbic acid (VITAMIN C) 500 MG tablet Take 500 mg by mouth Daily.     • atorvastatin (LIPITOR) 10 MG tablet Take 1 tablet by mouth Daily. 90 tablet 1   • Cholecalciferol 25 MCG (1000 UT) capsule Take  by mouth Daily.     • clobetasol (TEMOVATE) 0.05 % ointment APPLY TO AFFECTED AREA TWICE DAILY FOR ONE WEEK THEN apply ONCE A DAY FOR ONE WEEK THEN apply TWO TO 3 TIMES WEEKLY     • ESBRIET 267 MG capsule Take 801 mg by mouth 3 (Three) Times a Day With Meals.     • Fluzone High-Dose Quadrivalent 0.7 ML suspension prefilled syringe injection      • levothyroxine (SYNTHROID, LEVOTHROID) 112 MCG tablet Take 1 tablet by mouth Daily. 90 tablet 1   • O2 (OXYGEN) Inhale 8 L/min Continuous.     • omeprazole (priLOSEC) 40 MG capsule TAKE 1 CAPSULE TWICE DAILY 180 capsule 3   • PROBIOTIC PRODUCT PO Take  by mouth.     • sertraline (ZOLOFT) 50 MG tablet Take 1 tablet by mouth Daily. 90 tablet 1   • verapamil SR (CALAN-SR) 120 MG CR tablet Take 120 mg by mouth Daily.  3     No current facility-administered medications on file prior to visit.       Objective    Vitals:    11/15/22 1512   BP: 123/76   Pulse: 68  "  Resp: 16   Temp: 97.1 °F (36.2 °C)   TempSrc: Temporal   SpO2: 99%   Weight: 60.4 kg (133 lb 3.2 oz)   Height: 154 cm (60.63\")           Physical Exam  Vitals reviewed.   Constitutional:       General: She is not in acute distress.     Appearance: Normal appearance. She is well-developed.      Comments: On oxygen per nasal cannula   HENT:      Head: Normocephalic and atraumatic.   Eyes:      Pupils: Pupils are equal, round, and reactive to light.   Cardiovascular:      Rate and Rhythm: Normal rate and regular rhythm.      Heart sounds: Normal heart sounds. No murmur heard.  Pulmonary:      Effort: Pulmonary effort is normal. No respiratory distress.      Breath sounds: Decreased breath sounds present. No wheezing, rhonchi or rales.   Abdominal:      General: Bowel sounds are normal. There is no distension.      Palpations: Abdomen is soft.   Musculoskeletal:         General: Normal range of motion.      Cervical back: Normal range of motion.   Skin:     General: Skin is warm and dry.      Findings: No rash.   Neurological:      Mental Status: She is alert and oriented to person, place, and time.        RECENT LABS:   Lab Results   Component Value Date    WBC 6.82 11/15/2022    HGB 12.0 11/15/2022    HCT 36.1 11/15/2022    MCV 85.7 11/15/2022     11/15/2022     Lab Results   Component Value Date    GLUCOSE 105 11/15/2022    BUN 19 11/15/2022    CREATININE 0.60 11/15/2022    EGFRIFNONA 88 12/28/2021    EGFRIFAFRI 91 06/05/2020    BCR 31.7 (H) 11/15/2022    K 4.2 11/15/2022    CO2 30.4 (H) 11/15/2022    CALCIUM 9.7 11/15/2022    PROTENTOTREF 6.5 06/05/2020    ALBUMIN 4.00 11/15/2022    LABIL2 1.5 06/05/2020    AST 16 11/15/2022    ALT 6 11/15/2022           Assessment & Plan     Diagnoses and all orders for this visit:    1. Malignant neoplasm of ascending colon (HCC) (Primary)    2. Iron deficiency anemia due to chronic blood loss          1.  Colon cancer: Stage IIa (cT3, cN0, cM0).  Patient status post " colectomy  · Patient presented with anemia also found to have right upper quadrant mass.  · CT scan of the abdomen pelvis on 12/3/2020 showing ntussusception of the colon and there is a mass located in that that probably represents a colon cancer.    · Patient with significant pulmonary issues.  Refer to cardiology, and pulmonary for evaluation, as well as to Dr. Cesar for evaluation.  · 1/28/2021 patient underwent a right open hemicolectomy by Dr. Cesar.  to the surprise of everybody the patient walked away from the hospital 3 days later with no complications or issues pertinent to her lung disease.  · Pathology of the surgical specimen and she had a T3N0M0 colonic cancer with 14 lymph nodes removed, negative, with no perineural, perivascular or perivenous invasion. The margins of resection were completely clear. On the CT scan of the abdomen, her liver was completely unremarkable  · 06/17/2021 after personally reviewing the PET scan in the PAC system from 6/14/2021 done at [facility] that shows no areas of abnormal uptake in her lungs, no areas of abnormal uptake in the liver, retroperitoneal lymph nodes or any other site as far as I can tell. The radiologists reading is about the same.   · CT scan 4/1/2022 of the abdomen pelvis with no evidence of metastatic disease  · 07/19/2022, the patient looks terrific. She has no symptoms or signs of colon cancer recurrence or her abdominal symptomatology, after removal of the tumor subsided including diarrhea, passage of blood in the stool, gastric and intolerance, nausea, vomiting, abdominal pain and distention. Her anemia has recovered. Her hemoglobin is almost 12 today. Her reticulated hemoglobin is normal and I think she is no longer having iron deficiency.  · Patient seen 11/15/2022 doing quite well.  No signs or symptoms of colon cancer recurrence.  Denies bleeding issues.  Hemoglobin 12 today.      2.  Anemia:  · Patient seen initially in consult  11/24/2020 with iron deficiency anemia, chronic diarrhea, and a right upper quadrant abdominal mass.  Hemoglobin at that time was 9.3, with a ferritin of 15.9, iron saturation of 5%.  · Patient unable to tolerate oral iron as it causes issues with diarrhea vomiting.  · Patient given IV Injectafer on 12/4/2020 and again on 12/11/2020.  · Patient sent for CT scan which documents intussusception of the colon and there is a mass located in that that probably represents a colon cancer.  ·  I think the patient’s intussusception is producing bleeding, chronically, leading to iron deficiency anemia.  · After Injectafer hemoglobin improved to 11.2, from 8.4.  · 11/15/2022 hemoglobin 12.0.  Denies bleeding issues.  Ferritin 153, iron saturation 21%.      3.  Pulmonary hypertension and her pulmonary fibrosis.   · Followed by Dr. Zita Guy MD  · She remains on oxygen for her pulmonary fibrosis.         4.  Persistently elevated CEA:  · Initial CEA 1210 2026.79.  · PET scan following surgery 6/14/2021 was completely negative.  · Obviously raises the question why the CEA level is elevated. The only reasonable explanation could be the chronic inflammatory process associated with her chronic lung disease. She does not smoke. She is not around anybody who smokes and obviously it is the only logical explanation.   Due to cea of 11.8 on 3/29/2022 recommend patient have a CT scan of the abdomen and pelvis has recent high resolution CT scan of her chest showing nothing to suggest a lung malignancy or mets.    The patient was reviewed by telemedicine on 04/06/2022. In the interim she has had a CT scan of the abdomen and pelvis. I have reviewed this in the PAC system [facility]. The lower part of her lungs are more than impressive. They look like sponges with dramatic interstitial infiltrate. There is a lot of honeycomb pattern that is again more than dramatic. There is no pleural effusion. There is minimal  cardiomegaly. She has gallstones in the gallbladder, she has normal liver anatomy with no lesions. The pancreas, the stomach, the spleen remain normal. The kidneys are normal. The uterus remains normal. There is minimal bladder wall thickening. There is normal rectum. The bowel per se looks normal. There is no ascites or retroperitoneal adenopathy. There is significant calcification of the abdominal aorta with no aneurysm formation.  Given the above findings as I discussed with the patient on the telephone today I strongly believe that the CEA is manifestation of her lung alteration and no more than that.         PLAN:  1. Return for follow-up visit in 4 months with Dr. Lara with repeat CBC, CMP, ferritin, iron profile.  2. Patient to call/return sooner should she develop any new concerns or problems.  3. Continue follow-up with pulmonary medicine.

## 2022-11-16 NOTE — TELEPHONE ENCOUNTER
----- Message from Emmanuel Lara MD sent at 11/16/2022  8:38 AM EST -----  Call her iron is great her cea is stable at 10 great nothing to change

## 2022-12-14 DIAGNOSIS — E78.2 MIXED HYPERLIPIDEMIA: Chronic | ICD-10-CM

## 2022-12-14 DIAGNOSIS — F33.42 MAJOR DEPRESSIVE DISORDER, RECURRENT EPISODE, IN FULL REMISSION: Chronic | ICD-10-CM

## 2022-12-14 RX ORDER — ATORVASTATIN CALCIUM 10 MG/1
TABLET, FILM COATED ORAL
Qty: 30 TABLET | Refills: 0 | Status: SHIPPED | OUTPATIENT
Start: 2022-12-14 | End: 2023-01-26 | Stop reason: SDUPTHER

## 2023-01-16 DIAGNOSIS — E03.9 ACQUIRED HYPOTHYROIDISM: Chronic | ICD-10-CM

## 2023-01-16 RX ORDER — LEVOTHYROXINE SODIUM 112 UG/1
TABLET ORAL
Qty: 30 TABLET | Refills: 0 | Status: SHIPPED | OUTPATIENT
Start: 2023-01-16 | End: 2023-01-26 | Stop reason: SDUPTHER

## 2023-01-26 ENCOUNTER — OFFICE VISIT (OUTPATIENT)
Dept: FAMILY MEDICINE CLINIC | Facility: CLINIC | Age: 83
End: 2023-01-26
Payer: MEDICARE

## 2023-01-26 VITALS
DIASTOLIC BLOOD PRESSURE: 75 MMHG | WEIGHT: 134 LBS | SYSTOLIC BLOOD PRESSURE: 154 MMHG | BODY MASS INDEX: 25.3 KG/M2 | OXYGEN SATURATION: 95 % | HEART RATE: 62 BPM | TEMPERATURE: 97.3 F | RESPIRATION RATE: 14 BRPM | HEIGHT: 61 IN

## 2023-01-26 DIAGNOSIS — J34.89 RHINORRHEA: ICD-10-CM

## 2023-01-26 DIAGNOSIS — E03.9 ACQUIRED HYPOTHYROIDISM: Chronic | ICD-10-CM

## 2023-01-26 DIAGNOSIS — Z00.00 MEDICARE ANNUAL WELLNESS VISIT, SUBSEQUENT: Primary | ICD-10-CM

## 2023-01-26 DIAGNOSIS — E78.2 MIXED HYPERLIPIDEMIA: Chronic | ICD-10-CM

## 2023-01-26 DIAGNOSIS — F33.42 MAJOR DEPRESSIVE DISORDER, RECURRENT EPISODE, IN FULL REMISSION: Chronic | ICD-10-CM

## 2023-01-26 PROCEDURE — 1160F RVW MEDS BY RX/DR IN RCRD: CPT | Performed by: FAMILY MEDICINE

## 2023-01-26 PROCEDURE — 99214 OFFICE O/P EST MOD 30 MIN: CPT | Performed by: FAMILY MEDICINE

## 2023-01-26 PROCEDURE — 96160 PT-FOCUSED HLTH RISK ASSMT: CPT | Performed by: FAMILY MEDICINE

## 2023-01-26 PROCEDURE — G0439 PPPS, SUBSEQ VISIT: HCPCS | Performed by: FAMILY MEDICINE

## 2023-01-26 PROCEDURE — 1126F AMNT PAIN NOTED NONE PRSNT: CPT | Performed by: FAMILY MEDICINE

## 2023-01-26 PROCEDURE — 1170F FXNL STATUS ASSESSED: CPT | Performed by: FAMILY MEDICINE

## 2023-01-26 RX ORDER — ASPIRIN 81 MG/1
81 TABLET ORAL DAILY
COMMUNITY

## 2023-01-26 RX ORDER — LANOLIN ALCOHOL/MO/W.PET/CERES
400 CREAM (GRAM) TOPICAL DAILY
COMMUNITY

## 2023-01-26 RX ORDER — LEVOTHYROXINE SODIUM 112 UG/1
112 TABLET ORAL DAILY
Qty: 90 TABLET | Refills: 1 | Status: SHIPPED | OUTPATIENT
Start: 2023-01-26

## 2023-01-26 RX ORDER — IPRATROPIUM BROMIDE 42 UG/1
2 SPRAY, METERED NASAL 4 TIMES DAILY
Qty: 15 ML | Refills: 11 | Status: SHIPPED | OUTPATIENT
Start: 2023-01-26

## 2023-01-26 RX ORDER — ATORVASTATIN CALCIUM 10 MG/1
10 TABLET, FILM COATED ORAL DAILY
Qty: 90 TABLET | Refills: 1 | Status: SHIPPED | OUTPATIENT
Start: 2023-01-26

## 2023-01-26 NOTE — PATIENT INSTRUCTIONS
Medicare Wellness  Personal Prevention Plan of Service     Date of Office Visit:    Encounter Provider:  Enmanuel Berger MD  Place of Service:  [facility] PRIMARY CARE  Patient Name: Madalyn Galeas  :  1940    As part of the Medicare Wellness portion of your visit today, we are providing you with this personalized preventive plan of services (PPPS). This plan is based upon recommendations of the [place] Preventive Services Task Force (USPSTF) and the Advisory Committee on Immunization Practices (ACIP).    This lists the preventive care services that should be considered, and provides dates of when you are due. Items listed as completed are up-to-date and do not require any further intervention.    Health Maintenance   Topic Date Due    DXA SCAN  04/10/2021    COVID-19 Vaccine (4 - Booster for Pfizer series) 2021    TDAP/TD VACCINES (2 - Td or Tdap) 2022    ZOSTER VACCINE (2 of 3) 2023 (Originally 2012)    MAMMOGRAM  2023    LIPID PANEL  11/15/2023    ANNUAL WELLNESS VISIT  2024    COLONOSCOPY  2031    INFLUENZA VACCINE  Completed    Pneumococcal Vaccine 65+  Completed       No orders of the defined types were placed in this encounter.      Return in about 6 months (around 2023) for Recheck.

## 2023-01-26 NOTE — PROGRESS NOTES
The ABCs of the Annual Wellness Visit  Subsequent Medicare Wellness Visit    Subjective    Madalyn Galeas is a 82 y.o. female who presents for a Subsequent Medicare Wellness Visit.    The following portions of the patient's history were reviewed and   updated as appropriate: allergies, current medications, past family history, past medical history, past social history, past surgical history and problem list.    Compared to one year ago, the patient feels her physical   health is the same.    Compared to one year ago, the patient feels her mental   health is the same.    Recent Hospitalizations:  She was not admitted to the hospital during the last year.       Current Medical Providers:  Patient Care Team:  Enmanuel Berger MD as PCP - General (Family Medicine)  Zita Guy MD as Consulting Physician (Pulmonary Disease)  Anju Boyd MD as Consulting Physician (Gastroenterology)  Enmanuel Berger MD as Referring Physician (Family Medicine)  Emmanuel Lara MD as Consulting Physician (Hematology and Oncology)  Rambo Dasilva MD as Consulting Physician (Cardiology)    Outpatient Medications Prior to Visit   Medication Sig Dispense Refill   • atorvastatin (LIPITOR) 10 MG tablet TAKE 1 TABLET EVERY DAY 30 tablet 0   • levothyroxine (SYNTHROID, LEVOTHROID) 112 MCG tablet TAKE 1 TABLET EVERY DAY 30 tablet 0   • sertraline (ZOLOFT) 50 MG tablet TAKE 1 TABLET EVERY DAY 30 tablet 0   • acetaminophen (TYLENOL) 500 MG tablet Take 1,000 mg by mouth Every 6 (Six) Hours As Needed for Mild Pain .     • ADCIRCA 20 MG tablet Take 40 mg by mouth Daily.     • ascorbic acid (VITAMIN C) 500 MG tablet Take 500 mg by mouth Daily.     • Ascorbic Acid 500 MG chewable tablet Chew 500 mg Daily.     • aspirin 81 MG EC tablet Take 81 mg by mouth Daily.     • Cholecalciferol 25 MCG (1000 UT) capsule Take  by mouth Daily.     • clobetasol (TEMOVATE) 0.05 % ointment APPLY TO AFFECTED AREA TWICE DAILY FOR ONE WEEK THEN apply ONCE A DAY  "FOR ONE WEEK THEN apply TWO TO 3 TIMES WEEKLY     • ESBRIET 267 MG capsule Take 801 mg by mouth 3 (Three) Times a Day With Meals.     • Fluzone High-Dose Quadrivalent 0.7 ML suspension prefilled syringe injection      • folic acid (FOLVITE) 400 MCG tablet Take 400 mcg by mouth Daily.     • O2 (OXYGEN) Inhale 8 L/min Continuous.     • omeprazole (priLOSEC) 40 MG capsule TAKE 1 CAPSULE TWICE DAILY 180 capsule 3   • PROBIOTIC PRODUCT PO Take  by mouth.     • verapamil SR (CALAN-SR) 120 MG CR tablet Take 120 mg by mouth Daily.  3     No facility-administered medications prior to visit.       No opioid medication identified on active medication list. I have reviewed chart for other potential  high risk medication/s and harmful drug interactions in the elderly.          Aspirin is not on active medication list.  Aspirin use is not indicated based on review of current medical condition/s. Risk of harm outweighs potential benefits.  .    Patient Active Problem List   Diagnosis   • Esophageal reflux   • Hyperlipidemia   • Hypothyroidism   • Major depressive disorder, recurrent episode, in full remission (HCC)   • Pulmonary fibrosis (HCC)   • Pulmonary hypertension (HCC)   • Sleep apnea, obstructive   • Vertigo, peripheral   • Impaired fasting glucose   • Iron deficiency anemia   • Malabsorption of iron   • Essential hypertension   • Diverticulosis   • Hemorrhoids   • Malignant neoplasm of ascending colon (HCC)   • Elevated CEA   • Chronic respiratory failure (HCC)   • PAH (pulmonary artery hypertension) (HCC)   • Irregular heart rhythm     Advance Care Planning  Advance Directive is on file.  ACP discussion was held with the patient during this visit. Patient has an advance directive in EMR which is still valid.      Objective    Vitals:    01/26/23 1056   BP: 154/75   Pulse: 62   Resp: 14   Temp: 97.3 °F (36.3 °C)   TempSrc: Oral   SpO2: 95%   Weight: 60.8 kg (134 lb)   Height: 154 cm (60.63\")   PainSc: 0-No pain " "    Estimated body mass index is 25.63 kg/m² as calculated from the following:    Height as of this encounter: 154 cm (60.63\").    Weight as of this encounter: 60.8 kg (134 lb).    BMI is >= 25 and <30. (Overweight) The following options were offered after discussion;: exercise counseling/recommendations and nutrition counseling/recommendations      Does the patient have evidence of cognitive impairment? No    Lab Results   Component Value Date    CHLPL 159 11/15/2022    TRIG 103 11/15/2022    HDL 45 11/15/2022    LDL 95 11/15/2022    VLDL 19 11/15/2022        HEALTH RISK ASSESSMENT    Smoking Status:  Social History     Tobacco Use   Smoking Status Former   • Packs/day: 2.00   • Types: Cigarettes   • Start date:    • Quit date:    • Years since quittin.0   Smokeless Tobacco Never   Tobacco Comments    Caffeine use: Drinks Coca [employer]a      Alcohol Consumption:  Social History     Substance and Sexual Activity   Alcohol Use Yes    Comment: rare     Fall Risk Screen:    STEADI Fall Risk Assessment has not been completed.    Depression Screening:  PHQ-2/PHQ-9 Depression Screening 2023   Little Interest or Pleasure in Doing Things 0-->not at all   Feeling Down, Depressed or Hopeless 0-->not at all   PHQ-9: Brief Depression Severity Measure Score 0       Health Habits and Functional and Cognitive Screening:  Functional & Cognitive Status 2023   Do you have difficulty preparing food and eating? No   Do you have difficulty bathing yourself, getting dressed or grooming yourself? No   Do you have difficulty using the toilet? No   Do you have difficulty moving around from place to place? No   Do you have trouble with steps or getting out of a bed or a chair? No   Current Diet Well Balanced Diet   Dental Exam Up to date   Eye Exam Not up to date   Exercise (times per week) 0 times per week   Current Exercises Include No Regular Exercise   Current Exercise Activities Include -   Do you need help using the " phone?  No   Are you deaf or do you have serious difficulty hearing?  No   Do you need help with transportation? No   Do you need help shopping? No   Do you need help preparing meals?  No   Do you need help with housework?  No   Do you need help with laundry? No   Do you need help taking your medications? No   Do you need help managing money? No   Do you ever drive or ride in a car without wearing a seat belt? No   Have you felt unusual stress, anger or loneliness in the last month? No   Who do you live with? Alone   If you need help, do you have trouble finding someone available to you? Yes   Have you been bothered in the last four weeks by sexual problems? No   Do you have difficulty concentrating, remembering or making decisions? No       Age-appropriate Screening Schedule:  Refer to the list below for future screening recommendations based on patient's age, sex and/or medical conditions. Orders for these recommended tests are listed in the plan section. The patient has been provided with a written plan.    Health Maintenance   Topic Date Due   • DXA SCAN  04/10/2021   • TDAP/TD VACCINES (2 - Td or Tdap) 09/26/2022   • ZOSTER VACCINE (2 of 3) 01/26/2023 (Originally 9/20/2012)   • MAMMOGRAM  04/20/2023   • LIPID PANEL  11/15/2023   • COLONOSCOPY  01/21/2031   • INFLUENZA VACCINE  Completed                CMS Preventative Services Quick Reference  Risk Factors Identified During Encounter  None Identified  The above risks/problems have been discussed with the patient.  Pertinent information has been shared with the patient in the After Visit Summary.  An After Visit Summary and PPPS were made available to the patient.    Follow Up:   Next Medicare Wellness visit to be scheduled in 1 year.       Additional E&M Note during same encounter follows:  Patient has multiple medical problems which are significant and separately identifiable that require additional work above and beyond the Medicare Wellness Visit.      Chief  "Complaint  Hyperlipidemia (MED REFILL / MAIL ORDER PHARM   - PT IS ON 6 LITERS OF O2 ), Hypothyroidism, Anxiety, and medicare wellness    Subjective        HPI  Madalyn Galeas is also being seen today for Medication Management.    Pt doing well on the medication(s) w/o SEs, and is due refill today.  Pt suffers from a lot of daily runny nose.    Review of Systems   Constitutional: Negative for chills and fever.   HENT: Negative for congestion and sinus pressure.    Eyes: Negative for visual disturbance.   Respiratory: Negative for cough and shortness of breath.    Cardiovascular: Negative for chest pain.   Gastrointestinal: Negative for abdominal pain.   Genitourinary: Negative for dysuria.   Skin: Negative for rash.   Hematological: Negative for adenopathy.   Psychiatric/Behavioral: Negative for dysphoric mood.       Objective   Vital Signs:  /75   Pulse 62   Temp 97.3 °F (36.3 °C) (Oral)   Resp 14   Ht 154 cm (60.63\")   Wt 60.8 kg (134 lb)   SpO2 95%   BMI 25.63 kg/m²     Physical Exam  Constitutional:       General: She is not in acute distress.     Appearance: She is well-developed.   Cardiovascular:      Rate and Rhythm: Normal rate and regular rhythm.   Pulmonary:      Effort: Pulmonary effort is normal.      Breath sounds: Normal breath sounds.   Neurological:      Mental Status: She is alert and oriented to person, place, and time.   Psychiatric:         Behavior: Behavior normal.         Thought Content: Thought content normal.          The following data was reviewed by: Enmanuel Berger MD on 01/26/2023:  Common labs    Common Labs 4/1/22 7/19/22 7/19/22 11/15/22 11/15/22 11/15/22     1452 1452 1450 1450 1455   Glucose   87  105    BUN   16  19    Creatinine 0.60  0.65  0.60    Sodium   143  144    Potassium   4.3  4.2    Chloride   103  103    Calcium   9.6  9.7    Albumin   4.10  4.00    Total Bilirubin   0.2  0.2    Alkaline Phosphatase   92  82    AST (SGOT)   16  16    ALT (SGPT)   8  6  "   WBC  7.53  6.82     Hemoglobin  11.7 (A)  12.0     Hematocrit  35.9  36.1     Platelets  178  155     Total Cholesterol      159   Triglycerides      103   HDL Cholesterol      45   LDL Cholesterol       95   (A) Abnormal value       Comments are available for some flowsheets but are not being displayed.                      Assessment and Plan   Diagnoses and all orders for this visit:    1. Medicare annual wellness visit, subsequent (Primary)    2. Mixed hyperlipidemia  -     atorvastatin (LIPITOR) 10 MG tablet; Take 1 tablet by mouth Daily.  Dispense: 90 tablet; Refill: 1    3. Acquired hypothyroidism  -     levothyroxine (SYNTHROID, LEVOTHROID) 112 MCG tablet; Take 1 tablet by mouth Daily.  Dispense: 90 tablet; Refill: 1    4. Major depressive disorder, recurrent episode, in full remission (HCC)  -     sertraline (ZOLOFT) 50 MG tablet; Take 1 tablet by mouth Daily.  Dispense: 90 tablet; Refill: 1    5. Rhinorrhea  -     ipratropium (ATROVENT) 0.06 % nasal spray; 2 sprays into the nostril(s) as directed by provider 4 (Four) Times a Day.  Dispense: 15 mL; Refill: 11           I spent 15 minutes caring for Madalyn on this date of service. This time includes time spent by me in the following activities:preparing for the visit, reviewing tests, performing a medically appropriate examination and/or evaluation , ordering medications, tests, or procedures and documenting information in the medical record  Follow Up   Return in about 6 months (around 7/26/2023) for Recheck.  Patient was given instructions and counseling regarding her condition or for health maintenance advice. Please see specific information pulled into the AVS if appropriate.

## 2023-01-27 ENCOUNTER — TRANSCRIBE ORDERS (OUTPATIENT)
Dept: ADMINISTRATIVE | Facility: HOSPITAL | Age: 83
End: 2023-01-27
Payer: MEDICARE

## 2023-01-27 DIAGNOSIS — Z13.9 SCREENING DUE: Primary | ICD-10-CM

## 2023-02-04 NOTE — TELEPHONE ENCOUNTER
C/o sore throat.    Call to pt.  Advise per Dr Boyd note.  Verb understanding - states will proceed with ugi.

## 2023-02-08 ENCOUNTER — HOSPITAL ENCOUNTER (OUTPATIENT)
Dept: MAMMOGRAPHY | Facility: HOSPITAL | Age: 83
Discharge: HOME | End: 2023-02-08
Admitting: FAMILY MEDICINE
Payer: MEDICARE

## 2023-02-08 DIAGNOSIS — Z13.9 SCREENING DUE: ICD-10-CM

## 2023-02-08 PROCEDURE — 77063 BREAST TOMOSYNTHESIS BI: CPT

## 2023-02-08 PROCEDURE — 77067 SCR MAMMO BI INCL CAD: CPT

## 2023-02-09 DIAGNOSIS — R92.8 ABNORMAL MAMMOGRAM OF LEFT BREAST: Primary | ICD-10-CM

## 2023-03-15 ENCOUNTER — OFFICE VISIT (OUTPATIENT)
Dept: ONCOLOGY | Facility: CLINIC | Age: 83
End: 2023-03-15
Payer: MEDICARE

## 2023-03-15 ENCOUNTER — LAB (OUTPATIENT)
Dept: LAB | Facility: HOSPITAL | Age: 83
End: 2023-03-15
Payer: MEDICARE

## 2023-03-15 VITALS
HEART RATE: 63 BPM | BODY MASS INDEX: 25.3 KG/M2 | TEMPERATURE: 97.1 F | SYSTOLIC BLOOD PRESSURE: 153 MMHG | RESPIRATION RATE: 16 BRPM | OXYGEN SATURATION: 99 % | WEIGHT: 134 LBS | HEIGHT: 61 IN | DIASTOLIC BLOOD PRESSURE: 76 MMHG

## 2023-03-15 DIAGNOSIS — R97.0 ELEVATED CEA: ICD-10-CM

## 2023-03-15 DIAGNOSIS — D50.0 IRON DEFICIENCY ANEMIA DUE TO CHRONIC BLOOD LOSS: ICD-10-CM

## 2023-03-15 DIAGNOSIS — K90.9 MALABSORPTION OF IRON: ICD-10-CM

## 2023-03-15 DIAGNOSIS — C18.2 MALIGNANT NEOPLASM OF ASCENDING COLON: Primary | ICD-10-CM

## 2023-03-15 DIAGNOSIS — C18.2 MALIGNANT NEOPLASM OF ASCENDING COLON: ICD-10-CM

## 2023-03-15 LAB
ALBUMIN SERPL-MCNC: 4 G/DL (ref 3.5–5.2)
ALBUMIN/GLOB SERPL: 1.4 G/DL (ref 1.1–2.4)
ALP SERPL-CCNC: 91 U/L (ref 38–116)
ALT SERPL W P-5'-P-CCNC: 6 U/L (ref 0–33)
ANION GAP SERPL CALCULATED.3IONS-SCNC: 9 MMOL/L (ref 5–15)
AST SERPL-CCNC: 15 U/L (ref 0–32)
BASOPHILS # BLD AUTO: 0.05 10*3/MM3 (ref 0–0.2)
BASOPHILS NFR BLD AUTO: 0.6 % (ref 0–1.5)
BILIRUB SERPL-MCNC: 0.2 MG/DL (ref 0.2–1.2)
BUN SERPL-MCNC: 19 MG/DL (ref 6–20)
BUN/CREAT SERPL: 27.5 (ref 7.3–30)
CALCIUM SPEC-SCNC: 9.8 MG/DL (ref 8.5–10.2)
CHLORIDE SERPL-SCNC: 103 MMOL/L (ref 98–107)
CO2 SERPL-SCNC: 31 MMOL/L (ref 22–29)
CREAT SERPL-MCNC: 0.69 MG/DL (ref 0.6–1.1)
DEPRECATED RDW RBC AUTO: 39 FL (ref 37–54)
EGFRCR SERPLBLD CKD-EPI 2021: 87.3 ML/MIN/1.73
EOSINOPHIL # BLD AUTO: 0.21 10*3/MM3 (ref 0–0.4)
EOSINOPHIL NFR BLD AUTO: 2.4 % (ref 0.3–6.2)
ERYTHROCYTE [DISTWIDTH] IN BLOOD BY AUTOMATED COUNT: 12.2 % (ref 12.3–15.4)
FERRITIN SERPL-MCNC: 159.3 NG/ML (ref 13–150)
GLOBULIN UR ELPH-MCNC: 2.8 GM/DL (ref 1.8–3.5)
GLUCOSE SERPL-MCNC: 99 MG/DL (ref 74–124)
HCT VFR BLD AUTO: 37.2 % (ref 34–46.6)
HGB BLD-MCNC: 12 G/DL (ref 12–15.9)
IMM GRANULOCYTES # BLD AUTO: 0.02 10*3/MM3 (ref 0–0.05)
IMM GRANULOCYTES NFR BLD AUTO: 0.2 % (ref 0–0.5)
IRON 24H UR-MRATE: 76 MCG/DL (ref 37–145)
IRON SATN MFR SERPL: 28 % (ref 14–48)
LYMPHOCYTES # BLD AUTO: 1.14 10*3/MM3 (ref 0.7–3.1)
LYMPHOCYTES NFR BLD AUTO: 12.9 % (ref 19.6–45.3)
MCH RBC QN AUTO: 28.2 PG (ref 26.6–33)
MCHC RBC AUTO-ENTMCNC: 32.3 G/DL (ref 31.5–35.7)
MCV RBC AUTO: 87.3 FL (ref 79–97)
MONOCYTES # BLD AUTO: 0.68 10*3/MM3 (ref 0.1–0.9)
MONOCYTES NFR BLD AUTO: 7.7 % (ref 5–12)
NEUTROPHILS NFR BLD AUTO: 6.73 10*3/MM3 (ref 1.7–7)
NEUTROPHILS NFR BLD AUTO: 76.2 % (ref 42.7–76)
NRBC BLD AUTO-RTO: 0 /100 WBC (ref 0–0.2)
PLATELET # BLD AUTO: 177 10*3/MM3 (ref 140–450)
PMV BLD AUTO: 9 FL (ref 6–12)
POTASSIUM SERPL-SCNC: 4.4 MMOL/L (ref 3.5–4.7)
PROT SERPL-MCNC: 6.8 G/DL (ref 6.3–8)
RBC # BLD AUTO: 4.26 10*6/MM3 (ref 3.77–5.28)
SODIUM SERPL-SCNC: 143 MMOL/L (ref 134–145)
TIBC SERPL-MCNC: 272 MCG/DL (ref 249–505)
TRANSFERRIN SERPL-MCNC: 194 MG/DL (ref 200–360)
WBC NRBC COR # BLD: 8.83 10*3/MM3 (ref 3.4–10.8)

## 2023-03-15 PROCEDURE — 3077F SYST BP >= 140 MM HG: CPT | Performed by: INTERNAL MEDICINE

## 2023-03-15 PROCEDURE — 82728 ASSAY OF FERRITIN: CPT

## 2023-03-15 PROCEDURE — 3078F DIAST BP <80 MM HG: CPT | Performed by: INTERNAL MEDICINE

## 2023-03-15 PROCEDURE — 99214 OFFICE O/P EST MOD 30 MIN: CPT | Performed by: INTERNAL MEDICINE

## 2023-03-15 PROCEDURE — 1126F AMNT PAIN NOTED NONE PRSNT: CPT | Performed by: INTERNAL MEDICINE

## 2023-03-15 PROCEDURE — 36415 COLL VENOUS BLD VENIPUNCTURE: CPT

## 2023-03-15 PROCEDURE — 83540 ASSAY OF IRON: CPT

## 2023-03-15 PROCEDURE — 85025 COMPLETE CBC W/AUTO DIFF WBC: CPT

## 2023-03-15 PROCEDURE — 80053 COMPREHEN METABOLIC PANEL: CPT

## 2023-03-15 PROCEDURE — 84466 ASSAY OF TRANSFERRIN: CPT

## 2023-03-15 NOTE — PROGRESS NOTES
Subjective         DURING THE VISIT WITH THE PATIENT TODAY , PATIENT HAD FACE MASK, MY MEDICAL ASSISTANT AND I  HAD PROPPER PROTECTIVE EQUIPMENT, AND I DID HAND HYGIENE WITH SOAP AND WATER BEFORE AND AFTER THE VISIT.     REASON FOR FOLLOW UP: 1.IRON DEFICIENCY ANEMIA,  DUE TO CHRONIC GI BLOOD LOSS, CHRONIC DIARRHEA, MALNUTRITION, WEIGHT LOSS, ABDOMINAL MASS RUQ: colon cancer diagnosed, removed, no need for adjuvant therapy for stage 2 disease    2.Ascending colon cancer s/p colectomy.    3. ELEVATED CEA AFTER COLECTOMY: PET SCAN NEGATIVE IN 6/17/21, CT SCAN NEGATIVE 3/22    On 03/15/2023 this 82-year-old female returns to the office in company of her daughter in order to be reviewed with previous history of Stage II colon cancer, status post removal and previous iron deficiency anemia due to chronic gastrointestinal blood loss. At the time of her cancer diagnosis she had a palpable mass very obvious to me in my clinical examination in the right upper quadrant of the abdomen that signified intussusception of the colon by itself related to her malignancy. Besides all these issues the patient also has significant interstitial pulmonary disease and she remains on oxygen. She states that she has raised her oxygen concentration at home as high as 8L/min and without the need for this to happen. In fact today her oxygen concentrator here in the office is 3L/min and her O2 saturation is 99%. The patient denies any cough or sputum production. No hemoptysis or pleuritic pain. She is no longer using a CPAP machine because she has no need for this anymore after losing the weight and improving her anemia through her diagnosis a couple of years ago. The patient's activity is normal. She still has struggle with her bowel activity when she eats the wrong foods and she has learned to avoid certain things that she cannot handle any more like Dr. Pepper and tomato sauce. Her weight remains stable. She has no abdominal pain, jaundice,  distention or changes in bowel habits. Urination is normal. No fluid accumulation in her legs. No obvious pain. She remains functional and living independently and supported by her daughter.          Past Medical History:   Diagnosis Date   • Adenomatous polyp of sigmoid colon 1/21/2021   • Anemia    • Colonic polyp    • Depression    • Diverticulosis    • Esophageal reflux    • Essential hypertension    • GERD (gastroesophageal reflux disease)    • H/O complete eye exam 03/2018   • Hx of bone density study 02/15/2016   • Hyperlipidemia    • Hypothyroidism, acquired    • Major depressive disorder, recurrent episode, in full remission (HCC)    • Nonsenile cataract 2016   • Oxygen dependent     8L NC CONTINUOUS   • Pulmonary fibrosis (HCC)    • Pulmonary hypertension (HCC)    • Sleep apnea, obstructive     USES CPAP    • Stenosis, spinal, lumbar    • Vertigo, peripheral         Past Surgical History:   Procedure Laterality Date   • COLON RESECTION N/A 1/28/2021    Procedure: COLON RESECTION RIGHT;  Surgeon: Alfonso Cesar MD;  Location: [facility] MAIN OR;  Service: General;  Laterality: N/A;   • COLONOSCOPY  02/20/2012   • COLONOSCOPY N/A 1/21/2021    Procedure: COLONOSCOPY INTO CECUM AND TI WITH BIOPSIES, SPOT INJECTION TO COLON MASS (MID-ASCENDING), HOT SNARE POLYPECTOMY, COLD BX POLYPECTOMIES, SALINE LIFT, CLIP PLACEMENT X3;  Surgeon: Alfonso Cesar MD;  Location: [facility] ENDOSCOPY;  Service: General;  Laterality: N/A;  PRE:  ABNORMAL CT SCAN OF COLON  POST:  DIVERTICULOSIS, COLON MASS, POLYPS, HEMORRHOIDS   • INCONTINENCE SURGERY      Dr. Espinal   • MAMMO BILATERAL  2017    dr de la cruz    • PAP SMEAR  2014    dr de la cruz        Current Outpatient Medications on File Prior to Visit   Medication Sig Dispense Refill   • acetaminophen (TYLENOL) 500 MG tablet Take 2 tablets by mouth Every 6 (Six) Hours As Needed for Mild Pain.     • ADCIRCA 20 MG tablet Take 2 tablets by mouth Daily.     •  ascorbic acid (VITAMIN C) 500 MG tablet Take 1 tablet by mouth Daily.     • Ascorbic Acid 500 MG chewable tablet Chew 500 mg Daily.     • aspirin 81 MG EC tablet Take 1 tablet by mouth Daily.     • atorvastatin (LIPITOR) 10 MG tablet Take 1 tablet by mouth Daily. 90 tablet 1   • Cholecalciferol 25 MCG (1000 UT) capsule Take  by mouth Daily.     • clobetasol (TEMOVATE) 0.05 % ointment APPLY TO AFFECTED AREA TWICE DAILY FOR ONE WEEK THEN apply ONCE A DAY FOR ONE WEEK THEN apply TWO TO 3 TIMES WEEKLY     • ESBRIET 267 MG capsule Take 801 mg by mouth 3 (Three) Times a Day With Meals.     • Fluzone High-Dose Quadrivalent 0.7 ML suspension prefilled syringe injection      • folic acid (FOLVITE) 400 MCG tablet Take 1 tablet by mouth Daily.     • ipratropium (ATROVENT) 0.06 % nasal spray 2 sprays into the nostril(s) as directed by provider 4 (Four) Times a Day. 15 mL 11   • levothyroxine (SYNTHROID, LEVOTHROID) 112 MCG tablet Take 1 tablet by mouth Daily. 90 tablet 1   • O2 (OXYGEN) Inhale 8 L/min Continuous.     • omeprazole (priLOSEC) 40 MG capsule TAKE 1 CAPSULE TWICE DAILY 180 capsule 3   • PROBIOTIC PRODUCT PO Take  by mouth.     • sertraline (ZOLOFT) 50 MG tablet Take 1 tablet by mouth Daily. 90 tablet 1   • verapamil SR (CALAN-SR) 120 MG CR tablet Take 1 tablet by mouth Daily.  3     No current facility-administered medications on file prior to visit.        ALLERGIES:    Allergies   Allergen Reactions   • Sulfa Antibiotics Hives and Rash        Social History     Socioeconomic History   • Marital status:    Tobacco Use   • Smoking status: Former     Packs/day: 2.00     Types: Cigarettes     Start date:      Quit date:      Years since quittin.2   • Smokeless tobacco: Never   • Tobacco comments:     Caffeine use: Drinks Coca Cola    Vaping Use   • Vaping Use: Never used   Substance and Sexual Activity   • Alcohol use: Yes     Comment: rare   • Drug use: No   • Sexual activity: Never     Partners:  Male        Family History   Problem Relation Age of Onset   • Breast cancer Cousin    • Lung cancer Father    • Heart disease Father    • Colon cancer Brother    • Prostate cancer Brother    • Lung disease Brother    • Emphysema Brother    • Heart disease Mother    • Malig Hyperthermia Neg Hx       Current Outpatient Medications on File Prior to Visit   Medication Sig Dispense Refill   • acetaminophen (TYLENOL) 500 MG tablet Take 2 tablets by mouth Every 6 (Six) Hours As Needed for Mild Pain.     • ADCIRCA 20 MG tablet Take 2 tablets by mouth Daily.     • ascorbic acid (VITAMIN C) 500 MG tablet Take 1 tablet by mouth Daily.     • Ascorbic Acid 500 MG chewable tablet Chew 500 mg Daily.     • aspirin 81 MG EC tablet Take 1 tablet by mouth Daily.     • atorvastatin (LIPITOR) 10 MG tablet Take 1 tablet by mouth Daily. 90 tablet 1   • Cholecalciferol 25 MCG (1000 UT) capsule Take  by mouth Daily.     • clobetasol (TEMOVATE) 0.05 % ointment APPLY TO AFFECTED AREA TWICE DAILY FOR ONE WEEK THEN apply ONCE A DAY FOR ONE WEEK THEN apply TWO TO 3 TIMES WEEKLY     • ESBRIET 267 MG capsule Take 801 mg by mouth 3 (Three) Times a Day With Meals.     • Fluzone High-Dose Quadrivalent 0.7 ML suspension prefilled syringe injection      • folic acid (FOLVITE) 400 MCG tablet Take 1 tablet by mouth Daily.     • ipratropium (ATROVENT) 0.06 % nasal spray 2 sprays into the nostril(s) as directed by provider 4 (Four) Times a Day. 15 mL 11   • levothyroxine (SYNTHROID, LEVOTHROID) 112 MCG tablet Take 1 tablet by mouth Daily. 90 tablet 1   • O2 (OXYGEN) Inhale 8 L/min Continuous.     • omeprazole (priLOSEC) 40 MG capsule TAKE 1 CAPSULE TWICE DAILY 180 capsule 3   • PROBIOTIC PRODUCT PO Take  by mouth.     • sertraline (ZOLOFT) 50 MG tablet Take 1 tablet by mouth Daily. 90 tablet 1   • verapamil SR (CALAN-SR) 120 MG CR tablet Take 1 tablet by mouth Daily.  3     No current facility-administered medications on file prior to visit.  "      Objective    Vitals:    03/15/23 1507   BP: 153/76   Pulse: 63   Resp: 16   Temp: 97.1 °F (36.2 °C)   TempSrc: Temporal   SpO2: 99%   Weight: 60.8 kg (134 lb)   Height: 154 cm (60.63\")           Physical Exam               GENERAL:  Well-developed, Patient  in no acute distress.  ON OXYGEN  SKIN:  Warm, dry ,NO purpura ,no rash.  HEENT:  Pupils were equal and reactive to light and accomodation, conjunctivae noninjected,  normal visual acuity.   NECK:  Supple with good range of motion; no thyromegaly , no JVD or bruits,.No carotid artery pain, no carotid abnormal pulsation   LYMPHATICS:  No cervical, NO supraclavicular, NO axillary, NO inguinal adenopathies.  CARDIAC   normal rate , regular rhythm, without murmur,NO rubs NO S3 NO S4   LUNGS: normal breath sounds bilateral, no wheezing, NO rhonchi, NO crackles ,NO rubs.  VASCULAR VENOUS: no cyanosis, NO collateral circulation, NO varicosities, NO edema, NO palpable cords, NO pain,NO erythema, NO pigmentation of the skin.  ABDOMEN:  Soft, NO pain,no hepatomegaly, no splenomegaly,no masses, no ascites, no collateral circulation,no distention.  EXTREMITIES  AND SPINE:  No clubbing, no cyanosis ,no deformities , no pain .No kyphosis,  no pain in spine, no pain in ribs , no pain in pelvic bone.  NEUROLOGICAL:  Patient was awake, alert, oriented to time, person and place.                  RECENT LABS:   Lab Results   Component Value Date    WBC 8.83 03/15/2023    HGB 12.0 03/15/2023    HCT 37.2 03/15/2023    MCV 87.3 03/15/2023     03/15/2023     Lab Results   Component Value Date    GLUCOSE 99 03/15/2023    BUN 19 03/15/2023    CREATININE 0.69 03/15/2023    EGFR 87.3 03/15/2023    BCR 27.5 03/15/2023    K 4.4 03/15/2023    CO2 31.0 (H) 03/15/2023    CALCIUM 9.8 03/15/2023    PROTENTOTREF 6.5 06/05/2020    ALBUMIN 4.0 03/15/2023    BILITOT 0.2 03/15/2023    AST 15 03/15/2023    ALT 6 03/15/2023     Lab Results   Component Value Date    CEA 10.50 11/15/2022 "               Assessment & Plan     Diagnoses and all orders for this visit:    1. Malignant neoplasm of ascending colon (HCC) (Primary)  -     CBC & Differential; Future  -     Comprehensive Metabolic Panel; Future  -     Ferritin; Future  -     Iron Profile; Future    2. Malabsorption of iron  -     CBC & Differential; Future  -     Comprehensive Metabolic Panel; Future  -     Ferritin; Future  -     Iron Profile; Future    3. Iron deficiency anemia due to chronic blood loss  -     CBC & Differential; Future  -     Comprehensive Metabolic Panel; Future  -     Ferritin; Future  -     Iron Profile; Future    4. Elevated CEA              In summary this patient has progressive anemia. I documented in the laboratory parameters above progressive drop in the hemoglobin, progressive drop in the MCV. Morphology of peripheral blood is typical of iron deficiency anemia and I would not be surprised if she has an associated B12 or folate deficiency. This patient has had upper GI series and barium enema by GI, Dr. Boyd, and no anatomical alteration has been found in her upper GI tract or colon besides diverticular disease. The symptoms that she has today she blames them, especially the diarrhea, since the time that she had the barium enema. I think that was not an association. The fact that she has so much diarrhea day and night and with weight loss, profound fatigue, sarcopenia leads me to believe that it is some other entity that is producing this. It sounds like to me she has a malabsorptive process of some sort.     The patient also has a palpable mass in the right upper quadrant and the way how it feels to my hands, it is probably a palpable gallbladder. She had no pain there. This is very striking. Her abdomen is very soft and it is very easy to find.     Under the present circumstances and knowing that the patient cannot tolerate oral iron because it makes her to throw up and produce profuse diarrhea and makes the  diarrhea worse, I do believe that the patient needs to have the followin. For the workup for her anemia, she went back to the lab to obtain a comprehensive metabolic profile, ferritin, iron, TIBC, B12, folic acid level, reticulated hemoglobin.   2. Given the fact that the patient has had a TSH that was on the low side maybe her thyroid medication is overcorrecting her and this maybe this is part of the diarrhea process. I will obtain a TSH and a T4.   3. I do believe that the patient needs to have a CT scan of the abdomen and pelvis. I do not think she can handle oral contrast and I do not want to give her IV contrast, I find no need. I want to be sure there is no other anatomical alteration in her small intestine and GI tract that is favoring for her to have all the issues pertinent to her clinical symptoms.     I wonder if she could have some other process in her bowel including inflammatory bowel disease, bacterial overgrowth syndrome, celiac disease and differential diagnosis is more than extensive. This information would require to be shared with Dr. Boyd, her Gastroenterologist, for further review after she returns back in a few days after she proceeds with proper management of her anemia. Given the fact that she cannot handle oral iron by mouth, absolutely the patient needs to have Injectafer on 2 different occasions and this will be scheduled to start to happen as early as TODAY.   I discussed with the patient on 2020 after reviewing the case with the radiologist Dr. Pinto at [facility] in regards to her CT scan of the abdomen. This documents intussusception of the colon and there is a mass located in that that probably represents a colon cancer. Obviously, the question is how to proceed with all these issues on this patient. It is very obvious that she has profound iron deficiency anemia. Her ferritin level, iron level are low. She cannot handle oral iron therapy and she will  initiate Injectafer today and she will have the 2nd infusion next week. I expect that in 3 weeks she will have very significant rebound in erythropoiesis and I expect that her pulmonary manifestations will be better in regard to her pulmonary hypertension and her pulmonary fibrosis. Typically patient's with this condition develop polycythemia instead of anemia.    The other issue is what we are going to do with her colon. I do not think that the patient is a normal surgical candidate under the present circumstances and we need to try to fix her up in the best way possible to see if she gets to the point to have a resection of this issue. I have made her an appointment to be seen by cardiology, Rambo Dasilva MD, and proceed with an echocardiogram in days. We have also made an appointment for her to see Alfonso Cesar MD, General Surgery to review the case with me.       I reviewed the patient back with the daughter in the room on 12/10/2020. In the interim, I have had a conversation with Zita Guy MD, the patient’s pulmonologist, who strongly believes that the patient will be able to go through surgery as long as she has proper pulmonary assessment before intervention and she asked me to go ahead and consult Pulmonary Medicine, Dr. Ramos and associates for this purpose. A consult has been placed.     I reviewed her echocardiogram and actually it is better than I expected for somebody who has pulmonary fibrosis.     In summary I think the patient’s intussusception is producing bleeding, chronically, leading to iron deficiency anemia. She has had Injectafer a week ago, another one to be given to her tomorrow and thereafter she will require blood work on weekly basis. If the hemoglobin goes below 9, I think she will require transfusion of 2 units of red cells. She will have weekly appointment for CBC and nurse check and I will review her back on 12/30/2020.     I EMPHATICALLY POINTED OUT TO THE PATIENT’S DAUGHTER SHE  CANNOT TAKE ANYMORE ZULMA-SELTZER. SHE HAS BEEN TAKING THIS ON A REGULAR SCHEDULE AND THIS IS PRODUCING PROBABLY ALSO AN ELEMENT OF GI BLEEDING ASSOCIATED WITH HER INTUSSUSCEPTION AND WHY NOT GASTRITIS AND SO FORTH.     In summary the patient was reviewed on 12/30/2020 along with her daughter in the room. The patient has gone through cardiological assessment and actually her cardiac ejection fraction is not that bad, close to 50%, and with minimal evidence of pulmonary arterial hypertension. Normal contractility of the left ventricle. The patient also has been seen by Pulmonary Medicine. These records have been reviewed again and her pulmonary function tests is very marginal. The analysis is stated above by Dr. Moser who has advised the patient that she is a high risk for general anesthesia in regard to the need for ICU stay, intubation, mechanical ventilation and so forth given her pulmonary fibrosis. He has advised the patient to see Dr. Guy, Pulmonary Medicine, and she has an appointment to see her next week.     In regard to her iron deficiency anemia after she received IV iron infusions the hemoglobin keeps improving to 11.2 from 8.4. The patient is no longer taking [place]. The patient is a little bit stronger but her appetite is not good. Obviously the question is what is the cause of the intussusception. I have measured a CEA level that is elevated at 6.2 and leads me to believe that maybe this is something related to a primary colon cancer. Dr. Cesar has suggested for her to have a colonoscopy after reviewing his good notes. The patient is even more hesitant to proceed with this.       The patient was further reviewed in the office on 02/22/2021. Since the previous visit and after I discussed the case with Dr. Zita Guy, the patient’s pulmonologist, I advised Dr. Cesar to go ahead and proceed with her surgery. This happened and actually to the surprise of everybody the patient walked away from the  hospital 3 days later with no complications or issues pertinent to her lung disease. The patient had also excellent tolerance to her colonoscopy. Since she has been at home she has been gaining appetite and gaining weight. She is feeling substantially better. She has no abdominal pain. Her surgical site has healed. Her bowel activity is normal and urination is normal. She remains on oxygen. She is walking around the house and she is doing more things that she was not doing before. She is not sitting down complaining of a bad stomach all the time. She is not taking [place] as I pointed out to them before.     I have reviewed the pathology of the surgical specimen and she had a [id number] colonic cancer with 14 lymph nodes removed, negative, with no perineural, perivascular or perivenous invasion. The margins of resection were completely clear. On the CT scan of the abdomen, her liver was completely unremarkable.         I reviewed with the patient on the telephone on 06/17/2021 after personally reviewing the PET scan in the PAC system [facility] that shows no areas of abnormal uptake in her lungs, no areas of abnormal uptake in the liver, retroperitoneal lymph nodes or any other site as far as I can tell. The radiologists reading is about the same.     The good news to me is that the PET scan is negative, the patient is feeling better. She remains fully functional, her anemia is corrected after all of the bleeding that she did out of her colon cancer with improving hemoglobin.    Obviously raises the question why the CEA level is elevated. The only reasonable explanation could be the chronic inflammatory process associated with her chronic lung disease. She does not smoke. She is not around anybody who smokes and obviously it is the only logical explanation.       The patient was further reviewed on 09/28/2021. Her overall clinical condition is excellent. As long as she does not eat junk food she has  no diarrhea, abdominal pain or discomfort. Her weight is stable. She feels substantially better in regard to her ability to function independently at home. She has no pain related to malignancy, urination is normal, her respiratory situation remains stable, she remains on oxygen and she has not had any other respiratory infections.     The clinical examination of the patient today is very much negative normal besides her chronic lung disease and her decreased pulmonary auscultation with crackles at the bases. Her heart function seems to be appropriate.     Her white count, hemoglobin and platelets are normal. The white count differential is normal. Her ferritin and iron profile remain normal. Her CEA level actually has dropped some and this in my opinion is related to her chronic lung disease more than anything else. As we have mentioned before we have performed multiple radiological analyses on her including CT scans and PET scans looking for recurrent disease and nothing has been found.     I do believe that the patient in my opinion from the point of view of her colon cancer and her iron deficiency anemia that has been corrected is doing perfectly well. Her chronic lung disease is her chronic lung disease and this will remain on oxygen and followed up by her pulmonologist.     I advised her to go ahead and get the booster shot for COVID and I advised her also to proceed with flu vaccination.     I will review her back in 3 months with a CBC, CMP, ferritin, iron profile and a CEA level. I find no need for radiological assessment on her at this time.   The patient was further reviewed on 12/28/2021. In regard to the previous history of colon cancer, she underwent resection of this. Since then her hemoglobin has stabilized around 12. Her ferritin level is pending today. She has improved her diet. She has gained a few pounds and she feels substantially better from this point of view. She has learned to put away foods  that she knows that she will get sick with and now she is eating a variety of foods that she has not eaten in a long time. Surprising enough very likely her nutrition is better. She has no symptoms that wound indicate colon cancer recurrence. In spite of having a persistent elevation of the CEA level the patient has not had anything to suggest clinically, radiologically through CT scans and PET scans evidence of colon cancer recurrence. It raises the question if the pulmonary fibrosis is the reason why her CEA level is minimally elevated. We are awaiting to review this and discuss this with the patient upon result.     She remains on oxygen for her pulmonary fibrosis. Today she has minimal if any crackles. Actually her lung auscultation is very benign. She has atrial fibrillation with controlled ventricular response and she will see Cardiology very soon.     I advised the patient to return to see me back in 3 months with a CBC, CMP, CEA, ferritin, iron, TIBC. I discussed all these facts with the patient and her daughter present in the room. I did not prescribe any new medicines.  I reviewed the patient on 03/29/2022. Since the previous visit she has had COVID infection in the upper respiratory tract treated by her pulmonologist with steroids and resolution and no complications. That is very surprising. In any event, she has had lesser need for oxygen. Her O2 saturation has improved and she is down to 6L nasal cannula continuously. She remains on CPAP at nighttime. She has seen her pulmonologist, Dr. Duarte. She has had a CT scan that shows no new alterations in her lung anatomy according to the patient.     Her white count, hemoglobin and platelets are excellent today. Her reticulated hemoglobin is normal. Her ferritin, iron profile are pending as well as her CEA and CMP.     I do believe that the patient does not have any symptoms to suggest colon cancer recurrence. Her anemia seems to have improved. Her hemoglobin  has stabilized and I think she looks very good to me.    RECOMMENDATIONS:  I have advised her to return to see us back in 4 months with a CBC, CMP, ferritin, iron, TIBC as well as CEA level. Otherwise, no other need for radiological assessment from my point of view.     I discussed all these facts with the patient and her daughter present in the room.  Addendum: due to cea of 11.4 I will proceed with ct abd pelvis in few days, her ct chest shows nothing to suggest a lung malignancy or mets  The patient was reviewed by telemedicine on 04/06/2022. In the interim she has had a CT scan of the abdomen and pelvis. I have reviewed this in the PAC system [facility]. The lower part of her lungs are more than impressive. They look like sponges with dramatic interstitial infiltrate. There is a lot of honeycomb pattern that is again more than dramatic. There is no pleural effusion. There is minimal cardiomegaly. She has gallstones in the gallbladder, she has normal liver anatomy with no lesions. The pancreas, the stomach, the spleen remain normal. The kidneys are normal. The uterus remains normal. There is minimal bladder wall thickening. There is normal rectum. The bowel per se looks normal. There is no ascites or retroperitoneal adenopathy. There is significant calcification of the abdominal aorta with no aneurysm formation.    Given the above findings as I discussed with the patient on the telephone today I strongly believe that the CEA is manifestation of her lung alteration and no more than that.     We have done in the past PET scans, endoscopies and so forth looking for any other source of CEA elevation and all of the testing has been negative. The patient feels very well at this point, actually she has reached a new level of stability, she is able to be functional, she has no pain, she has normal bowel activity, urination is normal. She has no fever or infection. Her need for oxygen has decreased and she  will be coming back to the office in 4 months as originally planned with CBC, CMP and a CEA level.      I discussed all of these facts on the telephone.    Time on the telephone 14 minutes.   On 07/19/2022, the patient looks terrific. She has no symptoms or signs of colon cancer recurrence or her abdominal symptomatology, after removal of the tumor subsided including diarrhea, passage of blood in the stool, gastric and intolerance, nausea, vomiting, abdominal pain and distention. Her anemia has recovered. Her hemoglobin is almost 12 today. Her reticulated hemoglobin is normal and I think she is no longer having iron deficiency.     Besides this the patient has not had any new health issues. She remains on oxygen for her pulmonary fibrosis. I personally believe that the CEA elevation represents just reflection of pulmonary fibrosis and no more than that. Previous PET scan and CT scans have failed to document any abnormality to justify the CEA elevation. No liver metastases have been found. No lesions in the colon have been found. No retroperitoneal adenopathy, ascites or any other new alterations. Even the PET scan was negative in the areas of abnormal fibrosis in her lung anatomy.     A CEA level is obtained today but I told her that maybe in the long run we do not need to run this test anymore because always is going to remain abnormal and we are not going to know what to do with it. Maybe in the long run will be better off to run a liquid biopsy that can provide more information.     She will be called at home with the report of her testing. I want to review her back in 4 months with a CBC, CMP and a CEA level. I will order a liquid biopsy then.  On 03/15/2023 the patient was further reviewed. She looks fantastic to me and I do not believe that the patient has terrible lung auscultation. 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 I do not believe that this patient needs to have oxygen at 8L/min and I warned her against that. I advised her to use the oxygen meter that she has available at home to guide her oxygen therapy. At the best she probably needs to have 2L/min. She is saturating 99 in the office at 3L/min. Her heart rate is 62. On the other hand her anemia was corrected completely after IV iron therapy and the resection of her tumoral lesion in the colon. Her hemoglobin today is 12, excellent for her. Her white count and platelet count are normal and her chemistry profile discloses no significant alterations to speak of. Her CO2 is 31 probably telling me that she has probably a component of CO2 retention. Creatinine, glucose, albumin, bilirubin and liver enzymes are all normal. Her CEA level is pending.     Therefore from my point of view I do believe that the patient will require to come back and see us in 6 months and she will require a CBC, CMP and a CEA level at that time along with a reticulated hemoglobin.     I do not believe that I find any need for radiological assessment on her at this time.     As we have mentioned she has had chronic elevation of her CEA level that has not changed over time and detailed examination, radiological assessment, PET scan and colonoscopy have failed to document the reasons for this. My personal suspicion is that interstitial pulmonary disease triggers this abnormality.     The patient was advised otherwise to remain in observation coming to see us back in 6 months. I have not prescribed any medications for her at this time.          ·

## 2023-03-16 ENCOUNTER — TELEPHONE (OUTPATIENT)
Dept: ONCOLOGY | Facility: CLINIC | Age: 83
End: 2023-03-16
Payer: MEDICARE

## 2023-03-16 LAB — CEA SERPL-MCNC: 10.9 NG/ML

## 2023-03-16 PROCEDURE — 82378 CARCINOEMBRYONIC ANTIGEN: CPT | Performed by: INTERNAL MEDICINE

## 2023-03-16 NOTE — TELEPHONE ENCOUNTER
Spoke w/ Natasha and Stephie in the lab. Order is in. Asked for an add on CEA. If unable to add on requested a call back. Jennifer Mercado RN

## 2023-03-17 ENCOUNTER — HOSPITAL ENCOUNTER (OUTPATIENT)
Dept: MAMMOGRAPHY | Facility: HOSPITAL | Age: 83
Discharge: HOME | End: 2023-03-17
Payer: MEDICARE

## 2023-03-17 ENCOUNTER — TELEPHONE (OUTPATIENT)
Dept: ONCOLOGY | Facility: CLINIC | Age: 83
End: 2023-03-17
Payer: MEDICARE

## 2023-03-17 ENCOUNTER — HOSPITAL ENCOUNTER (OUTPATIENT)
Dept: ULTRASOUND IMAGING | Facility: HOSPITAL | Age: 83
Discharge: HOME | End: 2023-03-17
Payer: MEDICARE

## 2023-03-17 DIAGNOSIS — R92.8 ABNORMAL MAMMOGRAM OF LEFT BREAST: Primary | ICD-10-CM

## 2023-03-17 DIAGNOSIS — R92.8 ABNORMAL MAMMOGRAM OF LEFT BREAST: ICD-10-CM

## 2023-03-17 PROCEDURE — G0279 TOMOSYNTHESIS, MAMMO: HCPCS

## 2023-03-17 PROCEDURE — 77065 DX MAMMO INCL CAD UNI: CPT

## 2023-03-17 PROCEDURE — 76642 ULTRASOUND BREAST LIMITED: CPT

## 2023-03-17 NOTE — TELEPHONE ENCOUNTER
----- Message from Emmanuel Lara MD sent at 3/17/2023  8:08 AM EDT -----  CALL HER CEA IS STABLE AT 10 NO NEED FOR ANY OTHER TEST

## 2023-05-18 RX ORDER — OMEPRAZOLE 40 MG/1
40 CAPSULE, DELAYED RELEASE ORAL 2 TIMES DAILY
Qty: 180 CAPSULE | Refills: 3 | Status: SHIPPED | OUTPATIENT
Start: 2023-05-18

## 2023-07-26 ENCOUNTER — OFFICE VISIT (OUTPATIENT)
Dept: FAMILY MEDICINE CLINIC | Facility: CLINIC | Age: 83
End: 2023-07-26
Payer: MEDICARE

## 2023-07-26 VITALS
TEMPERATURE: 97.5 F | HEART RATE: 80 BPM | WEIGHT: 134 LBS | RESPIRATION RATE: 16 BRPM | DIASTOLIC BLOOD PRESSURE: 60 MMHG | SYSTOLIC BLOOD PRESSURE: 128 MMHG | HEIGHT: 61 IN | OXYGEN SATURATION: 96 % | BODY MASS INDEX: 25.3 KG/M2

## 2023-07-26 DIAGNOSIS — E03.9 ACQUIRED HYPOTHYROIDISM: Primary | Chronic | ICD-10-CM

## 2023-07-26 DIAGNOSIS — E78.2 MIXED HYPERLIPIDEMIA: Chronic | ICD-10-CM

## 2023-07-26 DIAGNOSIS — F33.42 MAJOR DEPRESSIVE DISORDER, RECURRENT EPISODE, IN FULL REMISSION: Chronic | ICD-10-CM

## 2023-07-26 DIAGNOSIS — J34.89 RHINORRHEA: ICD-10-CM

## 2023-07-26 PROCEDURE — 1160F RVW MEDS BY RX/DR IN RCRD: CPT | Performed by: FAMILY MEDICINE

## 2023-07-26 PROCEDURE — 3074F SYST BP LT 130 MM HG: CPT | Performed by: FAMILY MEDICINE

## 2023-07-26 PROCEDURE — 99214 OFFICE O/P EST MOD 30 MIN: CPT | Performed by: FAMILY MEDICINE

## 2023-07-26 PROCEDURE — 3078F DIAST BP <80 MM HG: CPT | Performed by: FAMILY MEDICINE

## 2023-07-26 PROCEDURE — 1159F MED LIST DOCD IN RCRD: CPT | Performed by: FAMILY MEDICINE

## 2023-07-26 RX ORDER — IPRATROPIUM BROMIDE 42 UG/1
2 SPRAY, METERED NASAL 4 TIMES DAILY
Qty: 45 ML | Refills: 3 | Status: SHIPPED | OUTPATIENT
Start: 2023-07-26

## 2023-07-26 RX ORDER — LEVOTHYROXINE SODIUM 112 UG/1
112 TABLET ORAL DAILY
Qty: 90 TABLET | Refills: 1 | Status: SHIPPED | OUTPATIENT
Start: 2023-07-26

## 2023-07-26 RX ORDER — ATORVASTATIN CALCIUM 10 MG/1
10 TABLET, FILM COATED ORAL DAILY
Qty: 90 TABLET | Refills: 1 | Status: SHIPPED | OUTPATIENT
Start: 2023-07-26

## 2023-07-26 NOTE — PROGRESS NOTES
Chief Complaint:   Chief Complaint   Patient presents with    Hyperlipidemia     MED REFILL DUE      Hypothyroidism    Depression       Madalyn Galeas 83 y.o. female who presents today for Medical Management of the below listed issues. She  has a problem list of   Patient Active Problem List   Diagnosis    Esophageal reflux    Hyperlipidemia    Hypothyroidism    Major depressive disorder, recurrent episode, in full remission    Pulmonary fibrosis    Pulmonary hypertension    Sleep apnea, obstructive    Vertigo, peripheral    Impaired fasting glucose    Iron deficiency anemia    Malabsorption of iron    Essential hypertension    Diverticulosis    Hemorrhoids    Malignant neoplasm of ascending colon    Elevated CEA    Chronic respiratory failure    PAH (pulmonary artery hypertension)    Irregular heart rhythm   .  Since the last visit, She has overall felt well.  she has been compliant with   Current Outpatient Medications:     acetaminophen (TYLENOL) 500 MG tablet, Take 2 tablets by mouth Every 6 (Six) Hours As Needed for Mild Pain., Disp: , Rfl:     ADCIRCA 20 MG tablet, Take 2 tablets by mouth Daily., Disp: , Rfl:     ascorbic acid (VITAMIN C) 500 MG tablet, Take 1 tablet by mouth Daily., Disp: , Rfl:     Ascorbic Acid 500 MG chewable tablet, Chew 500 mg Daily., Disp: , Rfl:     aspirin 81 MG EC tablet, Take 1 tablet by mouth Daily., Disp: , Rfl:     atorvastatin (LIPITOR) 10 MG tablet, Take 1 tablet by mouth Daily., Disp: 90 tablet, Rfl: 1    Cholecalciferol 25 MCG (1000 UT) capsule, Take  by mouth Daily., Disp: , Rfl:     clobetasol (TEMOVATE) 0.05 % ointment, APPLY TO AFFECTED AREA TWICE DAILY FOR ONE WEEK THEN apply ONCE A DAY FOR ONE WEEK THEN apply TWO TO 3 TIMES WEEKLY, Disp: , Rfl:     ESBRIET 267 MG capsule, Take 801 mg by mouth 3 (Three) Times a Day With Meals., Disp: , Rfl:     Fluzone High-Dose Quadrivalent 0.7 ML suspension prefilled syringe injection, , Disp: , Rfl:     folic acid (FOLVITE) 400  "MCG tablet, Take 1 tablet by mouth Daily., Disp: , Rfl:     ipratropium (ATROVENT) 0.06 % nasal spray, 2 sprays into the nostril(s) as directed by provider 4 (Four) Times a Day., Disp: 45 mL, Rfl: 3    levothyroxine (SYNTHROID, LEVOTHROID) 112 MCG tablet, Take 1 tablet by mouth Daily., Disp: 90 tablet, Rfl: 1    O2 (OXYGEN), Inhale 8 L/min Continuous., Disp: , Rfl:     omeprazole (priLOSEC) 40 MG capsule, Take 1 capsule by mouth 2 (Two) Times a Day., Disp: 180 capsule, Rfl: 3    PROBIOTIC PRODUCT PO, Take  by mouth., Disp: , Rfl:     sertraline (ZOLOFT) 50 MG tablet, Take 1 tablet by mouth Daily., Disp: 90 tablet, Rfl: 1    verapamil SR (CALAN-SR) 120 MG CR tablet, Take 1 tablet by mouth Daily., Disp: , Rfl: 3.  She denies medication side effects.    All of the other chronic condition(s) listed above are stable w/o issues.    /60   Pulse 80   Temp 97.5 °F (36.4 °C) (Oral)   Resp 16   Ht 154 cm (60.63\")   Wt 60.8 kg (134 lb)   LMP  (LMP Unknown)   SpO2 96%   BMI 25.63 kg/m²     Results for orders placed or performed in visit on 03/15/23   CEA    Specimen: Blood   Result Value Ref Range    CEA 10.90 ng/mL             The following portions of the patient's history were reviewed and updated as appropriate: allergies, current medications, past family history, past medical history, past social history, past surgical history, and problem list.    Review of Systems   Constitutional:  Negative for activity change, chills and fever.   Respiratory:  Negative for cough.    Cardiovascular:  Negative for chest pain.   Psychiatric/Behavioral:  Negative for dysphoric mood.      Objective              Physical Exam  Constitutional:       General: She is not in acute distress.     Appearance: She is well-developed.   Cardiovascular:      Rate and Rhythm: Normal rate and regular rhythm.   Pulmonary:      Effort: Pulmonary effort is normal.      Breath sounds: Normal breath sounds.   Neurological:      Mental Status: She " is alert and oriented to person, place, and time.   Psychiatric:         Behavior: Behavior normal.         Thought Content: Thought content normal.           Diagnoses and all orders for this visit:    1. Acquired hypothyroidism (Primary)  -     levothyroxine (SYNTHROID, LEVOTHROID) 112 MCG tablet; Take 1 tablet by mouth Daily.  Dispense: 90 tablet; Refill: 1    2. Major depressive disorder, recurrent episode, in full remission  -     sertraline (ZOLOFT) 50 MG tablet; Take 1 tablet by mouth Daily.  Dispense: 90 tablet; Refill: 1    3. Mixed hyperlipidemia  -     atorvastatin (LIPITOR) 10 MG tablet; Take 1 tablet by mouth Daily.  Dispense: 90 tablet; Refill: 1    4. Rhinorrhea  -     ipratropium (ATROVENT) 0.06 % nasal spray; 2 sprays into the nostril(s) as directed by provider 4 (Four) Times a Day.  Dispense: 45 mL; Refill: 3

## 2023-08-09 ENCOUNTER — TELEPHONE (OUTPATIENT)
Dept: ONCOLOGY | Facility: CLINIC | Age: 83
End: 2023-08-09
Payer: MEDICARE

## 2023-08-09 NOTE — TELEPHONE ENCOUNTER
Caller: Madalyn Galeas    Relationship to patient: Self    Best call back number: [phone]    Chief complaint: PATIENT CALLED TO RESCHEDULE    Type of visit: LAB AND FOLLOW UP    Requested date: PLEASE CALL PATIENT SHE WILL HAVE TO LOOK AT HER CALENDAR      If rescheduling, when is the original appointment: 9-27-23

## 2023-09-20 ENCOUNTER — HOSPITAL ENCOUNTER (OUTPATIENT)
Dept: MAMMOGRAPHY | Facility: HOSPITAL | Age: 83
Discharge: HOME | End: 2023-09-20
Admitting: FAMILY MEDICINE
Payer: MEDICARE

## 2023-09-20 DIAGNOSIS — R92.8 ABNORMAL MAMMOGRAM OF LEFT BREAST: ICD-10-CM

## 2023-09-20 PROCEDURE — G0279 TOMOSYNTHESIS, MAMMO: HCPCS

## 2023-09-20 PROCEDURE — 77065 DX MAMMO INCL CAD UNI: CPT

## 2023-10-16 ENCOUNTER — OFFICE VISIT (OUTPATIENT)
Dept: ONCOLOGY | Facility: CLINIC | Age: 83
End: 2023-10-16
Payer: MEDICARE

## 2023-10-16 ENCOUNTER — LAB (OUTPATIENT)
Dept: LAB | Facility: HOSPITAL | Age: 83
End: 2023-10-16
Payer: MEDICARE

## 2023-10-16 VITALS
WEIGHT: 133.5 LBS | TEMPERATURE: 97.8 F | HEART RATE: 66 BPM | DIASTOLIC BLOOD PRESSURE: 76 MMHG | SYSTOLIC BLOOD PRESSURE: 182 MMHG | OXYGEN SATURATION: 93 % | HEIGHT: 61 IN | BODY MASS INDEX: 25.2 KG/M2

## 2023-10-16 DIAGNOSIS — R97.0 ELEVATED CEA: ICD-10-CM

## 2023-10-16 DIAGNOSIS — C18.2 MALIGNANT NEOPLASM OF ASCENDING COLON: ICD-10-CM

## 2023-10-16 DIAGNOSIS — D50.0 IRON DEFICIENCY ANEMIA DUE TO CHRONIC BLOOD LOSS: ICD-10-CM

## 2023-10-16 DIAGNOSIS — K90.9 MALABSORPTION OF IRON: ICD-10-CM

## 2023-10-16 DIAGNOSIS — C18.2 MALIGNANT NEOPLASM OF ASCENDING COLON: Primary | ICD-10-CM

## 2023-10-16 LAB
ALBUMIN SERPL-MCNC: 3.9 G/DL (ref 3.5–5.2)
ALBUMIN/GLOB SERPL: 1.8 G/DL
ALP SERPL-CCNC: 76 U/L (ref 39–117)
ALT SERPL W P-5'-P-CCNC: 6 U/L (ref 1–33)
ANION GAP SERPL CALCULATED.3IONS-SCNC: 10.5 MMOL/L (ref 5–15)
AST SERPL-CCNC: 22 U/L (ref 1–32)
BASOPHILS # BLD AUTO: 0.03 10*3/MM3 (ref 0–0.2)
BASOPHILS NFR BLD AUTO: 0.5 % (ref 0–1.5)
BILIRUB SERPL-MCNC: 0.3 MG/DL (ref 0–1.2)
BUN SERPL-MCNC: 9 MG/DL (ref 8–23)
BUN/CREAT SERPL: 13.6 (ref 7–25)
CALCIUM SPEC-SCNC: 9.1 MG/DL (ref 8.6–10.5)
CEA SERPL-MCNC: 11.1 NG/ML
CHLORIDE SERPL-SCNC: 101 MMOL/L (ref 98–107)
CO2 SERPL-SCNC: 29.5 MMOL/L (ref 22–29)
CREAT SERPL-MCNC: 0.66 MG/DL (ref 0.6–1.1)
DEPRECATED RDW RBC AUTO: 41.9 FL (ref 37–54)
EGFRCR SERPLBLD CKD-EPI 2021: 87.7 ML/MIN/1.73
EOSINOPHIL # BLD AUTO: 0.18 10*3/MM3 (ref 0–0.4)
EOSINOPHIL NFR BLD AUTO: 2.8 % (ref 0.3–6.2)
ERYTHROCYTE [DISTWIDTH] IN BLOOD BY AUTOMATED COUNT: 12.7 % (ref 12.3–15.4)
GLOBULIN UR ELPH-MCNC: 2.2 GM/DL
GLUCOSE SERPL-MCNC: 97 MG/DL (ref 65–99)
HCT VFR BLD AUTO: 37.8 % (ref 34–46.6)
HGB BLD-MCNC: 12.1 G/DL (ref 12–15.9)
HGB RETIC QN AUTO: 33.7 PG (ref 29.8–36.1)
IMM GRANULOCYTES # BLD AUTO: 0.02 10*3/MM3 (ref 0–0.05)
IMM GRANULOCYTES NFR BLD AUTO: 0.3 % (ref 0–0.5)
IMM RETICS NFR: 6.7 % (ref 3–15.8)
LYMPHOCYTES # BLD AUTO: 0.91 10*3/MM3 (ref 0.7–3.1)
LYMPHOCYTES NFR BLD AUTO: 14 % (ref 19.6–45.3)
MCH RBC QN AUTO: 29 PG (ref 26.6–33)
MCHC RBC AUTO-ENTMCNC: 32 G/DL (ref 31.5–35.7)
MCV RBC AUTO: 90.6 FL (ref 79–97)
MONOCYTES # BLD AUTO: 0.42 10*3/MM3 (ref 0.1–0.9)
MONOCYTES NFR BLD AUTO: 6.5 % (ref 5–12)
NEUTROPHILS NFR BLD AUTO: 4.93 10*3/MM3 (ref 1.7–7)
NEUTROPHILS NFR BLD AUTO: 75.9 % (ref 42.7–76)
NRBC BLD AUTO-RTO: 0 /100 WBC (ref 0–0.2)
PLATELET # BLD AUTO: 165 10*3/MM3 (ref 140–450)
PMV BLD AUTO: 8.6 FL (ref 6–12)
POTASSIUM SERPL-SCNC: 4.1 MMOL/L (ref 3.5–5.2)
PROT SERPL-MCNC: 6.1 G/DL (ref 6–8.5)
RBC # BLD AUTO: 4.17 10*6/MM3 (ref 3.77–5.28)
RETICS # AUTO: 0.06 10*6/MM3 (ref 0.02–0.13)
RETICS/RBC NFR AUTO: 1.32 % (ref 0.7–1.9)
SODIUM SERPL-SCNC: 141 MMOL/L (ref 136–145)
WBC NRBC COR # BLD: 6.49 10*3/MM3 (ref 3.4–10.8)

## 2023-10-16 PROCEDURE — 85046 RETICYTE/HGB CONCENTRATE: CPT

## 2023-10-16 PROCEDURE — 85025 COMPLETE CBC W/AUTO DIFF WBC: CPT

## 2023-10-16 PROCEDURE — 80053 COMPREHEN METABOLIC PANEL: CPT

## 2023-10-16 PROCEDURE — 36415 COLL VENOUS BLD VENIPUNCTURE: CPT

## 2023-10-16 PROCEDURE — 82378 CARCINOEMBRYONIC ANTIGEN: CPT | Performed by: INTERNAL MEDICINE

## 2023-10-16 RX ORDER — TREPROSTINIL 16-32-48
KIT INHALATION
COMMUNITY
Start: 2023-08-10

## 2023-10-16 NOTE — PROGRESS NOTES
Subjective         DURING THE VISIT WITH THE PATIENT TODAY , PATIENT HAD FACE MASK, MY MEDICAL ASSISTANT AND I  HAD PROPPER PROTECTIVE EQUIPMENT, AND I DID HAND HYGIENE WITH SOAP AND WATER BEFORE AND AFTER THE VISIT.     REASON FOR FOLLOW UP: 1.IRON DEFICIENCY ANEMIA,  DUE TO CHRONIC GI BLOOD LOSS, CHRONIC DIARRHEA, MALNUTRITION, WEIGHT LOSS, ABDOMINAL MASS RUQ: colon cancer diagnosed, removed, no need for adjuvant therapy for stage 2 disease    2.Ascending colon cancer s/p colectomy.    3. ELEVATED CEA AFTER COLECTOMY: PET SCAN NEGATIVE IN 6/17/21, CT SCAN NEGATIVE 3/22      On 10/16/2023 this 83-year-old female who had previous history of Stage II colon cancer status post resection requiring no adjuvant therapy and previous iron deficiency anemia due to chronic gastrointestinal blood loss returns to the office for assessment. In the interim of time, she has been reevaluated by her pulmonologist in regard to her interstitial lung disease. Fortunately, nothing has changed in regard to functional status or radiological assessment. She remains on oxygen permanently. Her appetite remains good. Her weight is stable. Bowel function is appropriate. Urination is appropriate. No passage of blood in the stool. No abdominal pain, cramping or jaundice. No nausea or vomiting. She has minimal cough and her shortness of breath as stated remains the same.     Mentally she remains sharp taking care of all her affairs.            Past Medical History:   Diagnosis Date    Adenomatous polyp of sigmoid colon 1/21/2021    Anemia     Colonic polyp     Depression     Diverticulosis     Esophageal reflux     Essential hypertension     GERD (gastroesophageal reflux disease)     H/O complete eye exam 03/2018    Hx of bone density study 02/15/2016    Hyperlipidemia     Hypothyroidism, acquired     Major depressive disorder, recurrent episode, in full remission     Nonsenile cataract 2016    Oxygen dependent     8L NC CONTINUOUS    Pulmonary  fibrosis     Pulmonary hypertension     Sleep apnea, obstructive     USES CPAP     Stenosis, spinal, lumbar     Vertigo, peripheral         Past Surgical History:   Procedure Laterality Date    COLON RESECTION N/A 1/28/2021    Procedure: COLON RESECTION RIGHT;  Surgeon: Alfonso Cesar MD;  Location: [facility] MAIN OR;  Service: General;  Laterality: N/A;    COLONOSCOPY  02/20/2012    COLONOSCOPY N/A 1/21/2021    Procedure: COLONOSCOPY INTO CECUM AND TI WITH BIOPSIES, SPOT INJECTION TO COLON MASS (MID-ASCENDING), HOT SNARE POLYPECTOMY, COLD BX POLYPECTOMIES, SALINE LIFT, CLIP PLACEMENT X3;  Surgeon: Alfonso Cesar MD;  Location: [facility] ENDOSCOPY;  Service: General;  Laterality: N/A;  PRE:  ABNORMAL CT SCAN OF COLON  POST:  DIVERTICULOSIS, COLON MASS, POLYPS, HEMORRHOIDS    INCONTINENCE SURGERY      Dr. Espinal    MAMMSUZANNE BILATERAL  2017    dr de la cruz     PAP SMEAR  2014    dr de la cruz        Current Outpatient Medications on File Prior to Visit   Medication Sig Dispense Refill    acetaminophen (TYLENOL) 500 MG tablet Take 2 tablets by mouth Every 6 (Six) Hours As Needed for Mild Pain.      ADCIRCA 20 MG tablet Take 2 tablets by mouth Daily.      ascorbic acid (VITAMIN C) 500 MG tablet Take 1 tablet by mouth Daily.      Ascorbic Acid 500 MG chewable tablet Chew 500 mg Daily.      aspirin 81 MG EC tablet Take 1 tablet by mouth Daily.      atorvastatin (LIPITOR) 10 MG tablet Take 1 tablet by mouth Daily. 90 tablet 1    Cholecalciferol 25 MCG (1000 UT) capsule Take  by mouth Daily.      clobetasol (TEMOVATE) 0.05 % ointment APPLY TO AFFECTED AREA TWICE DAILY FOR ONE WEEK THEN apply ONCE A DAY FOR ONE WEEK THEN apply TWO TO 3 TIMES WEEKLY      ESBRIET 267 MG capsule Take 801 mg by mouth 3 (Three) Times a Day With Meals.      Fluzone High-Dose Quadrivalent 0.7 ML suspension prefilled syringe injection       folic acid (FOLVITE) 400 MCG tablet Take 1 tablet by mouth Daily.      ipratropium (ATROVENT)  0.06 % nasal spray 2 sprays into the nostril(s) as directed by provider 4 (Four) Times a Day. 45 mL 3    levothyroxine (SYNTHROID, LEVOTHROID) 112 MCG tablet Take 1 tablet by mouth Daily. 90 tablet 1    O2 (OXYGEN) Inhale 8 L/min Continuous.      omeprazole (priLOSEC) 40 MG capsule Take 1 capsule by mouth 2 (Two) Times a Day. 180 capsule 3    PROBIOTIC PRODUCT PO Take  by mouth.      sertraline (ZOLOFT) 50 MG tablet Take 1 tablet by mouth Daily. 90 tablet 1    Treprostinil (Tyvaso DPI Titration Kit) 16 & 32 & 48 MCG powder       Tyvaso DPI Titration Kit 16 & 32 & 48 MCG powder       verapamil SR (CALAN-SR) 120 MG CR tablet Take 1 tablet by mouth Daily.  3     No current facility-administered medications on file prior to visit.        ALLERGIES:    Allergies   Allergen Reactions    Sulfa Antibiotics Hives and Rash        Social History     Socioeconomic History    Marital status:    Tobacco Use    Smoking status: Former     Packs/day: 2     Types: Cigarettes     Start date:      Quit date:      Years since quittin.8    Smokeless tobacco: Never    Tobacco comments:     Caffeine use: Drinks Coca Cola    Vaping Use    Vaping Use: Never used   Substance and Sexual Activity    Alcohol use: Yes     Comment: rare    Drug use: No    Sexual activity: Never     Partners: Male        Family History   Problem Relation Age of Onset    Breast cancer Cousin     Lung cancer Father     Heart disease Father     Colon cancer Brother     Prostate cancer Brother     Lung disease Brother     Emphysema Brother     Heart disease Mother     Malig Hyperthermia Neg Hx       Current Outpatient Medications on File Prior to Visit   Medication Sig Dispense Refill    acetaminophen (TYLENOL) 500 MG tablet Take 2 tablets by mouth Every 6 (Six) Hours As Needed for Mild Pain.      ADCIRCA 20 MG tablet Take 2 tablets by mouth Daily.      ascorbic acid (VITAMIN C) 500 MG tablet Take 1 tablet by mouth Daily.      Ascorbic Acid 500 MG  "chewable tablet Chew 500 mg Daily.      aspirin 81 MG EC tablet Take 1 tablet by mouth Daily.      atorvastatin (LIPITOR) 10 MG tablet Take 1 tablet by mouth Daily. 90 tablet 1    Cholecalciferol 25 MCG (1000 UT) capsule Take  by mouth Daily.      clobetasol (TEMOVATE) 0.05 % ointment APPLY TO AFFECTED AREA TWICE DAILY FOR ONE WEEK THEN apply ONCE A DAY FOR ONE WEEK THEN apply TWO TO 3 TIMES WEEKLY      ESBRIET 267 MG capsule Take 801 mg by mouth 3 (Three) Times a Day With Meals.      Fluzone High-Dose Quadrivalent 0.7 ML suspension prefilled syringe injection       folic acid (FOLVITE) 400 MCG tablet Take 1 tablet by mouth Daily.      ipratropium (ATROVENT) 0.06 % nasal spray 2 sprays into the nostril(s) as directed by provider 4 (Four) Times a Day. 45 mL 3    levothyroxine (SYNTHROID, LEVOTHROID) 112 MCG tablet Take 1 tablet by mouth Daily. 90 tablet 1    O2 (OXYGEN) Inhale 8 L/min Continuous.      omeprazole (priLOSEC) 40 MG capsule Take 1 capsule by mouth 2 (Two) Times a Day. 180 capsule 3    PROBIOTIC PRODUCT PO Take  by mouth.      sertraline (ZOLOFT) 50 MG tablet Take 1 tablet by mouth Daily. 90 tablet 1    Treprostinil (Tyvaso DPI Titration Kit) 16 & 32 & 48 MCG powder       Tyvaso DPI Titration Kit 16 & 32 & 48 MCG powder       verapamil SR (CALAN-SR) 120 MG CR tablet Take 1 tablet by mouth Daily.  3     No current facility-administered medications on file prior to visit.       Objective    Vitals:    10/16/23 1209   BP: (!) 182/76   Pulse: 66   Temp: 97.8 °F (36.6 °C)   TempSrc: Temporal   SpO2: 93%   Weight: 60.6 kg (133 lb 8 oz)   Height: 154 cm (60.63\")           Physical Exam                   GENERAL:  Well-developed, Patient  in no acute distress.   SKIN:  Warm, dry ,NO purpura ,no rash.  HEENT:  Pupils were equal and reactive to light and accomodation, conjunctivae noninjected,  normal visual acuity.   NECK:  Supple with good range of motion; no thyromegaly , no JVD or bruits,.No carotid artery " pain, no carotid abnormal pulsation   LYMPHATICS:  No cervical, NO supraclavicular, NO axillary, NO inguinal adenopathies.  CARDIAC   normal rate , regular rhythm, without murmur,NO rubs NO S3 NO S4   LUNGS: DECREASED BASAL breath sounds bilateral, no wheezing, NO rhonchi, NO crackles ,NO rubs.  VASCULAR VENOUS: no cyanosis, NO collateral circulation, NO varicosities, NO edema, NO palpable cords, NO pain,NO erythema, NO pigmentation of the skin.  ABDOMEN:  Soft, NO pain,no hepatomegaly, no splenomegaly,no masses, no ascites, no collateral circulation,no distention.  EXTREMITIES  AND SPINE:  No clubbing, no cyanosis ,no deformities , no pain .No kyphosis,  no pain in spine, no pain in ribs , no pain in pelvic bone.  NEUROLOGICAL:  Patient was awake, alert, oriented to time, person and place.                  RECENT LABS:   Lab Results   Component Value Date    WBC 6.49 10/16/2023    HGB 12.1 10/16/2023    HCT 37.8 10/16/2023    MCV 90.6 10/16/2023     10/16/2023     Lab Results   Component Value Date    GLUCOSE 97 10/16/2023    BUN 9 10/16/2023    CREATININE 0.66 10/16/2023    EGFR 87.7 10/16/2023    BCR 13.6 10/16/2023    K 4.1 10/16/2023    CO2 29.5 (H) 10/16/2023    CALCIUM 9.1 10/16/2023    PROTENTOTREF 6.5 06/05/2020    ALBUMIN 3.9 10/16/2023    BILITOT 0.3 10/16/2023    AST 22 10/16/2023    ALT 6 10/16/2023     Lab Results   Component Value Date    CEA 10.90 03/16/2023     CT High Resolution Chest WO Contrast (10/02/2023 15:47)           Assessment & Plan     Diagnoses and all orders for this visit:    1. Malignant neoplasm of ascending colon (Primary)  -     CBC & Differential; Future  -     Comprehensive Metabolic Panel; Future  -     Iron Profile; Future  -     Retic With IRF & RET-He; Future  -     Ferritin; Future  -     CEA; Future    2. Elevated CEA  -     CBC & Differential; Future  -     Comprehensive Metabolic Panel; Future  -     Iron Profile; Future  -     Retic With IRF & RET-He; Future  -      Ferritin; Future  -     CEA; Future    3. Iron deficiency anemia due to chronic blood loss  -     CBC & Differential; Future  -     Comprehensive Metabolic Panel; Future  -     Iron Profile; Future  -     Retic With IRF & RET-He; Future  -     Ferritin; Future  -     CEA; Future              In summary this patient has progressive anemia. I documented in the laboratory parameters above progressive drop in the hemoglobin, progressive drop in the MCV. Morphology of peripheral blood is typical of iron deficiency anemia and I would not be surprised if she has an associated B12 or folate deficiency. This patient has had upper GI series and barium enema by GI, Dr. Boyd, and no anatomical alteration has been found in her upper GI tract or colon besides diverticular disease. The symptoms that she has today she blames them, especially the diarrhea, since the time that she had the barium enema. I think that was not an association. The fact that she has so much diarrhea day and night and with weight loss, profound fatigue, sarcopenia leads me to believe that it is some other entity that is producing this. It sounds like to me she has a malabsorptive process of some sort.     The patient also has a palpable mass in the right upper quadrant and the way how it feels to my hands, it is probably a palpable gallbladder. She had no pain there. This is very striking. Her abdomen is very soft and it is very easy to find.     Under the present circumstances and knowing that the patient cannot tolerate oral iron because it makes her to throw up and produce profuse diarrhea and makes the diarrhea worse, I do believe that the patient needs to have the followin. For the workup for her anemia, she went back to the lab to obtain a comprehensive metabolic profile, ferritin, iron, TIBC, B12, folic acid level, reticulated hemoglobin.   2. Given the fact that the patient has had a TSH that was on the low side maybe her thyroid  medication is overcorrecting her and this maybe this is part of the diarrhea process. I will obtain a TSH and a T4.   3. I do believe that the patient needs to have a CT scan of the abdomen and pelvis. I do not think she can handle oral contrast and I do not want to give her IV contrast, I find no need. I want to be sure there is no other anatomical alteration in her small intestine and GI tract that is favoring for her to have all the issues pertinent to her clinical symptoms.     I wonder if she could have some other process in her bowel including inflammatory bowel disease, bacterial overgrowth syndrome, celiac disease and differential diagnosis is more than extensive. This information would require to be shared with Dr. Boyd, her Gastroenterologist, for further review after she returns back in a few days after she proceeds with proper management of her anemia. Given the fact that she cannot handle oral iron by mouth, absolutely the patient needs to have Injectafer on 2 different occasions and this will be scheduled to start to happen as early as TODAY.   I discussed with the patient on 12/04/2020 after reviewing the case with the radiologist Dr. Pinto at [facility] in regards to her CT scan of the abdomen. This documents intussusception of the colon and there is a mass located in that that probably represents a colon cancer. Obviously, the question is how to proceed with all these issues on this patient. It is very obvious that she has profound iron deficiency anemia. Her ferritin level, iron level are low. She cannot handle oral iron therapy and she will initiate Injectafer today and she will have the 2nd infusion next week. I expect that in 3 weeks she will have very significant rebound in erythropoiesis and I expect that her pulmonary manifestations will be better in regard to her pulmonary hypertension and her pulmonary fibrosis. Typically patient's with this condition develop polycythemia  instead of anemia.    The other issue is what we are going to do with her colon. I do not think that the patient is a normal surgical candidate under the present circumstances and we need to try to fix her up in the best way possible to see if she gets to the point to have a resection of this issue. I have made her an appointment to be seen by cardiology, Rambo Dasilva MD, and proceed with an echocardiogram in days. We have also made an appointment for her to see Alfonso Cesar MD, General Surgery to review the case with me.       I reviewed the patient back with the daughter in the room on 12/10/2020. In the interim, I have had a conversation with Zita Guy MD, the patient’s pulmonologist, who strongly believes that the patient will be able to go through surgery as long as she has proper pulmonary assessment before intervention and she asked me to go ahead and consult Pulmonary Medicine, Dr. Ramos and associates for this purpose. A consult has been placed.     I reviewed her echocardiogram and actually it is better than I expected for somebody who has pulmonary fibrosis.     In summary I think the patient’s intussusception is producing bleeding, chronically, leading to iron deficiency anemia. She has had Injectafer a week ago, another one to be given to her tomorrow and thereafter she will require blood work on weekly basis. If the hemoglobin goes below 9, I think she will require transfusion of 2 units of red cells. She will have weekly appointment for CBC and nurse check and I will review her back on 12/30/2020.     I EMPHATICALLY POINTED OUT TO THE PATIENT’S DAUGHTER SHE CANNOT TAKE ANYMORE ZULMA-SELTZER. SHE HAS BEEN TAKING THIS ON A REGULAR SCHEDULE AND THIS IS PRODUCING PROBABLY ALSO AN ELEMENT OF GI BLEEDING ASSOCIATED WITH HER INTUSSUSCEPTION AND WHY NOT GASTRITIS AND SO FORTH.     In summary the patient was reviewed on 12/30/2020 along with her daughter in the room. The patient has gone through  cardiological assessment and actually her cardiac ejection fraction is not that bad, close to 50%, and with minimal evidence of pulmonary arterial hypertension. Normal contractility of the left ventricle. The patient also has been seen by Pulmonary Medicine. These records have been reviewed again and her pulmonary function tests is very marginal. The analysis is stated above by Dr. Moser who has advised the patient that she is a high risk for general anesthesia in regard to the need for ICU stay, intubation, mechanical ventilation and so forth given her pulmonary fibrosis. He has advised the patient to see Dr. Guy, Pulmonary Medicine, and she has an appointment to see her next week.     In regard to her iron deficiency anemia after she received IV iron infusions the hemoglobin keeps improving to 11.2 from 8.4. The patient is no longer taking Nohelia-[place]. The patient is a little bit stronger but her appetite is not good. Obviously the question is what is the cause of the intussusception. I have measured a CEA level that is elevated at 6.2 and leads me to believe that maybe this is something related to a primary colon cancer. Dr. Cesar has suggested for her to have a colonoscopy after reviewing his good notes. The patient is even more hesitant to proceed with this.       The patient was further reviewed in the office on 02/22/2021. Since the previous visit and after I discussed the case with Dr. Zita Guy, the patient’s pulmonologist, I advised Dr. Cesar to go ahead and proceed with her surgery. This happened and actually to the surprise of everybody the patient walked away from the hospital 3 days later with no complications or issues pertinent to her lung disease. The patient had also excellent tolerance to her colonoscopy. Since she has been at home she has been gaining appetite and gaining weight. She is feeling substantially better. She has no abdominal pain. Her surgical site has healed. Her bowel activity  is normal and urination is normal. She remains on oxygen. She is walking around the house and she is doing more things that she was not doing before. She is not sitting down complaining of a bad stomach all the time. She is not taking Nohelia[place] as I pointed out to them before.     I have reviewed the pathology of the surgical specimen and she had a [id number] colonic cancer with 14 lymph nodes removed, negative, with no perineural, perivascular or perivenous invasion. The margins of resection were completely clear. On the CT scan of the abdomen, her liver was completely unremarkable.         I reviewed with the patient on the telephone on 06/17/2021 after personally reviewing the PET scan in the PAC system [facility] that shows no areas of abnormal uptake in her lungs, no areas of abnormal uptake in the liver, retroperitoneal lymph nodes or any other site as far as I can tell. The radiologists reading is about the same.     The good news to me is that the PET scan is negative, the patient is feeling better. She remains fully functional, her anemia is corrected after all of the bleeding that she did out of her colon cancer with improving hemoglobin.    Obviously raises the question why the CEA level is elevated. The only reasonable explanation could be the chronic inflammatory process associated with her chronic lung disease. She does not smoke. She is not around anybody who smokes and obviously it is the only logical explanation.       The patient was further reviewed on 09/28/2021. Her overall clinical condition is excellent. As long as she does not eat junk food she has no diarrhea, abdominal pain or discomfort. Her weight is stable. She feels substantially better in regard to her ability to function independently at home. She has no pain related to malignancy, urination is normal, her respiratory situation remains stable, she remains on oxygen and she has not had any other respiratory infections.      The clinical examination of the patient today is very much negative normal besides her chronic lung disease and her decreased pulmonary auscultation with crackles at the bases. Her heart function seems to be appropriate.     Her white count, hemoglobin and platelets are normal. The white count differential is normal. Her ferritin and iron profile remain normal. Her CEA level actually has dropped some and this in my opinion is related to her chronic lung disease more than anything else. As we have mentioned before we have performed multiple radiological analyses on her including CT scans and PET scans looking for recurrent disease and nothing has been found.     I do believe that the patient in my opinion from the point of view of her colon cancer and her iron deficiency anemia that has been corrected is doing perfectly well. Her chronic lung disease is her chronic lung disease and this will remain on oxygen and followed up by her pulmonologist.     I advised her to go ahead and get the booster shot for COVID and I advised her also to proceed with flu vaccination.     I will review her back in 3 months with a CBC, CMP, ferritin, iron profile and a CEA level. I find no need for radiological assessment on her at this time.   The patient was further reviewed on 12/28/2021. In regard to the previous history of colon cancer, she underwent resection of this. Since then her hemoglobin has stabilized around 12. Her ferritin level is pending today. She has improved her diet. She has gained a few pounds and she feels substantially better from this point of view. She has learned to put away foods that she knows that she will get sick with and now she is eating a variety of foods that she has not eaten in a long time. Surprising enough very likely her nutrition is better. She has no symptoms that wound indicate colon cancer recurrence. In spite of having a persistent elevation of the CEA level the patient has not had  anything to suggest clinically, radiologically through CT scans and PET scans evidence of colon cancer recurrence. It raises the question if the pulmonary fibrosis is the reason why her CEA level is minimally elevated. We are awaiting to review this and discuss this with the patient upon result.     She remains on oxygen for her pulmonary fibrosis. Today she has minimal if any crackles. Actually her lung auscultation is very benign. She has atrial fibrillation with controlled ventricular response and she will see Cardiology very soon.     I advised the patient to return to see me back in 3 months with a CBC, CMP, CEA, ferritin, iron, TIBC. I discussed all these facts with the patient and her daughter present in the room. I did not prescribe any new medicines.  I reviewed the patient on 03/29/2022. Since the previous visit she has had COVID infection in the upper respiratory tract treated by her pulmonologist with steroids and resolution and no complications. That is very surprising. In any event, she has had lesser need for oxygen. Her O2 saturation has improved and she is down to 6L nasal cannula continuously. She remains on CPAP at nighttime. She has seen her pulmonologist, Dr. Duarte. She has had a CT scan that shows no new alterations in her lung anatomy according to the patient.     Her white count, hemoglobin and platelets are excellent today. Her reticulated hemoglobin is normal. Her ferritin, iron profile are pending as well as her CEA and CMP.     I do believe that the patient does not have any symptoms to suggest colon cancer recurrence. Her anemia seems to have improved. Her hemoglobin has stabilized and I think she looks very good to me.    RECOMMENDATIONS:  I have advised her to return to see us back in 4 months with a CBC, CMP, ferritin, iron, TIBC as well as CEA level. Otherwise, no other need for radiological assessment from my point of view.     I discussed all these facts with the patient and her  daughter present in the room.  Addendum: due to cea of 11.4 I will proceed with ct abd pelvis in few days, her ct chest shows nothing to suggest a lung malignancy or mets  The patient was reviewed by telemedicine on 04/06/2022. In the interim she has had a CT scan of the abdomen and pelvis. I have reviewed this in the PAC system [facility]. The lower part of her lungs are more than impressive. They look like sponges with dramatic interstitial infiltrate. There is a lot of honeycomb pattern that is again more than dramatic. There is no pleural effusion. There is minimal cardiomegaly. She has gallstones in the gallbladder, she has normal liver anatomy with no lesions. The pancreas, the stomach, the spleen remain normal. The kidneys are normal. The uterus remains normal. There is minimal bladder wall thickening. There is normal rectum. The bowel per se looks normal. There is no ascites or retroperitoneal adenopathy. There is significant calcification of the abdominal aorta with no aneurysm formation.    Given the above findings as I discussed with the patient on the telephone today I strongly believe that the CEA is manifestation of her lung alteration and no more than that.     We have done in the past PET scans, endoscopies and so forth looking for any other source of CEA elevation and all of the testing has been negative. The patient feels very well at this point, actually she has reached a new level of stability, she is able to be functional, she has no pain, she has normal bowel activity, urination is normal. She has no fever or infection. Her need for oxygen has decreased and she will be coming back to the office in 4 months as originally planned with CBC, CMP and a CEA level.      I discussed all of these facts on the telephone.    Time on the telephone 14 minutes.   On 07/19/2022, the patient looks terrific. She has no symptoms or signs of colon cancer recurrence or her abdominal symptomatology,  after removal of the tumor subsided including diarrhea, passage of blood in the stool, gastric and intolerance, nausea, vomiting, abdominal pain and distention. Her anemia has recovered. Her hemoglobin is almost 12 today. Her reticulated hemoglobin is normal and I think she is no longer having iron deficiency.     Besides this the patient has not had any new health issues. She remains on oxygen for her pulmonary fibrosis. I personally believe that the CEA elevation represents just reflection of pulmonary fibrosis and no more than that. Previous PET scan and CT scans have failed to document any abnormality to justify the CEA elevation. No liver metastases have been found. No lesions in the colon have been found. No retroperitoneal adenopathy, ascites or any other new alterations. Even the PET scan was negative in the areas of abnormal fibrosis in her lung anatomy.     A CEA level is obtained today but I told her that maybe in the long run we do not need to run this test anymore because always is going to remain abnormal and we are not going to know what to do with it. Maybe in the long run will be better off to run a liquid biopsy that can provide more information.     She will be called at home with the report of her testing. I want to review her back in 4 months with a CBC, CMP and a CEA level. I will order a liquid biopsy then.  On 03/15/2023 the patient was further reviewed. She looks fantastic to me and I do not believe that the patient has terrible lung auscultation. 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 I do not believe that this patient needs to have oxygen at 8L/min and I warned her against that. I advised her to use the oxygen meter that she has available at home to guide her oxygen therapy. At the best she probably needs to have 2L/min. She is saturating 99 in the office at 3L/min. Her heart rate  is 62. On the other hand her anemia was corrected completely after IV iron therapy and the resection of her tumoral lesion in the colon. Her hemoglobin today is 12, excellent for her. Her white count and platelet count are normal and her chemistry profile discloses no significant alterations to speak of. Her CO2 is 31 probably telling me that she has probably a component of CO2 retention. Creatinine, glucose, albumin, bilirubin and liver enzymes are all normal. Her CEA level is pending.     Therefore from my point of view I do believe that the patient will require to come back and see us in 6 months and she will require a CBC, CMP and a CEA level at that time along with a reticulated hemoglobin.     I do not believe that I find any need for radiological assessment on her at this time.     As we have mentioned she has had chronic elevation of her CEA level that has not changed over time and detailed examination, radiological assessment, PET scan and colonoscopy have failed to document the reasons for this. My personal suspicion is that interstitial pulmonary disease triggers this abnormality.     The patient was advised otherwise to remain in observation coming to see us back in 6 months. I have not prescribed any medications for her at this time.  On 10/16/2023 the patient has been further reviewed.  She has no symptoms or signs of colon cancer recurrence. Her white count, hemoglobin and platelets are normal. Her abdominal exam is benign with no liver enlargement, masses, ascites or pain.     I do believe that her colon cancer remains in remission. She will return to see us back in 6 months for review with repeat CBC, CMP, and a CEA level.   2. The patient has had iron deficiency anemia as a consequence of her colon cancer due to blood loss. Once that the cancer was removed the anemia was corrected with IV iron, the patient's hemoglobin has remained stable at 12.1. Her ferritin and reticulated hemoglobin are normal  total.   3. The patient has chronic elevation of the CEA level. We have performed colonoscopy, PET scans, CT scans showing no new abnormalities to speak of. I strongly suspect that interstitial pulmonary disease is the cause of this and I have nothing else to add to this process at this time. She will be called at home with the report of her CEA once that it becomes available. Otherwise, this number will be repeated upon return in 6 months.       I discussed all these facts with the patient and her daughter in the room. She will have repeat CBC, CMP, ferritin, iron profile and CEA level in 6 months.    On 10/16/2023 I reviewed the radiological assessment done in outside facility in regard to her CT scan of the chest with high resolution. The report says that there is no significant changes.

## 2023-10-17 ENCOUNTER — TELEPHONE (OUTPATIENT)
Dept: ONCOLOGY | Facility: CLINIC | Age: 83
End: 2023-10-17
Payer: MEDICARE

## 2023-10-17 NOTE — TELEPHONE ENCOUNTER
----- Message from Emmanuel Lara MD sent at 10/16/2023  3:51 PM EDT -----  CALL HER CEA STABLE AT 11 NO CHANGES IN CARE

## 2024-01-25 ENCOUNTER — OFFICE VISIT (OUTPATIENT)
Dept: FAMILY MEDICINE CLINIC | Facility: CLINIC | Age: 84
End: 2024-01-25
Payer: MEDICARE

## 2024-01-25 VITALS
HEART RATE: 90 BPM | DIASTOLIC BLOOD PRESSURE: 72 MMHG | WEIGHT: 135 LBS | SYSTOLIC BLOOD PRESSURE: 131 MMHG | BODY MASS INDEX: 25.49 KG/M2 | OXYGEN SATURATION: 97 % | HEIGHT: 61 IN | TEMPERATURE: 97.4 F | RESPIRATION RATE: 18 BRPM

## 2024-01-25 DIAGNOSIS — E03.9 ACQUIRED HYPOTHYROIDISM: Primary | Chronic | ICD-10-CM

## 2024-01-25 DIAGNOSIS — E78.2 MIXED HYPERLIPIDEMIA: Chronic | ICD-10-CM

## 2024-01-25 DIAGNOSIS — F33.42 MAJOR DEPRESSIVE DISORDER, RECURRENT EPISODE, IN FULL REMISSION: Chronic | ICD-10-CM

## 2024-01-25 DIAGNOSIS — J34.89 RHINORRHEA: Chronic | ICD-10-CM

## 2024-01-25 PROCEDURE — 99214 OFFICE O/P EST MOD 30 MIN: CPT | Performed by: FAMILY MEDICINE

## 2024-01-25 PROCEDURE — 1160F RVW MEDS BY RX/DR IN RCRD: CPT | Performed by: FAMILY MEDICINE

## 2024-01-25 PROCEDURE — 1159F MED LIST DOCD IN RCRD: CPT | Performed by: FAMILY MEDICINE

## 2024-01-25 PROCEDURE — 3075F SYST BP GE 130 - 139MM HG: CPT | Performed by: FAMILY MEDICINE

## 2024-01-25 PROCEDURE — 3078F DIAST BP <80 MM HG: CPT | Performed by: FAMILY MEDICINE

## 2024-01-25 RX ORDER — ATORVASTATIN CALCIUM 10 MG/1
10 TABLET, FILM COATED ORAL DAILY
Qty: 90 TABLET | Refills: 1 | Status: SHIPPED | OUTPATIENT
Start: 2024-01-25

## 2024-01-25 RX ORDER — IPRATROPIUM BROMIDE 42 UG/1
2 SPRAY, METERED NASAL 4 TIMES DAILY
Qty: 45 ML | Refills: 3 | Status: SHIPPED | OUTPATIENT
Start: 2024-01-25 | End: 2024-01-29 | Stop reason: CLARIF

## 2024-01-25 RX ORDER — LEVOTHYROXINE SODIUM 112 UG/1
112 TABLET ORAL DAILY
Qty: 90 TABLET | Refills: 1 | Status: SHIPPED | OUTPATIENT
Start: 2024-01-25

## 2024-01-25 NOTE — PROGRESS NOTES
Chief Complaint:   Chief Complaint   Patient presents with    Hyperlipidemia    Anxiety    Depression    Hypothyroidism     MED REFILL DUE        Madalyn Galeas 83 y.o. female who presents today for Medical Management of the below listed issues. She  has a problem list of   Patient Active Problem List   Diagnosis    Esophageal reflux    Hyperlipidemia    Hypothyroidism    Major depressive disorder, recurrent episode, in full remission    Pulmonary fibrosis    Pulmonary hypertension    Sleep apnea, obstructive    Vertigo, peripheral    Impaired fasting glucose    Iron deficiency anemia    Malabsorption of iron    Essential hypertension    Diverticulosis    Hemorrhoids    Malignant neoplasm of ascending colon    Elevated CEA    Chronic respiratory failure    PAH (pulmonary artery hypertension)    Irregular heart rhythm   .  Since the last visit, She has overall felt well.  she has been compliant with   Current Outpatient Medications:     acetaminophen (TYLENOL) 500 MG tablet, Take 2 tablets by mouth Every 6 (Six) Hours As Needed for Mild Pain., Disp: , Rfl:     ascorbic acid (VITAMIN C) 500 MG tablet, Take 1 tablet by mouth Daily., Disp: , Rfl:     Ascorbic Acid 500 MG chewable tablet, Chew 500 mg Daily., Disp: , Rfl:     atorvastatin (LIPITOR) 10 MG tablet, Take 1 tablet by mouth Daily., Disp: 90 tablet, Rfl: 1    Cholecalciferol 25 MCG (1000 UT) capsule, Take  by mouth Daily., Disp: , Rfl:     ESBRIET 267 MG capsule, Take 801 mg by mouth 3 (Three) Times a Day With Meals., Disp: , Rfl:     folic acid (FOLVITE) 400 MCG tablet, Take 1 tablet by mouth Daily., Disp: , Rfl:     ipratropium (ATROVENT) 0.06 % nasal spray, 2 sprays into the nostril(s) as directed by provider 4 (Four) Times a Day., Disp: 45 mL, Rfl: 3    levothyroxine (SYNTHROID, LEVOTHROID) 112 MCG tablet, Take 1 tablet by mouth Daily., Disp: 90 tablet, Rfl: 1    O2 (OXYGEN), Inhale 8 L/min Continuous., Disp: , Rfl:     omeprazole (priLOSEC) 40 MG  "capsule, Take 1 capsule by mouth 2 (Two) Times a Day., Disp: 180 capsule, Rfl: 3    PROBIOTIC PRODUCT PO, Take  by mouth., Disp: , Rfl:     sertraline (ZOLOFT) 50 MG tablet, Take 1 tablet by mouth Daily., Disp: 90 tablet, Rfl: 1    Treprostinil (Tyvaso DPI Titration Kit) 16 & 32 & 48 MCG powder, , Disp: , Rfl:     Tyvaso DPI Titration Kit 16 & 32 & 48 MCG powder, , Disp: , Rfl:     verapamil SR (CALAN-SR) 120 MG CR tablet, Take 1 tablet by mouth Daily., Disp: , Rfl: 3    ADCIRCA 20 MG tablet, Take 2 tablets by mouth Daily., Disp: , Rfl: .  She denies medication side effects.    All of the other chronic condition(s) listed above are stable w/o issues.    /72   Pulse 90   Temp 97.4 °F (36.3 °C) (Oral)   Resp 18   Ht 154 cm (60.63\")   Wt 61.2 kg (135 lb)   LMP  (LMP Unknown)   SpO2 97%   BMI 25.82 kg/m²     Results for orders placed or performed in visit on 10/16/23   Comprehensive Metabolic Panel    Specimen: Blood   Result Value Ref Range    Glucose 97 65 - 99 mg/dL    BUN 9 8 - 23 mg/dL    Creatinine 0.66 0.60 - 1.10 mg/dL    Sodium 141 136 - 145 mmol/L    Potassium 4.1 3.5 - 5.2 mmol/L    Chloride 101 98 - 107 mmol/L    CO2 29.5 (H) 22.0 - 29.0 mmol/L    Calcium 9.1 8.6 - 10.5 mg/dL    Total Protein 6.1 6.0 - 8.5 g/dL    Albumin 3.9 3.5 - 5.2 g/dL    ALT (SGPT) 6 1 - 33 U/L    AST (SGOT) 22 1 - 32 U/L    Alkaline Phosphatase 76 39 - 117 U/L    Total Bilirubin 0.3 0.0 - 1.2 mg/dL    Globulin 2.2 gm/dL    A/G Ratio 1.8 g/dL    BUN/Creatinine Ratio 13.6 7.0 - 25.0    Anion Gap 10.5 5.0 - 15.0 mmol/L    eGFR 87.7 >60.0 mL/min/1.73   CEA    Specimen: Blood   Result Value Ref Range    CEA 11.10 ng/mL   Retic With IRF & RET-He    Specimen: Blood   Result Value Ref Range    Immature Reticulocyte Fraction 6.7 3.0 - 15.8 %    Reticulocyte % 1.32 0.70 - 1.90 %    Reticulocyte Absolute 0.0550 0.0200 - 0.1300 10*6/mm3    Reticulocyte Hgb 33.7 29.8 - 36.1 pg   CBC Auto Differential    Specimen: Blood   Result Value " Ref Range    WBC 6.49 3.40 - 10.80 10*3/mm3    RBC 4.17 3.77 - 5.28 10*6/mm3    Hemoglobin 12.1 12.0 - 15.9 g/dL    Hematocrit 37.8 34.0 - 46.6 %    MCV 90.6 79.0 - 97.0 fL    MCH 29.0 26.6 - 33.0 pg    MCHC 32.0 31.5 - 35.7 g/dL    RDW 12.7 12.3 - 15.4 %    RDW-SD 41.9 37.0 - 54.0 fl    MPV 8.6 6.0 - 12.0 fL    Platelets 165 140 - 450 10*3/mm3    Neutrophil % 75.9 42.7 - 76.0 %    Lymphocyte % 14.0 (L) 19.6 - 45.3 %    Monocyte % 6.5 5.0 - 12.0 %    Eosinophil % 2.8 0.3 - 6.2 %    Basophil % 0.5 0.0 - 1.5 %    Immature Grans % 0.3 0.0 - 0.5 %    Neutrophils, Absolute 4.93 1.70 - 7.00 10*3/mm3    Lymphocytes, Absolute 0.91 0.70 - 3.10 10*3/mm3    Monocytes, Absolute 0.42 0.10 - 0.90 10*3/mm3    Eosinophils, Absolute 0.18 0.00 - 0.40 10*3/mm3    Basophils, Absolute 0.03 0.00 - 0.20 10*3/mm3    Immature Grans, Absolute 0.02 0.00 - 0.05 10*3/mm3    nRBC 0.0 0.0 - 0.2 /100 WBC             The following portions of the patient's history were reviewed and updated as appropriate: allergies, current medications, past family history, past medical history, past social history, past surgical history, and problem list.    Review of Systems   Constitutional:  Negative for activity change, chills and fever.   Respiratory:  Negative for cough.    Cardiovascular:  Negative for chest pain.   Psychiatric/Behavioral:  Negative for dysphoric mood.        Objective              Physical Exam  Constitutional:       General: She is not in acute distress.     Appearance: She is well-developed.   Cardiovascular:      Rate and Rhythm: Normal rate and regular rhythm.   Pulmonary:      Effort: Pulmonary effort is normal.      Breath sounds: Normal breath sounds.   Neurological:      Mental Status: She is alert and oriented to person, place, and time.   Psychiatric:         Behavior: Behavior normal.         Thought Content: Thought content normal.             Diagnoses and all orders for this visit:    1. Acquired hypothyroidism (Primary)  -      levothyroxine (SYNTHROID, LEVOTHROID) 112 MCG tablet; Take 1 tablet by mouth Daily.  Dispense: 90 tablet; Refill: 1  -     T4, Free  -     TSH    2. Major depressive disorder, recurrent episode, in full remission  -     sertraline (ZOLOFT) 50 MG tablet; Take 1 tablet by mouth Daily.  Dispense: 90 tablet; Refill: 1    3. Rhinorrhea  -     ipratropium (ATROVENT) 0.06 % nasal spray; 2 sprays into the nostril(s) as directed by provider 4 (Four) Times a Day.  Dispense: 45 mL; Refill: 3    4. Mixed hyperlipidemia  -     atorvastatin (LIPITOR) 10 MG tablet; Take 1 tablet by mouth Daily.  Dispense: 90 tablet; Refill: 1  -     Lipid Panel

## 2024-01-26 LAB
CHOLEST SERPL-MCNC: 171 MG/DL (ref 0–200)
HDLC SERPL-MCNC: 49 MG/DL (ref 40–60)
LDLC SERPL CALC-MCNC: 95 MG/DL (ref 0–100)
T4 FREE SERPL-MCNC: 1.4 NG/DL (ref 0.93–1.7)
TRIGL SERPL-MCNC: 155 MG/DL (ref 0–150)
TSH SERPL DL<=0.005 MIU/L-ACNC: 0.05 UIU/ML (ref 0.27–4.2)
VLDLC SERPL CALC-MCNC: 27 MG/DL (ref 5–40)

## 2024-01-29 ENCOUNTER — TELEPHONE (OUTPATIENT)
Dept: FAMILY MEDICINE CLINIC | Facility: CLINIC | Age: 84
End: 2024-01-29
Payer: MEDICARE

## 2024-01-29 RX ORDER — AZELASTINE 1 MG/ML
2 SPRAY, METERED NASAL 2 TIMES DAILY
Qty: 30 ML | Refills: 3 | Status: SHIPPED | OUTPATIENT
Start: 2024-01-29

## 2024-03-22 RX ORDER — OMEPRAZOLE 40 MG/1
40 CAPSULE, DELAYED RELEASE ORAL 2 TIMES DAILY
Qty: 180 CAPSULE | Refills: 0 | Status: SHIPPED | OUTPATIENT
Start: 2024-03-22

## 2024-03-22 NOTE — TELEPHONE ENCOUNTER
Caller: Madalyn Galeas    Relationship: Self    Best call back number: [phone]    Requested Prescriptions:   Requested Prescriptions     Pending Prescriptions Disp Refills    omeprazole (priLOSEC) 40 MG capsule 180 capsule 3     Sig: Take 1 capsule by mouth 2 (Two) Times a Day.        Pharmacy where request should be sent: [facility], [address] [phone] [facility] [phone]      Last office visit with prescribing clinician: 1/25/2024   Last telemedicine visit with prescribing clinician: Visit date not found   Next office visit with prescribing clinician: 7/25/2024     Additional details provided by patient: HAS 1 WEEK OF Danielle Cortes   03/22/24 12:02 EDT

## 2024-05-08 ENCOUNTER — TELEPHONE (OUTPATIENT)
Dept: ONCOLOGY | Facility: CLINIC | Age: 84
End: 2024-05-08
Payer: MEDICARE

## 2024-06-06 ENCOUNTER — LAB (OUTPATIENT)
Dept: LAB | Facility: HOSPITAL | Age: 84
End: 2024-06-06
Payer: MEDICARE

## 2024-06-06 ENCOUNTER — OFFICE VISIT (OUTPATIENT)
Dept: ONCOLOGY | Facility: CLINIC | Age: 84
End: 2024-06-06
Payer: MEDICARE

## 2024-06-06 VITALS
BODY MASS INDEX: 24.88 KG/M2 | SYSTOLIC BLOOD PRESSURE: 119 MMHG | HEIGHT: 61 IN | OXYGEN SATURATION: 94 % | HEART RATE: 58 BPM | TEMPERATURE: 97.5 F | DIASTOLIC BLOOD PRESSURE: 60 MMHG | WEIGHT: 131.8 LBS

## 2024-06-06 DIAGNOSIS — C18.3 MALIGNANT NEOPLASM OF HEPATIC FLEXURE: Primary | ICD-10-CM

## 2024-06-06 DIAGNOSIS — I49.9 IRREGULAR HEART BEAT: ICD-10-CM

## 2024-06-06 DIAGNOSIS — C18.2 MALIGNANT NEOPLASM OF ASCENDING COLON: ICD-10-CM

## 2024-06-06 DIAGNOSIS — R06.02 SOB (SHORTNESS OF BREATH): ICD-10-CM

## 2024-06-06 DIAGNOSIS — D50.0 IRON DEFICIENCY ANEMIA DUE TO CHRONIC BLOOD LOSS: ICD-10-CM

## 2024-06-06 DIAGNOSIS — R97.0 ELEVATED CEA: ICD-10-CM

## 2024-06-06 LAB
ALBUMIN SERPL-MCNC: 3.9 G/DL (ref 3.5–5.2)
ALBUMIN/GLOB SERPL: 1.3 G/DL
ALP SERPL-CCNC: 83 U/L (ref 39–117)
ALT SERPL W P-5'-P-CCNC: 7 U/L (ref 1–33)
ANION GAP SERPL CALCULATED.3IONS-SCNC: 11.7 MMOL/L (ref 5–15)
AST SERPL-CCNC: 21 U/L (ref 1–32)
BASOPHILS # BLD AUTO: 0.04 10*3/MM3 (ref 0–0.2)
BASOPHILS NFR BLD AUTO: 0.5 % (ref 0–1.5)
BILIRUB SERPL-MCNC: 0.3 MG/DL (ref 0–1.2)
BUN SERPL-MCNC: 17 MG/DL (ref 8–23)
BUN/CREAT SERPL: 22.1 (ref 7–25)
CALCIUM SPEC-SCNC: 9.9 MG/DL (ref 8.6–10.5)
CEA SERPL-MCNC: 10.8 NG/ML
CHLORIDE SERPL-SCNC: 100 MMOL/L (ref 98–107)
CO2 SERPL-SCNC: 30.3 MMOL/L (ref 22–29)
CREAT SERPL-MCNC: 0.77 MG/DL (ref 0.57–1)
DEPRECATED RDW RBC AUTO: 39 FL (ref 37–54)
EGFRCR SERPLBLD CKD-EPI 2021: 77.1 ML/MIN/1.73
EOSINOPHIL # BLD AUTO: 0.11 10*3/MM3 (ref 0–0.4)
EOSINOPHIL NFR BLD AUTO: 1.3 % (ref 0.3–6.2)
ERYTHROCYTE [DISTWIDTH] IN BLOOD BY AUTOMATED COUNT: 12.2 % (ref 12.3–15.4)
FERRITIN SERPL-MCNC: 132 NG/ML (ref 13–150)
GLOBULIN UR ELPH-MCNC: 2.9 GM/DL
GLUCOSE SERPL-MCNC: 128 MG/DL (ref 65–99)
HCT VFR BLD AUTO: 37.5 % (ref 34–46.6)
HGB BLD-MCNC: 12.1 G/DL (ref 12–15.9)
HGB RETIC QN AUTO: 32 PG (ref 29.8–36.1)
IMM GRANULOCYTES # BLD AUTO: 0.03 10*3/MM3 (ref 0–0.05)
IMM GRANULOCYTES NFR BLD AUTO: 0.3 % (ref 0–0.5)
IMM RETICS NFR: 5.1 % (ref 3–15.8)
IRON 24H UR-MRATE: 39 MCG/DL (ref 37–145)
IRON SATN MFR SERPL: 14 % (ref 20–50)
LYMPHOCYTES # BLD AUTO: 0.83 10*3/MM3 (ref 0.7–3.1)
LYMPHOCYTES NFR BLD AUTO: 9.7 % (ref 19.6–45.3)
MCH RBC QN AUTO: 28.2 PG (ref 26.6–33)
MCHC RBC AUTO-ENTMCNC: 32.3 G/DL (ref 31.5–35.7)
MCV RBC AUTO: 87.4 FL (ref 79–97)
MONOCYTES # BLD AUTO: 0.48 10*3/MM3 (ref 0.1–0.9)
MONOCYTES NFR BLD AUTO: 5.6 % (ref 5–12)
NEUTROPHILS NFR BLD AUTO: 7.11 10*3/MM3 (ref 1.7–7)
NEUTROPHILS NFR BLD AUTO: 82.6 % (ref 42.7–76)
NRBC BLD AUTO-RTO: 0 /100 WBC (ref 0–0.2)
PLATELET # BLD AUTO: 188 10*3/MM3 (ref 140–450)
PMV BLD AUTO: 8.9 FL (ref 6–12)
POTASSIUM SERPL-SCNC: 3.8 MMOL/L (ref 3.5–5.2)
PROT SERPL-MCNC: 6.8 G/DL (ref 6–8.5)
RBC # BLD AUTO: 4.29 10*6/MM3 (ref 3.77–5.28)
RETICS # AUTO: 0.05 10*6/MM3 (ref 0.02–0.13)
RETICS/RBC NFR AUTO: 1.15 % (ref 0.7–1.9)
SODIUM SERPL-SCNC: 142 MMOL/L (ref 136–145)
TIBC SERPL-MCNC: 283 MCG/DL (ref 298–536)
TRANSFERRIN SERPL-MCNC: 190 MG/DL (ref 200–360)
WBC NRBC COR # BLD AUTO: 8.6 10*3/MM3 (ref 3.4–10.8)

## 2024-06-06 PROCEDURE — 80053 COMPREHEN METABOLIC PANEL: CPT

## 2024-06-06 PROCEDURE — 36415 COLL VENOUS BLD VENIPUNCTURE: CPT

## 2024-06-06 PROCEDURE — 82378 CARCINOEMBRYONIC ANTIGEN: CPT | Performed by: INTERNAL MEDICINE

## 2024-06-06 PROCEDURE — 82728 ASSAY OF FERRITIN: CPT

## 2024-06-06 PROCEDURE — 84466 ASSAY OF TRANSFERRIN: CPT

## 2024-06-06 PROCEDURE — 85025 COMPLETE CBC W/AUTO DIFF WBC: CPT

## 2024-06-06 PROCEDURE — 85046 RETICYTE/HGB CONCENTRATE: CPT

## 2024-06-06 PROCEDURE — 83880 ASSAY OF NATRIURETIC PEPTIDE: CPT | Performed by: INTERNAL MEDICINE

## 2024-06-06 PROCEDURE — 83540 ASSAY OF IRON: CPT

## 2024-06-06 NOTE — PROGRESS NOTES
Subjective       REASON FOR FOLLOW UP: 1.IRON DEFICIENCY ANEMIA,  DUE TO CHRONIC GI BLOOD LOSS, CHRONIC DIARRHEA, MALNUTRITION, WEIGHT LOSS, ABDOMINAL MASS RUQ: colon cancer diagnosed, removed, no need for adjuvant therapy for stage 2 disease    2.Ascending colon cancer s/p colectomy.    3. ELEVATED CEA AFTER COLECTOMY: PET SCAN NEGATIVE IN 6/17/21, CT SCAN NEGATIVE 3/22      On 06/06/2024, this 83-year-old female returns to the office in company of her daughter to be reviewed. She has had a colectomy for stage II hepatic flexure colon cancer. She never required any adjuvant therapy. She has had a persistent elevation of the CEA. Colonoscopy, PET scan has failed to document any colon cancer recurrence. The patient has interstitial lung disease and she remains on oxygen chronically. She sees Zita Guy MD, Pulmonologist, for this. In any event the patient is telling me today that she feels more fatigued than ever and she is barely able to get around in the house given shortness of breath. She has more cough than usual with no sputum production and no hemoptysis or pleuritic pain. She has not had any fever. The oxygen now is continuous, permanent. Any drop in oxygen use triggers very significant desaturation in oxygen concentration in her blood. Her appetite has declined. She has lost some weight. She has not had any abdominal pain, nausea, vomiting, heartburn, indigestion, diarrhea, melena or passage of blood in the urine. She has not had any febrile illness. No symptoms of urinary tract infection. She has not had any new pain. She has not had any fevers or chills.              Past Medical History:   Diagnosis Date    Adenomatous polyp of sigmoid colon 1/21/2021    Anemia     Colonic polyp     Depression     Diverticulosis     Esophageal reflux     Essential hypertension     GERD (gastroesophageal reflux disease)     H/O complete eye exam 03/2018    Hx of bone density study 02/15/2016    Hyperlipidemia      Hypothyroidism, acquired     Major depressive disorder, recurrent episode, in full remission     Nonsenile cataract 2016    Oxygen dependent     8L NC CONTINUOUS    Pulmonary fibrosis     Pulmonary hypertension     Sleep apnea, obstructive     USES CPAP     Stenosis, spinal, lumbar     Vertigo, peripheral         Past Surgical History:   Procedure Laterality Date    COLON RESECTION N/A 1/28/2021    Procedure: COLON RESECTION RIGHT;  Surgeon: Alfonso Cesar MD;  Location: [facility] MAIN OR;  Service: General;  Laterality: N/A;    COLONOSCOPY  02/20/2012    COLONOSCOPY N/A 1/21/2021    Procedure: COLONOSCOPY INTO CECUM AND TI WITH BIOPSIES, SPOT INJECTION TO COLON MASS (MID-ASCENDING), HOT SNARE POLYPECTOMY, COLD BX POLYPECTOMIES, SALINE LIFT, CLIP PLACEMENT X3;  Surgeon: Alfonso Cesar MD;  Location: [facility] ENDOSCOPY;  Service: General;  Laterality: N/A;  PRE:  ABNORMAL CT SCAN OF COLON  POST:  DIVERTICULOSIS, COLON MASS, POLYPS, HEMORRHOIDS    INCONTINENCE SURGERY      Dr. Kylie BECERRIL BILATERAL  2017    dr de la crzu     PAP SMEAR  2014    dr de la cruz        Current Outpatient Medications on File Prior to Visit   Medication Sig Dispense Refill    acetaminophen (TYLENOL) 500 MG tablet Take 2 tablets by mouth Every 6 (Six) Hours As Needed for Mild Pain.      ADCIRCA 20 MG tablet Take 2 tablets by mouth Daily.      ascorbic acid (VITAMIN C) 500 MG tablet Take 1 tablet by mouth Daily.      atorvastatin (LIPITOR) 10 MG tablet Take 1 tablet by mouth Daily. 90 tablet 1    azelastine (ASTELIN) 0.1 % nasal spray 2 sprays into the nostril(s) as directed by provider 2 (Two) Times a Day. Use in each nostril as directed 30 mL 3    Cholecalciferol 25 MCG (1000 UT) capsule Take  by mouth Daily.      ESBRIET 267 MG capsule Take 801 mg by mouth 3 (Three) Times a Day With Meals.      levothyroxine (SYNTHROID, LEVOTHROID) 112 MCG tablet Take 1 tablet by mouth Daily. 90 tablet 1    O2 (OXYGEN) Inhale 8 L/min  Continuous.      omeprazole (priLOSEC) 40 MG capsule Take 1 capsule by mouth 2 (Two) Times a Day. 180 capsule 0    PROBIOTIC PRODUCT PO Take  by mouth.      sertraline (ZOLOFT) 50 MG tablet Take 1 tablet by mouth Daily. 90 tablet 1    verapamil SR (CALAN-SR) 120 MG CR tablet Take 1 tablet by mouth Daily.  3    Ascorbic Acid 500 MG chewable tablet Chew 500 mg Daily.      folic acid (FOLVITE) 400 MCG tablet Take 1 tablet by mouth Daily.      Treprostinil (Tyvaso DPI Titration Kit) 16 & 32 & 48 MCG powder       Tyvaso DPI Titration Kit 16 & 32 & 48 MCG powder        No current facility-administered medications on file prior to visit.        ALLERGIES:    Allergies   Allergen Reactions    Sulfa Antibiotics Hives and Rash        Social History     Socioeconomic History    Marital status:    Tobacco Use    Smoking status: Former     Current packs/day: 0.00     Average packs/day: 2.0 packs/day for 8.0 years (16.0 ttl pk-yrs)     Types: Cigarettes     Start date:      Quit date:      Years since quittin.4    Smokeless tobacco: Never    Tobacco comments:     Caffeine use: Drinks Coca Cola    Vaping Use    Vaping status: Never Used   Substance and Sexual Activity    Alcohol use: Yes     Comment: rare    Drug use: No    Sexual activity: Never     Partners: Male        Family History   Problem Relation Age of Onset    Breast cancer Cousin     Lung cancer Father     Heart disease Father     Colon cancer Brother     Prostate cancer Brother     Lung disease Brother     Emphysema Brother     Heart disease Mother     Malig Hyperthermia Neg Hx       Current Outpatient Medications on File Prior to Visit   Medication Sig Dispense Refill    acetaminophen (TYLENOL) 500 MG tablet Take 2 tablets by mouth Every 6 (Six) Hours As Needed for Mild Pain.      ADCIRCA 20 MG tablet Take 2 tablets by mouth Daily.      ascorbic acid (VITAMIN C) 500 MG tablet Take 1 tablet by mouth Daily.      atorvastatin (LIPITOR) 10 MG tablet  "Take 1 tablet by mouth Daily. 90 tablet 1    azelastine (ASTELIN) 0.1 % nasal spray 2 sprays into the nostril(s) as directed by provider 2 (Two) Times a Day. Use in each nostril as directed 30 mL 3    Cholecalciferol 25 MCG (1000 UT) capsule Take  by mouth Daily.      ESBRIET 267 MG capsule Take 801 mg by mouth 3 (Three) Times a Day With Meals.      levothyroxine (SYNTHROID, LEVOTHROID) 112 MCG tablet Take 1 tablet by mouth Daily. 90 tablet 1    O2 (OXYGEN) Inhale 8 L/min Continuous.      omeprazole (priLOSEC) 40 MG capsule Take 1 capsule by mouth 2 (Two) Times a Day. 180 capsule 0    PROBIOTIC PRODUCT PO Take  by mouth.      sertraline (ZOLOFT) 50 MG tablet Take 1 tablet by mouth Daily. 90 tablet 1    verapamil SR (CALAN-SR) 120 MG CR tablet Take 1 tablet by mouth Daily.  3    Ascorbic Acid 500 MG chewable tablet Chew 500 mg Daily.      folic acid (FOLVITE) 400 MCG tablet Take 1 tablet by mouth Daily.      Treprostinil (Tyvaso DPI Titration Kit) 16 & 32 & 48 MCG powder       Tyvaso DPI Titration Kit 16 & 32 & 48 MCG powder        No current facility-administered medications on file prior to visit.       Objective    Vitals:    06/06/24 1623   BP: 119/60   Pulse: 58   Temp: 97.5 °F (36.4 °C)   TempSrc: Oral   SpO2: 94%   Weight: 59.8 kg (131 lb 12.8 oz)   Height: 154 cm (60.63\")           Physical Exam           GENERAL:  Well-developed, Patient  in chronic illness on oxygen with sob at rest  SKIN:  Warm, dry ,NO purpura ,no rash.pale  HEENT:  Pupils were equal and reactive to light and accomodation, conjunctivae noninjected,  normal visual acuity.   NECK:  Supple with good range of motion; no thyromegaly , no JVD or bruits,.No carotid artery pain, no carotid abnormal pulsation   LYMPHATICS:  No cervical, NO supraclavicular, NO axillary, NO inguinal adenopathies.  CARDIAC   normal rate , irregular rhythm, without murmur,NO rubs NO S3 NO S4   LUNGS: decreased breath sounds bilateral, no wheezing, NO rhonchi, NO " crackles ,NO rubs.  VASCULAR VENOUS: no cyanosis, NO collateral circulation, NO varicosities, NO edema, NO palpable cords, NO pain,NO erythema, NO pigmentation of the skin.  ABDOMEN:  Soft, NO pain,no hepatomegaly, no splenomegaly,no masses, no ascites, no collateral circulation,no distention.  EXTREMITIES  AND SPINE:  No clubbing, no cyanosis ,no deformities , no pain .No kyphosis,  no pain in spine, no pain in ribs , no pain in pelvic bone.sarcopenia  NEUROLOGICAL:  Patient was awake, alert, oriented to time, person and place.                  RECENT LABS:   Lab Results   Component Value Date    WBC 8.60 06/06/2024    HGB 12.1 06/06/2024    HCT 37.5 06/06/2024    MCV 87.4 06/06/2024     06/06/2024     Lab Results   Component Value Date    GLUCOSE 128 (H) 06/06/2024    BUN 17 06/06/2024    CREATININE 0.77 06/06/2024    EGFR 77.1 06/06/2024    BCR 22.1 06/06/2024    K 3.8 06/06/2024    CO2 30.3 (H) 06/06/2024    CALCIUM 9.9 06/06/2024    PROTENTOTREF 6.5 06/05/2020    ALBUMIN 3.9 06/06/2024    BILITOT 0.3 06/06/2024    AST 21 06/06/2024    ALT 7 06/06/2024             Assessment & Plan     Diagnoses and all orders for this visit:    1. Malignant neoplasm of hepatic flexure (Primary)  -     CT Chest With Contrast Diagnostic; Future  -     CT Abdomen Pelvis With Contrast; Future    2. Irregular heart beat  -     Ambulatory Referral to Cardiology    3. SOB (shortness of breath)  -     N-Terminal proBNP              In summary this patient has progressive anemia. I documented in the laboratory parameters above progressive drop in the hemoglobin, progressive drop in the MCV. Morphology of peripheral blood is typical of iron deficiency anemia and I would not be surprised if she has an associated B12 or folate deficiency. This patient has had upper GI series and barium enema by GI, Dr. Boyd, and no anatomical alteration has been found in her upper GI tract or colon besides diverticular disease. The symptoms that  she has today she blames them, especially the diarrhea, since the time that she had the barium enema. I think that was not an association. The fact that she has so much diarrhea day and night and with weight loss, profound fatigue, sarcopenia leads me to believe that it is some other entity that is producing this. It sounds like to me she has a malabsorptive process of some sort.     The patient also has a palpable mass in the right upper quadrant and the way how it feels to my hands, it is probably a palpable gallbladder. She had no pain there. This is very striking. Her abdomen is very soft and it is very easy to find.     Under the present circumstances and knowing that the patient cannot tolerate oral iron because it makes her to throw up and produce profuse diarrhea and makes the diarrhea worse, I do believe that the patient needs to have the followin. For the workup for her anemia, she went back to the lab to obtain a comprehensive metabolic profile, ferritin, iron, TIBC, B12, folic acid level, reticulated hemoglobin.   2. Given the fact that the patient has had a TSH that was on the low side maybe her thyroid medication is overcorrecting her and this maybe this is part of the diarrhea process. I will obtain a TSH and a T4.   3. I do believe that the patient needs to have a CT scan of the abdomen and pelvis. I do not think she can handle oral contrast and I do not want to give her IV contrast, I find no need. I want to be sure there is no other anatomical alteration in her small intestine and GI tract that is favoring for her to have all the issues pertinent to her clinical symptoms.     I wonder if she could have some other process in her bowel including inflammatory bowel disease, bacterial overgrowth syndrome, celiac disease and differential diagnosis is more than extensive. This information would require to be shared with Dr. Boyd, her Gastroenterologist, for further review after she returns back  in a few days after she proceeds with proper management of her anemia. Given the fact that she cannot handle oral iron by mouth, absolutely the patient needs to have Injectafer on 2 different occasions and this will be scheduled to start to happen as early as TODAY.   I discussed with the patient on 12/04/2020 after reviewing the case with the radiologist Dr. Pinto at [facility] in regards to her CT scan of the abdomen. This documents intussusception of the colon and there is a mass located in that that probably represents a colon cancer. Obviously, the question is how to proceed with all these issues on this patient. It is very obvious that she has profound iron deficiency anemia. Her ferritin level, iron level are low. She cannot handle oral iron therapy and she will initiate Injectafer today and she will have the 2nd infusion next week. I expect that in 3 weeks she will have very significant rebound in erythropoiesis and I expect that her pulmonary manifestations will be better in regard to her pulmonary hypertension and her pulmonary fibrosis. Typically patient's with this condition develop polycythemia instead of anemia.    The other issue is what we are going to do with her colon. I do not think that the patient is a normal surgical candidate under the present circumstances and we need to try to fix her up in the best way possible to see if she gets to the point to have a resection of this issue. I have made her an appointment to be seen by cardiology, Rambo Dasilva MD, and proceed with an echocardiogram in days. We have also made an appointment for her to see Alfonso Cesar MD, General Surgery to review the case with me.       I reviewed the patient back with the daughter in the room on 12/10/2020. In the interim, I have had a conversation with Zita Guy MD, the patient’s pulmonologist, who strongly believes that the patient will be able to go through surgery as long as she has proper  pulmonary assessment before intervention and she asked me to go ahead and consult Pulmonary Medicine, Dr. Ramos and associates for this purpose. A consult has been placed.     I reviewed her echocardiogram and actually it is better than I expected for somebody who has pulmonary fibrosis.     In summary I think the patient’s intussusception is producing bleeding, chronically, leading to iron deficiency anemia. She has had Injectafer a week ago, another one to be given to her tomorrow and thereafter she will require blood work on weekly basis. If the hemoglobin goes below 9, I think she will require transfusion of 2 units of red cells. She will have weekly appointment for CBC and nurse check and I will review her back on 12/30/2020.     I EMPHATICALLY POINTED OUT TO THE PATIENT’S DAUGHTER SHE CANNOT TAKE ANYMORE ZULMA-SELTZER. SHE HAS BEEN TAKING THIS ON A REGULAR SCHEDULE AND THIS IS PRODUCING PROBABLY ALSO AN ELEMENT OF GI BLEEDING ASSOCIATED WITH HER INTUSSUSCEPTION AND WHY NOT GASTRITIS AND SO FORTH.     In summary the patient was reviewed on 12/30/2020 along with her daughter in the room. The patient has gone through cardiological assessment and actually her cardiac ejection fraction is not that bad, close to 50%, and with minimal evidence of pulmonary arterial hypertension. Normal contractility of the left ventricle. The patient also has been seen by Pulmonary Medicine. These records have been reviewed again and her pulmonary function tests is very marginal. The analysis is stated above by Dr. Moser who has advised the patient that she is a high risk for general anesthesia in regard to the need for ICU stay, intubation, mechanical ventilation and so forth given her pulmonary fibrosis. He has advised the patient to see Dr. Guy, Pulmonary Medicine, and she has an appointment to see her next week.     In regard to her iron deficiency anemia after she received IV iron infusions the hemoglobin keeps improving to 11.2  from 8.4. The patient is no longer taking [place]. The patient is a little bit stronger but her appetite is not good. Obviously the question is what is the cause of the intussusception. I have measured a CEA level that is elevated at 6.2 and leads me to believe that maybe this is something related to a primary colon cancer. Dr. Cesar has suggested for her to have a colonoscopy after reviewing his good notes. The patient is even more hesitant to proceed with this.       The patient was further reviewed in the office on 02/22/2021. Since the previous visit and after I discussed the case with Dr. Zita Guy, the patient’s pulmonologist, I advised Dr. Cesar to go ahead and proceed with her surgery. This happened and actually to the surprise of everybody the patient walked away from the hospital 3 days later with no complications or issues pertinent to her lung disease. The patient had also excellent tolerance to her colonoscopy. Since she has been at home she has been gaining appetite and gaining weight. She is feeling substantially better. She has no abdominal pain. Her surgical site has healed. Her bowel activity is normal and urination is normal. She remains on oxygen. She is walking around the house and she is doing more things that she was not doing before. She is not sitting down complaining of a bad stomach all the time. She is not taking [place] as I pointed out to them before.     I have reviewed the pathology of the surgical specimen and she had a [id number] colonic cancer with 14 lymph nodes removed, negative, with no perineural, perivascular or perivenous invasion. The margins of resection were completely clear. On the CT scan of the abdomen, her liver was completely unremarkable.         I reviewed with the patient on the telephone on 06/17/2021 after personally reviewing the PET scan in the PAC system [facility] that shows no areas of abnormal uptake in her lungs, no areas of  abnormal uptake in the liver, retroperitoneal lymph nodes or any other site as far as I can tell. The radiologists reading is about the same.     The good news to me is that the PET scan is negative, the patient is feeling better. She remains fully functional, her anemia is corrected after all of the bleeding that she did out of her colon cancer with improving hemoglobin.    Obviously raises the question why the CEA level is elevated. The only reasonable explanation could be the chronic inflammatory process associated with her chronic lung disease. She does not smoke. She is not around anybody who smokes and obviously it is the only logical explanation.       The patient was further reviewed on 09/28/2021. Her overall clinical condition is excellent. As long as she does not eat junk food she has no diarrhea, abdominal pain or discomfort. Her weight is stable. She feels substantially better in regard to her ability to function independently at home. She has no pain related to malignancy, urination is normal, her respiratory situation remains stable, she remains on oxygen and she has not had any other respiratory infections.     The clinical examination of the patient today is very much negative normal besides her chronic lung disease and her decreased pulmonary auscultation with crackles at the bases. Her heart function seems to be appropriate.     Her white count, hemoglobin and platelets are normal. The white count differential is normal. Her ferritin and iron profile remain normal. Her CEA level actually has dropped some and this in my opinion is related to her chronic lung disease more than anything else. As we have mentioned before we have performed multiple radiological analyses on her including CT scans and PET scans looking for recurrent disease and nothing has been found.     I do believe that the patient in my opinion from the point of view of her colon cancer and her iron deficiency anemia that has been  corrected is doing perfectly well. Her chronic lung disease is her chronic lung disease and this will remain on oxygen and followed up by her pulmonologist.     I advised her to go ahead and get the booster shot for COVID and I advised her also to proceed with flu vaccination.     I will review her back in 3 months with a CBC, CMP, ferritin, iron profile and a CEA level. I find no need for radiological assessment on her at this time.   The patient was further reviewed on 12/28/2021. In regard to the previous history of colon cancer, she underwent resection of this. Since then her hemoglobin has stabilized around 12. Her ferritin level is pending today. She has improved her diet. She has gained a few pounds and she feels substantially better from this point of view. She has learned to put away foods that she knows that she will get sick with and now she is eating a variety of foods that she has not eaten in a long time. Surprising enough very likely her nutrition is better. She has no symptoms that wound indicate colon cancer recurrence. In spite of having a persistent elevation of the CEA level the patient has not had anything to suggest clinically, radiologically through CT scans and PET scans evidence of colon cancer recurrence. It raises the question if the pulmonary fibrosis is the reason why her CEA level is minimally elevated. We are awaiting to review this and discuss this with the patient upon result.     She remains on oxygen for her pulmonary fibrosis. Today she has minimal if any crackles. Actually her lung auscultation is very benign. She has atrial fibrillation with controlled ventricular response and she will see Cardiology very soon.     I advised the patient to return to see me back in 3 months with a CBC, CMP, CEA, ferritin, iron, TIBC. I discussed all these facts with the patient and her daughter present in the room. I did not prescribe any new medicines.  I reviewed the patient on 03/29/2022.  Since the previous visit she has had COVID infection in the upper respiratory tract treated by her pulmonologist with steroids and resolution and no complications. That is very surprising. In any event, she has had lesser need for oxygen. Her O2 saturation has improved and she is down to 6L nasal cannula continuously. She remains on CPAP at nighttime. She has seen her pulmonologist, Dr. Duarte. She has had a CT scan that shows no new alterations in her lung anatomy according to the patient.     Her white count, hemoglobin and platelets are excellent today. Her reticulated hemoglobin is normal. Her ferritin, iron profile are pending as well as her CEA and CMP.     I do believe that the patient does not have any symptoms to suggest colon cancer recurrence. Her anemia seems to have improved. Her hemoglobin has stabilized and I think she looks very good to me.    RECOMMENDATIONS:  I have advised her to return to see us back in 4 months with a CBC, CMP, ferritin, iron, TIBC as well as CEA level. Otherwise, no other need for radiological assessment from my point of view.     I discussed all these facts with the patient and her daughter present in the room.  Addendum: due to cea of 11.4 I will proceed with ct abd pelvis in few days, her ct chest shows nothing to suggest a lung malignancy or mets  The patient was reviewed by telemedicine on 04/06/2022. In the interim she has had a CT scan of the abdomen and pelvis. I have reviewed this in the PAC system [facility]. The lower part of her lungs are more than impressive. They look like sponges with dramatic interstitial infiltrate. There is a lot of honeycomb pattern that is again more than dramatic. There is no pleural effusion. There is minimal cardiomegaly. She has gallstones in the gallbladder, she has normal liver anatomy with no lesions. The pancreas, the stomach, the spleen remain normal. The kidneys are normal. The uterus remains normal. There is minimal  bladder wall thickening. There is normal rectum. The bowel per se looks normal. There is no ascites or retroperitoneal adenopathy. There is significant calcification of the abdominal aorta with no aneurysm formation.    Given the above findings as I discussed with the patient on the telephone today I strongly believe that the CEA is manifestation of her lung alteration and no more than that.     We have done in the past PET scans, endoscopies and so forth looking for any other source of CEA elevation and all of the testing has been negative. The patient feels very well at this point, actually she has reached a new level of stability, she is able to be functional, she has no pain, she has normal bowel activity, urination is normal. She has no fever or infection. Her need for oxygen has decreased and she will be coming back to the office in 4 months as originally planned with CBC, CMP and a CEA level.      I discussed all of these facts on the telephone.    Time on the telephone 14 minutes.   On 07/19/2022, the patient looks terrific. She has no symptoms or signs of colon cancer recurrence or her abdominal symptomatology, after removal of the tumor subsided including diarrhea, passage of blood in the stool, gastric and intolerance, nausea, vomiting, abdominal pain and distention. Her anemia has recovered. Her hemoglobin is almost 12 today. Her reticulated hemoglobin is normal and I think she is no longer having iron deficiency.     Besides this the patient has not had any new health issues. She remains on oxygen for her pulmonary fibrosis. I personally believe that the CEA elevation represents just reflection of pulmonary fibrosis and no more than that. Previous PET scan and CT scans have failed to document any abnormality to justify the CEA elevation. No liver metastases have been found. No lesions in the colon have been found. No retroperitoneal adenopathy, ascites or any other new alterations. Even the PET scan was  negative in the areas of abnormal fibrosis in her lung anatomy.     A CEA level is obtained today but I told her that maybe in the long run we do not need to run this test anymore because always is going to remain abnormal and we are not going to know what to do with it. Maybe in the long run will be better off to run a liquid biopsy that can provide more information.     She will be called at home with the report of her testing. I want to review her back in 4 months with a CBC, CMP and a CEA level. I will order a liquid biopsy then.  On 03/15/2023 the patient was further reviewed. She looks fantastic to me and I do not believe that the patient has terrible lung auscultation. 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 I do not believe that this patient needs to have oxygen at 8L/min and I warned her against that. I advised her to use the oxygen meter that she has available at home to guide her oxygen therapy. At the best she probably needs to have 2L/min. She is saturating 99 in the office at 3L/min. Her heart rate is 62. On the other hand her anemia was corrected completely after IV iron therapy and the resection of her tumoral lesion in the colon. Her hemoglobin today is 12, excellent for her. Her white count and platelet count are normal and her chemistry profile discloses no significant alterations to speak of. Her CO2 is 31 probably telling me that she has probably a component of CO2 retention. Creatinine, glucose, albumin, bilirubin and liver enzymes are all normal. Her CEA level is pending.     Therefore from my point of view I do believe that the patient will require to come back and see us in 6 months and she will require a CBC, CMP and a CEA level at that time along with a reticulated hemoglobin.     I do not believe that I find any need for radiological assessment on her at this time.     As we have  mentioned she has had chronic elevation of her CEA level that has not changed over time and detailed examination, radiological assessment, PET scan and colonoscopy have failed to document the reasons for this. My personal suspicion is that interstitial pulmonary disease triggers this abnormality.     The patient was advised otherwise to remain in observation coming to see us back in 6 months. I have not prescribed any medications for her at this time.  On 10/16/2023 the patient has been further reviewed.  She has no symptoms or signs of colon cancer recurrence. Her white count, hemoglobin and platelets are normal. Her abdominal exam is benign with no liver enlargement, masses, ascites or pain.     I do believe that her colon cancer remains in remission. She will return to see us back in 6 months for review with repeat CBC, CMP, and a CEA level.   2. The patient has had iron deficiency anemia as a consequence of her colon cancer due to blood loss. Once that the cancer was removed the anemia was corrected with IV iron, the patient's hemoglobin has remained stable at 12.1. Her ferritin and reticulated hemoglobin are normal total.   3. The patient has chronic elevation of the CEA level. We have performed colonoscopy, PET scans, CT scans showing no new abnormalities to speak of. I strongly suspect that interstitial pulmonary disease is the cause of this and I have nothing else to add to this process at this time. She will be called at home with the report of her CEA once that it becomes available. Otherwise, this number will be repeated upon return in 6 months.       I discussed all these facts with the patient and her daughter in the room. She will have repeat CBC, CMP, ferritin, iron profile and CEA level in 6 months.    On 10/16/2023 I reviewed the radiological assessment done in outside facility in regard to her CT scan of the chest with high resolution. The report says that there is no significant changes.    On  06/06/2024, it is very obvious that the patient has progressive amount of shortness of breath for the last several months. She gets tremendously fatigued upon doing any minimal physical activity at home and she has more shortness of breath at rest. Today, her oxygen concentration on 2L nasal cannula is appropriate. Her lung auscultation shows relatively good breath sounds bilaterally with no crackles, wheezing or rubs. Her heart is irregular. She has minimal Kussmaul sign in the neck and she has no edema in her lower extremities. The liver is not palpable or congested. This leads me to believe that her worsening shortness of breath is probably related to her interstitial lung disease and I would not be surprised if she has pulmonary hypertension and right-sided early congestive heart failure.     It is concerning to me as well the minimal drop in the hemoglobin, the low iron saturation. The patient continues eating a lot of junk food, no kind of food that will let her to have normal nutrition for her age. Therefore, I do believe that the patient needs to be seen by Cardiology as soon as possible, Dr. Dasilva, and I went ahead and ordered a proBNP today.     Given her pulmonary cardiac situation, I went ahead and ordered a CT scan of the chest, abdomen and pelvis as soon as possible as early as tomorrow. Once that the report becomes available, will be further discussing this with the patient and her daughter on the telephone.     I would not be surprised if the patient needs to come back and see me in a few days given the present circumstances. Given the low iron saturation, I think the patient will qualify for IV iron therapy. She has terrible intolerance to oral iron therapy.     I advised the patient that very likely she has malnutrition. The food that she consumes is terrible. Just processed food, junk food, and Dr. Pepper for example. There is nothing nutritious besides a scoop of ice cream here and there and a  cookie here and there. Therefore, I asked her daughter to do some real cooking, try to bring her some food that has some proper protein and proper nutrition in the form of vegetables and fruits.     Depending on the radiological analysis of her chest, we probably need to have a conversation with Zita Guy MD, the patient's pulmonologist.     We left an open appointment in case that she needs to come back and see me.     We know that this patient has had an elevated CEA level since her colectomy. She has had a repeated colonoscopy, PET scan, CT scans and so forth. We never have been able to prove any recurrent disease. My suspicion is that in the middle of that interstitial pulmonary disease and in absence of any abnormality in the PET scan has to be something that triggers CEA elevation. I have no other good theory though. She is not smoking at this time either.     Discussed with the patient and daughter in detail.     Duration of this visit: 1 hour.

## 2024-06-07 LAB — NT-PROBNP SERPL-MCNC: 524 PG/ML (ref 0–1800)

## 2024-06-10 ENCOUNTER — HOSPITAL ENCOUNTER (OUTPATIENT)
Facility: HOSPITAL | Age: 84
Discharge: HOME | End: 2024-06-10
Admitting: INTERNAL MEDICINE
Payer: MEDICARE

## 2024-06-10 DIAGNOSIS — C18.3 MALIGNANT NEOPLASM OF HEPATIC FLEXURE: ICD-10-CM

## 2024-06-10 PROCEDURE — 25510000001 IOPAMIDOL 61 % SOLUTION: Performed by: INTERNAL MEDICINE

## 2024-06-10 PROCEDURE — 74177 CT ABD & PELVIS W/CONTRAST: CPT

## 2024-06-10 PROCEDURE — 71260 CT THORAX DX C+: CPT

## 2024-06-10 RX ADMIN — IOPAMIDOL 85 ML: 612 INJECTION, SOLUTION INTRAVENOUS at 15:08

## 2024-06-12 ENCOUNTER — TELEPHONE (OUTPATIENT)
Dept: ONCOLOGY | Facility: CLINIC | Age: 84
End: 2024-06-12
Payer: MEDICARE

## 2024-06-12 NOTE — TELEPHONE ENCOUNTER
Called patient and relayed message that CT results are not ready yet. Placed iron plan per Dr. Lara and sent message to scheduling. Pt v/u. Jennifer Mercado RN

## 2024-06-12 NOTE — TELEPHONE ENCOUNTER
Caller: Madalyn Galeas    Relationship: Self    Best call back number: [phone]    What is the best time to reach you: ANYTIME    Who are you requesting to speak with (clinical staff, provider, specific staff member): CLINICAL    What was the call regarding: PLEASE CALL PATIENT WITH HER 6/10 CT RESULTS AND SHE WOULD ALSO LIKE TO KNOW WHEN HER IRON INFUSION WILL BE SCHEDULED.

## 2024-06-14 ENCOUNTER — TELEPHONE (OUTPATIENT)
Dept: ONCOLOGY | Facility: CLINIC | Age: 84
End: 2024-06-14
Payer: MEDICARE

## 2024-06-14 DIAGNOSIS — I49.9 IRREGULAR HEART BEAT: ICD-10-CM

## 2024-06-14 DIAGNOSIS — I51.7 CARDIAC ENLARGEMENT: Primary | ICD-10-CM

## 2024-06-14 NOTE — TELEPHONE ENCOUNTER
Called to relay results of CT. Pt v/u. Sent CT to pt's pulmonologist Dr. Guy and requested a call back to Dr. Lara. Echo ordered. Message sent to scheduling. Jennifer Mercado RN

## 2024-06-19 ENCOUNTER — TELEPHONE (OUTPATIENT)
Dept: ONCOLOGY | Facility: CLINIC | Age: 84
End: 2024-06-19
Payer: MEDICARE

## 2024-06-19 NOTE — TELEPHONE ENCOUNTER
Caller: Madalyn Galeas    Relationship to patient: Self    Best call back number: [phone]     Chief complaint: PT SAW DR SOUSA ON 06/06 AND HE WANTED HER TO HAVE IRON INFUSIONS. THE PT HAS NOT RECEIVED APPTS FOR THOSE YET AND WOULD LIKE TO FIND OUT WHEN SHE WILL BE SCHEDULED.     Type of visit: IRON INFUSIONS

## 2024-06-25 ENCOUNTER — INFUSION (OUTPATIENT)
Dept: ONCOLOGY | Facility: HOSPITAL | Age: 84
End: 2024-06-25
Payer: MEDICARE

## 2024-06-25 VITALS
TEMPERATURE: 98 F | WEIGHT: 129 LBS | OXYGEN SATURATION: 86 % | HEART RATE: 108 BPM | DIASTOLIC BLOOD PRESSURE: 62 MMHG | RESPIRATION RATE: 16 BRPM | BODY MASS INDEX: 24.67 KG/M2 | SYSTOLIC BLOOD PRESSURE: 101 MMHG

## 2024-06-25 DIAGNOSIS — K90.9 MALABSORPTION OF IRON: ICD-10-CM

## 2024-06-25 DIAGNOSIS — D50.8 OTHER IRON DEFICIENCY ANEMIA: Primary | ICD-10-CM

## 2024-06-25 PROCEDURE — 63710000001 DIPHENHYDRAMINE PER 50 MG: Performed by: INTERNAL MEDICINE

## 2024-06-25 PROCEDURE — 25010000002 FERUMOXYTOL 510 MG/17ML SOLUTION 17 ML VIAL: Performed by: INTERNAL MEDICINE

## 2024-06-25 PROCEDURE — 96374 THER/PROPH/DIAG INJ IV PUSH: CPT

## 2024-06-25 PROCEDURE — 25810000003 SODIUM CHLORIDE 0.9 % SOLUTION: Performed by: INTERNAL MEDICINE

## 2024-06-25 RX ORDER — ACETAMINOPHEN 325 MG/1
650 TABLET ORAL ONCE
Status: COMPLETED | OUTPATIENT
Start: 2024-06-25 | End: 2024-06-25

## 2024-06-25 RX ORDER — DIPHENHYDRAMINE HCL 25 MG
25 CAPSULE ORAL ONCE
Status: COMPLETED | OUTPATIENT
Start: 2024-06-25 | End: 2024-06-25

## 2024-06-25 RX ORDER — SODIUM CHLORIDE 9 MG/ML
20 INJECTION, SOLUTION INTRAVENOUS ONCE
Status: COMPLETED | OUTPATIENT
Start: 2024-06-25 | End: 2024-06-25

## 2024-06-25 RX ADMIN — DIPHENHYDRAMINE HYDROCHLORIDE 25 MG: 25 CAPSULE ORAL at 14:34

## 2024-06-25 RX ADMIN — SODIUM CHLORIDE 20 ML/HR: 9 INJECTION, SOLUTION INTRAVENOUS at 15:01

## 2024-06-25 RX ADMIN — FERUMOXYTOL 510 MG: 510 INJECTION INTRAVENOUS at 15:01

## 2024-06-25 RX ADMIN — ACETAMINOPHEN 650 MG: 325 TABLET ORAL at 14:33

## 2024-07-01 ENCOUNTER — OFFICE VISIT (OUTPATIENT)
Dept: CARDIOLOGY | Facility: CLINIC | Age: 84
End: 2024-07-01
Payer: MEDICARE

## 2024-07-01 ENCOUNTER — HOSPITAL ENCOUNTER (OUTPATIENT)
Dept: CARDIOLOGY | Facility: HOSPITAL | Age: 84
Discharge: HOME | End: 2024-07-01
Admitting: INTERNAL MEDICINE
Payer: MEDICARE

## 2024-07-01 VITALS
HEIGHT: 61 IN | DIASTOLIC BLOOD PRESSURE: 58 MMHG | BODY MASS INDEX: 24.35 KG/M2 | WEIGHT: 129 LBS | SYSTOLIC BLOOD PRESSURE: 110 MMHG | HEART RATE: 75 BPM

## 2024-07-01 VITALS
HEIGHT: 61 IN | SYSTOLIC BLOOD PRESSURE: 110 MMHG | HEART RATE: 89 BPM | DIASTOLIC BLOOD PRESSURE: 58 MMHG | BODY MASS INDEX: 24.24 KG/M2 | WEIGHT: 128.4 LBS | OXYGEN SATURATION: 96 %

## 2024-07-01 DIAGNOSIS — R06.09 DYSPNEA ON EXERTION: ICD-10-CM

## 2024-07-01 DIAGNOSIS — I49.3 PVC (PREMATURE VENTRICULAR CONTRACTION): ICD-10-CM

## 2024-07-01 DIAGNOSIS — I51.7 CARDIAC ENLARGEMENT: ICD-10-CM

## 2024-07-01 DIAGNOSIS — J84.10 PULMONARY FIBROSIS: ICD-10-CM

## 2024-07-01 DIAGNOSIS — I49.1 PAC (PREMATURE ATRIAL CONTRACTION): ICD-10-CM

## 2024-07-01 DIAGNOSIS — I10 ESSENTIAL HYPERTENSION: ICD-10-CM

## 2024-07-01 DIAGNOSIS — I49.9 IRREGULAR HEART BEAT: ICD-10-CM

## 2024-07-01 DIAGNOSIS — I49.9 IRREGULAR HEART RHYTHM: Primary | ICD-10-CM

## 2024-07-01 DIAGNOSIS — I27.20 PULMONARY HYPERTENSION: ICD-10-CM

## 2024-07-01 PROBLEM — I27.21 PAH (PULMONARY ARTERY HYPERTENSION): Status: RESOLVED | Noted: 2021-04-07 | Resolved: 2024-07-01

## 2024-07-01 LAB
AORTIC ARCH: 1.9 CM
ASCENDING AORTA: 3.4 CM
BH CV ECHO LEFT VENTRICLE GLOBAL LONGITUDINAL STRAIN: -14.9 %
BH CV ECHO MEAS - ACS: 1.72 CM
BH CV ECHO MEAS - AO MAX PG: 8.3 MMHG
BH CV ECHO MEAS - AO MEAN PG: 5 MMHG
BH CV ECHO MEAS - AO ROOT DIAM: 3 CM
BH CV ECHO MEAS - AO V2 MAX: 144 CM/SEC
BH CV ECHO MEAS - AO V2 VTI: 26.7 CM
BH CV ECHO MEAS - AVA(I,D): 1.82 CM2
BH CV ECHO MEAS - EDV(CUBED): 132.7 ML
BH CV ECHO MEAS - EDV(MOD-SP2): 58 ML
BH CV ECHO MEAS - EDV(MOD-SP4): 59 ML
BH CV ECHO MEAS - EF(MOD-BP): 44.9 %
BH CV ECHO MEAS - EF(MOD-SP2): 46.6 %
BH CV ECHO MEAS - EF(MOD-SP4): 44.1 %
BH CV ECHO MEAS - ESV(CUBED): 39.2 ML
BH CV ECHO MEAS - ESV(MOD-SP2): 31 ML
BH CV ECHO MEAS - ESV(MOD-SP4): 33 ML
BH CV ECHO MEAS - FS: 33.4 %
BH CV ECHO MEAS - IVS/LVPW: 1 CM
BH CV ECHO MEAS - IVSD: 1.1 CM
BH CV ECHO MEAS - LAT PEAK E' VEL: 8.3 CM/SEC
BH CV ECHO MEAS - LV DIASTOLIC VOL/BSA (35-75): 37.6 CM2
BH CV ECHO MEAS - LV MASS(C)D: 213.9 GRAMS
BH CV ECHO MEAS - LV MAX PG: 3.2 MMHG
BH CV ECHO MEAS - LV MEAN PG: 2 MMHG
BH CV ECHO MEAS - LV SYSTOLIC VOL/BSA (12-30): 21 CM2
BH CV ECHO MEAS - LV V1 MAX: 89.5 CM/SEC
BH CV ECHO MEAS - LV V1 VTI: 16.3 CM
BH CV ECHO MEAS - LVIDD: 5.1 CM
BH CV ECHO MEAS - LVIDS: 3.4 CM
BH CV ECHO MEAS - LVOT AREA: 3 CM2
BH CV ECHO MEAS - LVOT DIAM: 1.95 CM
BH CV ECHO MEAS - LVPWD: 1.1 CM
BH CV ECHO MEAS - MED PEAK E' VEL: 6 CM/SEC
BH CV ECHO MEAS - MR MAX PG: 130.6 MMHG
BH CV ECHO MEAS - MR MAX VEL: 571.4 CM/SEC
BH CV ECHO MEAS - MV A DUR: 0.1 SEC
BH CV ECHO MEAS - MV A MAX VEL: 108 CM/SEC
BH CV ECHO MEAS - MV DEC SLOPE: 440.6 CM/SEC2
BH CV ECHO MEAS - MV DEC TIME: 0.17 SEC
BH CV ECHO MEAS - MV E MAX VEL: 42.8 CM/SEC
BH CV ECHO MEAS - MV E/A: 0.4
BH CV ECHO MEAS - MV MAX PG: 7.7 MMHG
BH CV ECHO MEAS - MV MEAN PG: 2.38 MMHG
BH CV ECHO MEAS - MV P1/2T: 44.3 MSEC
BH CV ECHO MEAS - MV V2 VTI: 23.8 CM
BH CV ECHO MEAS - MVA(P1/2T): 5 CM2
BH CV ECHO MEAS - MVA(VTI): 2.04 CM2
BH CV ECHO MEAS - PA ACC TIME: 0.09 SEC
BH CV ECHO MEAS - PA V2 MAX: 112.9 CM/SEC
BH CV ECHO MEAS - PULM A REVS DUR: 0.13 SEC
BH CV ECHO MEAS - PULM A REVS VEL: 36 CM/SEC
BH CV ECHO MEAS - PULM DIAS VEL: 28.7 CM/SEC
BH CV ECHO MEAS - PULM S/D: 2.18
BH CV ECHO MEAS - PULM SYS VEL: 62.6 CM/SEC
BH CV ECHO MEAS - QP/QS: 0.89
BH CV ECHO MEAS - RAP SYSTOLE: 3 MMHG
BH CV ECHO MEAS - RV MAX PG: 2.41 MMHG
BH CV ECHO MEAS - RV V1 MAX: 77.6 CM/SEC
BH CV ECHO MEAS - RV V1 VTI: 13.3 CM
BH CV ECHO MEAS - RVOT DIAM: 2.03 CM
BH CV ECHO MEAS - RVSP: 33 MMHG
BH CV ECHO MEAS - SUP REN AO DIAM: 1.9 CM
BH CV ECHO MEAS - SV(LVOT): 48.5 ML
BH CV ECHO MEAS - SV(MOD-SP2): 27 ML
BH CV ECHO MEAS - SV(MOD-SP4): 26 ML
BH CV ECHO MEAS - SV(RVOT): 43.2 ML
BH CV ECHO MEAS - SVI(LVOT): 30.9 ML/M2
BH CV ECHO MEAS - SVI(MOD-SP2): 17.2 ML/M2
BH CV ECHO MEAS - SVI(MOD-SP4): 16.6 ML/M2
BH CV ECHO MEAS - TAPSE (>1.6): 2.2 CM
BH CV ECHO MEAS - TR MAX PG: 29.8 MMHG
BH CV ECHO MEAS - TR MAX VEL: 273 CM/SEC
BH CV ECHO MEASUREMENTS AVERAGE E/E' RATIO: 5.99
BH CV XLRA - RV BASE: 2.8 CM
BH CV XLRA - RV LENGTH: 6.6 CM
BH CV XLRA - RV MID: 2.5 CM
BH CV XLRA - TDI S': 14.9 CM/SEC
LEFT ATRIUM VOLUME INDEX: 31.8 ML/M2
SINUS: 2.9 CM
STJ: 2.7 CM

## 2024-07-01 PROCEDURE — 1159F MED LIST DOCD IN RCRD: CPT | Performed by: NURSE PRACTITIONER

## 2024-07-01 PROCEDURE — 3074F SYST BP LT 130 MM HG: CPT | Performed by: NURSE PRACTITIONER

## 2024-07-01 PROCEDURE — 1160F RVW MEDS BY RX/DR IN RCRD: CPT | Performed by: NURSE PRACTITIONER

## 2024-07-01 PROCEDURE — 99214 OFFICE O/P EST MOD 30 MIN: CPT | Performed by: NURSE PRACTITIONER

## 2024-07-01 PROCEDURE — 93356 MYOCRD STRAIN IMG SPCKL TRCK: CPT | Performed by: INTERNAL MEDICINE

## 2024-07-01 PROCEDURE — 3078F DIAST BP <80 MM HG: CPT | Performed by: NURSE PRACTITIONER

## 2024-07-01 PROCEDURE — 93306 TTE W/DOPPLER COMPLETE: CPT

## 2024-07-01 PROCEDURE — 93356 MYOCRD STRAIN IMG SPCKL TRCK: CPT

## 2024-07-01 PROCEDURE — 93306 TTE W/DOPPLER COMPLETE: CPT | Performed by: INTERNAL MEDICINE

## 2024-07-01 PROCEDURE — 93000 ELECTROCARDIOGRAM COMPLETE: CPT | Performed by: NURSE PRACTITIONER

## 2024-07-01 RX ORDER — DIPHENOXYLATE HYDROCHLORIDE AND ATROPINE SULFATE 2.5; .025 MG/1; MG/1
TABLET ORAL
COMMUNITY

## 2024-07-01 NOTE — PROGRESS NOTES
Date of Office Visit: 2024  Encounter Provider: PAYAL Simmons  Place of Service: [facility] CARDIOLOGY  Patient Name: Madalyn Galeas  :1940  Primary Cardiologist: Dr. Rambo Dasilva     Chief Complaint   Patient presents with    Shortness of Breath    Irregular Heart Beat   :     HPI: Madalyn Galeas is a 84 y.o. female who presents today for evaluation of irregular heartbeat at the request of Dr. Lara. I have  reviewed her medical records.    She has known pulmonary fibrosis, interstitial lung disease, pulmonary hypertension, and is oxygen dependent; followed by Dr. Zita Guy.     In 2020, she saw Dr. Dasilva for perioperative cardiac risk assessment.  Echocardiogram showed EF 45-50% and no pulmonary hypertension (taking tadalafil).  She was noted to have PACs felt to be due to left atrial dilation and lung disease.  In 2021, myocardial perfusion study was normal.    In 2021, Holter monitor showed normal sinus rhythm, average heart rate 70 bpm, and a 27% APC burden felt to be due to her pulmonary condition.  She has remained on verapamil.    She recently saw Dr. Lara And was noted to have an irregular heartbeat.  She reported worsening shortness of breath.  proBNP was negative for heart failure.  Echocardiogram was ordered because of concern of pulmonary hypertension or right sided early heart failure.    She presents today for evaluation and her daughters accompany her.  She does have an echocardiogram completed and the results are pending.  She feels that her shortness of breath has worsened over the past month.  She feels that it may be due to switching to generic Esbriet.  She is oxygen dependent wearing 8 L of oxygen at home.  She reports a cough.  She denies chest pain, PND, orthopnea, edema, palpitations, dizziness, syncope, or bleeding.      Past Medical History:   Diagnosis Date    Adenomatous polyp of sigmoid colon  2021    Anemia     Colonic polyp     Depression     Diverticulosis     Esophageal reflux     Essential hypertension     GERD (gastroesophageal reflux disease)     H/O complete eye exam 2018    Hx of bone density study 02/15/2016    Hyperlipidemia     Hypothyroidism, acquired     Major depressive disorder, recurrent episode, in full remission     Nonsenile cataract     Oxygen dependent     8L NC CONTINUOUS    Pulmonary fibrosis     Pulmonary hypertension     Sleep apnea, obstructive     USES CPAP     Stenosis, spinal, lumbar     Vertigo, peripheral        Past Surgical History:   Procedure Laterality Date    COLON RESECTION N/A 2021    Procedure: COLON RESECTION RIGHT;  Surgeon: Alfonso Cesar MD;  Location: [facility] MAIN OR;  Service: General;  Laterality: N/A;    COLONOSCOPY  2012    COLONOSCOPY N/A 2021    Procedure: COLONOSCOPY INTO CECUM AND TI WITH BIOPSIES, SPOT INJECTION TO COLON MASS (MID-ASCENDING), HOT SNARE POLYPECTOMY, COLD BX POLYPECTOMIES, SALINE LIFT, CLIP PLACEMENT X3;  Surgeon: Alfonso Cesar MD;  Location: [facility] ENDOSCOPY;  Service: General;  Laterality: N/A;  PRE:  ABNORMAL CT SCAN OF COLON  POST:  DIVERTICULOSIS, COLON MASS, POLYPS, HEMORRHOIDS    INCONTINENCE SURGERY      Dr. Espinal    MAMMO BILATERAL  2017    dr de la cruz     PAP SMEAR      dr de la cruz       Social History     Socioeconomic History    Marital status:    Tobacco Use    Smoking status: Former     Current packs/day: 0.00     Average packs/day: 2.0 packs/day for 8.0 years (16.0 ttl pk-yrs)     Types: Cigarettes     Start date:      Quit date:      Years since quittin.5    Smokeless tobacco: Never    Tobacco comments:     Caffeine use: Drinks Coca [employer]a    Vaping Use    Vaping status: Never Used   Substance and Sexual Activity    Alcohol use: Yes     Comment: rare    Drug use: No    Sexual activity: Never     Partners: Male       Family History   Problem  Relation Age of Onset    Breast cancer Cousin     Lung cancer Father     Heart disease Father     Colon cancer Brother     Prostate cancer Brother     Lung disease Brother     Emphysema Brother     Heart disease Mother     Malig Hyperthermia Neg Hx        The following portion of the patient's history were reviewed and updated as appropriate: past medical history, past surgical history, past social history, past family history, allergies, current medications, and problem list.    Review of Systems   Constitutional: Negative.   Cardiovascular:  Positive for dyspnea on exertion.   Respiratory:  Positive for cough and shortness of breath.    Hematologic/Lymphatic: Negative.    Neurological: Negative.        Allergies   Allergen Reactions    Sulfa Antibiotics Hives and Rash         Current Outpatient Medications:     acetaminophen (TYLENOL) 500 MG tablet, Take 2 tablets by mouth Every 6 (Six) Hours As Needed for Mild Pain., Disp: , Rfl:     ADCIRCA 20 MG tablet, Take 2 tablets by mouth Daily., Disp: , Rfl:     ascorbic acid (VITAMIN C) 500 MG tablet, Take 1 tablet by mouth Daily., Disp: , Rfl:     atorvastatin (LIPITOR) 10 MG tablet, Take 1 tablet by mouth Daily., Disp: 90 tablet, Rfl: 1    Cholecalciferol 25 MCG (1000 UT) capsule, Take  by mouth Daily., Disp: , Rfl:     ESBRIET 267 MG capsule, Take 801 mg by mouth 3 (Three) Times a Day With Meals., Disp: , Rfl:     levothyroxine (SYNTHROID, LEVOTHROID) 112 MCG tablet, Take 1 tablet by mouth Daily., Disp: 90 tablet, Rfl: 1    multivitamin (THERAGRAN) tablet tablet, Take  by mouth., Disp: , Rfl:     O2 (OXYGEN), Inhale 8 L/min Continuous., Disp: , Rfl:     omeprazole (priLOSEC) 40 MG capsule, Take 1 capsule by mouth 2 (Two) Times a Day., Disp: 180 capsule, Rfl: 0    PROBIOTIC PRODUCT PO, Take  by mouth., Disp: , Rfl:     sertraline (ZOLOFT) 50 MG tablet, Take 1 tablet by mouth Daily., Disp: 90 tablet, Rfl: 1    verapamil SR (CALAN-SR) 120 MG CR tablet, Take 1 tablet by  "mouth Daily., Disp: , Rfl: 3         Objective:     Vitals:    07/01/24 1433   BP: 110/58   Cuff Size: Adult   Pulse: 89   SpO2: 96%   Weight: 58.2 kg (128 lb 6.4 oz)   Height: 154.9 cm (60.98\")     Body mass index is 24.27 kg/m².    PHYSICAL EXAM:    Vitals Reviewed.   General Appearance: In no acute distress.  Thin.    Eyes: Glasses.   HENT: No hearing loss noted.    Respiratory: No signs of respiratory distress. Respiration rhythm and depth normal.  On oxygen.  Diminished.  Cardiovascular:  Jugular Venous Pressure: Normal  Heart Rate and Rhythm: Normal, Heart Sounds: Normal S1 and S2. No S3 or S4 noted.  Murmurs: No murmurs noted. No rubs, thrills, or gallops.   Lower Extremities: No edema noted.  Musculoskeletal: Normal movement of extremities.  Skin: General appearance normal.    Psychiatric: Patient alert and oriented to person, place, and time. Speech and behavior appropriate. Normal mood and affect.       ECG 12 Lead    Date/Time: 7/1/2024 2:22 PM  Performed by: Joceline Hopper APRN    Authorized by: Joceline Hopper APRN  Comparison: compared with previous ECG from 1/3/2022  Comparison to previous ECG: Normal sinus rhythm with frequent PACs  Rhythm: sinus rhythm  Ectopy: unifocal PVCs  Rate: normal  BPM: 89  Conduction: conduction normal  ST Segments: ST segments normal  T Waves: T waves normal  QRS axis: normal    Clinical impression: non-specific ECG            Assessment:       Diagnosis Plan   1. Irregular heart rhythm  Holter Monitor - 48 Hour      2. PAC (premature atrial contraction)  Holter Monitor - 48 Hour      3. PVC (premature ventricular contraction)  Holter Monitor - 48 Hour      4. Pulmonary fibrosis        5. Pulmonary hypertension        6. Dyspnea on exertion        7. Essential hypertension               Plan:       1-3.  She was referred here for an irregular heart rhythm.  She was noted to have a single PVC on today's EKG.  She has a history of a 27% PAC burden felt to be due to " pulmonary disease and left atrial dilation.  Check 48-hour Holter monitor to rule out the possibility of atrial fibrillation.  Denies palpitations.    4/5/6.  Pulmonary fibrosis and pulmonary hypertension.  Oxygen dependent.  She has chronic dyspnea with exertion.  She feels good at rest.    7.  Hypertension.  Blood pressure normal.    8.  We will call her with the echocardiogram and 48-hour Holter monitor results.  Further recommendations to follow.    As always, it has been a pleasure to participate in your patient's care. Thank you.         Sincerely,         PAYAL Wharton  [facility] Cardiology      Dictated utilizing Dragon Dictation

## 2024-07-03 ENCOUNTER — TELEPHONE (OUTPATIENT)
Dept: CARDIOLOGY | Facility: CLINIC | Age: 84
End: 2024-07-03
Payer: MEDICARE

## 2024-07-03 NOTE — TELEPHONE ENCOUNTER
Patient was seen in the office on 7/1.  Echocardiogram results showed the following:    Echocardiogram showed EF 46-50%, GLS -14.9%, grade 1 diastolic dysfunction, mild LVH, left atrium moderately dilated, right atrium mildly dilated, mild MR, and trace TR.    Ms. Galeas was informed of results and verbalized understanding.

## 2024-07-05 RX ORDER — OMEPRAZOLE 40 MG/1
40 CAPSULE, DELAYED RELEASE ORAL 2 TIMES DAILY
Qty: 180 CAPSULE | Refills: 0 | Status: SHIPPED | OUTPATIENT
Start: 2024-07-05

## 2024-07-05 NOTE — TELEPHONE ENCOUNTER
Caller: Madalyn Galeas    Relationship: Self    Best call back number: [phone]    Requested Prescriptions:   Requested Prescriptions     Pending Prescriptions Disp Refills    omeprazole (priLOSEC) 40 MG capsule 180 capsule 0     Sig: Take 1 capsule by mouth 2 (Two) Times a Day.        [facility] [address] [phone] [facility] [phone]      Last office visit with prescribing clinician: 1/25/2024   Last telemedicine visit with prescribing clinician: Visit date not found   Next office visit with prescribing clinician: 7/25/2024     Additional details provided by patient: PATIENT HAS 4 DAYS OF MEDICATION REMAINING    Does the patient have less than a 3 day supply:  [] Yes  [x] No    Would you like a call back once the refill request has been completed: [] Yes [x] No    If the office needs to give you a call back, can they leave a voicemail: [] Yes [x] No    Daniel Morris   07/05/24 10:48 EDT

## 2024-07-08 ENCOUNTER — APPOINTMENT (OUTPATIENT)
Dept: LAB | Facility: HOSPITAL | Age: 84
End: 2024-07-08
Payer: MEDICARE

## 2024-07-08 ENCOUNTER — INFUSION (OUTPATIENT)
Dept: ONCOLOGY | Facility: HOSPITAL | Age: 84
End: 2024-07-08
Payer: MEDICARE

## 2024-07-08 ENCOUNTER — OFFICE VISIT (OUTPATIENT)
Dept: ONCOLOGY | Facility: CLINIC | Age: 84
End: 2024-07-08
Payer: MEDICARE

## 2024-07-08 VITALS
OXYGEN SATURATION: 93 % | DIASTOLIC BLOOD PRESSURE: 61 MMHG | WEIGHT: 128.3 LBS | HEART RATE: 97 BPM | HEIGHT: 61 IN | RESPIRATION RATE: 16 BRPM | BODY MASS INDEX: 24.22 KG/M2 | SYSTOLIC BLOOD PRESSURE: 107 MMHG | TEMPERATURE: 97.9 F

## 2024-07-08 DIAGNOSIS — D50.0 IRON DEFICIENCY ANEMIA DUE TO CHRONIC BLOOD LOSS: Primary | ICD-10-CM

## 2024-07-08 DIAGNOSIS — K90.9 MALABSORPTION OF IRON: ICD-10-CM

## 2024-07-08 DIAGNOSIS — D50.8 OTHER IRON DEFICIENCY ANEMIA: Primary | ICD-10-CM

## 2024-07-08 DIAGNOSIS — J84.10 PULMONARY FIBROSIS: ICD-10-CM

## 2024-07-08 DIAGNOSIS — C18.2 MALIGNANT NEOPLASM OF ASCENDING COLON: ICD-10-CM

## 2024-07-08 DIAGNOSIS — I27.20 PULMONARY HYPERTENSION: ICD-10-CM

## 2024-07-08 PROCEDURE — 96374 THER/PROPH/DIAG INJ IV PUSH: CPT

## 2024-07-08 PROCEDURE — 63710000001 DIPHENHYDRAMINE PER 50 MG: Performed by: INTERNAL MEDICINE

## 2024-07-08 PROCEDURE — 25010000002 FERUMOXYTOL 510 MG/17ML SOLUTION 17 ML VIAL: Performed by: INTERNAL MEDICINE

## 2024-07-08 PROCEDURE — 25810000003 SODIUM CHLORIDE 0.9 % SOLUTION: Performed by: INTERNAL MEDICINE

## 2024-07-08 RX ORDER — SODIUM CHLORIDE 9 MG/ML
20 INJECTION, SOLUTION INTRAVENOUS ONCE
Status: CANCELLED | OUTPATIENT
Start: 2024-07-08

## 2024-07-08 RX ORDER — ACETAMINOPHEN 325 MG/1
650 TABLET ORAL ONCE
Status: CANCELLED | OUTPATIENT
Start: 2024-07-08

## 2024-07-08 RX ORDER — ACETAMINOPHEN 325 MG/1
650 TABLET ORAL ONCE
Status: COMPLETED | OUTPATIENT
Start: 2024-07-08 | End: 2024-07-08

## 2024-07-08 RX ORDER — SODIUM CHLORIDE 9 MG/ML
20 INJECTION, SOLUTION INTRAVENOUS ONCE
Status: COMPLETED | OUTPATIENT
Start: 2024-07-08 | End: 2024-07-08

## 2024-07-08 RX ORDER — DIPHENHYDRAMINE HCL 25 MG
25 CAPSULE ORAL ONCE
Status: COMPLETED | OUTPATIENT
Start: 2024-07-08 | End: 2024-07-08

## 2024-07-08 RX ORDER — DIPHENHYDRAMINE HCL 25 MG
25 CAPSULE ORAL ONCE
Status: CANCELLED | OUTPATIENT
Start: 2024-07-08

## 2024-07-08 RX ADMIN — SODIUM CHLORIDE 20 ML/HR: 9 INJECTION, SOLUTION INTRAVENOUS at 12:04

## 2024-07-08 RX ADMIN — DIPHENHYDRAMINE HYDROCHLORIDE 25 MG: 25 CAPSULE ORAL at 11:46

## 2024-07-08 RX ADMIN — FERUMOXYTOL 510 MG: 510 INJECTION INTRAVENOUS at 12:04

## 2024-07-08 RX ADMIN — ACETAMINOPHEN 650 MG: 325 TABLET ORAL at 11:45

## 2024-07-08 NOTE — PROGRESS NOTES
Subjective       REASON FOR FOLLOW UP: 1.IRON DEFICIENCY ANEMIA,  DUE TO CHRONIC GI BLOOD LOSS, CHRONIC DIARRHEA, MALNUTRITION, WEIGHT LOSS, ABDOMINAL MASS RUQ: colon cancer diagnosed, removed, no need for adjuvant therapy for stage 2 disease    2.Ascending colon cancer s/p colectomy.    3. ELEVATED CEA AFTER COLECTOMY: PET SCAN NEGATIVE IN 6/17/21, CT SCAN NEGATIVE 3/22      On 06/06/2024, this 83-year-old female returns to the office in company of her daughter to be reviewed. She has had a colectomy for stage II hepatic flexure colon cancer. She never required any adjuvant therapy. She has had a persistent elevation of the CEA. Colonoscopy, PET scan has failed to document any colon cancer recurrence. The patient has interstitial lung disease and she remains on oxygen chronically. She sees Zita Guy MD, Pulmonologist, for this. In any event the patient is telling me today that she feels more fatigued than ever and she is barely able to get around in the house given shortness of breath. She has more cough than usual with no sputum production and no hemoptysis or pleuritic pain. She has not had any fever. The oxygen now is continuous, permanent. Any drop in oxygen use triggers very significant desaturation in oxygen concentration in her blood. Her appetite has declined. She has lost some weight. She has not had any abdominal pain, nausea, vomiting, heartburn, indigestion, diarrhea, melena or passage of blood in the urine. She has not had any febrile illness. No symptoms of urinary tract infection. She has not had any new pain. She has not had any fevers or chills.    The patient was brought back to the office on 07/08/2024 to review her clinical status that unfortunately has not changed.  Her shortness of breath remains the main limiting factor in regard to her ability to function and she remains on oxygen. When she skips the oxygen utilization and she walks from one room to the bathroom her O2 saturation goes  in the low 70s. She has no cough at this time, minimal if any sputum, no pleuritic pain, no hemoptysis. She has been seen by cardiology and the summary of that report will be below. She also has had radiological assessment for review today including CT scans. I have had a telephone conversation with, Zita Guy MD, about her today, please review below.              Past Medical History:   Diagnosis Date    Adenomatous polyp of sigmoid colon 1/21/2021    Anemia     Colonic polyp     Depression     Diverticulosis     Esophageal reflux     Essential hypertension     GERD (gastroesophageal reflux disease)     H/O complete eye exam 03/2018    Hx of bone density study 02/15/2016    Hyperlipidemia     Hypothyroidism, acquired     Major depressive disorder, recurrent episode, in full remission     Nonsenile cataract 2016    Oxygen dependent     8L NC CONTINUOUS    Pulmonary fibrosis     Pulmonary hypertension     Sleep apnea, obstructive     USES CPAP     Stenosis, spinal, lumbar     Vertigo, peripheral         Past Surgical History:   Procedure Laterality Date    COLON RESECTION N/A 1/28/2021    Procedure: COLON RESECTION RIGHT;  Surgeon: Alfonso Cesar MD;  Location: [facility] MAIN OR;  Service: General;  Laterality: N/A;    COLONOSCOPY  02/20/2012    COLONOSCOPY N/A 1/21/2021    Procedure: COLONOSCOPY INTO CECUM AND TI WITH BIOPSIES, SPOT INJECTION TO COLON MASS (MID-ASCENDING), HOT SNARE POLYPECTOMY, COLD BX POLYPECTOMIES, SALINE LIFT, CLIP PLACEMENT X3;  Surgeon: Alfonso Cesar MD;  Location: [facility] ENDOSCOPY;  Service: General;  Laterality: N/A;  PRE:  ABNORMAL CT SCAN OF COLON  POST:  DIVERTICULOSIS, COLON MASS, POLYPS, HEMORRHOIDS    INCONTINENCE SURGERY      Dr. Espinal    MAMMO BILATERAL  2017    dr de la cruz     PAP SMEAR  2014    dr de la cruz        Current Outpatient Medications on File Prior to Visit   Medication Sig Dispense Refill    acetaminophen (TYLENOL) 500 MG tablet Take 2  tablets by mouth Every 6 (Six) Hours As Needed for Mild Pain.      ADCIRCA 20 MG tablet Take 2 tablets by mouth Daily.      ascorbic acid (VITAMIN C) 500 MG tablet Take 1 tablet by mouth Daily.      atorvastatin (LIPITOR) 10 MG tablet Take 1 tablet by mouth Daily. 90 tablet 1    Cholecalciferol 25 MCG (1000 UT) capsule Take  by mouth Daily.      ESBRIET 267 MG capsule Take 801 mg by mouth 3 (Three) Times a Day With Meals.      levothyroxine (SYNTHROID, LEVOTHROID) 112 MCG tablet Take 1 tablet by mouth Daily. 90 tablet 1    multivitamin (THERAGRAN) tablet tablet Take  by mouth.      O2 (OXYGEN) Inhale 8 L/min Continuous.      omeprazole (priLOSEC) 40 MG capsule Take 1 capsule by mouth 2 (Two) Times a Day. 180 capsule 0    PROBIOTIC PRODUCT PO Take  by mouth.      sertraline (ZOLOFT) 50 MG tablet Take 1 tablet by mouth Daily. 90 tablet 1    verapamil SR (CALAN-SR) 120 MG CR tablet Take 1 tablet by mouth Daily.  3     No current facility-administered medications on file prior to visit.        ALLERGIES:    Allergies   Allergen Reactions    Sulfa Antibiotics Hives and Rash        Social History     Socioeconomic History    Marital status:    Tobacco Use    Smoking status: Former     Current packs/day: 0.00     Average packs/day: 2.0 packs/day for 8.0 years (16.0 ttl pk-yrs)     Types: Cigarettes     Start date:      Quit date:      Years since quittin.5    Smokeless tobacco: Never    Tobacco comments:     Caffeine use: Drinks Coca Cola    Vaping Use    Vaping status: Never Used   Substance and Sexual Activity    Alcohol use: Yes     Comment: rare    Drug use: No    Sexual activity: Never     Partners: Male        Family History   Problem Relation Age of Onset    Breast cancer Cousin     Lung cancer Father     Heart disease Father     Colon cancer Brother     Prostate cancer Brother     Lung disease Brother     Emphysema Brother     Heart disease Mother     Malig Hyperthermia Neg Hx       Current  "Outpatient Medications on File Prior to Visit   Medication Sig Dispense Refill    acetaminophen (TYLENOL) 500 MG tablet Take 2 tablets by mouth Every 6 (Six) Hours As Needed for Mild Pain.      ADCIRCA 20 MG tablet Take 2 tablets by mouth Daily.      ascorbic acid (VITAMIN C) 500 MG tablet Take 1 tablet by mouth Daily.      atorvastatin (LIPITOR) 10 MG tablet Take 1 tablet by mouth Daily. 90 tablet 1    Cholecalciferol 25 MCG (1000 UT) capsule Take  by mouth Daily.      ESBRIET 267 MG capsule Take 801 mg by mouth 3 (Three) Times a Day With Meals.      levothyroxine (SYNTHROID, LEVOTHROID) 112 MCG tablet Take 1 tablet by mouth Daily. 90 tablet 1    multivitamin (THERAGRAN) tablet tablet Take  by mouth.      O2 (OXYGEN) Inhale 8 L/min Continuous.      omeprazole (priLOSEC) 40 MG capsule Take 1 capsule by mouth 2 (Two) Times a Day. 180 capsule 0    PROBIOTIC PRODUCT PO Take  by mouth.      sertraline (ZOLOFT) 50 MG tablet Take 1 tablet by mouth Daily. 90 tablet 1    verapamil SR (CALAN-SR) 120 MG CR tablet Take 1 tablet by mouth Daily.  3     No current facility-administered medications on file prior to visit.       Objective    Vitals:    07/08/24 1102   BP: 107/61   Pulse: 97   Resp: 16   Temp: 97.9 °F (36.6 °C)   TempSrc: Oral   SpO2: 93%   Weight: 58.2 kg (128 lb 4.8 oz)   Height: 154.9 cm (60.98\")           Physical Exam               GENERAL:  Well-developed, Patient  in no acute distress. On oxygen  SKIN:  Warm, dry ,NO purpura ,no rash.  HEENT:  Pupils were equal and reactive to light and accomodation, conjunctivae noninjected,  normal visual acuity.   NECK:  Supple with good range of motion; no thyromegaly , no JVD or bruits,.No carotid artery pain, no carotid abnormal pulsation   LYMPHATICS:  No cervical, NO supraclavicular, NO axillary, NO inguinal adenopathies.  CARDIAC   normal rate , regular rhythm, frequent pvc, without murmur,NO rubs NO S3 NO S4   LUNGS: decreased breath sounds bilateral, no wheezing, " NO rhonchi, NO crackles ,NO rubs.  VASCULAR VENOUS: no cyanosis, NO collateral circulation, NO varicosities, NO edema, NO palpable cords, NO pain,NO erythema, NO pigmentation of the skin.  ABDOMEN:  Soft, NO pain,no hepatomegaly, no splenomegaly,no masses, no ascites, no collateral circulation,no distention.  EXTREMITIES  AND SPINE:  finger clubbing, no cyanosis ,no deformities , no pain .No kyphosis,  no pain in spine, no pain in ribs , no pain in pelvic bone.  NEUROLOGICAL:  Patient was awake, alert, oriented to time, person and place.                  RECENT LABS:   Lab Results   Component Value Date    WBC 8.60 06/06/2024    HGB 12.1 06/06/2024    HCT 37.5 06/06/2024    MCV 87.4 06/06/2024     06/06/2024     Lab Results   Component Value Date    GLUCOSE 128 (H) 06/06/2024    BUN 17 06/06/2024    CREATININE 0.77 06/06/2024    EGFR 77.1 06/06/2024    BCR 22.1 06/06/2024    K 3.8 06/06/2024    CO2 30.3 (H) 06/06/2024    CALCIUM 9.9 06/06/2024    PROTENTOTREF 6.5 06/05/2020    ALBUMIN 3.9 06/06/2024    BILITOT 0.3 06/06/2024    AST 21 06/06/2024    ALT 7 06/06/2024       CT Chest With Contrast Diagnostic (06/10/2024 15:07)  CT Abdomen Pelvis With Contrast (06/10/2024 15:07)  Adult Transthoracic Echo Complete W/ Cont if Necessary Per Protocol (07/01/2024 14:21)   Holter Monitor - 48 Hour (07/01/2024 15:43)       Assessment & Plan     Diagnoses and all orders for this visit:    1. Iron deficiency anemia due to chronic blood loss (Primary)    2. Malignant neoplasm of ascending colon    3. Pulmonary fibrosis    4. Pulmonary hypertension                In summary this patient has progressive anemia. I documented in the laboratory parameters above progressive drop in the hemoglobin, progressive drop in the MCV. Morphology of peripheral blood is typical of iron deficiency anemia and I would not be surprised if she has an associated B12 or folate deficiency. This patient has had upper GI series and barium enema by  [facility], Dr. Boyd, and no anatomical alteration has been found in her upper GI tract or colon besides diverticular disease. The symptoms that she has today she blames them, especially the diarrhea, since the time that she had the barium enema. I think that was not an association. The fact that she has so much diarrhea day and night and with weight loss, profound fatigue, sarcopenia leads me to believe that it is some other entity that is producing this. It sounds like to me she has a malabsorptive process of some sort.     The patient also has a palpable mass in the right upper quadrant and the way how it feels to my hands, it is probably a palpable gallbladder. She had no pain there. This is very striking. Her abdomen is very soft and it is very easy to find.     Under the present circumstances and knowing that the patient cannot tolerate oral iron because it makes her to throw up and produce profuse diarrhea and makes the diarrhea worse, I do believe that the patient needs to have the followin. For the workup for her anemia, she went back to the lab to obtain a comprehensive metabolic profile, ferritin, iron, TIBC, B12, folic acid level, reticulated hemoglobin.   2. Given the fact that the patient has had a TSH that was on the low side maybe her thyroid medication is overcorrecting her and this maybe this is part of the diarrhea process. I will obtain a TSH and a T4.   3. I do believe that the patient needs to have a CT scan of the abdomen and pelvis. I do not think she can handle oral contrast and I do not want to give her IV contrast, I find no need. I want to be sure there is no other anatomical alteration in her small intestine and GI tract that is favoring for her to have all the issues pertinent to her clinical symptoms.     I wonder if she could have some other process in her bowel including inflammatory bowel disease, bacterial overgrowth syndrome, celiac disease and differential diagnosis is more than  extensive. This information would require to be shared with Dr. Boyd, her Gastroenterologist, for further review after she returns back in a few days after she proceeds with proper management of her anemia. Given the fact that she cannot handle oral iron by mouth, absolutely the patient needs to have Injectafer on 2 different occasions and this will be scheduled to start to happen as early as TODAY.   I discussed with the patient on 12/04/2020 after reviewing the case with the radiologist Dr. Pinto at [facility] in regards to her CT scan of the abdomen. This documents intussusception of the colon and there is a mass located in that that probably represents a colon cancer. Obviously, the question is how to proceed with all these issues on this patient. It is very obvious that she has profound iron deficiency anemia. Her ferritin level, iron level are low. She cannot handle oral iron therapy and she will initiate Injectafer today and she will have the 2nd infusion next week. I expect that in 3 weeks she will have very significant rebound in erythropoiesis and I expect that her pulmonary manifestations will be better in regard to her pulmonary hypertension and her pulmonary fibrosis. Typically patient's with this condition develop polycythemia instead of anemia.    The other issue is what we are going to do with her colon. I do not think that the patient is a normal surgical candidate under the present circumstances and we need to try to fix her up in the best way possible to see if she gets to the point to have a resection of this issue. I have made her an appointment to be seen by cardiology, Rambo Dasilva MD, and proceed with an echocardiogram in days. We have also made an appointment for her to see Alfonso Cesar MD, General Surgery to review the case with me.       I reviewed the patient back with the daughter in the room on 12/10/2020. In the interim, I have had a conversation with Zita Guy,  MD, the patient’s pulmonologist, who strongly believes that the patient will be able to go through surgery as long as she has proper pulmonary assessment before intervention and she asked me to go ahead and consult Pulmonary Medicine, Dr. Ramos and associates for this purpose. A consult has been placed.     I reviewed her echocardiogram and actually it is better than I expected for somebody who has pulmonary fibrosis.     In summary I think the patient’s intussusception is producing bleeding, chronically, leading to iron deficiency anemia. She has had Injectafer a week ago, another one to be given to her tomorrow and thereafter she will require blood work on weekly basis. If the hemoglobin goes below 9, I think she will require transfusion of 2 units of red cells. She will have weekly appointment for CBC and nurse check and I will review her back on 12/30/2020.     I EMPHATICALLY POINTED OUT TO THE PATIENT’S DAUGHTER SHE CANNOT TAKE ANYMORE ZULMA-SELTZER. SHE HAS BEEN TAKING THIS ON A REGULAR SCHEDULE AND THIS IS PRODUCING PROBABLY ALSO AN ELEMENT OF GI BLEEDING ASSOCIATED WITH HER INTUSSUSCEPTION AND WHY NOT GASTRITIS AND SO FORTH.     In summary the patient was reviewed on 12/30/2020 along with her daughter in the room. The patient has gone through cardiological assessment and actually her cardiac ejection fraction is not that bad, close to 50%, and with minimal evidence of pulmonary arterial hypertension. Normal contractility of the left ventricle. The patient also has been seen by Pulmonary Medicine. These records have been reviewed again and her pulmonary function tests is very marginal. The analysis is stated above by Dr. Moser who has advised the patient that she is a high risk for general anesthesia in regard to the need for ICU stay, intubation, mechanical ventilation and so forth given her pulmonary fibrosis. He has advised the patient to see Dr. Guy, Pulmonary Medicine, and she has an appointment to see  her next week.     In regard to her iron deficiency anemia after she received IV iron infusions the hemoglobin keeps improving to 11.2 from 8.4. The patient is no longer taking Nohelia-Fairplay. The patient is a little bit stronger but her appetite is not good. Obviously the question is what is the cause of the intussusception. I have measured a CEA level that is elevated at 6.2 and leads me to believe that maybe this is something related to a primary colon cancer. Dr. Cesar has suggested for her to have a colonoscopy after reviewing his good notes. The patient is even more hesitant to proceed with this.       The patient was further reviewed in the office on 02/22/2021. Since the previous visit and after I discussed the case with Dr. Zita Guy, the patient’s pulmonologist, I advised Dr. Cesar to go ahead and proceed with her surgery. This happened and actually to the surprise of everybody the patient walked away from the hospital 3 days later with no complications or issues pertinent to her lung disease. The patient had also excellent tolerance to her colonoscopy. Since she has been at home she has been gaining appetite and gaining weight. She is feeling substantially better. She has no abdominal pain. Her surgical site has healed. Her bowel activity is normal and urination is normal. She remains on oxygen. She is walking around the house and she is doing more things that she was not doing before. She is not sitting down complaining of a bad stomach all the time. She is not taking Nohelia-Fairplay as I pointed out to them before.     I have reviewed the pathology of the surgical specimen and she had a [id number] colonic cancer with 14 lymph nodes removed, negative, with no perineural, perivascular or perivenous invasion. The margins of resection were completely clear. On the CT scan of the abdomen, her liver was completely unremarkable.         I reviewed with the patient on the telephone on 06/17/2021 after personally  reviewing the PET scan in the PAC system [facility] that shows no areas of abnormal uptake in her lungs, no areas of abnormal uptake in the liver, retroperitoneal lymph nodes or any other site as far as I can tell. The radiologists reading is about the same.     The good news to me is that the PET scan is negative, the patient is feeling better. She remains fully functional, her anemia is corrected after all of the bleeding that she did out of her colon cancer with improving hemoglobin.    Obviously raises the question why the CEA level is elevated. The only reasonable explanation could be the chronic inflammatory process associated with her chronic lung disease. She does not smoke. She is not around anybody who smokes and obviously it is the only logical explanation.       The patient was further reviewed on 09/28/2021. Her overall clinical condition is excellent. As long as she does not eat junk food she has no diarrhea, abdominal pain or discomfort. Her weight is stable. She feels substantially better in regard to her ability to function independently at home. She has no pain related to malignancy, urination is normal, her respiratory situation remains stable, she remains on oxygen and she has not had any other respiratory infections.     The clinical examination of the patient today is very much negative normal besides her chronic lung disease and her decreased pulmonary auscultation with crackles at the bases. Her heart function seems to be appropriate.     Her white count, hemoglobin and platelets are normal. The white count differential is normal. Her ferritin and iron profile remain normal. Her CEA level actually has dropped some and this in my opinion is related to her chronic lung disease more than anything else. As we have mentioned before we have performed multiple radiological analyses on her including CT scans and PET scans looking for recurrent disease and nothing has been found.     I  do believe that the patient in my opinion from the point of view of her colon cancer and her iron deficiency anemia that has been corrected is doing perfectly well. Her chronic lung disease is her chronic lung disease and this will remain on oxygen and followed up by her pulmonologist.     I advised her to go ahead and get the booster shot for COVID and I advised her also to proceed with flu vaccination.     I will review her back in 3 months with a CBC, CMP, ferritin, iron profile and a CEA level. I find no need for radiological assessment on her at this time.   The patient was further reviewed on 12/28/2021. In regard to the previous history of colon cancer, she underwent resection of this. Since then her hemoglobin has stabilized around 12. Her ferritin level is pending today. She has improved her diet. She has gained a few pounds and she feels substantially better from this point of view. She has learned to put away foods that she knows that she will get sick with and now she is eating a variety of foods that she has not eaten in a long time. Surprising enough very likely her nutrition is better. She has no symptoms that wound indicate colon cancer recurrence. In spite of having a persistent elevation of the CEA level the patient has not had anything to suggest clinically, radiologically through CT scans and PET scans evidence of colon cancer recurrence. It raises the question if the pulmonary fibrosis is the reason why her CEA level is minimally elevated. We are awaiting to review this and discuss this with the patient upon result.     She remains on oxygen for her pulmonary fibrosis. Today she has minimal if any crackles. Actually her lung auscultation is very benign. She has atrial fibrillation with controlled ventricular response and she will see Cardiology very soon.     I advised the patient to return to see me back in 3 months with a CBC, CMP, CEA, ferritin, iron, TIBC. I discussed all these facts with  the patient and her daughter present in the room. I did not prescribe any new medicines.  I reviewed the patient on 03/29/2022. Since the previous visit she has had COVID infection in the upper respiratory tract treated by her pulmonologist with steroids and resolution and no complications. That is very surprising. In any event, she has had lesser need for oxygen. Her O2 saturation has improved and she is down to 6L nasal cannula continuously. She remains on CPAP at nighttime. She has seen her pulmonologist, Dr. Duarte. She has had a CT scan that shows no new alterations in her lung anatomy according to the patient.     Her white count, hemoglobin and platelets are excellent today. Her reticulated hemoglobin is normal. Her ferritin, iron profile are pending as well as her CEA and CMP.     I do believe that the patient does not have any symptoms to suggest colon cancer recurrence. Her anemia seems to have improved. Her hemoglobin has stabilized and I think she looks very good to me.    RECOMMENDATIONS:  I have advised her to return to see us back in 4 months with a CBC, CMP, ferritin, iron, TIBC as well as CEA level. Otherwise, no other need for radiological assessment from my point of view.     I discussed all these facts with the patient and her daughter present in the room.  Addendum: due to cea of 11.4 I will proceed with ct abd pelvis in few days, her ct chest shows nothing to suggest a lung malignancy or mets  The patient was reviewed by telemedicine on 04/06/2022. In the interim she has had a CT scan of the abdomen and pelvis. I have reviewed this in the PAC system [facility]. The lower part of her lungs are more than impressive. They look like sponges with dramatic interstitial infiltrate. There is a lot of honeycomb pattern that is again more than dramatic. There is no pleural effusion. There is minimal cardiomegaly. She has gallstones in the gallbladder, she has normal liver anatomy with no  lesions. The pancreas, the stomach, the spleen remain normal. The kidneys are normal. The uterus remains normal. There is minimal bladder wall thickening. There is normal rectum. The bowel per se looks normal. There is no ascites or retroperitoneal adenopathy. There is significant calcification of the abdominal aorta with no aneurysm formation.    Given the above findings as I discussed with the patient on the telephone today I strongly believe that the CEA is manifestation of her lung alteration and no more than that.     We have done in the past PET scans, endoscopies and so forth looking for any other source of CEA elevation and all of the testing has been negative. The patient feels very well at this point, actually she has reached a new level of stability, she is able to be functional, she has no pain, she has normal bowel activity, urination is normal. She has no fever or infection. Her need for oxygen has decreased and she will be coming back to the office in 4 months as originally planned with CBC, CMP and a CEA level.      I discussed all of these facts on the telephone.    Time on the telephone 14 minutes.   On 07/19/2022, the patient looks terrific. She has no symptoms or signs of colon cancer recurrence or her abdominal symptomatology, after removal of the tumor subsided including diarrhea, passage of blood in the stool, gastric and intolerance, nausea, vomiting, abdominal pain and distention. Her anemia has recovered. Her hemoglobin is almost 12 today. Her reticulated hemoglobin is normal and I think she is no longer having iron deficiency.     Besides this the patient has not had any new health issues. She remains on oxygen for her pulmonary fibrosis. I personally believe that the CEA elevation represents just reflection of pulmonary fibrosis and no more than that. Previous PET scan and CT scans have failed to document any abnormality to justify the CEA elevation. No liver metastases have been found.  No lesions in the colon have been found. No retroperitoneal adenopathy, ascites or any other new alterations. Even the PET scan was negative in the areas of abnormal fibrosis in her lung anatomy.     A CEA level is obtained today but I told her that maybe in the long run we do not need to run this test anymore because always is going to remain abnormal and we are not going to know what to do with it. Maybe in the long run will be better off to run a liquid biopsy that can provide more information.     She will be called at home with the report of her testing. I want to review her back in 4 months with a CBC, CMP and a CEA level. I will order a liquid biopsy then.  On 03/15/2023 the patient was further reviewed. She looks fantastic to me and I do not believe that the patient has terrible lung auscultation. 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 I do not believe that this patient needs to have oxygen at 8L/min and I warned her against that. I advised her to use the oxygen meter that she has available at home to guide her oxygen therapy. At the best she probably needs to have 2L/min. She is saturating 99 in the office at 3L/min. Her heart rate is 62. On the other hand her anemia was corrected completely after IV iron therapy and the resection of her tumoral lesion in the colon. Her hemoglobin today is 12, excellent for her. Her white count and platelet count are normal and her chemistry profile discloses no significant alterations to speak of. Her CO2 is 31 probably telling me that she has probably a component of CO2 retention. Creatinine, glucose, albumin, bilirubin and liver enzymes are all normal. Her CEA level is pending.     Therefore from my point of view I do believe that the patient will require to come back and see us in 6 months and she will require a CBC, CMP and a CEA level at that time along with a  reticulated hemoglobin.     I do not believe that I find any need for radiological assessment on her at this time.     As we have mentioned she has had chronic elevation of her CEA level that has not changed over time and detailed examination, radiological assessment, PET scan and colonoscopy have failed to document the reasons for this. My personal suspicion is that interstitial pulmonary disease triggers this abnormality.     The patient was advised otherwise to remain in observation coming to see us back in 6 months. I have not prescribed any medications for her at this time.  On 10/16/2023 the patient has been further reviewed.  She has no symptoms or signs of colon cancer recurrence. Her white count, hemoglobin and platelets are normal. Her abdominal exam is benign with no liver enlargement, masses, ascites or pain.     I do believe that her colon cancer remains in remission. She will return to see us back in 6 months for review with repeat CBC, CMP, and a CEA level.   2. The patient has had iron deficiency anemia as a consequence of her colon cancer due to blood loss. Once that the cancer was removed the anemia was corrected with IV iron, the patient's hemoglobin has remained stable at 12.1. Her ferritin and reticulated hemoglobin are normal total.   3. The patient has chronic elevation of the CEA level. We have performed colonoscopy, PET scans, CT scans showing no new abnormalities to speak of. I strongly suspect that interstitial pulmonary disease is the cause of this and I have nothing else to add to this process at this time. She will be called at home with the report of her CEA once that it becomes available. Otherwise, this number will be repeated upon return in 6 months.       I discussed all these facts with the patient and her daughter in the room. She will have repeat CBC, CMP, ferritin, iron profile and CEA level in 6 months.    On 10/16/2023 I reviewed the radiological assessment done in outside  facility in regard to her CT scan of the chest with high resolution. The report says that there is no significant changes.    On 06/06/2024, it is very obvious that the patient has progressive amount of shortness of breath for the last several months. She gets tremendously fatigued upon doing any minimal physical activity at home and she has more shortness of breath at rest. Today, her oxygen concentration on 2L nasal cannula is appropriate. Her lung auscultation shows relatively good breath sounds bilaterally with no crackles, wheezing or rubs. Her heart is irregular. She has minimal Kussmaul sign in the neck and she has no edema in her lower extremities. The liver is not palpable or congested. This leads me to believe that her worsening shortness of breath is probably related to her interstitial lung disease and I would not be surprised if she has pulmonary hypertension and right-sided early congestive heart failure.     It is concerning to me as well the minimal drop in the hemoglobin, the low iron saturation. The patient continues eating a lot of junk food, no kind of food that will let her to have normal nutrition for her age. Therefore, I do believe that the patient needs to be seen by Cardiology as soon as possible, Dr. Dasilva, and I went ahead and ordered a proBNP today.     Given her pulmonary cardiac situation, I went ahead and ordered a CT scan of the chest, abdomen and pelvis as soon as possible as early as tomorrow. Once that the report becomes available, will be further discussing this with the patient and her daughter on the telephone.     I would not be surprised if the patient needs to come back and see me in a few days given the present circumstances. Given the low iron saturation, I think the patient will qualify for IV iron therapy. She has terrible intolerance to oral iron therapy.     I advised the patient that very likely she has malnutrition. The food that she consumes is terrible. Just  processed food, junk food, and Dr. Pepper for example. There is nothing nutritious besides a scoop of ice cream here and there and a cookie here and there. Therefore, I asked her daughter to do some real cooking, try to bring her some food that has some proper protein and proper nutrition in the form of vegetables and fruits.     Depending on the radiological analysis of her chest, we probably need to have a conversation with Zita Guy MD, the patient's pulmonologist.     We left an open appointment in case that she needs to come back and see me.     We know that this patient has had an elevated CEA level since her colectomy. She has had a repeated colonoscopy, PET scan, CT scans and so forth. We never have been able to prove any recurrent disease. My suspicion is that in the middle of that interstitial pulmonary disease and in absence of any abnormality in the PET scan has to be something that triggers CEA elevation. I have no other good theory though. She is not smoking at this time either.     Discussed with the patient and daughter in detail.     Duration of this visit: 1 hour.    1. On 07/08/2024 the patient was further reviewed. Her CT scan of the chest is very dramatic in regard to the degree of pulmonary fibrosis and small bulla that is throughout. There is no pericardial effusion, no pleural effusion, there is no other pulmonary discreet masses and there is no mediastinal adenopathy or hilar adenopathy. The CT scan of the abdomen documents normal liver anatomy, previous partial colectomy, normal spleen, pancreas and kidneys. No intraabdominal masses, no ascites, no retroperitoneal adenopathy. Bowel anatomy looked normal.    Her CEA level was minimally elevated at 10, no different than before.     Therefore in regard to her colon cancer I find nothing to suggest recurrent disease at this point. We know and we have discussed this on many occasions before her CEA level is elevated and always has remained  around 10. To me this is reflection of what goes on in her lungs and nothing related to her colon cancer at all. She has had PET scans as discussed before negative for recurrent disease anywhere.     She will be watched in absence of any other intervention in regard to the colon cancer returning to see us back in 4 months with repeat CBC, CMP, CEA level, ferritin and iron profile.     2. The patient has been found to have iron deficiency again. She has received IV iron last week and she will receive IV iron today. Ferritin level, iron profile and CBC will be rechecked again in 4 months.    3.  The patient's fatigue and shortness of breath is related all to her lung condition. I have discussed the case with, Zita Guy MD, the patient's Pulmonologist. The patient blames the changes on one of her medicines to a generic form to the changes that she has. I am not sure if that is the case or not. Nevertheless her pulmonary situation is very frail and marginal. For this reason I do believe that the patient needs to be proceeding with Dr. Guy's appointment and my office has made her an appointment to see her as soon as possible. I do believe that maybe the patient also will require some prednisone at some point but again will leave this to Dr. Guy.     4. From the point of view of the cardiac evaluation requested by me, this has been completed. She had a Holter test that showed paroxysmal supraventricular tachycardia. This is very associated with COPD exacerbations and lung conditions, there were some PVCs but no cardiac arrhythmia for example no atrial fibrillation or ventricular tachycardia.     Her echocardiogram showed some minor decrease in the ejection fraction. There is no pericardial effusion. There is no evidence of pulmonary hypertension that surprises me. In any event the heart is not the reason why this patient has these problems and I have measured a proBNP during the previous visit showing no alterations  whatsoever. Therefore the heart has been ruled out as the condition that is triggering this process.    The patient will be followed up by Dr. Guy.     In the next visit in 4 months we will recheck ferritin, iron profile and reticulated hemoglobin to be sure that her iron deposits are completed. The patient is now eating regular diet, she was eating just junk food all of the time and I think she probably had an element of malnutrition.     This was discussed with the patient and her daughter in the room.    I discussed all of these facts with Dr. Guy on the telephone today.

## 2024-07-09 ENCOUNTER — TELEPHONE (OUTPATIENT)
Dept: ONCOLOGY | Facility: CLINIC | Age: 84
End: 2024-07-09
Payer: MEDICARE

## 2024-07-09 ENCOUNTER — TELEPHONE (OUTPATIENT)
Dept: CARDIOLOGY | Facility: CLINIC | Age: 84
End: 2024-07-09
Payer: MEDICARE

## 2024-07-09 PROBLEM — I49.3 PVCS (PREMATURE VENTRICULAR CONTRACTIONS): Status: ACTIVE | Noted: 2024-07-09

## 2024-07-09 PROBLEM — I49.1 PAC (PREMATURE ATRIAL CONTRACTION): Status: ACTIVE | Noted: 2024-07-09

## 2024-07-09 NOTE — TELEPHONE ENCOUNTER
"Contacted patient, per Dr. Lara's note \"I do believe that maybe the patient also will require some prednisone at some point but again will leave this to Dr. Guy.\" I relayed this information to her and patient V/U.  "

## 2024-07-09 NOTE — TELEPHONE ENCOUNTER
Caller: Madalyn Galeas    Relationship: Self    Best call back number: [phone]      What was the call regarding: PATIENT CALLED TO SEE IF DR SOUSA WOULD GO AHEAD AND CALL IN THE PREDNISONE FOR HER. DR SOUSA WANTED HER TO SEE HER PULMONARY DOCTOR FIRST BUT SHE CAN NOT GET IN TO THEM UNTIL NEXT WEDNESDAY         [facility] - [place], [place] - [address] [facility] - [phone]  - [phone]       PLEASE CALL TO ADVISE

## 2024-07-09 NOTE — TELEPHONE ENCOUNTER
Results and recommendations called to pt.  Instructed to call with any further questions or concerns.  Verbalized understanding.  Follow up scheduled as requested.    Ifeoma Perez RN  Triage Nurse, [facility]  07/09/24 10:15 EDT

## 2024-07-09 NOTE — TELEPHONE ENCOUNTER
----- Message from Joceline Hopper sent at 7/9/2024  9:53 AM EDT -----  Patient was referred to me recently for an irregular heartbeat.  EKG showed normal sinus rhythm with PVCs.  She was asymptomatic and denied palpitations.    Please call patient.  Holter monitor showed normal sinus rhythm with an average heart rate of 71 bpm.  She has a 2% PAC burden and a 2% PVC burden.  I think that is what is the irregular heartbeat at times.  She is asymptomatic so I am not can to treat him.  She had 10 brief episodes of atrial ectopy up to 8 beats which would last for few seconds.  Overall relatively benign Holter monitor.  I would not change treatment.    Please make a 6-month follow-up visit with Dr. Dasilva.  Thank you

## 2024-07-25 ENCOUNTER — OFFICE VISIT (OUTPATIENT)
Dept: FAMILY MEDICINE CLINIC | Facility: CLINIC | Age: 84
End: 2024-07-25
Payer: MEDICARE

## 2024-07-25 VITALS
WEIGHT: 127 LBS | HEIGHT: 61 IN | BODY MASS INDEX: 23.98 KG/M2 | OXYGEN SATURATION: 97 % | DIASTOLIC BLOOD PRESSURE: 58 MMHG | TEMPERATURE: 97.7 F | HEART RATE: 80 BPM | SYSTOLIC BLOOD PRESSURE: 116 MMHG | RESPIRATION RATE: 16 BRPM

## 2024-07-25 DIAGNOSIS — K21.9 GASTROESOPHAGEAL REFLUX DISEASE WITHOUT ESOPHAGITIS: Chronic | ICD-10-CM

## 2024-07-25 DIAGNOSIS — E78.2 MIXED HYPERLIPIDEMIA: Chronic | ICD-10-CM

## 2024-07-25 DIAGNOSIS — Z00.00 ENCOUNTER FOR SUBSEQUENT ANNUAL WELLNESS VISIT (AWV) IN MEDICARE PATIENT: Primary | ICD-10-CM

## 2024-07-25 DIAGNOSIS — F33.42 MAJOR DEPRESSIVE DISORDER, RECURRENT EPISODE, IN FULL REMISSION: Chronic | ICD-10-CM

## 2024-07-25 DIAGNOSIS — E03.9 ACQUIRED HYPOTHYROIDISM: Chronic | ICD-10-CM

## 2024-07-25 PROCEDURE — 1159F MED LIST DOCD IN RCRD: CPT | Performed by: FAMILY MEDICINE

## 2024-07-25 PROCEDURE — 99214 OFFICE O/P EST MOD 30 MIN: CPT | Performed by: FAMILY MEDICINE

## 2024-07-25 PROCEDURE — G0439 PPPS, SUBSEQ VISIT: HCPCS | Performed by: FAMILY MEDICINE

## 2024-07-25 PROCEDURE — 1170F FXNL STATUS ASSESSED: CPT | Performed by: FAMILY MEDICINE

## 2024-07-25 PROCEDURE — 3078F DIAST BP <80 MM HG: CPT | Performed by: FAMILY MEDICINE

## 2024-07-25 PROCEDURE — 3074F SYST BP LT 130 MM HG: CPT | Performed by: FAMILY MEDICINE

## 2024-07-25 PROCEDURE — 1160F RVW MEDS BY RX/DR IN RCRD: CPT | Performed by: FAMILY MEDICINE

## 2024-07-25 PROCEDURE — 1126F AMNT PAIN NOTED NONE PRSNT: CPT | Performed by: FAMILY MEDICINE

## 2024-07-25 RX ORDER — LEVOTHYROXINE SODIUM 112 UG/1
112 TABLET ORAL DAILY
Qty: 90 TABLET | Refills: 1 | Status: SHIPPED | OUTPATIENT
Start: 2024-07-25

## 2024-07-25 RX ORDER — OMEPRAZOLE 40 MG/1
40 CAPSULE, DELAYED RELEASE ORAL 2 TIMES DAILY
Qty: 180 CAPSULE | Refills: 1 | Status: SHIPPED | OUTPATIENT
Start: 2024-07-25

## 2024-07-25 RX ORDER — PREDNISONE 20 MG/1
2 TABLET ORAL DAILY
COMMUNITY
Start: 2024-07-10

## 2024-07-25 RX ORDER — ATORVASTATIN CALCIUM 10 MG/1
10 TABLET, FILM COATED ORAL DAILY
Qty: 90 TABLET | Refills: 1 | Status: SHIPPED | OUTPATIENT
Start: 2024-07-25

## 2024-07-25 NOTE — PATIENT INSTRUCTIONS
Medicare Wellness  Personal Prevention Plan of Service     Date of Office Visit:    Encounter Provider:  Enmanuel Berger MD  Place of Service:  [facility]  Patient Name: Madalyn Galeas  :  1940    As part of the Medicare Wellness portion of your visit today, we are providing you with this personalized preventive plan of services (PPPS). This plan is based upon recommendations of the [place] Preventive Services Task Force (USPSTF) and the Advisory Committee on Immunization Practices (ACIP).    This lists the preventive care services that should be considered, and provides dates of when you are due. Items listed as completed are up-to-date and do not require any further intervention.    Health Maintenance   Topic Date Due    DXA SCAN  04/10/2021    TDAP/TD VACCINES (2 - Td or Tdap) 2024 (Originally 2022)    COVID-19 Vaccine (5 - -24 season) 10/14/2024 (Originally 2023)    ZOSTER VACCINE (2 of 3) 2025 (Originally 2012)    RSV Vaccine - Adults (1 - 1-dose 60+ series) 2025 (Originally 2001)    INFLUENZA VACCINE  2024    LIPID PANEL  2025    ANNUAL WELLNESS VISIT  2025    COLONOSCOPY  2031    Pneumococcal Vaccine 65+  Completed       No orders of the defined types were placed in this encounter.      Return in about 6 months (around 2025) for Recheck.

## 2024-07-25 NOTE — PROGRESS NOTES
Subjective   The ABCs of the Annual Wellness Visit  Medicare Wellness Visit      Madalyn Galeas is a 84 y.o. patient who presents for a Medicare Wellness Visit.    The following portions of the patient's history were reviewed and   updated as appropriate: allergies, current medications, past family history, past medical history, past social history, past surgical history, and problem list.    Compared to one year ago, the patient's physical   health is worse.  Compared to one year ago, the patient's mental   health is the same.    Recent Hospitalizations:  She was not admitted to the hospital during the last year.     Current Medical Providers:  Patient Care Team:  Enmanuel Berger MD as PCP - General (Family Medicine)  Zita Guy MD as Consulting Physician (Pulmonary Disease)  Anju Boyd MD as Consulting Physician (Gastroenterology)  Enmanuel Berger MD as Referring Physician (Family Medicine)  Emmanuel Lara MD as Consulting Physician (Hematology and Oncology)  Rambo Dasilva MD as Consulting Physician (Cardiology)  Estefany Sotelo MD as Consulting Physician (Pulmonary Disease)  KRISTIN Baldwin MD as Consulting Physician (Ophthalmology)  Zita Guy MD (Pulmonary Disease)  Luther Chavarria MD as Consulting Physician (Otolaryngology)  Sharri Gillespie APRN as Nurse Practitioner (Nurse Practitioner)  Joceline Hopper APRN as Nurse Practitioner (Cardiology)  Hugo Ortiz MD as Consulting Physician (Hematology and Oncology)    Outpatient Medications Prior to Visit   Medication Sig Dispense Refill    predniSONE (DELTASONE) 20 MG tablet Take 2 tablets by mouth Daily.      atorvastatin (LIPITOR) 10 MG tablet Take 1 tablet by mouth Daily. 90 tablet 1    levothyroxine (SYNTHROID, LEVOTHROID) 112 MCG tablet Take 1 tablet by mouth Daily. 90 tablet 1    sertraline (ZOLOFT) 50 MG tablet Take 1 tablet by mouth Daily. 90 tablet 1    acetaminophen (TYLENOL) 500 MG tablet Take 2 tablets  by mouth Every 6 (Six) Hours As Needed for Mild Pain.      ADCIRCA 20 MG tablet Take 2 tablets by mouth Daily.      ascorbic acid (VITAMIN C) 500 MG tablet Take 1 tablet by mouth Daily.      Cholecalciferol 25 MCG (1000 UT) capsule Take  by mouth Daily.      ESBRIET 267 MG capsule Take 801 mg by mouth 3 (Three) Times a Day With Meals.      multivitamin (THERAGRAN) tablet tablet Take  by mouth.      O2 (OXYGEN) Inhale 8 L/min Continuous.      PROBIOTIC PRODUCT PO Take  by mouth.      verapamil SR (CALAN-SR) 120 MG CR tablet Take 1 tablet by mouth Daily.  3    omeprazole (priLOSEC) 40 MG capsule Take 1 capsule by mouth 2 (Two) Times a Day. 180 capsule 0     No facility-administered medications prior to visit.     No opioid medication identified on active medication list. I have reviewed chart for other potential  high risk medication/s and harmful drug interactions in the elderly.      Aspirin is not on active medication list.  Aspirin use is not indicated based on review of current medical condition/s. Risk of harm outweighs potential benefits.  .    Patient Active Problem List   Diagnosis    Esophageal reflux    Hyperlipidemia    Hypothyroidism    Major depressive disorder, recurrent episode, in full remission    Pulmonary fibrosis    Pulmonary hypertension    Sleep apnea, obstructive    Vertigo, peripheral    Impaired fasting glucose    Iron deficiency anemia    Malabsorption of iron    Essential hypertension    Diverticulosis    Hemorrhoids    Malignant neoplasm of ascending colon    Elevated CEA    Chronic respiratory failure    Irregular heart rhythm    PVCs (premature ventricular contractions)    PAC (premature atrial contraction)     Advance Care Planning (Click this link to access ACP Navigator)  Advance Directive is on file.  ACP discussion was held with the patient during this visit. Patient has an advance directive in EMR which is still valid.         Objective   Vitals:    07/25/24 1450   BP: 116/58  "  Pulse: 80   Resp: 16   Temp: 97.7 °F (36.5 °C)   TempSrc: Oral   SpO2: 97%   Weight: 57.6 kg (127 lb)   Height: 154.9 cm (60.98\")   PainSc: 0-No pain       Estimated body mass index is 24.01 kg/m² as calculated from the following:    Height as of this encounter: 154.9 cm (60.98\").    Weight as of this encounter: 57.6 kg (127 lb).    BMI is within normal parameters. No other follow-up for BMI required.        Does the patient have evidence of cognitive impairment? No                                                                                                Health  Risk Assessment    Smoking Status:  Social History     Tobacco Use   Smoking Status Former    Current packs/day: 0.00    Average packs/day: 2.0 packs/day for 8.0 years (16.0 ttl pk-yrs)    Types: Cigarettes    Start date:     Quit date:     Years since quittin.5   Smokeless Tobacco Never   Tobacco Comments    Caffeine use: Drinks Coca [employer]      Alcohol Consumption:  Social History     Substance and Sexual Activity   Alcohol Use Yes    Comment: rare     Fall Risk Screen:  [facility]    Depression Screenin/6/2024     4:27 PM   PHQ-2/PHQ-9 Depression Screening   Little Interest or Pleasure in Doing Things 0-->not at all   Feeling Down, Depressed or Hopeless 0-->not at all   PHQ-9: Brief Depression Severity Measure Score 0     Health Habits and Functional and Cognitive Screenin/25/2024     3:00 PM   Functional & Cognitive Status   Do you have difficulty preparing food and eating? No   Do you have difficulty bathing yourself, getting dressed or grooming yourself? No   Do you have difficulty using the toilet? No   Do you have difficulty moving around from place to place? No   Do you have trouble with steps or getting out of a bed or a chair? No   Current Diet Well Balanced Diet   Dental Exam Up to date   Eye Exam Up to date   Exercise (times per week) 0 times per week   Current " Exercises Include No Regular Exercise   Do you need help using the phone?  No   Are you deaf or do you have serious difficulty hearing?  No   Do you need help to go to places out of walking distance? No   Do you need help shopping? No   Do you need help preparing meals?  No   Do you need help with housework?  No   Do you need help with laundry? No   Do you need help taking your medications? No   Do you need help managing money? No   Do you ever drive or ride in a car without wearing a seat belt? No   Have you felt unusual stress, anger or loneliness in the last month? No   Who do you live with? Alone   If you need help, do you have trouble finding someone available to you? No   Have you been bothered in the last four weeks by sexual problems? No   Do you have difficulty concentrating, remembering or making decisions? No             Age-appropriate Screening Schedule:  Refer to the list below for future screening recommendations based on patient's age, sex and/or medical conditions. Orders for these recommended tests are listed in the plan section. The patient has been provided with a written plan.    Health Maintenance List  Health Maintenance   Topic Date Due    DXA SCAN  04/10/2021    TDAP/TD VACCINES (2 - Td or Tdap) 07/25/2024 (Originally 9/26/2022)    COVID-19 Vaccine (5 - 2023-24 season) 10/14/2024 (Originally 9/1/2023)    ZOSTER VACCINE (2 of 3) 01/25/2025 (Originally 9/20/2012)    RSV Vaccine - Adults (1 - 1-dose 60+ series) 07/25/2025 (Originally 6/24/2001)    INFLUENZA VACCINE  08/01/2024    LIPID PANEL  01/25/2025    ANNUAL WELLNESS VISIT  07/25/2025    COLONOSCOPY  01/21/2031    Pneumococcal Vaccine 65+  Completed                                                                                                                                                CMS Preventative Services Quick Reference  Risk Factors Identified During Encounter  None Identified    The above risks/problems have been discussed  "with the patient.  Pertinent information has been shared with the patient in the After Visit Summary.  An After Visit Summary and PPPS were made available to the patient.    Follow Up:   Next Medicare Wellness visit to be scheduled in 1 year.         Additional E&M Note during same encounter follows:  Patient has additional, significant, and separately identifiable condition(s)/problem(s) that require work above and beyond the Medicare Wellness Visit     Chief Complaint  MEDICARE WELLNESS (DUE ), Hypertension (MED REFILL DUE ), Hyperlipidemia, Hypothyroidism, Heartburn, and Depression    Subjective   HPI  Madalyn is also being seen today for additional medical problem/s.    Review of Systems   Constitutional:  Negative for chills and fever.   HENT:  Negative for congestion and sinus pressure.    Eyes:  Negative for visual disturbance.   Respiratory:  Negative for cough and shortness of breath.    Cardiovascular:  Negative for chest pain.   Gastrointestinal:  Negative for abdominal pain.   Genitourinary:  Negative for dysuria.   Skin:  Negative for rash.   Hematological:  Negative for adenopathy.   Psychiatric/Behavioral:  Negative for dysphoric mood.               Objective   Vital Signs:  /58   Pulse 80   Temp 97.7 °F (36.5 °C) (Oral)   Resp 16   Ht 154.9 cm (60.98\")   Wt 57.6 kg (127 lb)   SpO2 97%   BMI 24.01 kg/m²   Physical Exam  Vitals and nursing note reviewed.   Constitutional:       General: She is not in acute distress.     Appearance: She is well-developed.   Cardiovascular:      Rate and Rhythm: Normal rate and regular rhythm.   Pulmonary:      Effort: Pulmonary effort is normal.      Breath sounds: Normal breath sounds.   Neurological:      Mental Status: She is alert and oriented to person, place, and time.   Psychiatric:         Behavior: Behavior normal.         Thought Content: Thought content normal.         The following data was reviewed by: Enmanuel Berger MD on 07/25/2024:      "   Assessment and Plan Additional age appropriate preventative wellness advice topics were discussed during today's preventative wellness exam(some topics already addressed during AWV portion of the note above):    Physical Activity: Advised cardiovascular activity 150 minutes per week as tolerated. (example brisk walk for 30 minutes, 5 days a week).     Nutrition: Discussed nutrition plan with patient. Information shared in after visit summary. Goal is for a well balanced diet to enhance overall health.              Encounter for subsequent annual wellness visit (AWV) in Medicare patient    Acquired hypothyroidism    Major depressive disorder, recurrent episode, in full remission  Patient's depression is a single episode that is mild without psychosis. Depression is in full remission and stable.    Plan:   Continue current medication therapy     Followup in 6 months.   Mixed hyperlipidemia   Lipid abnormalities are stable    Plan:  Continue same medication/s without change.      Counseled patient on lifestyle modifications to help control hyperlipidemia.     Patient Treatment Goals:   LDL goal is under 100    Followup in 6 months.  Gastroesophageal reflux disease without esophagitis    No orders of the defined types were placed in this encounter.    New Medications Ordered This Visit   Medications    levothyroxine (SYNTHROID, LEVOTHROID) 112 MCG tablet     Sig: Take 1 tablet by mouth Daily.     Dispense:  90 tablet     Refill:  1    sertraline (ZOLOFT) 50 MG tablet     Sig: Take 1 tablet by mouth Daily.     Dispense:  90 tablet     Refill:  1    atorvastatin (LIPITOR) 10 MG tablet     Sig: Take 1 tablet by mouth Daily.     Dispense:  90 tablet     Refill:  1    omeprazole (priLOSEC) 40 MG capsule     Sig: Take 1 capsule by mouth 2 (Two) Times a Day.     Dispense:  180 capsule     Refill:  1        I spent 15 minutes caring for Madalyn on this date of service. This time includes time spent by me in the following  activities:preparing for the visit, performing a medically appropriate examination and/or evaluation , ordering medications, tests, or procedures, and documenting information in the medical record  Follow Up   Return in about 6 months (around 1/25/2025) for Recheck.  Patient was given instructions and counseling regarding her condition or for health maintenance advice. Please see specific information pulled into the AVS if appropriate.

## 2024-08-11 ENCOUNTER — APPOINTMENT (OUTPATIENT)
Dept: GENERAL RADIOLOGY | Facility: HOSPITAL | Age: 84
DRG: 871 | End: 2024-08-11
Payer: MEDICARE

## 2024-08-11 ENCOUNTER — HOSPITAL ENCOUNTER (INPATIENT)
Facility: HOSPITAL | Age: 84
LOS: 3 days | Discharge: HOME HEALTH | DRG: 871 | End: 2024-08-14
Attending: EMERGENCY MEDICINE | Admitting: HOSPITALIST
Payer: MEDICARE

## 2024-08-11 DIAGNOSIS — J96.21 ACUTE ON CHRONIC HYPOXIC RESPIRATORY FAILURE: ICD-10-CM

## 2024-08-11 DIAGNOSIS — R74.8 ELEVATED LIVER ENZYMES: ICD-10-CM

## 2024-08-11 DIAGNOSIS — E83.42 HYPOMAGNESEMIA: ICD-10-CM

## 2024-08-11 DIAGNOSIS — J18.9 COMMUNITY ACQUIRED PNEUMONIA, UNSPECIFIED LATERALITY: ICD-10-CM

## 2024-08-11 DIAGNOSIS — R79.89 ELEVATED TROPONIN: ICD-10-CM

## 2024-08-11 DIAGNOSIS — A41.9 SEPSIS, DUE TO UNSPECIFIED ORGANISM, UNSPECIFIED WHETHER ACUTE ORGAN DYSFUNCTION PRESENT: Primary | ICD-10-CM

## 2024-08-11 LAB
ALBUMIN SERPL-MCNC: 3.4 G/DL (ref 3.5–5.2)
ALBUMIN/GLOB SERPL: 1.4 G/DL
ALP SERPL-CCNC: 86 U/L (ref 39–117)
ALT SERPL W P-5'-P-CCNC: 61 U/L (ref 1–33)
ANION GAP SERPL CALCULATED.3IONS-SCNC: 7 MMOL/L (ref 5–15)
APTT PPP: 27.1 SECONDS (ref 22.7–35.4)
AST SERPL-CCNC: 46 U/L (ref 1–32)
B PARAPERT DNA SPEC QL NAA+PROBE: NOT DETECTED
B PERT DNA SPEC QL NAA+PROBE: NOT DETECTED
BASOPHILS # BLD AUTO: 0.02 10*3/MM3 (ref 0–0.2)
BASOPHILS NFR BLD AUTO: 0.1 % (ref 0–1.5)
BILIRUB SERPL-MCNC: 0.6 MG/DL (ref 0–1.2)
BUN SERPL-MCNC: 19 MG/DL (ref 8–23)
BUN/CREAT SERPL: 28.4 (ref 7–25)
C PNEUM DNA NPH QL NAA+NON-PROBE: NOT DETECTED
CALCIUM SPEC-SCNC: 9.2 MG/DL (ref 8.6–10.5)
CHLORIDE SERPL-SCNC: 97 MMOL/L (ref 98–107)
CO2 SERPL-SCNC: 31 MMOL/L (ref 22–29)
CREAT SERPL-MCNC: 0.67 MG/DL (ref 0.57–1)
D-LACTATE SERPL-SCNC: 1.6 MMOL/L (ref 0.5–2)
DEPRECATED RDW RBC AUTO: 44.1 FL (ref 37–54)
EGFRCR SERPLBLD CKD-EPI 2021: 86.8 ML/MIN/1.73
EOSINOPHIL # BLD AUTO: 0.13 10*3/MM3 (ref 0–0.4)
EOSINOPHIL NFR BLD AUTO: 0.9 % (ref 0.3–6.2)
ERYTHROCYTE [DISTWIDTH] IN BLOOD BY AUTOMATED COUNT: 13.8 % (ref 12.3–15.4)
FLUAV SUBTYP SPEC NAA+PROBE: NOT DETECTED
FLUBV RNA ISLT QL NAA+PROBE: NOT DETECTED
GEN 5 2HR TROPONIN T REFLEX: 40 NG/L
GLOBULIN UR ELPH-MCNC: 2.5 GM/DL
GLUCOSE SERPL-MCNC: 122 MG/DL (ref 65–99)
HADV DNA SPEC NAA+PROBE: NOT DETECTED
HCOV 229E RNA SPEC QL NAA+PROBE: NOT DETECTED
HCOV HKU1 RNA SPEC QL NAA+PROBE: NOT DETECTED
HCOV NL63 RNA SPEC QL NAA+PROBE: NOT DETECTED
HCOV OC43 RNA SPEC QL NAA+PROBE: NOT DETECTED
HCT VFR BLD AUTO: 41.5 % (ref 34–46.6)
HGB BLD-MCNC: 13.6 G/DL (ref 12–15.9)
HMPV RNA NPH QL NAA+NON-PROBE: NOT DETECTED
HPIV1 RNA ISLT QL NAA+PROBE: NOT DETECTED
HPIV2 RNA SPEC QL NAA+PROBE: NOT DETECTED
HPIV3 RNA NPH QL NAA+PROBE: NOT DETECTED
HPIV4 P GENE NPH QL NAA+PROBE: NOT DETECTED
IMM GRANULOCYTES # BLD AUTO: 0.1 10*3/MM3 (ref 0–0.05)
IMM GRANULOCYTES NFR BLD AUTO: 0.7 % (ref 0–0.5)
INR PPP: 1.01 (ref 0.9–1.1)
L PNEUMO1 AG UR QL IA: NEGATIVE
LYMPHOCYTES # BLD AUTO: 0.82 10*3/MM3 (ref 0.7–3.1)
LYMPHOCYTES NFR BLD AUTO: 6 % (ref 19.6–45.3)
M PNEUMO IGG SER IA-ACNC: NOT DETECTED
MAGNESIUM SERPL-MCNC: 1.5 MG/DL (ref 1.6–2.4)
MCH RBC QN AUTO: 29 PG (ref 26.6–33)
MCHC RBC AUTO-ENTMCNC: 32.8 G/DL (ref 31.5–35.7)
MCV RBC AUTO: 88.5 FL (ref 79–97)
MONOCYTES # BLD AUTO: 0.65 10*3/MM3 (ref 0.1–0.9)
MONOCYTES NFR BLD AUTO: 4.7 % (ref 5–12)
NEUTROPHILS NFR BLD AUTO: 12.04 10*3/MM3 (ref 1.7–7)
NEUTROPHILS NFR BLD AUTO: 87.6 % (ref 42.7–76)
NRBC BLD AUTO-RTO: 0 /100 WBC (ref 0–0.2)
NT-PROBNP SERPL-MCNC: 440 PG/ML (ref 0–1800)
PLATELET # BLD AUTO: 162 10*3/MM3 (ref 140–450)
PMV BLD AUTO: 8.5 FL (ref 6–12)
POTASSIUM SERPL-SCNC: 4 MMOL/L (ref 3.5–5.2)
PROCALCITONIN SERPL-MCNC: 0.7 NG/ML (ref 0–0.25)
PROT SERPL-MCNC: 5.9 G/DL (ref 6–8.5)
PROTHROMBIN TIME: 13.6 SECONDS (ref 11.7–14.2)
QT INTERVAL: 306 MS
QTC INTERVAL: 427 MS
RBC # BLD AUTO: 4.69 10*6/MM3 (ref 3.77–5.28)
RHINOVIRUS RNA SPEC NAA+PROBE: NOT DETECTED
RSV RNA NPH QL NAA+NON-PROBE: NOT DETECTED
S PNEUM AG SPEC QL LA: NEGATIVE
SARS-COV-2 RNA NPH QL NAA+NON-PROBE: NOT DETECTED
SODIUM SERPL-SCNC: 135 MMOL/L (ref 136–145)
TROPONIN T DELTA: -23 NG/L
TROPONIN T SERPL HS-MCNC: 63 NG/L
WBC NRBC COR # BLD AUTO: 13.76 10*3/MM3 (ref 3.4–10.8)

## 2024-08-11 PROCEDURE — 83735 ASSAY OF MAGNESIUM: CPT | Performed by: EMERGENCY MEDICINE

## 2024-08-11 PROCEDURE — 36415 COLL VENOUS BLD VENIPUNCTURE: CPT

## 2024-08-11 PROCEDURE — 85025 COMPLETE CBC W/AUTO DIFF WBC: CPT | Performed by: EMERGENCY MEDICINE

## 2024-08-11 PROCEDURE — 83880 ASSAY OF NATRIURETIC PEPTIDE: CPT | Performed by: EMERGENCY MEDICINE

## 2024-08-11 PROCEDURE — 84145 PROCALCITONIN (PCT): CPT | Performed by: EMERGENCY MEDICINE

## 2024-08-11 PROCEDURE — 93005 ELECTROCARDIOGRAM TRACING: CPT | Performed by: EMERGENCY MEDICINE

## 2024-08-11 PROCEDURE — 25810000003 SODIUM CHLORIDE 0.9 % SOLUTION 250 ML FLEX CONT: Performed by: EMERGENCY MEDICINE

## 2024-08-11 PROCEDURE — 87040 BLOOD CULTURE FOR BACTERIA: CPT | Performed by: EMERGENCY MEDICINE

## 2024-08-11 PROCEDURE — 71045 X-RAY EXAM CHEST 1 VIEW: CPT

## 2024-08-11 PROCEDURE — 85610 PROTHROMBIN TIME: CPT | Performed by: EMERGENCY MEDICINE

## 2024-08-11 PROCEDURE — 87449 NOS EACH ORGANISM AG IA: CPT | Performed by: INTERNAL MEDICINE

## 2024-08-11 PROCEDURE — 25010000002 CEFTRIAXONE PER 250 MG: Performed by: EMERGENCY MEDICINE

## 2024-08-11 PROCEDURE — 99285 EMERGENCY DEPT VISIT HI MDM: CPT

## 2024-08-11 PROCEDURE — 83605 ASSAY OF LACTIC ACID: CPT | Performed by: EMERGENCY MEDICINE

## 2024-08-11 PROCEDURE — 25010000002 MAGNESIUM SULFATE 2 GM/50ML SOLUTION: Performed by: EMERGENCY MEDICINE

## 2024-08-11 PROCEDURE — 0202U NFCT DS 22 TRGT SARS-COV-2: CPT | Performed by: EMERGENCY MEDICINE

## 2024-08-11 PROCEDURE — 80053 COMPREHEN METABOLIC PANEL: CPT | Performed by: EMERGENCY MEDICINE

## 2024-08-11 PROCEDURE — 84484 ASSAY OF TROPONIN QUANT: CPT | Performed by: EMERGENCY MEDICINE

## 2024-08-11 PROCEDURE — 25010000002 AZITHROMYCIN PER 500 MG: Performed by: EMERGENCY MEDICINE

## 2024-08-11 PROCEDURE — 63710000001 PREDNISONE PER 1 MG: Performed by: HOSPITALIST

## 2024-08-11 PROCEDURE — 93010 ELECTROCARDIOGRAM REPORT: CPT | Performed by: INTERNAL MEDICINE

## 2024-08-11 PROCEDURE — 25810000003 SODIUM CHLORIDE 0.9 % SOLUTION: Performed by: EMERGENCY MEDICINE

## 2024-08-11 PROCEDURE — 85730 THROMBOPLASTIN TIME PARTIAL: CPT | Performed by: EMERGENCY MEDICINE

## 2024-08-11 RX ORDER — PANTOPRAZOLE SODIUM 40 MG/1
40 TABLET, DELAYED RELEASE ORAL
Status: DISCONTINUED | OUTPATIENT
Start: 2024-08-12 | End: 2024-08-14 | Stop reason: HOSPADM

## 2024-08-11 RX ORDER — VERAPAMIL HYDROCHLORIDE 240 MG/1
120 TABLET, FILM COATED, EXTENDED RELEASE ORAL DAILY
Status: DISCONTINUED | OUTPATIENT
Start: 2024-08-11 | End: 2024-08-14 | Stop reason: HOSPADM

## 2024-08-11 RX ORDER — ONDANSETRON 4 MG/1
4 TABLET, ORALLY DISINTEGRATING ORAL EVERY 6 HOURS PRN
Status: DISCONTINUED | OUTPATIENT
Start: 2024-08-11 | End: 2024-08-14 | Stop reason: HOSPADM

## 2024-08-11 RX ORDER — SODIUM CHLORIDE 0.9 % (FLUSH) 0.9 %
10 SYRINGE (ML) INJECTION AS NEEDED
Status: DISCONTINUED | OUTPATIENT
Start: 2024-08-11 | End: 2024-08-14 | Stop reason: HOSPADM

## 2024-08-11 RX ORDER — LEVOTHYROXINE SODIUM 112 UG/1
112 TABLET ORAL
Status: DISCONTINUED | OUTPATIENT
Start: 2024-08-11 | End: 2024-08-14 | Stop reason: HOSPADM

## 2024-08-11 RX ORDER — BISACODYL 10 MG
10 SUPPOSITORY, RECTAL RECTAL DAILY PRN
Status: DISCONTINUED | OUTPATIENT
Start: 2024-08-11 | End: 2024-08-14 | Stop reason: HOSPADM

## 2024-08-11 RX ORDER — ACETAMINOPHEN 160 MG/5ML
650 SOLUTION ORAL EVERY 4 HOURS PRN
Status: DISCONTINUED | OUTPATIENT
Start: 2024-08-11 | End: 2024-08-14 | Stop reason: HOSPADM

## 2024-08-11 RX ORDER — SODIUM CHLORIDE 9 MG/ML
40 INJECTION, SOLUTION INTRAVENOUS AS NEEDED
Status: DISCONTINUED | OUTPATIENT
Start: 2024-08-11 | End: 2024-08-14 | Stop reason: HOSPADM

## 2024-08-11 RX ORDER — SODIUM CHLORIDE 0.9 % (FLUSH) 0.9 %
10 SYRINGE (ML) INJECTION EVERY 12 HOURS SCHEDULED
Status: DISCONTINUED | OUTPATIENT
Start: 2024-08-11 | End: 2024-08-14 | Stop reason: HOSPADM

## 2024-08-11 RX ORDER — ACETAMINOPHEN 325 MG/1
650 TABLET ORAL EVERY 4 HOURS PRN
Status: DISCONTINUED | OUTPATIENT
Start: 2024-08-11 | End: 2024-08-14 | Stop reason: HOSPADM

## 2024-08-11 RX ORDER — TADALAFIL 20 MG/1
20 TABLET, FILM COATED ORAL 2 TIMES DAILY
Status: DISCONTINUED | OUTPATIENT
Start: 2024-08-11 | End: 2024-08-14 | Stop reason: HOSPADM

## 2024-08-11 RX ORDER — AMOXICILLIN 250 MG
2 CAPSULE ORAL 2 TIMES DAILY PRN
Status: DISCONTINUED | OUTPATIENT
Start: 2024-08-11 | End: 2024-08-14 | Stop reason: HOSPADM

## 2024-08-11 RX ORDER — PREDNISONE 20 MG/1
40 TABLET ORAL DAILY
Status: DISCONTINUED | OUTPATIENT
Start: 2024-08-11 | End: 2024-08-14 | Stop reason: HOSPADM

## 2024-08-11 RX ORDER — PIRFENIDONE 267 MG/1
801 CAPSULE ORAL 3 TIMES DAILY
Status: DISCONTINUED | OUTPATIENT
Start: 2024-08-11 | End: 2024-08-14 | Stop reason: HOSPADM

## 2024-08-11 RX ORDER — ACETAMINOPHEN 500 MG
1000 TABLET ORAL ONCE
Status: COMPLETED | OUTPATIENT
Start: 2024-08-11 | End: 2024-08-11

## 2024-08-11 RX ORDER — ATORVASTATIN CALCIUM 20 MG/1
10 TABLET, FILM COATED ORAL DAILY
Status: DISCONTINUED | OUTPATIENT
Start: 2024-08-11 | End: 2024-08-14 | Stop reason: HOSPADM

## 2024-08-11 RX ORDER — ONDANSETRON 2 MG/ML
4 INJECTION INTRAMUSCULAR; INTRAVENOUS EVERY 6 HOURS PRN
Status: DISCONTINUED | OUTPATIENT
Start: 2024-08-11 | End: 2024-08-14 | Stop reason: HOSPADM

## 2024-08-11 RX ORDER — MAGNESIUM SULFATE HEPTAHYDRATE 40 MG/ML
2 INJECTION, SOLUTION INTRAVENOUS ONCE
Status: COMPLETED | OUTPATIENT
Start: 2024-08-11 | End: 2024-08-11

## 2024-08-11 RX ORDER — POLYETHYLENE GLYCOL 3350 17 G/17G
17 POWDER, FOR SOLUTION ORAL DAILY PRN
Status: DISCONTINUED | OUTPATIENT
Start: 2024-08-11 | End: 2024-08-14 | Stop reason: HOSPADM

## 2024-08-11 RX ORDER — ACETAMINOPHEN 650 MG/1
650 SUPPOSITORY RECTAL EVERY 4 HOURS PRN
Status: DISCONTINUED | OUTPATIENT
Start: 2024-08-11 | End: 2024-08-14 | Stop reason: HOSPADM

## 2024-08-11 RX ORDER — BISACODYL 5 MG/1
5 TABLET, DELAYED RELEASE ORAL DAILY PRN
Status: DISCONTINUED | OUTPATIENT
Start: 2024-08-11 | End: 2024-08-14 | Stop reason: HOSPADM

## 2024-08-11 RX ADMIN — SERTRALINE 50 MG: 50 TABLET, FILM COATED ORAL at 22:43

## 2024-08-11 RX ADMIN — SODIUM CHLORIDE 500 ML: 9 INJECTION, SOLUTION INTRAVENOUS at 09:09

## 2024-08-11 RX ADMIN — PIRFENIDONE 801 MG: 267 CAPSULE ORAL at 20:48

## 2024-08-11 RX ADMIN — AZITHROMYCIN MONOHYDRATE 500 MG: 500 INJECTION, POWDER, LYOPHILIZED, FOR SOLUTION INTRAVENOUS at 11:14

## 2024-08-11 RX ADMIN — ACETAMINOPHEN 325MG 650 MG: 325 TABLET ORAL at 20:50

## 2024-08-11 RX ADMIN — CEFTRIAXONE 2 G: 2 INJECTION, POWDER, FOR SOLUTION INTRAMUSCULAR; INTRAVENOUS at 10:29

## 2024-08-11 RX ADMIN — Medication 10 ML: at 20:52

## 2024-08-11 RX ADMIN — PREDNISONE 40 MG: 20 TABLET ORAL at 18:23

## 2024-08-11 RX ADMIN — ATORVASTATIN CALCIUM 10 MG: 20 TABLET, FILM COATED ORAL at 15:12

## 2024-08-11 RX ADMIN — SODIUM CHLORIDE 500 ML: 9 INJECTION, SOLUTION INTRAVENOUS at 10:38

## 2024-08-11 RX ADMIN — MAGNESIUM SULFATE HEPTAHYDRATE 2 G: 40 INJECTION, SOLUTION INTRAVENOUS at 09:53

## 2024-08-11 RX ADMIN — ACETAMINOPHEN 1000 MG: 500 TABLET ORAL at 09:09

## 2024-08-11 NOTE — PLAN OF CARE
Goal Outcome Evaluation:  Plan of Care Reviewed With: patient        Progress: improving       Transfer from 5S. A&Ox4. 4L NC, weaning as tolerated. SOA improved per pt. Purewick in place, catheter care provided. VSS.

## 2024-08-11 NOTE — H&P
HISTORY AND PHYSICAL   [facility]        Patient Identification:  Name: Madalyn Galeas  Age: 84 y.o.  Sex: female  :  1940  MRN: [id number]                     Primary Care Physician: Enmanuel Berger MD    Chief Complaint:  Short of air    History of Present Illness:         The patient  is a 84 y.o. female who presents to the hospital complaining of chief complaint of shortness of breath.  History supplied by patient and patient's family.  Patient reports that she woke up with the shakes and chills today, was unaware that she had a fever, temperature here 100.3.  Patient was short of breath.  Patient is on 3 L at baseline, when EMS arrived there patient was on room air, 73%, patient was placed on 4 L and continued to be hypoxic, required 10 L via nonrebreather to raise sats to 94%.  No other interventions prior to arrival.  Patient reports chronic cough, occasionally productive.  No chest pain, no vomiting or diarrhea.  The patient was evaluated in the ER and pulmonary was also consulted from the ER.  Workup in the ER suggest patient had some degree of pneumonia on top of her pulmonary fibrosis.  She was started on broad-spectrum IV antibiotics and admitted for further evaluation treatment.    Past Medical History:  Past Medical History:   Diagnosis Date    Adenomatous polyp of sigmoid colon 2021    Anemia     Colonic polyp     Depression     Diverticulosis     Esophageal reflux     Essential hypertension     GERD (gastroesophageal reflux disease)     H/O complete eye exam 2018    Hx of bone density study 02/15/2016    Hyperlipidemia     Hypothyroidism, acquired     Major depressive disorder, recurrent episode, in full remission     Nonsenile cataract 2016    Oxygen dependent     8L NC CONTINUOUS    Pulmonary fibrosis     Pulmonary hypertension     Sleep apnea, obstructive     USES CPAP     Stenosis, spinal, lumbar     Vertigo, peripheral      Past Surgical History:  Past Surgical  History:   Procedure Laterality Date    COLON RESECTION N/A 1/28/2021    Procedure: COLON RESECTION RIGHT;  Surgeon: Alfonso Cesar MD;  Location: [facility] MAIN OR;  Service: General;  Laterality: N/A;    COLONOSCOPY  02/20/2012    COLONOSCOPY N/A 1/21/2021    Procedure: COLONOSCOPY INTO CECUM AND TI WITH BIOPSIES, SPOT INJECTION TO COLON MASS (MID-ASCENDING), HOT SNARE POLYPECTOMY, COLD BX POLYPECTOMIES, SALINE LIFT, CLIP PLACEMENT X3;  Surgeon: Alfonso Cesar MD;  Location: [facility] ENDOSCOPY;  Service: General;  Laterality: N/A;  PRE:  ABNORMAL CT SCAN OF COLON  POST:  DIVERTICULOSIS, COLON MASS, POLYPS, HEMORRHOIDS    INCONTINENCE SURGERY      Dr. Espinal    MAMMSUZANNE BILATERAL  2017    dr de la cruz     PAP SMEAR  2014    dr de la cruz      Home Meds:  Medications Prior to Admission   Medication Sig Dispense Refill Last Dose    acetaminophen (TYLENOL) 500 MG tablet Take 2 tablets by mouth Every 6 (Six) Hours As Needed for Mild Pain.   8/11/2024    ADCIRCA 20 MG tablet Take 2 tablets by mouth Daily.   8/10/2024    ascorbic acid (VITAMIN C) 500 MG tablet Take 1 tablet by mouth Daily.   8/10/2024    atorvastatin (LIPITOR) 10 MG tablet Take 1 tablet by mouth Daily. 90 tablet 1 8/10/2024    Cholecalciferol 25 MCG (1000 UT) capsule Take  by mouth Daily.   8/10/2024    ESBRIET 267 MG capsule Take 801 mg by mouth 3 (Three) Times a Day With Meals.   8/10/2024    levothyroxine (SYNTHROID, LEVOTHROID) 112 MCG tablet Take 1 tablet by mouth Daily. 90 tablet 1 8/10/2024    O2 (OXYGEN) Inhale 8 L/min Continuous.   8/10/2024    omeprazole (priLOSEC) 40 MG capsule Take 1 capsule by mouth 2 (Two) Times a Day. 180 capsule 1 8/10/2024    predniSONE (DELTASONE) 20 MG tablet Take 2 tablets by mouth Daily.   8/10/2024    sertraline (ZOLOFT) 50 MG tablet Take 1 tablet by mouth Daily. 90 tablet 1 8/10/2024    PROBIOTIC PRODUCT PO Take  by mouth.   More than a month    verapamil SR (CALAN-SR) 120 MG CR tablet Take 1 tablet  by mouth Daily.  3 Unknown     Current meds    Current Facility-Administered Medications:     [START ON 2024] azithromycin (ZITHROMAX) 500 mg in sodium chloride 0.9 % 250 mL IVPB-VTB, 500 mg, Intravenous, Q24H, Otto Lloyd MD    [START ON 2024] cefTRIAXone (ROCEPHIN) 1,000 mg in sodium chloride 0.9 % 100 mL MBP, 1,000 mg, Intravenous, Q24H, Otto Lloyd MD    Non-Formulary / Patient Supplied Medication, 801 mg, Oral, TID, Otto Lloyd MD    Pharmacy Consult, , Does not apply, Continuous PRN, Otto Lloyd MD    Pirfenidone capsule 801 mg, 801 mg, Oral, TID, Otto Lloyd MD    tadalafil tablet 20 mg, 20 mg, Oral, BID, Otto Lloyd MD  Allergies:  Allergies   Allergen Reactions    Sulfa Antibiotics Hives and Rash     Immunizations:  Immunization History   Administered Date(s) Administered    COVID-19 (PFIZER) Purple Cap Monovalent 2021, 2021, 03/15/2021, 10/05/2021    DTaP 2012    FLUAD TRI 65YR+ 09/10/2019    Fluzone High Dose =>65 Years ([employer] ONLY) 2012    Fluzone High-Dose 65+yrs 2021, 2022, 2023    Hepatitis A 2018, 10/28/2018    Influenza TIV (IM) 2011, 10/21/2015    Influenza, Unspecified 09/10/2019    Pneumococcal Conjugate 13-Valent (PCV13) 2014    Pneumococcal Polysaccharide (PPSV23) 2008    Tdap 2012    Zostavax 2011, 2012     Social History:   Social History     Social History Narrative    Not on file     Social History     Socioeconomic History    Marital status:    Tobacco Use    Smoking status: Former     Current packs/day: 0.00     Average packs/day: 2.0 packs/day for 8.0 years (16.0 ttl pk-yrs)     Types: Cigarettes     Start date:      Quit date:      Years since quittin.6    Smokeless tobacco: Never    Tobacco comments:     Caffeine use: Drinks Coca Cola    Vaping Use    Vaping status: Never Used   Substance and Sexual Activity    Alcohol use: Yes     Comment:  "rare    Drug use: No    Sexual activity: Never     Partners: Male       Family History:  Family History   Problem Relation Age of Onset    Breast cancer Cousin     Lung cancer Father     Heart disease Father     Colon cancer Brother     Prostate cancer Brother     Lung disease Brother     Emphysema Brother     Heart disease Mother     Malig Hyperthermia Neg Hx         Review of Systems  See history of present illness and past medical history.  Patient denies headache, dizziness, syncope, falls, trauma, change in vision, change in hearing, change in taste, changes in weight, changes in appetite, focal weakness, numbness, or paresthesia.  Patient denies chest pain, palpitations, dyspnea, orthopnea, PND, cough, sinus pressure, rhinorrhea, epistaxis, hemoptysis, nausea, vomiting, hematemesis, diarrhea, constipation or hematochezia. Denies cold or heat intolerance, polydipsia, polyuria, polyphagia. Denies hematuria, pyuria, dysuria, hesitancy, frequency or urgency. Denies consumption of raw and under cooked meats foods or change in water source.  Denies fever, chills, sweats, night sweats.  Denies missing any routine medications. Remainder of ROS is negative.    Objective:  tMax 24 hrs: Temp (24hrs), Av.7 °F (37.6 °C), Min:99 °F (37.2 °C), Max:100.3 °F (37.9 °C)    Vitals Ranges:   Temp:  [99 °F (37.2 °C)-100.3 °F (37.9 °C)] 99 °F (37.2 °C)  Heart Rate:  [] 76  Resp:  [26] 26  BP: ()/(48-66) 99/48      Exam:  BP 99/48   Pulse 76   Temp 99 °F (37.2 °C) (Tympanic)   Resp 26   Ht 154.9 cm (60.98\")   Wt 57.6 kg (126 lb 15.8 oz)   LMP  (LMP Unknown)   SpO2 90%   BMI 24.01 kg/m²     General Appearance:    Alert, cooperative, no distress, appears stated age   Head:    Normocephalic, without obvious abnormality, atraumatic   Eyes:    PERRL, conjunctivae/corneas clear, EOM's intact, both eyes   Ears:    Normal external ear canals, both ears   Nose:   Nares normal, septum midline, mucosa normal, no drainage "    or sinus tenderness   Throat:   Lips, mucosa, and tongue normal   Neck:   Supple, symmetrical, trachea midline, no adenopathy;     thyroid:  no enlargement/tenderness/nodules; no carotid    bruit or JVD   Back:     Symmetric, no curvature, ROM normal, no CVA tenderness   Lungs:     Clear to auscultation bilaterally, respirations unlabored   Chest Wall:    No tenderness or deformity    Heart:    Regular rate and rhythm, S1 and S2 normal, no murmur, rub   or gallop   Abdomen:     Soft, nontender, bowel sounds active all four quadrants,     no masses, no hepatomegaly, no splenomegaly   Extremities:   Extremities normal, atraumatic, no cyanosis or edema   Pulses:   2+ and symmetric all extremities   Skin:   Skin color, texture, turgor normal, no rashes or lesions   Lymph nodes:   Cervical, supraclavicular, and axillary nodes normal   Neurologic:   CNII-XII intact, normal strength, sensation intact throughout      .    Data Review:  Lab Results (last 72 hours)       Procedure Component Value Units Date/Time    S. Pneumo Ag Urine or CSF - Urine, Urine, Clean Catch [[id number]]  (Normal) Collected: 08/11/24 1233    Specimen: Urine, Clean Catch Updated: 08/11/24 1349     Strep Pneumo Ag Negative    Legionella Antigen, Urine - Urine, Urine, Clean Catch [[id number]]  (Normal) Collected: 08/11/24 1233    Specimen: Urine, Clean Catch Updated: 08/11/24 1349     LEGIONELLA ANTIGEN, URINE Negative    High Sensitivity Troponin T 2Hr [[id number]]  (Abnormal) Collected: 08/11/24 1109    Specimen: Blood Updated: 08/11/24 1139     HS Troponin T 40 ng/L      Troponin T Delta -23 ng/L     Narrative:      High Sensitive Troponin T Reference Range:  <14.0 ng/L- Negative Female for AMI  <22.0 ng/L- Negative Male for AMI  >=14 - Abnormal Female indicating possible myocardial injury.  >=22 - Abnormal Male indicating possible myocardial injury.   Clinicians would have to utilize clinical acumen, EKG, Troponin, and serial changes to determine  if it is an Acute Myocardial Infarction or myocardial injury due to an underlying chronic condition.         Blood Culture - Blood, Arm, Left [[id number]] Collected: 08/11/24 0932    Specimen: Blood from Arm, Left Updated: 08/11/24 1057    Blood Culture - Blood, Arm, Right [[id number]] Collected: 08/11/24 0938    Specimen: Blood from Arm, Right Updated: 08/11/24 1057    Respiratory Panel PCR w/COVID-19(SARS-CoV-2) ESSENCE/KEVIN/CHRIS/PAD/COR/LY In-House, NP Swab in UTM/VTM, 2 HR TAT - Swab, Nasopharynx [[id number]]  (Normal) Collected: 08/11/24 0902    Specimen: Swab from Nasopharynx Updated: 08/11/24 1006     ADENOVIRUS, PCR Not Detected     Coronavirus 229E Not Detected     Coronavirus HKU1 Not Detected     Coronavirus NL63 Not Detected     Coronavirus OC43 Not Detected     COVID19 Not Detected     Human Metapneumovirus Not Detected     Human Rhinovirus/Enterovirus Not Detected     Influenza A PCR Not Detected     Influenza B PCR Not Detected     Parainfluenza Virus 1 Not Detected     Parainfluenza Virus 2 Not Detected     Parainfluenza Virus 3 Not Detected     Parainfluenza Virus 4 Not Detected     RSV, PCR Not Detected     Bordetella pertussis pcr Not Detected     Bordetella parapertussis PCR Not Detected     Chlamydophila pneumoniae PCR Not Detected     Mycoplasma pneumo by PCR Not Detected    Narrative:      In the setting of a positive respiratory panel with a viral infection PLUS a negative procalcitonin without other underlying concern for bacterial infection, consider observing off antibiotics or discontinuation of antibiotics and continue supportive care. If the respiratory panel is positive for atypical bacterial infection (Bordetella pertussis, Chlamydophila pneumoniae, or Mycoplasma pneumoniae), consider antibiotic de-escalation to target atypical bacterial infection.    High Sensitivity Troponin T [[id number]]  (Abnormal) Collected: 08/11/24 0905    Specimen: Blood Updated: 08/11/24 0948     HS Troponin T 63  ng/L     Narrative:      High Sensitive Troponin T Reference Range:  <14.0 ng/L- Negative Female for AMI  <22.0 ng/L- Negative Male for AMI  >=14 - Abnormal Female indicating possible myocardial injury.  >=22 - Abnormal Male indicating possible myocardial injury.   Clinicians would have to utilize clinical acumen, EKG, Troponin, and serial changes to determine if it is an Acute Myocardial Infarction or myocardial injury due to an underlying chronic condition.         BNP [[id number]]  (Normal) Collected: 08/11/24 0905    Specimen: Blood Updated: 08/11/24 0943     proBNP 440.0 pg/mL     Narrative:      This assay is used as an aid in the diagnosis of individuals suspected of having heart failure. It can be used as an aid in the diagnosis of acute decompensated heart failure (ADHF) in patients presenting with signs and symptoms of ADHF to the emergency department (ED). In addition, NT-proBNP of <300 pg/mL indicates ADHF is not likely.    Age Range Result Interpretation  NT-proBNP Concentration (pg/mL:      <50             Positive            >450                   Gray                 300-450                    Negative             <300    50-75           Positive            >900                  Gray                300-900                  Negative            <300      >75             Positive            >1800                  Gray                300-1800                  Negative            <300    Procalcitonin [[id number]]  (Abnormal) Collected: 08/11/24 0905    Specimen: Blood Updated: 08/11/24 0943     Procalcitonin 0.70 ng/mL     Narrative:      As a Marker for Sepsis (Non-Neonates):    1. <0.5 ng/mL represents a low risk of severe sepsis and/or septic shock.  2. >2 ng/mL represents a high risk of severe sepsis and/or septic shock.    As a Marker for Lower Respiratory Tract Infections that require antibiotic therapy:    PCT on Admission    Antibiotic Therapy       6-12 Hrs later    >0.5                Strongly  "Recommended  >0.25 - <0.5        Recommended   0.1 - 0.25          Discouraged              Remeasure/reassess PCT  <0.1                Strongly Discouraged     Remeasure/reassess PCT    As 28 day mortality risk marker: \"Change in Procalcitonin Result\" (>80% or <=80%) if Day 0 (or Day 1) and Day 4 values are available. Refer to http://www.[facility]-pct-calculator.com    Change in PCT <=80%  A decrease of PCT levels below or equal to 80% defines a positive change in PCT test result representing a higher risk for 28-day all-cause mortality of patients diagnosed with severe sepsis for septic shock.    Change in PCT >80%  A decrease of PCT levels of more than 80% defines a negative change in PCT result representing a lower risk for 28-day all-cause mortality of patients diagnosed with severe sepsis or septic shock.       Comprehensive Metabolic Panel [[id number]]  (Abnormal) Collected: 08/11/24 0905    Specimen: Blood Updated: 08/11/24 0937     Glucose 122 mg/dL      BUN 19 mg/dL      Creatinine 0.67 mg/dL      Sodium 135 mmol/L      Potassium 4.0 mmol/L      Chloride 97 mmol/L      CO2 31.0 mmol/L      Calcium 9.2 mg/dL      Total Protein 5.9 g/dL      Albumin 3.4 g/dL      ALT (SGPT) 61 U/L      AST (SGOT) 46 U/L      Alkaline Phosphatase 86 U/L      Total Bilirubin 0.6 mg/dL      Globulin 2.5 gm/dL      A/G Ratio 1.4 g/dL      BUN/Creatinine Ratio 28.4     Anion Gap 7.0 mmol/L      eGFR 86.8 mL/min/1.73     Narrative:      GFR Normal >60  Chronic Kidney Disease <60  Kidney Failure <15    The GFR formula is only valid for adults with stable renal function between ages 18 and 70.    Magnesium [[id number]]  (Abnormal) Collected: 08/11/24 0905    Specimen: Blood Updated: 08/11/24 0937     Magnesium 1.5 mg/dL     aPTT [[id number]]  (Normal) Collected: 08/11/24 0905    Specimen: Blood Updated: 08/11/24 0931     PTT 27.1 seconds     Protime-INR [[id number]]  (Normal) Collected: 08/11/24 0905    Specimen: Blood Updated: 08/11/24 " 0931     Protime 13.6 Seconds      INR 1.01    Lactic Acid, Plasma [[id number]]  (Normal) Collected: 08/11/24 0905    Specimen: Blood Updated: 08/11/24 0930     Lactate 1.6 mmol/L     CBC & Differential [[id number]]  (Abnormal) Collected: 08/11/24 0905    Specimen: Blood Updated: 08/11/24 0916    Narrative:      The following orders were created for panel order CBC & Differential.  Procedure                               Abnormality         Status                     ---------                               -----------         ------                     CBC Auto Differential[[id number]]        Abnormal            Final result                 Please view results for these tests on the individual orders.    CBC Auto Differential [[id number]]  (Abnormal) Collected: 08/11/24 0905    Specimen: Blood Updated: 08/11/24 0916     WBC 13.76 10*3/mm3      RBC 4.69 10*6/mm3      Hemoglobin 13.6 g/dL      Hematocrit 41.5 %      MCV 88.5 fL      MCH 29.0 pg      MCHC 32.8 g/dL      RDW 13.8 %      RDW-SD 44.1 fl      MPV 8.5 fL      Platelets 162 10*3/mm3      Neutrophil % 87.6 %      Lymphocyte % 6.0 %      Monocyte % 4.7 %      Eosinophil % 0.9 %      Basophil % 0.1 %      Immature Grans % 0.7 %      Neutrophils, Absolute 12.04 10*3/mm3      Lymphocytes, Absolute 0.82 10*3/mm3      Monocytes, Absolute 0.65 10*3/mm3      Eosinophils, Absolute 0.13 10*3/mm3      Basophils, Absolute 0.02 10*3/mm3      Immature Grans, Absolute 0.10 10*3/mm3      nRBC 0.0 /100 WBC                      Imaging Results (All)       Procedure Component Value Units Date/Time    XR Chest 1 View [[id number]] Collected: 08/11/24 0958     Updated: 08/11/24 1003    Narrative:      XR CHEST 1 VW-     HISTORY: Female who is 84 years-old, short of breath     TECHNIQUE: Frontal view of the chest     COMPARISON: 11/2/2014     FINDINGS: The heart size is borderline. Fibrotic changes of the lungs  are again demonstrated. Mild hazy opacity at the peripheral right mid  to  upper lung could be developing infiltrate or may represent progression  of underlying interstitial lung disease, follow-up as indications  persist. No large pleural effusion, or pneumothorax. No acute osseous  process.       Impression:      As described.           This report was finalized on 8/11/2024 10:00 AM by Dr. Artur Marrero M.D on Workstation: [id number]             Past Medical History:   Diagnosis Date    Adenomatous polyp of sigmoid colon 1/21/2021    Anemia     Colonic polyp     Depression     Diverticulosis     Esophageal reflux     Essential hypertension     GERD (gastroesophageal reflux disease)     H/O complete eye exam 03/2018    Hx of bone density study 02/15/2016    Hyperlipidemia     Hypothyroidism, acquired     Major depressive disorder, recurrent episode, in full remission     Nonsenile cataract 2016    Oxygen dependent     8L NC CONTINUOUS    Pulmonary fibrosis     Pulmonary hypertension     Sleep apnea, obstructive     USES CPAP     Stenosis, spinal, lumbar     Vertigo, peripheral        Assessment:  Active Hospital Problems    Diagnosis  POA    **Sepsis [A41.9]  Yes    Community acquired pneumonia [J18.9]  Unknown    Essential hypertension [I10]  Yes    Hypothyroidism [E03.9]  Yes    Sleep apnea, obstructive [G47.33]  Yes    Pulmonary fibrosis [J84.10]  Yes    Pulmonary hypertension [I27.20]  Yes      Resolved Hospital Problems   No resolved problems to display.       Plan:  Continue with antibiotics for pneumonia and continue with oxygen.  Follow-up on labs and cultures.  Continue with most of her routine home medicines and prednisone.  Pulmonary consult noted.    Jeanmarie Spence MD  8/11/2024  13:56 EDT

## 2024-08-11 NOTE — ED PROVIDER NOTES
EMERGENCY DEPARTMENT ENCOUNTER  Room Number:  32/32  Date of encounter:  8/11/2024  PCP: Enmanuel Berger MD  Patient Care Team:  Enmanuel Berger MD as PCP - General (Family Medicine)  Zita Guy MD as Consulting Physician (Pulmonary Disease)  Anju Boyd MD as Consulting Physician (Gastroenterology)  Enmanuel Berger MD as Referring Physician (Family Medicine)  Emmanuel Lara MD as Consulting Physician (Hematology and Oncology)  Rambo Dasilva MD as Consulting Physician (Cardiology)  Estefany Sotelo MD as Consulting Physician (Pulmonary Disease)  KRISTIN Baldwin MD as Consulting Physician (Ophthalmology)  Zita Guy MD (Pulmonary Disease)  Luther Chavarria MD as Consulting Physician (Otolaryngology)  Sharri Gillespie APRN as Nurse Practitioner (Nurse Practitioner)  Joceline Hopper APRN as Nurse Practitioner (Cardiology)  Hugo Ortiz MD as Consulting Physician (Hematology and Oncology)     HPI:  Context: Madalyn Galeas is a 84 y.o. female who presents to the ED c/o chief complaint of shortness of breath.  History supplied by patient and patient's family.  Patient reports that she woke up with the shakes and chills today, was unaware that she had a fever, temperature here 100.3.  Patient was short of breath.  Patient is on 3 L at baseline, when EMS arrived there patient was on room air, 73%, patient was placed on 4 L and continued to be hypoxic, required 10 L via nonrebreather to raise sats to 94%.  No other interventions prior to arrival.  Patient reports chronic cough, occasionally productive.  No chest pain, no vomiting or diarrhea.    MEDICAL HISTORY REVIEW  Reviewed in EPIC    PAST MEDICAL HISTORY  Active Ambulatory Problems     Diagnosis Date Noted    Esophageal reflux     Hyperlipidemia     Hypothyroidism     Major depressive disorder, recurrent episode, in full remission     Pulmonary fibrosis     Pulmonary hypertension     Sleep apnea, obstructive     Vertigo,  peripheral     Impaired fasting glucose 11/23/2015    Iron deficiency anemia 11/24/2020    Malabsorption of iron 11/24/2020    Essential hypertension     Diverticulosis 01/21/2021    Hemorrhoids 01/21/2021    Malignant neoplasm of ascending colon 01/25/2021    Elevated CEA 05/19/2021    Chronic respiratory failure 04/07/2021    Irregular heart rhythm 10/14/2021    PVCs (premature ventricular contractions) 07/09/2024    PAC (premature atrial contraction) 07/09/2024     Resolved Ambulatory Problems     Diagnosis Date Noted    Chronic diarrhea 11/24/2020    Mild protein-calorie malnutrition 11/24/2020    Weight loss, abnormal 11/24/2020    Right upper quadrant abdominal mass 12/04/2020    Colonic intussusception 12/04/2020    Colonic mass 01/15/2021    Adenomatous polyp of sigmoid colon 01/21/2021    Diastolic congestive heart failure 02/01/2013    PAH (pulmonary artery hypertension) 04/07/2021     Past Medical History:   Diagnosis Date    Anemia     Colonic polyp     Depression     GERD (gastroesophageal reflux disease)     H/O complete eye exam 03/2018    Hx of bone density study 02/15/2016    Hypothyroidism, acquired     Nonsenile cataract 2016    Oxygen dependent     Stenosis, spinal, lumbar        PAST SURGICAL HISTORY  Past Surgical History:   Procedure Laterality Date    COLON RESECTION N/A 1/28/2021    Procedure: COLON RESECTION RIGHT;  Surgeon: Alfonso Cesar MD;  Location: [facility] MAIN OR;  Service: General;  Laterality: N/A;    COLONOSCOPY  02/20/2012    COLONOSCOPY N/A 1/21/2021    Procedure: COLONOSCOPY INTO CECUM AND TI WITH BIOPSIES, SPOT INJECTION TO COLON MASS (MID-ASCENDING), HOT SNARE POLYPECTOMY, COLD BX POLYPECTOMIES, SALINE LIFT, CLIP PLACEMENT X3;  Surgeon: Alfonso Cesar MD;  Location: [facility] ENDOSCOPY;  Service: General;  Laterality: N/A;  PRE:  ABNORMAL CT SCAN OF COLON  POST:  DIVERTICULOSIS, COLON MASS, POLYPS, HEMORRHOIDS    INCONTINENCE SURGERY      Dr. Espinal     MAMMO BILATERAL      dr de la cruz     PAP SMEAR      dr de la cruz       FAMILY HISTORY  Family History   Problem Relation Age of Onset    Breast cancer Cousin     Lung cancer Father     Heart disease Father     Colon cancer Brother     Prostate cancer Brother     Lung disease Brother     Emphysema Brother     Heart disease Mother     Malig Hyperthermia Neg Hx        SOCIAL HISTORY  Social History     Socioeconomic History    Marital status:    Tobacco Use    Smoking status: Former     Current packs/day: 0.00     Average packs/day: 2.0 packs/day for 8.0 years (16.0 ttl pk-yrs)     Types: Cigarettes     Start date:      Quit date:      Years since quittin.6    Smokeless tobacco: Never    Tobacco comments:     Caffeine use: Drinks Coca Cola    Vaping Use    Vaping status: Never Used   Substance and Sexual Activity    Alcohol use: Yes     Comment: rare    Drug use: No    Sexual activity: Never     Partners: Male       ALLERGIES  Sulfa antibiotics    The patient's allergies have been reviewed    REVIEW OF SYSTEMS  All systems reviewed and negative except for those discussed in HPI.     PHYSICAL EXAM  I have reviewed the triage vital signs and nursing notes.  ED Triage Vitals [24 [id number]]   Temp Heart Rate Resp BP SpO2   100.3 °F (37.9 °C) (!) 124 26 117/66 94 %      Temp src Heart Rate Source Patient Position BP Location FiO2 (%)   Tympanic Monitor -- -- --       General: No acute distress.  HENT: NCAT, PERRL, Nares patent.  Eyes: no scleral icterus.  Neck: trachea midline, no ROM limitations.  CV: regular rhythm, tachycardic rate.  Respiratory: normal effort, coarse breath sounds bilateral lower lungs.  Abdomen: soft, nondistended, NTTP, no rebound tenderness, no guarding or rigidity.  Musculoskeletal: no deformity.  Neuro: alert, moves all extremities, follows commands.  Skin: warm, dry.    LAB RESULTS  Recent Results (from the past 24 hour(s))   Respiratory Panel PCR  w/COVID-19(SARS-CoV-2) [facility] In-House, NP Swab in UTM/VTM, 2 HR TAT - Swab, Nasopharynx    Collection Time: 08/11/24  9:02 AM    Specimen: Nasopharynx; Swab   Result Value Ref Range    ADENOVIRUS, PCR Not Detected Not Detected    Coronavirus 229E Not Detected Not Detected    Coronavirus HKU1 Not Detected Not Detected    Coronavirus NL63 Not Detected Not Detected    Coronavirus OC43 Not Detected Not Detected    COVID19 Not Detected Not Detected - Ref. Range    Human Metapneumovirus Not Detected Not Detected    Human Rhinovirus/Enterovirus Not Detected Not Detected    Influenza A PCR Not Detected Not Detected    Influenza B PCR Not Detected Not Detected    Parainfluenza Virus 1 Not Detected Not Detected    Parainfluenza Virus 2 Not Detected Not Detected    Parainfluenza Virus 3 Not Detected Not Detected    Parainfluenza Virus 4 Not Detected Not Detected    RSV, PCR Not Detected Not Detected    Bordetella pertussis pcr Not Detected Not Detected    Bordetella parapertussis PCR Not Detected Not Detected    Chlamydophila pneumoniae PCR Not Detected Not Detected    Mycoplasma pneumo by PCR Not Detected Not Detected   ECG 12 Lead Dyspnea    Collection Time: 08/11/24  9:02 AM   Result Value Ref Range    QT Interval 306 ms    QTC Interval 427 ms   Protime-INR    Collection Time: 08/11/24  9:05 AM    Specimen: Blood   Result Value Ref Range    Protime 13.6 11.7 - 14.2 Seconds    INR 1.01 0.90 - 1.10   aPTT    Collection Time: 08/11/24  9:05 AM    Specimen: Blood   Result Value Ref Range    PTT 27.1 22.7 - 35.4 seconds   Comprehensive Metabolic Panel    Collection Time: 08/11/24  9:05 AM    Specimen: Blood   Result Value Ref Range    Glucose 122 (H) 65 - 99 mg/dL    BUN 19 8 - 23 mg/dL    Creatinine 0.67 0.57 - 1.00 mg/dL    Sodium 135 (L) 136 - 145 mmol/L    Potassium 4.0 3.5 - 5.2 mmol/L    Chloride 97 (L) 98 - 107 mmol/L    CO2 31.0 (H) 22.0 - 29.0 mmol/L    Calcium 9.2 8.6 - 10.5 mg/dL    Total Protein 5.9  (L) 6.0 - 8.5 g/dL    Albumin 3.4 (L) 3.5 - 5.2 g/dL    ALT (SGPT) 61 (H) 1 - 33 U/L    AST (SGOT) 46 (H) 1 - 32 U/L    Alkaline Phosphatase 86 39 - 117 U/L    Total Bilirubin 0.6 0.0 - 1.2 mg/dL    Globulin 2.5 gm/dL    A/G Ratio 1.4 g/dL    BUN/Creatinine Ratio 28.4 (H) 7.0 - 25.0    Anion Gap 7.0 5.0 - 15.0 mmol/L    eGFR 86.8 >60.0 mL/min/1.73   High Sensitivity Troponin T    Collection Time: 08/11/24  9:05 AM    Specimen: Blood   Result Value Ref Range    HS Troponin T 63 (C) <14 ng/L   BNP    Collection Time: 08/11/24  9:05 AM    Specimen: Blood   Result Value Ref Range    proBNP 440.0 0.0 - 1,800.0 pg/mL   Magnesium    Collection Time: 08/11/24  9:05 AM    Specimen: Blood   Result Value Ref Range    Magnesium 1.5 (L) 1.6 - 2.4 mg/dL   Procalcitonin    Collection Time: 08/11/24  9:05 AM    Specimen: Blood   Result Value Ref Range    Procalcitonin 0.70 (H) 0.00 - 0.25 ng/mL   CBC Auto Differential    Collection Time: 08/11/24  9:05 AM    Specimen: Blood   Result Value Ref Range    WBC 13.76 (H) 3.40 - 10.80 10*3/mm3    RBC 4.69 3.77 - 5.28 10*6/mm3    Hemoglobin 13.6 12.0 - 15.9 g/dL    Hematocrit 41.5 34.0 - 46.6 %    MCV 88.5 79.0 - 97.0 fL    MCH 29.0 26.6 - 33.0 pg    MCHC 32.8 31.5 - 35.7 g/dL    RDW 13.8 12.3 - 15.4 %    RDW-SD 44.1 37.0 - 54.0 fl    MPV 8.5 6.0 - 12.0 fL    Platelets 162 140 - 450 10*3/mm3    Neutrophil % 87.6 (H) 42.7 - 76.0 %    Lymphocyte % 6.0 (L) 19.6 - 45.3 %    Monocyte % 4.7 (L) 5.0 - 12.0 %    Eosinophil % 0.9 0.3 - 6.2 %    Basophil % 0.1 0.0 - 1.5 %    Immature Grans % 0.7 (H) 0.0 - 0.5 %    Neutrophils, Absolute 12.04 (H) 1.70 - 7.00 10*3/mm3    Lymphocytes, Absolute 0.82 0.70 - 3.10 10*3/mm3    Monocytes, Absolute 0.65 0.10 - 0.90 10*3/mm3    Eosinophils, Absolute 0.13 0.00 - 0.40 10*3/mm3    Basophils, Absolute 0.02 0.00 - 0.20 10*3/mm3    Immature Grans, Absolute 0.10 (H) 0.00 - 0.05 10*3/mm3    nRBC 0.0 0.0 - 0.2 /100 WBC   Lactic Acid, Plasma    Collection Time: 08/11/24   9:05 AM    Specimen: Blood   Result Value Ref Range    Lactate 1.6 0.5 - 2.0 mmol/L       I ordered the above labs and reviewed the results.    RADIOLOGY  XR Chest 1 View    Result Date: 8/11/2024  XR CHEST 1 VW-  HISTORY: Female who is 84 years-old, short of breath  TECHNIQUE: Frontal view of the chest  COMPARISON: 11/2/2014  FINDINGS: The heart size is borderline. Fibrotic changes of the lungs are again demonstrated. Mild hazy opacity at the peripheral right mid to upper lung could be developing infiltrate or may represent progression of underlying interstitial lung disease, follow-up as indications persist. No large pleural effusion, or pneumothorax. No acute osseous process.      As described.    This report was finalized on 8/11/2024 10:00 AM by Dr. Artur Marrero M.D on Workstation: [facility]       I ordered the above noted radiological studies. I reviewed the images and results. I agree with the radiologist interpretation.    PROCEDURES  Procedures    MEDICATIONS GIVEN IN ER  Medications   magnesium sulfate 2g/50 mL (PREMIX) infusion (2 g Intravenous New Bag 8/11/24 0953)   cefTRIAXone (ROCEPHIN) 2 g in sodium chloride 0.9 % 100 mL MBP (2 g Intravenous New Bag 8/11/24 1029)   azithromycin (ZITHROMAX) 500 mg in sodium chloride 0.9 % 250 mL IVPB-VTB (has no administration in time range)   sodium chloride 0.9 % bolus 500 mL (has no administration in time range)   acetaminophen (TYLENOL) tablet 1,000 mg (1,000 mg Oral Given 8/11/24 0909)   sodium chloride 0.9 % bolus 500 mL (0 mL Intravenous Stopped 8/11/24 1026)       PROGRESS, DATA ANALYSIS, CONSULTS, AND MEDICAL DECISION MAKING  A complete history and physical exam have been performed.  All available laboratory and imaging results have been reviewed by myself prior to disposition.    MDM    After the initial H&P, I discussed pertinent information from history and physical exam with patient/family.  Discussed differential diagnosis.  Discussed plan for ED  evaluation/workup/treatment.  All questions answered.  Patient/family is agreeable with plan.  ED Course as of 08/11/24 1036   Sun Aug 11, 2024   0851 My differential diagnosis for dyspnea includes but is not limited to:  Asthma, COPD, pneumonia, pulmonary embolus, acute respiratory distress syndrome, pneumothorax, pleural effusion, pulmonary fibrosis, congestive heart failure, myocardial infarction, DKA, uremia, acidosis, sepsis, anemia, drug related, hyperventilation, CNS disease     [JG]   0854 Review of prior external notes (non-ED) -and- Review of prior external test results outside of this encounter:   I reviewed patient's pulmonology office visit note from July 17 this year, patient has history of PAH secondary to ILD, IPF and chronic hypoxic respiratory failure on 3 L nasal cannula up to 8 to 10 L with activity.  Reviewed patient's CT chest from 6/10/2024, pulmonary fibrosis at lung bases demonstrate mild interval progression, few mediastinal lymph nodes slightly larger than before, 5 mm splenic hypodensity was not demonstrated previously. [JG]   0908 EKG independently viewed and contemporaneously interpreted by ED physician. Time: 9:02 AM.  Rate 117.  Interpretation: Sinus tachycardia, left axis deviation, poor R wave progression, LVH, no ST elevation or depression, T wave inversion in aVL. [JG]   0946 Patient febrile with tachycardia, leukocytosis with left shift, elevated procalcitonin, increased hypoxia.  Treating for community-acquired pneumonia.  Blood cultures and lactic acid previously obtained.  Treating with ceftriaxone azithromycin. [JG]   0951 Plan for admission to hospitalist, consulting pulmonology for assistance in patient management. [JG]   0951 Magnesium anemia, rebleeding. [JG]   0951 Troponin elevated 63, no prior for comparison, EKG shows no acute findings, obtain repeat troponin to evaluate trend. [JG]   1004 Phone call with Dr. Otto Vargas, pulmonology.  Discussed the patient, relevant  history, exam, diagnostics, ED findings/progress, and concerns.  They agree to consult.    [JG]   1020 I reviewed chest x-ray in PACS, patient has interstitial lung disease per my read. [JG]   1029 Phone call with Dr. Spence [facility].  Discussed the patient, relevant history, exam, diagnostics, ED findings/progress, and concerns. They agree to admit the patient to telemetry. Care assumed by the admitting physician at this time.     [JG]      ED Course User Index  [JG] Song Adams MD       AS OF 10:36 EDT VITALS:    BP - 90/48  HR - 85  TEMP - 100.3 °F (37.9 °C) (Tympanic)  O2 SATS - 91%    DIAGNOSIS  Final diagnoses:   Sepsis, due to unspecified organism, unspecified whether acute organ dysfunction present   Community acquired pneumonia, unspecified laterality   Acute on chronic hypoxic respiratory failure   Hypomagnesemia   Elevated liver enzymes   Elevated troponin         DISPOSITION  ADMISSION    Discussed treatment plan and reason for admission with pt/family and admitting physician.  Pt/family voiced understanding of the plan for admission for further testing/treatment as needed.        Song Adams MD  08/11/24 1037

## 2024-08-11 NOTE — CONSULTS
[facility]    Reason for consult: IPF, pneumonia,pulm htn    HPI:  Mrs. Mera is a 85yo female with IPF diagnosed 15 years ago. She follows with Alvin kay.  I reviewed the last office note from her available in [facility].  Mrs. Mera has chronic hypoxemic respiratory failure at 3lpm and sounds like she has desaturations chronically on that with exertion.  Mrs. Mera was awakened this past night with shaking sensation and feeling unwell and somewhat short of breath. She had low oxygen saturations. She felt fine yesterday and has not been having any increased cough or wheezing or chest tightness or change in sputum production. No nasal congestion or drainage. She says she feels better now and is actually asking to go home. However she has lower oxygen saturations than usual on 4lpm and she has lab evaluation concerning for infeciton.    Past Medical History:   Diagnosis Date    Adenomatous polyp of sigmoid colon 2021    Anemia     Colonic polyp     Depression     Diverticulosis     Esophageal reflux     Essential hypertension     GERD (gastroesophageal reflux disease)     H/O complete eye exam 2018    Hx of bone density study 02/15/2016    Hyperlipidemia     Hypothyroidism, acquired     Major depressive disorder, recurrent episode, in full remission     Nonsenile cataract 2016    Oxygen dependent     8L NC CONTINUOUS    Pulmonary fibrosis     Pulmonary hypertension     Sleep apnea, obstructive     USES CPAP     Stenosis, spinal, lumbar     Vertigo, peripheral      Social History     Socioeconomic History    Marital status:    Tobacco Use    Smoking status: Former     Current packs/day: 0.00     Average packs/day: 2.0 packs/day for 8.0 years (16.0 ttl pk-yrs)     Types: Cigarettes     Start date:      Quit date:      Years since quittin.6    Smokeless tobacco: Never    Tobacco comments:     Caffeine use: Drinks Coca Cola    Vaping Use    Vaping status: Never Used    Substance and Sexual Activity    Alcohol use: Yes     Comment: rare    Drug use: No    Sexual activity: Never     Partners: Male     Family History   Problem Relation Age of Onset    Breast cancer Cousin     Lung cancer Father     Heart disease Father     Colon cancer Brother     Prostate cancer Brother     Lung disease Brother     Emphysema Brother     Heart disease Mother     Malig Hyperthermia Neg Hx      MEDS: reviewed as per chart  ALL: sulfa  ROS: 12 point negative except as in HPI    Vital Sign Min/Max for last 24 hours  Temp  Min: 99 °F (37.2 °C)  Max: 100.3 °F (37.9 °C)   BP  Min: 90/48  Max: 117/66   Pulse  Min: 76  Max: 124   Resp  Min: 26  Max: 26   SpO2  Min: 89 %  Max: 94 %   Flow (L/min)  Min: 3  Max: 10   Weight  Min: 57.6 kg (126 lb 15.8 oz)  Max: 57.6 kg (126 lb 15.8 oz)     GEN:   appears ill,  A&O x3  HEENT: PERRL, EOMI, no icterus, mmm, no JVD, trachea midline, neck supple  CHEST: fair ae bilat, no wheezes, + left>right  crackles, no use of accessory muscles  CV: RRR, no m/g/r  ABD: soft, nt, nd +bs, no hepatosplenomegaly  EXT: no c/c/e  SKIN: no rashes, no xanthomas, nl turgor, warm, dry  LYMPH: no palpable cervical or supraclavicular lymphadenopathy  NEURO: CN 2-12 intact and symmetric bilaterally  PSYCH: nl affect, nl orientation, nl judgment, nl mood  MSK: some kyphosis, 5/5 strength ue and le bilaterally    Labs: 8/11: reviewed:  Glucose 122  Bun 19  Cr 0.67  Na 135  Bicarb 31  Alt 61  Ast 46  Procal 0.7  Wbc 13  Hgb 13.65  Plts 162  Rpp neg  Lactate 1.6    CXR: 8/11: reviewed, extensive bilateral infiltrates    A/P:  IPF -- continue home med esbriet.  I don't really think this is an ae of her IPF  Pulmonary hypertension group II and III -- has been on tadalafil at home (previously unable to tolerate tyvaso) so I would continue this for now and defer longterm continuance to her primary pulmonary MD  Sepsis -- seems pneumonia would be a likely soruce -- agree with antibiotics, check  infectious studies  Chronic hypoxemic respiratory failure -- continue oxygen as needed.

## 2024-08-11 NOTE — ED NOTES
"Nursing report ED to floor  Madalyn Galeas  84 y.o.  female    HPI :  HPI (Adult)  Stated Reason for Visit: soa  History Obtained From: patient    Chief Complaint  Chief Complaint   Patient presents with    Shortness of Breath       Admitting doctor:   Jeanmarie Spence MD    Admitting diagnosis:   The primary encounter diagnosis was Sepsis, due to unspecified organism, unspecified whether acute organ dysfunction present. Diagnoses of Community acquired pneumonia, unspecified laterality, Acute on chronic hypoxic respiratory failure, Hypomagnesemia, Elevated liver enzymes, and Elevated troponin were also pertinent to this visit.    Code status:   Current Code Status       Date Active Code Status Order ID Comments User Context       Prior            Allergies:   Sulfa antibiotics    Isolation:   Enhanced Droplet/Contact     Intake and Output    Intake/Output Summary (Last 24 hours) at 8/11/2024 1051  Last data filed at 8/11/2024 1026  Gross per 24 hour   Intake 500 ml   Output --   Net 500 ml       Weight:       08/11/24  1043   Weight: 57.6 kg (126 lb 15.8 oz)       Most recent vitals:   Vitals:    08/11/24 1034 08/11/24 1040 08/11/24 1043 08/11/24 1046   BP:    99/52   Pulse: 85   84   Resp:       Temp:  99 °F (37.2 °C)     TempSrc:  Tympanic     SpO2: 91%   91%   Weight:   57.6 kg (126 lb 15.8 oz)    Height:   154.9 cm (60.98\")        Active LDAs/IV Access:   Lines, Drains & Airways       Active LDAs       Name Placement date Placement time Site Days    Peripheral IV 08/11/24 0907 Right Antecubital 08/11/24  0907  Antecubital  less than 1    Peripheral IV 08/11/24 1027 Posterior;Right Hand 08/11/24  1027  Hand  less than 1                    Labs (abnormal labs have a star):   Labs Reviewed   COMPREHENSIVE METABOLIC PANEL - Abnormal; Notable for the following components:       Result Value    Glucose 122 (*)     Sodium 135 (*)     Chloride 97 (*)     CO2 31.0 (*)     Total Protein 5.9 (*)     Albumin 3.4 (*)     ALT " "(SGPT) 61 (*)     AST (SGOT) 46 (*)     BUN/Creatinine Ratio 28.4 (*)     All other components within normal limits    Narrative:     GFR Normal >60  Chronic Kidney Disease <60  Kidney Failure <15    The GFR formula is only valid for adults with stable renal function between ages 18 and 70.   TROPONIN - Abnormal; Notable for the following components:    HS Troponin T 63 (*)     All other components within normal limits    Narrative:     High Sensitive Troponin T Reference Range:  <14.0 ng/L- Negative Female for AMI  <22.0 ng/L- Negative Male for AMI  >=14 - Abnormal Female indicating possible myocardial injury.  >=22 - Abnormal Male indicating possible myocardial injury.   Clinicians would have to utilize clinical acumen, EKG, Troponin, and serial changes to determine if it is an Acute Myocardial Infarction or myocardial injury due to an underlying chronic condition.        MAGNESIUM - Abnormal; Notable for the following components:    Magnesium 1.5 (*)     All other components within normal limits   PROCALCITONIN - Abnormal; Notable for the following components:    Procalcitonin 0.70 (*)     All other components within normal limits    Narrative:     As a Marker for Sepsis (Non-Neonates):    1. <0.5 ng/mL represents a low risk of severe sepsis and/or septic shock.  2. >2 ng/mL represents a high risk of severe sepsis and/or septic shock.    As a Marker for Lower Respiratory Tract Infections that require antibiotic therapy:    PCT on Admission    Antibiotic Therapy       6-12 Hrs later    >0.5                Strongly Recommended  >0.25 - <0.5        Recommended   0.1 - 0.25          Discouraged              Remeasure/reassess PCT  <0.1                Strongly Discouraged     Remeasure/reassess PCT    As 28 day mortality risk marker: \"Change in Procalcitonin Result\" (>80% or <=80%) if Day 0 (or Day 1) and Day 4 values are available. Refer to http://www.[employer]-pct-calculator.com    Change in PCT <=80%  A decrease of " PCT levels below or equal to 80% defines a positive change in PCT test result representing a higher risk for 28-day all-cause mortality of patients diagnosed with severe sepsis for septic shock.    Change in PCT >80%  A decrease of PCT levels of more than 80% defines a negative change in PCT result representing a lower risk for 28-day all-cause mortality of patients diagnosed with severe sepsis or septic shock.      CBC WITH AUTO DIFFERENTIAL - Abnormal; Notable for the following components:    WBC 13.76 (*)     Neutrophil % 87.6 (*)     Lymphocyte % 6.0 (*)     Monocyte % 4.7 (*)     Immature Grans % 0.7 (*)     Neutrophils, Absolute 12.04 (*)     Immature Grans, Absolute 0.10 (*)     All other components within normal limits   RESPIRATORY PANEL PCR W/ COVID-19 (SARS-COV-2), NP SWAB IN UTM/VTP, 2 HR TAT - Normal    Narrative:     In the setting of a positive respiratory panel with a viral infection PLUS a negative procalcitonin without other underlying concern for bacterial infection, consider observing off antibiotics or discontinuation of antibiotics and continue supportive care. If the respiratory panel is positive for atypical bacterial infection (Bordetella pertussis, Chlamydophila pneumoniae, or Mycoplasma pneumoniae), consider antibiotic de-escalation to target atypical bacterial infection.   PROTIME-INR - Normal   APTT - Normal   BNP (IN-HOUSE) - Normal    Narrative:     This assay is used as an aid in the diagnosis of individuals suspected of having heart failure. It can be used as an aid in the diagnosis of acute decompensated heart failure (ADHF) in patients presenting with signs and symptoms of ADHF to the emergency department (ED). In addition, NT-proBNP of <300 pg/mL indicates ADHF is not likely.    Age Range Result Interpretation  NT-proBNP Concentration (pg/mL:      <50             Positive            >450                   Gray                 300-450                    Negative              <300    50-75           Positive            >900                  Gray                300-900                  Negative            <300      >75             Positive            >1800                  Gray                300-1800                  Negative            <300   LACTIC ACID, PLASMA - Normal   BLOOD CULTURE   BLOOD CULTURE   HIGH SENSITIVITIY TROPONIN T 2HR   CBC AND DIFFERENTIAL    Narrative:     The following orders were created for panel order CBC & Differential.  Procedure                               Abnormality         Status                     ---------                               -----------         ------                     CBC Auto Differential[[id number]]        Abnormal            Final result                 Please view results for these tests on the individual orders.       EKG:   ECG 12 Lead Dyspnea   Preliminary Result   HEART WXVJ=370  bpm   RR Rvluhhta=229  ms   OH Yyedzfyc=695  ms   P Horizontal Axis=-5  deg   P Front Axis=40  deg   QRSD Interval=84  ms   QT Lwvmrkrm=655  ms   UKgH=144  ms   QRS Axis=-35  deg   T Wave Axis=77  deg   - ABNORMAL ECG -   Sinus tachycardia   Multiple premature complexes, vent & supraven   Abnormal R-wave progression, late transition   Probable left ventricular hypertrophy   Date and Time of Study:2024-08-11 09:02:19      Telemetry Scan   Final Result      Telemetry Scan   Final Result          Meds given in ED:   Medications   magnesium sulfate 2g/50 mL (PREMIX) infusion (2 g Intravenous New Bag 8/11/24 0953)   cefTRIAXone (ROCEPHIN) 2 g in sodium chloride 0.9 % 100 mL MBP (2 g Intravenous New Bag 8/11/24 1029)   azithromycin (ZITHROMAX) 500 mg in sodium chloride 0.9 % 250 mL IVPB-VTB (has no administration in time range)   sodium chloride 0.9 % bolus 500 mL (500 mL Intravenous New Bag 8/11/24 1038)   acetaminophen (TYLENOL) tablet 1,000 mg (1,000 mg Oral Given 8/11/24 0909)   sodium chloride 0.9 % bolus 500 mL (0 mL Intravenous Stopped 8/11/24 1026)        Imaging results:  XR Chest 1 View    Result Date: 2024  As described.    This report was finalized on 2024 10:00 AM by Dr. Artur Marrero M.D on Workstation: [email or web address]       Ambulatory status:   - with assist    Social issues:   Social History     Socioeconomic History    Marital status:    Tobacco Use    Smoking status: Former     Current packs/day: 0.00     Average packs/day: 2.0 packs/day for 8.0 years (16.0 ttl pk-yrs)     Types: Cigarettes     Start date:      Quit date:      Years since quittin.6    Smokeless tobacco: Never    Tobacco comments:     Caffeine use: Drinks Coca [employer]a    Vaping Use    Vaping status: Never Used   Substance and Sexual Activity    Alcohol use: Yes     Comment: rare    Drug use: No    Sexual activity: Never     Partners: Male       Peripheral Neurovascular  Peripheral Neurovascular (Adult)  Peripheral Neurovascular WDL: WDL    Neuro Cognitive  Neuro Cognitive (Adult)  Cognitive/Neuro/Behavioral WDL: WDL    Learning  Learning Assessment (Adult)  Learning Readiness and Ability: physical limitation noted    Respiratory  Respiratory WDL  Respiratory WDL: all  Rhythm/Pattern, Respiratory: tachypneic, shortness of breath  Expansion/Accessory Muscles/Retractions: accessory muscle use  Nailbeds: clubbed  Mucous Membranes: moist, pink, intact  Cough Frequency: infrequent  Cough Type: congested  Breath Sounds  Breath Sounds: LLL, RLL  LLL Breath Sounds: Posterior:, crackles, coarse  RLL Breath Sounds: Posterior:, crackles, coarse    Abdominal Pain       Pain Assessments  Pain (Adult)  (0-10) Pain Rating: Rest: 0  Response to Pain Interventions: interventions effective per patient    NIH Stroke Scale       Malik Foster RN  24 10:51 EDT

## 2024-08-11 NOTE — ED NOTES
Pt is soa since the middle of the night.  She was 73% on RA on ems arrival.  She has fibrosis.  Ems started her on 4L O2 NC which did not improve sats.  Pt is now on 10L nrb for sats 94%

## 2024-08-12 LAB
ANION GAP SERPL CALCULATED.3IONS-SCNC: 6.3 MMOL/L (ref 5–15)
BASOPHILS # BLD AUTO: 0.02 10*3/MM3 (ref 0–0.2)
BASOPHILS NFR BLD AUTO: 0.2 % (ref 0–1.5)
BUN SERPL-MCNC: 17 MG/DL (ref 8–23)
BUN/CREAT SERPL: 25 (ref 7–25)
CALCIUM SPEC-SCNC: 9.5 MG/DL (ref 8.6–10.5)
CHLORIDE SERPL-SCNC: 101 MMOL/L (ref 98–107)
CO2 SERPL-SCNC: 30.7 MMOL/L (ref 22–29)
CREAT SERPL-MCNC: 0.68 MG/DL (ref 0.57–1)
DEPRECATED RDW RBC AUTO: 45.2 FL (ref 37–54)
EGFRCR SERPLBLD CKD-EPI 2021: 86.5 ML/MIN/1.73
EOSINOPHIL # BLD AUTO: 0 10*3/MM3 (ref 0–0.4)
EOSINOPHIL NFR BLD AUTO: 0 % (ref 0.3–6.2)
ERYTHROCYTE [DISTWIDTH] IN BLOOD BY AUTOMATED COUNT: 13.9 % (ref 12.3–15.4)
GLUCOSE SERPL-MCNC: 148 MG/DL (ref 65–99)
HCT VFR BLD AUTO: 42.2 % (ref 34–46.6)
HGB BLD-MCNC: 13.7 G/DL (ref 12–15.9)
IMM GRANULOCYTES # BLD AUTO: 0.05 10*3/MM3 (ref 0–0.05)
IMM GRANULOCYTES NFR BLD AUTO: 0.5 % (ref 0–0.5)
LYMPHOCYTES # BLD AUTO: 0.22 10*3/MM3 (ref 0.7–3.1)
LYMPHOCYTES NFR BLD AUTO: 2.4 % (ref 19.6–45.3)
MCH RBC QN AUTO: 28.5 PG (ref 26.6–33)
MCHC RBC AUTO-ENTMCNC: 32.5 G/DL (ref 31.5–35.7)
MCV RBC AUTO: 87.9 FL (ref 79–97)
MONOCYTES # BLD AUTO: 0.12 10*3/MM3 (ref 0.1–0.9)
MONOCYTES NFR BLD AUTO: 1.3 % (ref 5–12)
NEUTROPHILS NFR BLD AUTO: 8.73 10*3/MM3 (ref 1.7–7)
NEUTROPHILS NFR BLD AUTO: 95.6 % (ref 42.7–76)
NRBC BLD AUTO-RTO: 0 /100 WBC (ref 0–0.2)
PLATELET # BLD AUTO: 166 10*3/MM3 (ref 140–450)
PMV BLD AUTO: 8.8 FL (ref 6–12)
POTASSIUM SERPL-SCNC: 4.5 MMOL/L (ref 3.5–5.2)
RBC # BLD AUTO: 4.8 10*6/MM3 (ref 3.77–5.28)
SODIUM SERPL-SCNC: 138 MMOL/L (ref 136–145)
WBC NRBC COR # BLD AUTO: 9.14 10*3/MM3 (ref 3.4–10.8)

## 2024-08-12 PROCEDURE — 63710000001 PREDNISONE PER 1 MG: Performed by: HOSPITALIST

## 2024-08-12 PROCEDURE — 25810000003 SODIUM CHLORIDE 0.9 % SOLUTION 250 ML FLEX CONT: Performed by: INTERNAL MEDICINE

## 2024-08-12 PROCEDURE — 80048 BASIC METABOLIC PNL TOTAL CA: CPT | Performed by: HOSPITALIST

## 2024-08-12 PROCEDURE — 25010000002 CEFTRIAXONE PER 250 MG: Performed by: INTERNAL MEDICINE

## 2024-08-12 PROCEDURE — 85025 COMPLETE CBC W/AUTO DIFF WBC: CPT | Performed by: HOSPITALIST

## 2024-08-12 PROCEDURE — 25010000002 AZITHROMYCIN PER 500 MG: Performed by: INTERNAL MEDICINE

## 2024-08-12 RX ORDER — VITAMIN B COMPLEX
1 CAPSULE ORAL DAILY
COMMUNITY

## 2024-08-12 RX ADMIN — TADALAFIL 20 MG: 20 TABLET, FILM COATED ORAL at 21:00

## 2024-08-12 RX ADMIN — LEVOTHYROXINE SODIUM 112 MCG: 112 TABLET ORAL at 06:57

## 2024-08-12 RX ADMIN — PANTOPRAZOLE SODIUM 40 MG: 40 TABLET, DELAYED RELEASE ORAL at 06:57

## 2024-08-12 RX ADMIN — TADALAFIL 20 MG: 20 TABLET, FILM COATED ORAL at 09:03

## 2024-08-12 RX ADMIN — ATORVASTATIN CALCIUM 10 MG: 20 TABLET, FILM COATED ORAL at 09:02

## 2024-08-12 RX ADMIN — AZITHROMYCIN DIHYDRATE 500 MG: 500 INJECTION, POWDER, LYOPHILIZED, FOR SOLUTION INTRAVENOUS at 12:34

## 2024-08-12 RX ADMIN — VERAPAMIL HYDROCHLORIDE 120 MG: 240 TABLET, FILM COATED, EXTENDED RELEASE ORAL at 09:00

## 2024-08-12 RX ADMIN — Medication 10 ML: at 09:07

## 2024-08-12 RX ADMIN — PIRFENIDONE 801 MG: 267 CAPSULE ORAL at 20:59

## 2024-08-12 RX ADMIN — PIRFENIDONE 801 MG: 267 CAPSULE ORAL at 15:58

## 2024-08-12 RX ADMIN — SERTRALINE 50 MG: 50 TABLET, FILM COATED ORAL at 09:00

## 2024-08-12 RX ADMIN — PREDNISONE 40 MG: 20 TABLET ORAL at 09:01

## 2024-08-12 RX ADMIN — PIRFENIDONE 801 MG: 267 CAPSULE ORAL at 09:03

## 2024-08-12 RX ADMIN — CEFTRIAXONE SODIUM 1000 MG: 1 INJECTION, POWDER, FOR SOLUTION INTRAMUSCULAR; INTRAVENOUS at 12:28

## 2024-08-12 NOTE — PROGRESS NOTES
"DAILY PROGRESS NOTE  [facility]    Patient Identification:  Name: Madalyn Galeas  Age: 84 y.o.  Sex: female  :  1940  MRN: [id number]         Primary Care Physician: Enmanuel Berger MD    Subjective:  Interval History: She is feeling much better today and oxygen is weaned down to 4 L close to her baseline.    Objective:    Scheduled Meds:atorvastatin, 10 mg, Oral, Daily  azithromycin, 500 mg, Intravenous, Q24H  cefTRIAXone, 1,000 mg, Intravenous, Q24H  levothyroxine, 112 mcg, Oral, Q AM  pantoprazole, 40 mg, Oral, Q AM  Pirfenidone, 801 mg, Oral, TID  predniSONE, 40 mg, Oral, Daily  sertraline, 50 mg, Oral, Daily  sodium chloride, 10 mL, Intravenous, Q12H  tadalafil, 20 mg, Oral, BID  verapamil SR, 120 mg, Oral, Daily      Continuous Infusions:O2, 8 L/min        Vital signs in last 24 hours:  Temp:  [97.5 °F (36.4 °C)-100.8 °F (38.2 °C)] 97.5 °F (36.4 °C)  Heart Rate:  [] 83  Resp:  [18] 18  BP: (130-162)/(61-84) 139/84    Intake/Output:    Intake/Output Summary (Last 24 hours) at 2024 1342  Last data filed at 2024 2354  Gross per 24 hour   Intake --   Output 800 ml   Net -800 ml       Exam:  /84 (BP Location: Left arm, Patient Position: Lying)   Pulse 83   Temp 97.5 °F (36.4 °C) (Oral)   Resp 18   Ht 154.9 cm (60.98\")   Wt 57.6 kg (126 lb 15.8 oz)   LMP  (LMP Unknown)   SpO2 97%   BMI 24.01 kg/m²     General Appearance:    Alert, cooperative, no distress   Head:    Normocephalic, without obvious abnormality, atraumatic   Eyes:       Throat:   Lips, tongue, gums normal   Neck:   Supple, symmetrical, trachea midline, no JVD   Lungs:     Clear to auscultation bilaterally, respirations unlabored   Chest Wall:    No tenderness or deformity    Heart:    Regular rate and rhythm, S1 and S2 normal, no murmur,no  rub or gallop   Abdomen:     Soft, nontender, bowel sounds active, no masses, no organomegaly    Extremities:   Extremities normal, atraumatic, no cyanosis " or edema   Pulses:      Skin:   Skin is warm and dry,  no rashes or palpable lesions   Neurologic:   no focal deficits noted      Lab Results (last 72 hours)       Procedure Component Value Units Date/Time    Blood Culture - Blood, Arm, Left [[id number]]  (Normal) Collected: 08/11/24 0932    Specimen: Blood from Arm, Left Updated: 08/12/24 1100     Blood Culture No growth at 24 hours    Blood Culture - Blood, Arm, Right [[id number]]  (Normal) Collected: 08/11/24 0938    Specimen: Blood from Arm, Right Updated: 08/12/24 1100     Blood Culture No growth at 24 hours    Basic Metabolic Panel [[id number]]  (Abnormal) Collected: 08/12/24 0443    Specimen: Blood Updated: 08/12/24 0551     Glucose 148 mg/dL      BUN 17 mg/dL      Creatinine 0.68 mg/dL      Sodium 138 mmol/L      Potassium 4.5 mmol/L      Chloride 101 mmol/L      CO2 30.7 mmol/L      Calcium 9.5 mg/dL      BUN/Creatinine Ratio 25.0     Anion Gap 6.3 mmol/L      eGFR 86.5 mL/min/1.73     Narrative:      GFR Normal >60  Chronic Kidney Disease <60  Kidney Failure <15    The GFR formula is only valid for adults with stable renal function between ages 18 and 70.    CBC & Differential [[id number]]  (Abnormal) Collected: 08/12/24 0443    Specimen: Blood Updated: 08/12/24 0532    Narrative:      The following orders were created for panel order CBC & Differential.  Procedure                               Abnormality         Status                     ---------                               -----------         ------                     CBC Auto Differential[[id number]]        Abnormal            Final result                 Please view results for these tests on the individual orders.    CBC Auto Differential [[id number]]  (Abnormal) Collected: 08/12/24 0443    Specimen: Blood Updated: 08/12/24 0532     WBC 9.14 10*3/mm3      RBC 4.80 10*6/mm3      Hemoglobin 13.7 g/dL      Hematocrit 42.2 %      MCV 87.9 fL      MCH 28.5 pg      MCHC 32.5 g/dL      RDW 13.9 %      RDW-SD  45.2 fl      MPV 8.8 fL      Platelets 166 10*3/mm3      Neutrophil % 95.6 %      Lymphocyte % 2.4 %      Monocyte % 1.3 %      Eosinophil % 0.0 %      Basophil % 0.2 %      Immature Grans % 0.5 %      Neutrophils, Absolute 8.73 10*3/mm3      Lymphocytes, Absolute 0.22 10*3/mm3      Monocytes, Absolute 0.12 10*3/mm3      Eosinophils, Absolute 0.00 10*3/mm3      Basophils, Absolute 0.02 10*3/mm3      Immature Grans, Absolute 0.05 10*3/mm3      nRBC 0.0 /100 WBC     S. Pneumo Ag Urine or CSF - Urine, Urine, Clean Catch [[id number]]  (Normal) Collected: 08/11/24 1233    Specimen: Urine, Clean Catch Updated: 08/11/24 1349     Strep Pneumo Ag Negative    Legionella Antigen, Urine - Urine, Urine, Clean Catch [[id number]]  (Normal) Collected: 08/11/24 1233    Specimen: Urine, Clean Catch Updated: 08/11/24 1349     LEGIONELLA ANTIGEN, URINE Negative    High Sensitivity Troponin T 2Hr [[id number]]  (Abnormal) Collected: 08/11/24 1109    Specimen: Blood Updated: 08/11/24 1139     HS Troponin T 40 ng/L      Troponin T Delta -23 ng/L     Narrative:      High Sensitive Troponin T Reference Range:  <14.0 ng/L- Negative Female for AMI  <22.0 ng/L- Negative Male for AMI  >=14 - Abnormal Female indicating possible myocardial injury.  >=22 - Abnormal Male indicating possible myocardial injury.   Clinicians would have to utilize clinical acumen, EKG, Troponin, and serial changes to determine if it is an Acute Myocardial Infarction or myocardial injury due to an underlying chronic condition.         Respiratory Panel PCR w/COVID-19(SARS-CoV-2) [facility]/KEVIN/CHRIS/PAD/COR/YL In-House, NP Swab in UTM/VTM, 2 HR TAT - Swab, Nasopharynx [[id number]]  (Normal) Collected: 08/11/24 0902    Specimen: Swab from Nasopharynx Updated: 08/11/24 1006     ADENOVIRUS, PCR Not Detected     Coronavirus 229E Not Detected     Coronavirus HKU1 Not Detected     Coronavirus NL63 Not Detected     Coronavirus OC43 Not Detected     COVID19 Not Detected     Human  Metapneumovirus Not Detected     Human Rhinovirus/Enterovirus Not Detected     Influenza A PCR Not Detected     Influenza B PCR Not Detected     Parainfluenza Virus 1 Not Detected     Parainfluenza Virus 2 Not Detected     Parainfluenza Virus 3 Not Detected     Parainfluenza Virus 4 Not Detected     RSV, PCR Not Detected     Bordetella pertussis pcr Not Detected     Bordetella parapertussis PCR Not Detected     Chlamydophila pneumoniae PCR Not Detected     Mycoplasma pneumo by PCR Not Detected    Narrative:      In the setting of a positive respiratory panel with a viral infection PLUS a negative procalcitonin without other underlying concern for bacterial infection, consider observing off antibiotics or discontinuation of antibiotics and continue supportive care. If the respiratory panel is positive for atypical bacterial infection (Bordetella pertussis, Chlamydophila pneumoniae, or Mycoplasma pneumoniae), consider antibiotic de-escalation to target atypical bacterial infection.    High Sensitivity Troponin T [[id number]]  (Abnormal) Collected: 08/11/24 0905    Specimen: Blood Updated: 08/11/24 0948     HS Troponin T 63 ng/L     Narrative:      High Sensitive Troponin T Reference Range:  <14.0 ng/L- Negative Female for AMI  <22.0 ng/L- Negative Male for AMI  >=14 - Abnormal Female indicating possible myocardial injury.  >=22 - Abnormal Male indicating possible myocardial injury.   Clinicians would have to utilize clinical acumen, EKG, Troponin, and serial changes to determine if it is an Acute Myocardial Infarction or myocardial injury due to an underlying chronic condition.         BNP [[id number]]  (Normal) Collected: 08/11/24 0905    Specimen: Blood Updated: 08/11/24 0943     proBNP 440.0 pg/mL     Narrative:      This assay is used as an aid in the diagnosis of individuals suspected of having heart failure. It can be used as an aid in the diagnosis of acute decompensated heart failure (ADHF) in patients  "presenting with signs and symptoms of ADHF to the emergency department (ED). In addition, NT-proBNP of <300 pg/mL indicates ADHF is not likely.    Age Range Result Interpretation  NT-proBNP Concentration (pg/mL:      <50             Positive            >450                   Gray                 300-450                    Negative             <300    50-75           Positive            >900                  Gray                300-900                  Negative            <300      >75             Positive            >1800                  Gray                300-1800                  Negative            <300    Procalcitonin [[id number]]  (Abnormal) Collected: 08/11/24 0905    Specimen: Blood Updated: 08/11/24 0943     Procalcitonin 0.70 ng/mL     Narrative:      As a Marker for Sepsis (Non-Neonates):    1. <0.5 ng/mL represents a low risk of severe sepsis and/or septic shock.  2. >2 ng/mL represents a high risk of severe sepsis and/or septic shock.    As a Marker for Lower Respiratory Tract Infections that require antibiotic therapy:    PCT on Admission    Antibiotic Therapy       6-12 Hrs later    >0.5                Strongly Recommended  >0.25 - <0.5        Recommended   0.1 - 0.25          Discouraged              Remeasure/reassess PCT  <0.1                Strongly Discouraged     Remeasure/reassess PCT    As 28 day mortality risk marker: \"Change in Procalcitonin Result\" (>80% or <=80%) if Day 0 (or Day 1) and Day 4 values are available. Refer to http://www.[facility]-pct-calculator.com    Change in PCT <=80%  A decrease of PCT levels below or equal to 80% defines a positive change in PCT test result representing a higher risk for 28-day all-cause mortality of patients diagnosed with severe sepsis for septic shock.    Change in PCT >80%  A decrease of PCT levels of more than 80% defines a negative change in PCT result representing a lower risk for 28-day all-cause mortality of patients diagnosed with severe sepsis " or septic shock.       Comprehensive Metabolic Panel [[id number]]  (Abnormal) Collected: 08/11/24 0905    Specimen: Blood Updated: 08/11/24 0937     Glucose 122 mg/dL      BUN 19 mg/dL      Creatinine 0.67 mg/dL      Sodium 135 mmol/L      Potassium 4.0 mmol/L      Chloride 97 mmol/L      CO2 31.0 mmol/L      Calcium 9.2 mg/dL      Total Protein 5.9 g/dL      Albumin 3.4 g/dL      ALT (SGPT) 61 U/L      AST (SGOT) 46 U/L      Alkaline Phosphatase 86 U/L      Total Bilirubin 0.6 mg/dL      Globulin 2.5 gm/dL      A/G Ratio 1.4 g/dL      BUN/Creatinine Ratio 28.4     Anion Gap 7.0 mmol/L      eGFR 86.8 mL/min/1.73     Narrative:      GFR Normal >60  Chronic Kidney Disease <60  Kidney Failure <15    The GFR formula is only valid for adults with stable renal function between ages 18 and 70.    Magnesium [[id number]]  (Abnormal) Collected: 08/11/24 0905    Specimen: Blood Updated: 08/11/24 0937     Magnesium 1.5 mg/dL     aPTT [[id number]]  (Normal) Collected: 08/11/24 0905    Specimen: Blood Updated: 08/11/24 0931     PTT 27.1 seconds     Protime-INR [[id number]]  (Normal) Collected: 08/11/24 0905    Specimen: Blood Updated: 08/11/24 0931     Protime 13.6 Seconds      INR 1.01    Lactic Acid, Plasma [[id number]]  (Normal) Collected: 08/11/24 0905    Specimen: Blood Updated: 08/11/24 0930     Lactate 1.6 mmol/L     CBC & Differential [[id number]]  (Abnormal) Collected: 08/11/24 0905    Specimen: Blood Updated: 08/11/24 0916    Narrative:      The following orders were created for panel order CBC & Differential.  Procedure                               Abnormality         Status                     ---------                               -----------         ------                     CBC Auto Differential[[id number]]        Abnormal            Final result                 Please view results for these tests on the individual orders.    CBC Auto Differential [[id number]]  (Abnormal) Collected: 08/11/24 0905    Specimen: Blood  "Updated: 08/11/24 0916     WBC 13.76 10*3/mm3      RBC 4.69 10*6/mm3      Hemoglobin 13.6 g/dL      Hematocrit 41.5 %      MCV 88.5 fL      MCH 29.0 pg      MCHC 32.8 g/dL      RDW 13.8 %      RDW-SD 44.1 fl      MPV 8.5 fL      Platelets 162 10*3/mm3      Neutrophil % 87.6 %      Lymphocyte % 6.0 %      Monocyte % 4.7 %      Eosinophil % 0.9 %      Basophil % 0.1 %      Immature Grans % 0.7 %      Neutrophils, Absolute 12.04 10*3/mm3      Lymphocytes, Absolute 0.82 10*3/mm3      Monocytes, Absolute 0.65 10*3/mm3      Eosinophils, Absolute 0.13 10*3/mm3      Basophils, Absolute 0.02 10*3/mm3      Immature Grans, Absolute 0.10 10*3/mm3      nRBC 0.0 /100 WBC           Data Review:  Results from last 7 days   Lab Units 08/12/24  0443 08/11/24  0905   SODIUM mmol/L 138 135*   POTASSIUM mmol/L 4.5 4.0   CHLORIDE mmol/L 101 97*   CO2 mmol/L 30.7* 31.0*   BUN mg/dL 17 19   CREATININE mg/dL 0.68 0.67   GLUCOSE mg/dL 148* 122*   CALCIUM mg/dL 9.5 9.2     Results from last 7 days   Lab Units 08/12/24  0443 08/11/24  0905   WBC 10*3/mm3 9.14 13.76*   HEMOGLOBIN g/dL 13.7 13.6   HEMATOCRIT % 42.2 41.5   PLATELETS 10*3/mm3 166 162             Lab Results   Lab Value Date/Time    TROPONINT 40 (H) 08/11/2024 1109    TROPONINT 63 (C) 08/11/2024 0905         Results from last 7 days   Lab Units 08/11/24  0905   ALK PHOS U/L 86   BILIRUBIN mg/dL 0.6   ALT (SGPT) U/L 61*   AST (SGOT) U/L 46*             No results found for: \"POCGLU\"  Results from last 7 days   Lab Units 08/11/24  0905   INR  1.01       Past Medical History:   Diagnosis Date    Adenomatous polyp of sigmoid colon 1/21/2021    Anemia     Colonic polyp     Depression     Diverticulosis     Esophageal reflux     Essential hypertension     GERD (gastroesophageal reflux disease)     H/O complete eye exam 03/2018    Hx of bone density study 02/15/2016    Hyperlipidemia     Hypothyroidism, acquired     Major depressive disorder, recurrent episode, in full remission     " Nonsenile cataract 2016    Oxygen dependent     8L NC CONTINUOUS    Pulmonary fibrosis     Pulmonary hypertension     Sleep apnea, obstructive     USES CPAP     Stenosis, spinal, lumbar     Vertigo, peripheral        Assessment:  Active Hospital Problems    Diagnosis  POA    **Sepsis [A41.9]  Yes    Community acquired pneumonia [J18.9]  Unknown    Essential hypertension [I10]  Yes    Hypothyroidism [E03.9]  Yes    Sleep apnea, obstructive [G47.33]  Yes    Pulmonary fibrosis [J84.10]  Yes    Pulmonary hypertension [I27.20]  Yes      Resolved Hospital Problems   No resolved problems to display.       Plan:  Continue with current medications and antibiotics.  Follow-up on labs and cultures.  DC planning.  If she continues to improve and is down to about her baseline oxygen of 4 L possibly can go home tomorrow and finish course of oral antibiotics.    Jeanmarie Spence MD  8/12/2024  13:42 EDT

## 2024-08-12 NOTE — PLAN OF CARE
Goal Outcome Evaluation:  Plan of Care Reviewed With: patient        Progress: improving  Outcome Evaluation: A&Ox4. 3L NC, weaning as tolerated. SOA improved per pt. O2 decreases with ambulation but able to recover. Up to toliet SBA. BM this shift. IV abx. Purewick in place. Possible d/c tomorrow. VSS.

## 2024-08-12 NOTE — PROGRESS NOTES
[facility]  [phone]  Dr. Damir Cuellar     Subjective:  LOS: 1    Chief Complaint: Rigors and shortness of breath    Patient appears to be slowly getting better and feels markedly better than when she first came in.  Had family members at bedside and she was in good spirits.  Denied any acute concerns or complaints.  States that she is getting closer to her baseline oxygen.    Objective   Vital Signs past 24hrs  Temp range: Temp (24hrs), Av.7 °F (37.1 °C), Min:97.5 °F (36.4 °C), Max:100.8 °F (38.2 °C)    BP range: BP: ()/(48-84) 139/84  Pulse range: Heart Rate:  [] 83  Resp rate range: Resp:  [18] 18  Device (Oxygen Therapy): nasal cannulaFlow (L/min):  [5-16] 5  Oxygen range:SpO2:  [90 %-97 %] 97 %     Physical Exam  Vitals reviewed.   Constitutional:       General: She is not in acute distress.     Appearance: Normal appearance.   HENT:      Head: Normocephalic and atraumatic.   Eyes:      Extraocular Movements: Extraocular movements intact.      Pupils: Pupils are equal, round, and reactive to light.   Cardiovascular:      Rate and Rhythm: Normal rate and regular rhythm.      Heart sounds: No murmur heard.     No friction rub. No gallop.   Pulmonary:      Effort: Pulmonary effort is normal. No respiratory distress.      Breath sounds: Rales (Dry crackles bilaterally) present. No wheezing or rhonchi.   Abdominal:      General: Abdomen is flat.      Palpations: Abdomen is soft.      Tenderness: There is no abdominal tenderness.   Musculoskeletal:         General: No swelling or tenderness. Normal range of motion.      Cervical back: Normal range of motion. No rigidity or tenderness.   Skin:     General: Skin is warm and dry.      Findings: No rash.   Neurological:      General: No focal deficit present.      Mental Status: She is alert and oriented to person, place, and time.   Psychiatric:         Mood and Affect: Mood normal.         Behavior: Behavior normal.       Results  Review:    I have reviewed the laboratory and imaging data since the last note by [facility] physician.  My annotations are noted in assessment and plan.      Result Review:  I have personally reviewed the results from last note by [facility] physician to 8/12/2024 10:21 EDT and agree with these findings:  [x]  Laboratory list / accordion  [x]  Microbiology  [x]  Radiology  [x]  EKG/Telemetry   [x]  Cardiology/Vascular   [x]  Pathology  [x]  Old records  []  Other:    Medication Review:  I have reviewed the current MAR.  My annotations are noted in assessment and plan.    atorvastatin, 10 mg, Oral, Daily  azithromycin, 500 mg, Intravenous, Q24H  cefTRIAXone, 1,000 mg, Intravenous, Q24H  levothyroxine, 112 mcg, Oral, Q AM  pantoprazole, 40 mg, Oral, Q AM  Pirfenidone, 801 mg, Oral, TID  predniSONE, 40 mg, Oral, Daily  sertraline, 50 mg, Oral, Daily  sodium chloride, 10 mL, Intravenous, Q12H  tadalafil, 20 mg, Oral, BID  verapamil SR, 120 mg, Oral, Daily        O2, 8 L/min      Lines, Drains & Airways       Active LDAs       Name Placement date Placement time Site Days    Peripheral IV 08/11/24 0907 Right Antecubital 08/11/24  0907  Antecubital  1    Peripheral IV 08/11/24 1027 Posterior;Right Hand 08/11/24  1027  Hand  less than 1    External Urinary Catheter 08/11/24  1554  --  less than 1                  No active isolations  Diet Orders (active) (From admission, onward)       Start     Ordered    08/11/24 1419  Diet: Regular/House; Fluid Consistency: Thin (IDDSI 0)  Diet Effective Now         08/11/24 1418                      A/P:  IPF -- continue home med esbriet.  I don't really think this is an ae of her IPF  Pulmonary hypertension group II and III -- has been on tadalafil at home (previously unable to tolerate tyvaso) so I would continue this for now and defer longterm continuance to her primary pulmonary MD  Sepsis -- seems pneumonia would be a likely soruce --continue antibiotics for 5-day course.  Chronic hypoxemic  respiratory failure -- continue oxygen as needed.        THESE ARE NEW MEDICAL PROBLEMS TO ME.      Damir Cuellar DO   08/12/24  10:21 EDT      Part of this note may be an electronic transcription/translation of spoken language to printed text using the Dragon Dictation System.

## 2024-08-12 NOTE — PROGRESS NOTES
Clinical Pharmacy Services: Medication History    Madalyn Galeas is a 84 y.o. female presenting to [facility] for Hypomagnesemia [E83.42]  Elevated liver enzymes [R74.8]  Elevated troponin [R79.89]  Sepsis [A41.9]  Community acquired pneumonia, unspecified laterality [J18.9]  Sepsis, due to unspecified organism, unspecified whether acute organ dysfunction present [A41.9]  Acute on chronic hypoxic respiratory failure [J96.21]    She  has a past medical history of Adenomatous polyp of sigmoid colon (1/21/2021), Anemia, Colonic polyp, Depression, Diverticulosis, Esophageal reflux, Essential hypertension, GERD (gastroesophageal reflux disease), H/O complete eye exam (03/2018), bone density study (02/15/2016), Hyperlipidemia, Hypothyroidism, acquired, Major depressive disorder, recurrent episode, in full remission, Nonsenile cataract (2016), Oxygen dependent, Pulmonary fibrosis, Pulmonary hypertension, Sleep apnea, obstructive, Stenosis, spinal, lumbar, and Vertigo, peripheral.    Allergies as of 08/11/2024 - Reviewed 08/11/2024   Allergen Reaction Noted    Sulfa antibiotics Hives and Rash 11/20/2015       Medication information was obtained from: patient   Pharmacy and Phone Number:     Prior to Admission Medications       Prescriptions Last Dose Informant Patient Reported? Taking?    acetaminophen (TYLENOL) 500 MG tablet 8/11/2024 Self Yes Yes    Take 2 tablets by mouth Every 6 (Six) Hours As Needed for Mild Pain.    ADCIRCA 20 MG tablet 8/10/2024 Self Yes Yes    Take 2 tablets by mouth Daily.    ascorbic acid (VITAMIN C) 500 MG tablet 8/10/2024 Self Yes Yes    Take 1 tablet by mouth Daily.    atorvastatin (LIPITOR) 10 MG tablet 8/10/2024 Self No Yes    Take 1 tablet by mouth Daily.    B Complex Vitamins (vitamin b complex) capsule capsule  Self Yes Yes    Take 1 capsule by mouth Daily.    Cholecalciferol 25 MCG (1000 UT) capsule 8/10/2024 Self Yes Yes    Take  by mouth Daily.    ESBRIET 267 MG  capsule 8/10/2024 Self Yes Yes    Take 801 mg by mouth 3 (Three) Times a Day With Meals.    levothyroxine (SYNTHROID, LEVOTHROID) 112 MCG tablet 8/10/2024 Self No Yes    Take 1 tablet by mouth Daily.    O2 (OXYGEN) 8/10/2024 Self Yes Yes    Inhale 8 L/min Continuous.    omeprazole (priLOSEC) 40 MG capsule 8/10/2024 Self No Yes    Take 1 capsule by mouth 2 (Two) Times a Day.    predniSONE (DELTASONE) 20 MG tablet 8/10/2024 Self Yes Yes    Take 2 tablets by mouth Daily.    sertraline (ZOLOFT) 50 MG tablet 8/10/2024 Self No Yes    Take 1 tablet by mouth Daily.    verapamil SR (CALAN-SR) 120 MG CR tablet 8/10/2024 Self Yes Yes    Take 1 tablet by mouth Daily.              Medication notes:     This medication list is complete to the best of my knowledge as of 8/12/2024    Please call if questions.    Sia Ward, PharmD, BCPS  8/12/2024 09:32 EDT

## 2024-08-13 LAB
ANION GAP SERPL CALCULATED.3IONS-SCNC: 11.1 MMOL/L (ref 5–15)
BASOPHILS # BLD AUTO: 0.01 10*3/MM3 (ref 0–0.2)
BASOPHILS NFR BLD AUTO: 0.1 % (ref 0–1.5)
BUN SERPL-MCNC: 19 MG/DL (ref 8–23)
BUN/CREAT SERPL: 29.2 (ref 7–25)
CALCIUM SPEC-SCNC: 9.3 MG/DL (ref 8.6–10.5)
CHLORIDE SERPL-SCNC: 104 MMOL/L (ref 98–107)
CO2 SERPL-SCNC: 26.9 MMOL/L (ref 22–29)
CREAT SERPL-MCNC: 0.65 MG/DL (ref 0.57–1)
CRP SERPL-MCNC: 16.81 MG/DL (ref 0–0.5)
DEPRECATED RDW RBC AUTO: 45.6 FL (ref 37–54)
EGFRCR SERPLBLD CKD-EPI 2021: 87.5 ML/MIN/1.73
EOSINOPHIL # BLD AUTO: 0.04 10*3/MM3 (ref 0–0.4)
EOSINOPHIL NFR BLD AUTO: 0.3 % (ref 0.3–6.2)
ERYTHROCYTE [DISTWIDTH] IN BLOOD BY AUTOMATED COUNT: 14.1 % (ref 12.3–15.4)
GLUCOSE SERPL-MCNC: 129 MG/DL (ref 65–99)
HCT VFR BLD AUTO: 40.2 % (ref 34–46.6)
HGB BLD-MCNC: 13.2 G/DL (ref 12–15.9)
IMM GRANULOCYTES # BLD AUTO: 0.07 10*3/MM3 (ref 0–0.05)
IMM GRANULOCYTES NFR BLD AUTO: 0.5 % (ref 0–0.5)
LYMPHOCYTES # BLD AUTO: 0.59 10*3/MM3 (ref 0.7–3.1)
LYMPHOCYTES NFR BLD AUTO: 4.6 % (ref 19.6–45.3)
MCH RBC QN AUTO: 29.3 PG (ref 26.6–33)
MCHC RBC AUTO-ENTMCNC: 32.8 G/DL (ref 31.5–35.7)
MCV RBC AUTO: 89.3 FL (ref 79–97)
MONOCYTES # BLD AUTO: 0.45 10*3/MM3 (ref 0.1–0.9)
MONOCYTES NFR BLD AUTO: 3.5 % (ref 5–12)
NEUTROPHILS NFR BLD AUTO: 11.68 10*3/MM3 (ref 1.7–7)
NEUTROPHILS NFR BLD AUTO: 91 % (ref 42.7–76)
NRBC BLD AUTO-RTO: 0 /100 WBC (ref 0–0.2)
PLATELET # BLD AUTO: 208 10*3/MM3 (ref 140–450)
PMV BLD AUTO: 9.2 FL (ref 6–12)
POTASSIUM SERPL-SCNC: 3.8 MMOL/L (ref 3.5–5.2)
PROCALCITONIN SERPL-MCNC: 0.57 NG/ML (ref 0–0.25)
RBC # BLD AUTO: 4.5 10*6/MM3 (ref 3.77–5.28)
SODIUM SERPL-SCNC: 142 MMOL/L (ref 136–145)
WBC NRBC COR # BLD AUTO: 12.84 10*3/MM3 (ref 3.4–10.8)

## 2024-08-13 PROCEDURE — 63710000001 PREDNISONE PER 1 MG: Performed by: HOSPITALIST

## 2024-08-13 PROCEDURE — 25010000002 AZITHROMYCIN PER 500 MG: Performed by: INTERNAL MEDICINE

## 2024-08-13 PROCEDURE — 25810000003 SODIUM CHLORIDE 0.9 % SOLUTION 250 ML FLEX CONT: Performed by: INTERNAL MEDICINE

## 2024-08-13 PROCEDURE — 25010000002 CEFTRIAXONE PER 250 MG: Performed by: INTERNAL MEDICINE

## 2024-08-13 PROCEDURE — 85025 COMPLETE CBC W/AUTO DIFF WBC: CPT | Performed by: HOSPITALIST

## 2024-08-13 PROCEDURE — 80048 BASIC METABOLIC PNL TOTAL CA: CPT | Performed by: HOSPITALIST

## 2024-08-13 PROCEDURE — 86140 C-REACTIVE PROTEIN: CPT | Performed by: HOSPITALIST

## 2024-08-13 PROCEDURE — 84145 PROCALCITONIN (PCT): CPT | Performed by: HOSPITALIST

## 2024-08-13 RX ADMIN — ATORVASTATIN CALCIUM 10 MG: 20 TABLET, FILM COATED ORAL at 09:25

## 2024-08-13 RX ADMIN — Medication 10 ML: at 09:25

## 2024-08-13 RX ADMIN — PIRFENIDONE 801 MG: 267 CAPSULE ORAL at 09:22

## 2024-08-13 RX ADMIN — LEVOTHYROXINE SODIUM 112 MCG: 112 TABLET ORAL at 06:13

## 2024-08-13 RX ADMIN — SERTRALINE 50 MG: 50 TABLET, FILM COATED ORAL at 09:21

## 2024-08-13 RX ADMIN — CEFTRIAXONE SODIUM 1000 MG: 1 INJECTION, POWDER, FOR SOLUTION INTRAMUSCULAR; INTRAVENOUS at 14:10

## 2024-08-13 RX ADMIN — PIRFENIDONE 801 MG: 267 CAPSULE ORAL at 16:39

## 2024-08-13 RX ADMIN — PANTOPRAZOLE SODIUM 40 MG: 40 TABLET, DELAYED RELEASE ORAL at 06:13

## 2024-08-13 RX ADMIN — PREDNISONE 40 MG: 20 TABLET ORAL at 09:22

## 2024-08-13 RX ADMIN — PIRFENIDONE 801 MG: 267 CAPSULE ORAL at 20:49

## 2024-08-13 RX ADMIN — VERAPAMIL HYDROCHLORIDE 120 MG: 240 TABLET, FILM COATED, EXTENDED RELEASE ORAL at 09:21

## 2024-08-13 RX ADMIN — TADALAFIL 20 MG: 20 TABLET, FILM COATED ORAL at 09:22

## 2024-08-13 RX ADMIN — TADALAFIL 20 MG: 20 TABLET, FILM COATED ORAL at 20:49

## 2024-08-13 RX ADMIN — AZITHROMYCIN DIHYDRATE 500 MG: 500 INJECTION, POWDER, LYOPHILIZED, FOR SOLUTION INTRAVENOUS at 12:32

## 2024-08-13 NOTE — PROGRESS NOTES
[facility]  [phone]  Dr. Damir Cuellar     Subjective:  LOS: 2    Chief Complaint:  Rigors and shortness of breath     Patient reports feeling pretty good.  She denies any acute concerns or complaints.  There were no acute events overnight.    Objective   Vital Signs past 24hrs  Temp range: Temp (24hrs), Av.9 °F (36.6 °C), Min:97.7 °F (36.5 °C), Max:98.2 °F (36.8 °C)    BP range: BP: (112-149)/(63-93) 112/93  Pulse range: Heart Rate:  [] 81  Resp rate range: Resp:  [18] 18  Device (Oxygen Therapy): nasal cannulaFlow (L/min):  [3-5] 3  Oxygen range:SpO2:  [96 %-100 %] 100 %    Physical Exam  Vitals reviewed.   Constitutional:       General: She is not in acute distress.     Appearance: Normal appearance.   HENT:      Head: Normocephalic and atraumatic.   Eyes:      Extraocular Movements: Extraocular movements intact.      Pupils: Pupils are equal, round, and reactive to light.   Cardiovascular:      Rate and Rhythm: Normal rate and regular rhythm.      Heart sounds: No murmur heard.     No friction rub. No gallop.   Pulmonary:      Effort: Pulmonary effort is normal. No respiratory distress.      Breath sounds: Rales (Dry crackles bilaterally) present. No wheezing or rhonchi.   Abdominal:      General: Abdomen is flat.      Palpations: Abdomen is soft.      Tenderness: There is no abdominal tenderness.   Musculoskeletal:         General: No swelling or tenderness. Normal range of motion.      Cervical back: Normal range of motion. No rigidity or tenderness.   Skin:     General: Skin is warm and dry.      Findings: No rash.   Neurological:      General: No focal deficit present.      Mental Status: She is alert and oriented to person, place, and time.   Psychiatric:         Mood and Affect: Mood normal.         Behavior: Behavior normal.       Results Review:    I have reviewed the laboratory and imaging data since the last note by LPC physician.  My annotations are noted in  assessment and plan.      Result Review:  I have personally reviewed the results from last note by LPC physician to 8/13/2024 08:43 EDT and agree with these findings:  [x]  Laboratory list / accordion  [x]  Microbiology  [x]  Radiology  [x]  EKG/Telemetry   [x]  Cardiology/Vascular   [x]  Pathology  [x]  Old records  []  Other:    Medication Review:  I have reviewed the current MAR.  My annotations are noted in assessment and plan.    atorvastatin, 10 mg, Oral, Daily  azithromycin, 500 mg, Intravenous, Q24H  cefTRIAXone, 1,000 mg, Intravenous, Q24H  levothyroxine, 112 mcg, Oral, Q AM  pantoprazole, 40 mg, Oral, Q AM  Pirfenidone, 801 mg, Oral, TID  predniSONE, 40 mg, Oral, Daily  sertraline, 50 mg, Oral, Daily  sodium chloride, 10 mL, Intravenous, Q12H  tadalafil, 20 mg, Oral, BID  verapamil SR, 120 mg, Oral, Daily        O2, 8 L/min      Lines, Drains & Airways       Active LDAs       Name Placement date Placement time Site Days    Peripheral IV 08/11/24 0907 Right Antecubital 08/11/24  0907  Antecubital  1    Peripheral IV 08/11/24 1027 Posterior;Right Hand 08/11/24  1027  Hand  1    External Urinary Catheter 08/11/24  1554  --  1                  No active isolations  Diet Orders (active) (From admission, onward)       Start     Ordered    08/11/24 1419  Diet: Regular/House; Fluid Consistency: Thin (IDDSI 0)  Diet Effective Now         08/11/24 1418                      A[facility]P:  [facility]  I don't really think this is an ae of her IPF  Pulmonary hypertension group II and III -- has been on tadalafil at home (previously unable to tolerate tyvaso) so I would continue this for now and defer longterm continuance to her primary pulmonary MD  Sepsis -- seems pneumonia would be a likely soruce --continue antibiotics for 5-day course.  Chronic hypoxemic respiratory failure -- continue oxygen as needed.        Damir Cuellar DO   08/13/24  08:43 EDT      Part of this note may be an electronic  transcription/translation of spoken language to printed text using the Dragon Dictation System.

## 2024-08-13 NOTE — PROGRESS NOTES
"DAILY PROGRESS NOTE  [facility]    Patient Identification:  Name: Madalyn Galeas  Age: 84 y.o.  Sex: female  :  1940  MRN: [id number]         Primary Care Physician: Enmanuel Berger MD    Subjective:  Interval History: She is feeling much better today and oxygen is weaned down to 3 L close to her baseline.    Objective:    Scheduled Meds:atorvastatin, 10 mg, Oral, Daily  azithromycin, 500 mg, Intravenous, Q24H  cefTRIAXone, 1,000 mg, Intravenous, Q24H  levothyroxine, 112 mcg, Oral, Q AM  pantoprazole, 40 mg, Oral, Q AM  Pirfenidone, 801 mg, Oral, TID  predniSONE, 40 mg, Oral, Daily  sertraline, 50 mg, Oral, Daily  sodium chloride, 10 mL, Intravenous, Q12H  tadalafil, 20 mg, Oral, BID  verapamil SR, 120 mg, Oral, Daily      Continuous Infusions:O2, 8 L/min        Vital signs in last 24 hours:  Temp:  [97.7 °F (36.5 °C)-98.2 °F (36.8 °C)] 97.7 °F (36.5 °C)  Heart Rate:  [] 81  Resp:  [18] 18  BP: (112-149)/(63-93) 112/93    Intake/Output:    Intake/Output Summary (Last 24 hours) at 2024 1332  Last data filed at 2024 0640  Gross per 24 hour   Intake 360 ml   Output 1650 ml   Net -1290 ml       Exam:  /93 (BP Location: Left arm, Patient Position: Lying)   Pulse 81   Temp 97.7 °F (36.5 °C) (Oral)   Resp 18   Ht 154.9 cm (60.98\")   Wt 57.6 kg (126 lb 15.8 oz)   LMP  (LMP Unknown)   SpO2 100%   BMI 24.01 kg/m²     General Appearance:    Alert, cooperative, no distress   Head:    Normocephalic, without obvious abnormality, atraumatic   Eyes:       Throat:   Lips, tongue, gums normal   Neck:   Supple, symmetrical, trachea midline, no JVD   Lungs:     Clear to auscultation bilaterally, respirations unlabored   Chest Wall:    No tenderness or deformity    Heart:    Regular rate and rhythm, S1 and S2 normal, no murmur,no  rub or gallop   Abdomen:     Soft, nontender, bowel sounds active, no masses, no organomegaly    Extremities:   Extremities normal, atraumatic, no " cyanosis or edema   Pulses:      Skin:   Skin is warm and dry,  no rashes or palpable lesions   Neurologic:   no focal deficits noted      Lab Results (last 72 hours)       Procedure Component Value Units Date/Time    Blood Culture - Blood, Arm, Left [[id number]]  (Normal) Collected: 08/11/24 0932    Specimen: Blood from Arm, Left Updated: 08/12/24 1100     Blood Culture No growth at 24 hours    Blood Culture - Blood, Arm, Right [[id number]]  (Normal) Collected: 08/11/24 0938    Specimen: Blood from Arm, Right Updated: 08/12/24 1100     Blood Culture No growth at 24 hours    Basic Metabolic Panel [[id number]]  (Abnormal) Collected: 08/12/24 0443    Specimen: Blood Updated: 08/12/24 0551     Glucose 148 mg/dL      BUN 17 mg/dL      Creatinine 0.68 mg/dL      Sodium 138 mmol/L      Potassium 4.5 mmol/L      Chloride 101 mmol/L      CO2 30.7 mmol/L      Calcium 9.5 mg/dL      BUN/Creatinine Ratio 25.0     Anion Gap 6.3 mmol/L      eGFR 86.5 mL/min/1.73     Narrative:      GFR Normal >60  Chronic Kidney Disease <60  Kidney Failure <15    The GFR formula is only valid for adults with stable renal function between ages 18 and 70.    CBC & Differential [[id number]]  (Abnormal) Collected: 08/12/24 0443    Specimen: Blood Updated: 08/12/24 0532    Narrative:      The following orders were created for panel order CBC & Differential.  Procedure                               Abnormality         Status                     ---------                               -----------         ------                     CBC Auto Differential[[id number]]        Abnormal            Final result                 Please view results for these tests on the individual orders.    CBC Auto Differential [[id number]]  (Abnormal) Collected: 08/12/24 0443    Specimen: Blood Updated: 08/12/24 0532     WBC 9.14 10*3/mm3      RBC 4.80 10*6/mm3      Hemoglobin 13.7 g/dL      Hematocrit 42.2 %      MCV 87.9 fL      MCH 28.5 pg      MCHC 32.5 g/dL      RDW 13.9 %       RDW-SD 45.2 fl      MPV 8.8 fL      Platelets 166 10*3/mm3      Neutrophil % 95.6 %      Lymphocyte % 2.4 %      Monocyte % 1.3 %      Eosinophil % 0.0 %      Basophil % 0.2 %      Immature Grans % 0.5 %      Neutrophils, Absolute 8.73 10*3/mm3      Lymphocytes, Absolute 0.22 10*3/mm3      Monocytes, Absolute 0.12 10*3/mm3      Eosinophils, Absolute 0.00 10*3/mm3      Basophils, Absolute 0.02 10*3/mm3      Immature Grans, Absolute 0.05 10*3/mm3      nRBC 0.0 /100 WBC     S. Pneumo Ag Urine or CSF - Urine, Urine, Clean Catch [[id number]]  (Normal) Collected: 08/11/24 1233    Specimen: Urine, Clean Catch Updated: 08/11/24 1349     Strep Pneumo Ag Negative    Legionella Antigen, Urine - Urine, Urine, Clean Catch [[id number]]  (Normal) Collected: 08/11/24 1233    Specimen: Urine, Clean Catch Updated: 08/11/24 1349     LEGIONELLA ANTIGEN, URINE Negative    High Sensitivity Troponin T 2Hr [[id number]]  (Abnormal) Collected: 08/11/24 1109    Specimen: Blood Updated: 08/11/24 1139     HS Troponin T 40 ng/L      Troponin T Delta -23 ng/L     Narrative:      High Sensitive Troponin T Reference Range:  <14.0 ng/L- Negative Female for AMI  <22.0 ng/L- Negative Male for AMI  >=14 - Abnormal Female indicating possible myocardial injury.  >=22 - Abnormal Male indicating possible myocardial injury.   Clinicians would have to utilize clinical acumen, EKG, Troponin, and serial changes to determine if it is an Acute Myocardial Infarction or myocardial injury due to an underlying chronic condition.         Respiratory Panel PCR w/COVID-19(SARS-CoV-2) [facility]/KEVIN/CHRIS/PAD/COR/LY In-House, NP Swab in [place], 2 HR TAT - Swab, Nasopharynx [[id number]]  (Normal) Collected: 08/11/24 0902    Specimen: Swab from Nasopharynx Updated: 08/11/24 1006     ADENOVIRUS, PCR Not Detected     Coronavirus 229E Not Detected     Coronavirus HKU1 Not Detected     Coronavirus NL63 Not Detected     Coronavirus OC43 Not Detected     COVID19 Not Detected     Human  Metapneumovirus Not Detected     Human Rhinovirus/Enterovirus Not Detected     Influenza A PCR Not Detected     Influenza B PCR Not Detected     Parainfluenza Virus 1 Not Detected     Parainfluenza Virus 2 Not Detected     Parainfluenza Virus 3 Not Detected     Parainfluenza Virus 4 Not Detected     RSV, PCR Not Detected     Bordetella pertussis pcr Not Detected     Bordetella parapertussis PCR Not Detected     Chlamydophila pneumoniae PCR Not Detected     Mycoplasma pneumo by PCR Not Detected    Narrative:      In the setting of a positive respiratory panel with a viral infection PLUS a negative procalcitonin without other underlying concern for bacterial infection, consider observing off antibiotics or discontinuation of antibiotics and continue supportive care. If the respiratory panel is positive for atypical bacterial infection (Bordetella pertussis, Chlamydophila pneumoniae, or Mycoplasma pneumoniae), consider antibiotic de-escalation to target atypical bacterial infection.    High Sensitivity Troponin T [[id number]]  (Abnormal) Collected: 08/11/24 0905    Specimen: Blood Updated: 08/11/24 0948     HS Troponin T 63 ng/L     Narrative:      High Sensitive Troponin T Reference Range:  <14.0 ng/L- Negative Female for AMI  <22.0 ng/L- Negative Male for AMI  >=14 - Abnormal Female indicating possible myocardial injury.  >=22 - Abnormal Male indicating possible myocardial injury.   Clinicians would have to utilize clinical acumen, EKG, Troponin, and serial changes to determine if it is an Acute Myocardial Infarction or myocardial injury due to an underlying chronic condition.         BNP [[id number]]  (Normal) Collected: 08/11/24 0905    Specimen: Blood Updated: 08/11/24 0943     proBNP 440.0 pg/mL     Narrative:      This assay is used as an aid in the diagnosis of individuals suspected of having heart failure. It can be used as an aid in the diagnosis of acute decompensated heart failure (ADHF) in patients  "presenting with signs and symptoms of ADHF to the emergency department (ED). In addition, NT-proBNP of <300 pg/mL indicates ADHF is not likely.    Age Range Result Interpretation  NT-proBNP Concentration (pg/mL:      <50             Positive            >450                   Gray                 300-450                    Negative             <300    50-75           Positive            >900                  Gray                300-900                  Negative            <300      >75             Positive            >1800                  Gray                300-1800                  Negative            <300    Procalcitonin [[id number]]  (Abnormal) Collected: 08/11/24 0905    Specimen: Blood Updated: 08/11/24 0943     Procalcitonin 0.70 ng/mL     Narrative:      As a Marker for Sepsis (Non-Neonates):    1. <0.5 ng/mL represents a low risk of severe sepsis and/or septic shock.  2. >2 ng/mL represents a high risk of severe sepsis and/or septic shock.    As a Marker for Lower Respiratory Tract Infections that require antibiotic therapy:    PCT on Admission    Antibiotic Therapy       6-12 Hrs later    >0.5                Strongly Recommended  >0.25 - <0.5        Recommended   0.1 - 0.25          Discouraged              Remeasure/reassess PCT  <0.1                Strongly Discouraged     Remeasure/reassess PCT    As 28 day mortality risk marker: \"Change in Procalcitonin Result\" (>80% or <=80%) if Day 0 (or Day 1) and Day 4 values are available. Refer to http://www.[facility]-pct-calculator.com    Change in PCT <=80%  A decrease of PCT levels below or equal to 80% defines a positive change in PCT test result representing a higher risk for 28-day all-cause mortality of patients diagnosed with severe sepsis for septic shock.    Change in PCT >80%  A decrease of PCT levels of more than 80% defines a negative change in PCT result representing a lower risk for 28-day all-cause mortality of patients diagnosed with severe sepsis " or septic shock.       Comprehensive Metabolic Panel [[id number]]  (Abnormal) Collected: 08/11/24 0905    Specimen: Blood Updated: 08/11/24 0937     Glucose 122 mg/dL      BUN 19 mg/dL      Creatinine 0.67 mg/dL      Sodium 135 mmol/L      Potassium 4.0 mmol/L      Chloride 97 mmol/L      CO2 31.0 mmol/L      Calcium 9.2 mg/dL      Total Protein 5.9 g/dL      Albumin 3.4 g/dL      ALT (SGPT) 61 U/L      AST (SGOT) 46 U/L      Alkaline Phosphatase 86 U/L      Total Bilirubin 0.6 mg/dL      Globulin 2.5 gm/dL      A/G Ratio 1.4 g/dL      BUN/Creatinine Ratio 28.4     Anion Gap 7.0 mmol/L      eGFR 86.8 mL/min/1.73     Narrative:      GFR Normal >60  Chronic Kidney Disease <60  Kidney Failure <15    The GFR formula is only valid for adults with stable renal function between ages 18 and 70.    Magnesium [[id number]]  (Abnormal) Collected: 08/11/24 0905    Specimen: Blood Updated: 08/11/24 0937     Magnesium 1.5 mg/dL     aPTT [[id number]]  (Normal) Collected: 08/11/24 0905    Specimen: Blood Updated: 08/11/24 0931     PTT 27.1 seconds     Protime-INR [[id number]]  (Normal) Collected: 08/11/24 0905    Specimen: Blood Updated: 08/11/24 0931     Protime 13.6 Seconds      INR 1.01    Lactic Acid, Plasma [[id number]]  (Normal) Collected: 08/11/24 0905    Specimen: Blood Updated: 08/11/24 0930     Lactate 1.6 mmol/L     CBC & Differential [[id number]]  (Abnormal) Collected: 08/11/24 0905    Specimen: Blood Updated: 08/11/24 0916    Narrative:      The following orders were created for panel order CBC & Differential.  Procedure                               Abnormality         Status                     ---------                               -----------         ------                     CBC Auto Differential[[id number]]        Abnormal            Final result                 Please view results for these tests on the individual orders.    CBC Auto Differential [[id number]]  (Abnormal) Collected: 08/11/24 0905    Specimen: Blood  "Updated: 08/11/24 0916     WBC 13.76 10*3/mm3      RBC 4.69 10*6/mm3      Hemoglobin 13.6 g/dL      Hematocrit 41.5 %      MCV 88.5 fL      MCH 29.0 pg      MCHC 32.8 g/dL      RDW 13.8 %      RDW-SD 44.1 fl      MPV 8.5 fL      Platelets 162 10*3/mm3      Neutrophil % 87.6 %      Lymphocyte % 6.0 %      Monocyte % 4.7 %      Eosinophil % 0.9 %      Basophil % 0.1 %      Immature Grans % 0.7 %      Neutrophils, Absolute 12.04 10*3/mm3      Lymphocytes, Absolute 0.82 10*3/mm3      Monocytes, Absolute 0.65 10*3/mm3      Eosinophils, Absolute 0.13 10*3/mm3      Basophils, Absolute 0.02 10*3/mm3      Immature Grans, Absolute 0.10 10*3/mm3      nRBC 0.0 /100 WBC           Data Review:  Results from last 7 days   Lab Units 08/13/24  0439 08/12/24  0443 08/11/24  0905   SODIUM mmol/L 142 138 135*   POTASSIUM mmol/L 3.8 4.5 4.0   CHLORIDE mmol/L 104 101 97*   CO2 mmol/L 26.9 30.7* 31.0*   BUN mg/dL 19 17 19   CREATININE mg/dL 0.65 0.68 0.67   GLUCOSE mg/dL 129* 148* 122*   CALCIUM mg/dL 9.3 9.5 9.2     Results from last 7 days   Lab Units 08/13/24  0439 08/12/24  0443 08/11/24  0905   WBC 10*3/mm3 12.84* 9.14 13.76*   HEMOGLOBIN g/dL 13.2 13.7 13.6   HEMATOCRIT % 40.2 42.2 41.5   PLATELETS 10*3/mm3 208 166 162             Lab Results   Lab Value Date/Time    TROPONINT 40 (H) 08/11/2024 1109    TROPONINT 63 (C) 08/11/2024 0905         Results from last 7 days   Lab Units 08/11/24  0905   ALK PHOS U/L 86   BILIRUBIN mg/dL 0.6   ALT (SGPT) U/L 61*   AST (SGOT) U/L 46*             No results found for: \"POCGLU\"  Results from last 7 days   Lab Units 08/11/24  0905   INR  1.01       Past Medical History:   Diagnosis Date    Adenomatous polyp of sigmoid colon 1/21/2021    Anemia     Colonic polyp     Depression     Diverticulosis     Esophageal reflux     Essential hypertension     GERD (gastroesophageal reflux disease)     H/O complete eye exam 03/2018    Hx of bone density study 02/15/2016    Hyperlipidemia     Hypothyroidism, " acquired     Major depressive disorder, recurrent episode, in full remission     Nonsenile cataract 2016    Oxygen dependent     8L NC CONTINUOUS    Pulmonary fibrosis     Pulmonary hypertension     Sleep apnea, obstructive     USES CPAP     Stenosis, spinal, lumbar     Vertigo, peripheral        Assessment:  Active Hospital Problems    Diagnosis  POA    **Sepsis [A41.9]  Yes    Community acquired pneumonia [J18.9]  Unknown    Essential hypertension [I10]  Yes    Hypothyroidism [E03.9]  Yes    Sleep apnea, obstructive [G47.33]  Yes    Pulmonary fibrosis [J84.10]  Yes    Pulmonary hypertension [I27.20]  Yes      Resolved Hospital Problems   No resolved problems to display.       Plan:  Continue with current medications and antibiotics.  Follow-up on labs and cultures.  DC planning.  If she continues to improve and is down to about her baseline oxygen of 3- 4 L possibly can go home tomorrow and finish course of oral antibiotics.  Will ask for PT eval and see how well she does ambulating.    Jeanmarie Spence MD  8/13/2024  13:32 EDT

## 2024-08-13 NOTE — PLAN OF CARE
Goal Outcome Evaluation:  Plan of Care Reviewed With: patient        Progress: improving  Outcome Evaluation: A&Ox4. 3L NC, O2 sats >90%. IV abx. Purewick in place. SOA improved today per pt. VSS.

## 2024-08-13 NOTE — PLAN OF CARE
Goal Outcome Evaluation:              Outcome Evaluation: Patient sleeping well on 3L oxygen until approximately 0430.  Patient's oxygen sat dropped to low 80's, respirations increased to 32, and HR increased to 118. Oxygen increased to 5 L with minimal improvement.  NRB placed with sat increasing to 100%.  After respirations returned to 18-20 and HR decreased to 80's, NRB removed.  Patient maintained oxygen sat and  oxygen decreased to 4L.

## 2024-08-13 NOTE — CASE MANAGEMENT/SOCIAL WORK
Discharge Planning Assessment  [facility]     Patient Name: Madalyn Galeas  MRN: [id number]  Today's Date: 8/13/2024    Admit Date: 8/11/2024    Plan: Home   Discharge Needs Assessment       Row Name 08/13/24 1024       Living Environment    People in Home alone    Current Living Arrangements home    Potentially Unsafe Housing Conditions none    Primary Care Provided by self    Provides Primary Care For no one    Family Caregiver if Needed child(maria elena), adult    Quality of Family Relationships helpful;involved;supportive    Able to Return to Prior Arrangements yes       Resource/Environmental Concerns    Resource/Environmental Concerns none       Transition Planning    Patient/Family Anticipates Transition to home with family    Patient/Family Anticipated Services at Transition none    Transportation Anticipated family or friend will provide       Discharge Needs Assessment    Readmission Within the Last 30 Days no previous admission in last 30 days    Equipment Currently Used at Home oxygen;grab bar;shower chair    Concerns to be Addressed denies needs/concerns at this time;no discharge needs identified    Anticipated Changes Related to Illness none    Equipment Needed After Discharge none                   Discharge Plan       Row Name 08/13/24 1024       Plan    Plan Home    Plan Comments CCP met with patient at bedside. CCP role explained and discharge planning discussed. Face sheet verified and IMM noted. Patient's PCP is Dr. Berger. Patient lives alone. Patient has 4 steps to the inside of the home. Patient's bedroom and bathroom are on the main level. Patient has grab bars and shower chair present in the bathroom. Patient is independent with her ADLs and does not use DME. Patient has home 02 continuously through [employer]. Patient has used home health in the past but could not recall which agency. Patient has been to [facility] for rehab. Patient does not feel like HH/SNF is needed and plans to return home.  Family to transport at discharge. Margy PATEL                  Continued Care and Services - Admitted Since 8/11/2024    No active coordination exists for this encounter.       Expected Discharge Date and Time       Expected Discharge Date Expected Discharge Time    Aug 13, 2024            Demographic Summary       Row Name 08/13/24 1023       General Information    Admission Type inpatient    Arrived From home    Required Notices Provided Important Message from Medicare    Referral Source admission list    Reason for Consult discharge planning    Preferred Language English                   Functional Status       Row Name 08/13/24 1023       Functional Status    Usual Activity Tolerance good    Current Activity Tolerance good       Functional Status, IADL    Medications independent    Meal Preparation independent    Housekeeping independent    Laundry independent    Shopping independent       Mental Status    General Appearance WDL WDL       Mental Status Summary    Recent Changes in Mental Status/Cognitive Functioning no changes                   Psychosocial    No documentation.                  Abuse/Neglect    No documentation.                  Legal    No documentation.                  Substance Abuse    No documentation.                  Patient Forms    No documentation.                     AMANDA Ordaz

## 2024-08-14 ENCOUNTER — READMISSION MANAGEMENT (OUTPATIENT)
Dept: CALL CENTER | Facility: HOSPITAL | Age: 84
End: 2024-08-14
Payer: MEDICARE

## 2024-08-14 VITALS
RESPIRATION RATE: 18 BRPM | TEMPERATURE: 97.7 F | BODY MASS INDEX: 23.98 KG/M2 | DIASTOLIC BLOOD PRESSURE: 69 MMHG | SYSTOLIC BLOOD PRESSURE: 121 MMHG | OXYGEN SATURATION: 91 % | WEIGHT: 126.98 LBS | HEART RATE: 91 BPM | HEIGHT: 61 IN

## 2024-08-14 LAB
ANION GAP SERPL CALCULATED.3IONS-SCNC: 8.7 MMOL/L (ref 5–15)
BASOPHILS # BLD AUTO: 0.01 10*3/MM3 (ref 0–0.2)
BASOPHILS NFR BLD AUTO: 0.1 % (ref 0–1.5)
BUN SERPL-MCNC: 20 MG/DL (ref 8–23)
BUN/CREAT SERPL: 31.3 (ref 7–25)
CALCIUM SPEC-SCNC: 9.5 MG/DL (ref 8.6–10.5)
CHLORIDE SERPL-SCNC: 105 MMOL/L (ref 98–107)
CO2 SERPL-SCNC: 27.3 MMOL/L (ref 22–29)
CREAT SERPL-MCNC: 0.64 MG/DL (ref 0.57–1)
CRP SERPL-MCNC: 7.26 MG/DL (ref 0–0.5)
DEPRECATED RDW RBC AUTO: 44.4 FL (ref 37–54)
EGFRCR SERPLBLD CKD-EPI 2021: 87.8 ML/MIN/1.73
EOSINOPHIL # BLD AUTO: 0.09 10*3/MM3 (ref 0–0.4)
EOSINOPHIL NFR BLD AUTO: 0.8 % (ref 0.3–6.2)
ERYTHROCYTE [DISTWIDTH] IN BLOOD BY AUTOMATED COUNT: 13.9 % (ref 12.3–15.4)
GLUCOSE SERPL-MCNC: 109 MG/DL (ref 65–99)
HCT VFR BLD AUTO: 36.7 % (ref 34–46.6)
HGB BLD-MCNC: 12.4 G/DL (ref 12–15.9)
IMM GRANULOCYTES # BLD AUTO: 0.07 10*3/MM3 (ref 0–0.05)
IMM GRANULOCYTES NFR BLD AUTO: 0.7 % (ref 0–0.5)
LYMPHOCYTES # BLD AUTO: 0.68 10*3/MM3 (ref 0.7–3.1)
LYMPHOCYTES NFR BLD AUTO: 6.4 % (ref 19.6–45.3)
MCH RBC QN AUTO: 29.5 PG (ref 26.6–33)
MCHC RBC AUTO-ENTMCNC: 33.8 G/DL (ref 31.5–35.7)
MCV RBC AUTO: 87.2 FL (ref 79–97)
MONOCYTES # BLD AUTO: 0.6 10*3/MM3 (ref 0.1–0.9)
MONOCYTES NFR BLD AUTO: 5.6 % (ref 5–12)
NEUTROPHILS NFR BLD AUTO: 86.4 % (ref 42.7–76)
NEUTROPHILS NFR BLD AUTO: 9.18 10*3/MM3 (ref 1.7–7)
NRBC BLD AUTO-RTO: 0 /100 WBC (ref 0–0.2)
PLATELET # BLD AUTO: 200 10*3/MM3 (ref 140–450)
PMV BLD AUTO: 8.9 FL (ref 6–12)
POTASSIUM SERPL-SCNC: 3.6 MMOL/L (ref 3.5–5.2)
PROCALCITONIN SERPL-MCNC: 0.33 NG/ML (ref 0–0.25)
RBC # BLD AUTO: 4.21 10*6/MM3 (ref 3.77–5.28)
SODIUM SERPL-SCNC: 141 MMOL/L (ref 136–145)
WBC NRBC COR # BLD AUTO: 10.63 10*3/MM3 (ref 3.4–10.8)

## 2024-08-14 PROCEDURE — 25010000002 CEFTRIAXONE PER 250 MG: Performed by: INTERNAL MEDICINE

## 2024-08-14 PROCEDURE — 80048 BASIC METABOLIC PNL TOTAL CA: CPT | Performed by: HOSPITALIST

## 2024-08-14 PROCEDURE — 63710000001 PREDNISONE PER 1 MG: Performed by: HOSPITALIST

## 2024-08-14 PROCEDURE — 25810000003 SODIUM CHLORIDE 0.9 % SOLUTION 250 ML FLEX CONT: Performed by: INTERNAL MEDICINE

## 2024-08-14 PROCEDURE — 25010000002 AZITHROMYCIN PER 500 MG: Performed by: INTERNAL MEDICINE

## 2024-08-14 PROCEDURE — 86140 C-REACTIVE PROTEIN: CPT | Performed by: HOSPITALIST

## 2024-08-14 PROCEDURE — 84145 PROCALCITONIN (PCT): CPT | Performed by: HOSPITALIST

## 2024-08-14 PROCEDURE — 85025 COMPLETE CBC W/AUTO DIFF WBC: CPT | Performed by: HOSPITALIST

## 2024-08-14 PROCEDURE — 97110 THERAPEUTIC EXERCISES: CPT

## 2024-08-14 PROCEDURE — 97162 PT EVAL MOD COMPLEX 30 MIN: CPT

## 2024-08-14 RX ORDER — AZITHROMYCIN 250 MG/1
TABLET, FILM COATED ORAL
Qty: 3 TABLET | Refills: 0 | Status: SHIPPED | OUTPATIENT
Start: 2024-08-14 | End: 2024-08-16

## 2024-08-14 RX ORDER — CEFDINIR 300 MG/1
300 CAPSULE ORAL 2 TIMES DAILY
Qty: 6 CAPSULE | Refills: 0 | Status: SHIPPED | OUTPATIENT
Start: 2024-08-14 | End: 2024-08-17

## 2024-08-14 RX ADMIN — PANTOPRAZOLE SODIUM 40 MG: 40 TABLET, DELAYED RELEASE ORAL at 06:21

## 2024-08-14 RX ADMIN — VERAPAMIL HYDROCHLORIDE 120 MG: 240 TABLET, FILM COATED, EXTENDED RELEASE ORAL at 08:54

## 2024-08-14 RX ADMIN — TADALAFIL 20 MG: 20 TABLET, FILM COATED ORAL at 08:54

## 2024-08-14 RX ADMIN — Medication 10 ML: at 08:54

## 2024-08-14 RX ADMIN — CEFTRIAXONE SODIUM 1000 MG: 1 INJECTION, POWDER, FOR SOLUTION INTRAMUSCULAR; INTRAVENOUS at 12:19

## 2024-08-14 RX ADMIN — PREDNISONE 40 MG: 20 TABLET ORAL at 08:54

## 2024-08-14 RX ADMIN — ATORVASTATIN CALCIUM 10 MG: 20 TABLET, FILM COATED ORAL at 08:54

## 2024-08-14 RX ADMIN — SERTRALINE 50 MG: 50 TABLET, FILM COATED ORAL at 08:54

## 2024-08-14 RX ADMIN — AZITHROMYCIN DIHYDRATE 500 MG: 500 INJECTION, POWDER, LYOPHILIZED, FOR SOLUTION INTRAVENOUS at 13:22

## 2024-08-14 RX ADMIN — LEVOTHYROXINE SODIUM 112 MCG: 112 TABLET ORAL at 06:21

## 2024-08-14 RX ADMIN — PIRFENIDONE 801 MG: 267 CAPSULE ORAL at 14:19

## 2024-08-14 RX ADMIN — PIRFENIDONE 801 MG: 267 CAPSULE ORAL at 08:54

## 2024-08-14 NOTE — PLAN OF CARE
Goal Outcome Evaluation:  Plan of Care Reviewed With: patient        Progress: improving  Outcome Evaluation: Patient resting well this shift.  Some oxygen desaturation with ambulation to bathroom, but recovers quickly.  VSS.  Maintaining 3L.  No pain noted.  Will continue to monitor labs and reassess as needed.

## 2024-08-14 NOTE — THERAPY EVALUATION
Patient Name: Madalyn Galeas  : 1940    MRN: [id number]                              Today's Date: 2024       Admit Date: 2024    Visit Dx:     ICD-10-CM ICD-9-CM   1. Sepsis, due to unspecified organism, unspecified whether acute organ dysfunction present  A41.9 038.9     995.91   2. Community acquired pneumonia, unspecified laterality  J18.9 486   3. Acute on chronic hypoxic respiratory failure  J96.21 518.84     799.02   4. Hypomagnesemia  E83.42 275.2   5. Elevated liver enzymes  R74.8 790.5   6. Elevated troponin  R79.89 790.6     Patient Active Problem List   Diagnosis    Esophageal reflux    Hyperlipidemia    Hypothyroidism    Major depressive disorder, recurrent episode, in full remission    Pulmonary fibrosis    Pulmonary hypertension    Sleep apnea, obstructive    Vertigo, peripheral    Impaired fasting glucose    Iron deficiency anemia    Malabsorption of iron    Essential hypertension    Diverticulosis    Hemorrhoids    Malignant neoplasm of ascending colon    Elevated CEA    Chronic respiratory failure    Irregular heart rhythm    PVCs (premature ventricular contractions)    PAC (premature atrial contraction)    Sepsis    Community acquired pneumonia     Past Medical History:   Diagnosis Date    Adenomatous polyp of sigmoid colon 2021    Anemia     Colonic polyp     Depression     Diverticulosis     Esophageal reflux     Essential hypertension     GERD (gastroesophageal reflux disease)     H/O complete eye exam 2018    Hx of bone density study 02/15/2016    Hyperlipidemia     Hypothyroidism, acquired     Major depressive disorder, recurrent episode, in full remission     Nonsenile cataract 2016    Oxygen dependent     8L NC CONTINUOUS    Pulmonary fibrosis     Pulmonary hypertension     Sleep apnea, obstructive     USES CPAP     Stenosis, spinal, lumbar     Vertigo, peripheral      Past Surgical History:   Procedure Laterality Date    COLON RESECTION N/A 2021     Procedure: COLON RESECTION RIGHT;  Surgeon: Alfonso Cesar MD;  Location: [facility] MAIN OR;  Service: General;  Laterality: N/A;    COLONOSCOPY  02/20/2012    COLONOSCOPY N/A 1/21/2021    Procedure: COLONOSCOPY INTO CECUM AND TI WITH BIOPSIES, SPOT INJECTION TO COLON MASS (MID-ASCENDING), HOT SNARE POLYPECTOMY, COLD BX POLYPECTOMIES, SALINE LIFT, CLIP PLACEMENT X3;  Surgeon: Alfonso Cesar MD;  Location: [facility] ENDOSCOPY;  Service: General;  Laterality: N/A;  PRE:  ABNORMAL CT SCAN OF COLON  POST:  DIVERTICULOSIS, COLON MASS, POLYPS, HEMORRHOIDS    INCONTINENCE SURGERY      Dr. Kylie BECERRIL BILATERAL  2017    dr de la cruz     PAP SMEAR  2014    dr de la cruz      General Information       Row Name 08/14/24 1142          Physical Therapy Time and Intention    Document Type evaluation  -PC     Mode of Treatment physical therapy  -PC       Row Name 08/14/24 1142          General Information    Patient Profile Reviewed yes  -PC     Prior Level of Function independent:  -PC     Existing Precautions/Restrictions oxygen therapy device and L/min  -PC     Barriers to Rehab medically complex  -PC       Row Name 08/14/24 1142          Living Environment    People in Home alone  -PC       Row Name 08/14/24 1142          Cognition    Orientation Status (Cognition) oriented x 4  -PC       Row Name 08/14/24 1142          Safety Issues, Functional Mobility    Impairments Affecting Function (Mobility) endurance/activity tolerance;strength;shortness of breath  -PC               User Key  (r) = Recorded By, (t) = Taken By, (c) = Cosigned By      Initials Name Provider Type    PC Mimi Epstein PT Physical Therapist                   Mobility       Row Name 08/14/24 1143          Bed Mobility    Bed Mobility supine-sit;sit-supine  -PC     Supine-Sit Garza (Bed Mobility) independent  -PC       Row Name 08/14/24 1143          Sit-Stand Transfer    Sit-Stand Garza (Transfers) supervision  -        [facility] Name 08/14/24 1143          Gait/Stairs (Locomotion)    [place] Level (Gait) standby assist  -PC     Distance in Feet (Gait) 80  -PC     Deviations/Abnormal Patterns (Gait) stride length decreased;gait speed decreased  -PC     Bilateral Gait Deviations forward flexed posture  -PC     Comment, (Gait/Stairs) 3 planned standing rests, pt on 6L o2 for activity, pt occasionally held to wall rail  -PC               User Key  (r) = Recorded By, (t) = Taken By, (c) = Cosigned By      Initials Name Provider Type    PC Mimi Epstein, PT Physical Therapist                   Obj/Interventions       Row Name 08/14/24 1144          Range of Motion Comprehensive    General Range of Motion no range of motion deficits identified  -PC       Row Name 08/14/24 1144          Strength Comprehensive (MMT)    Comment, General Manual Muscle Testing (MMT) Assessment general weakness, no focal deficits  -PC       Row Name 08/14/24 1144          Balance    Comment, Balance WNL  -PC               User Key  (r) = Recorded By, (t) = Taken By, (c) = Cosigned By      Initials Name Provider Type    PC Mimi Epstein, PT Physical Therapist                   Goals/Plan       Row Name 08/14/24 1151          Gait Training Goal 1 (PT)    Activity/Assistive Device (Gait Training Goal 1, PT) gait (walking locomotion)  -PC     [place] Level (Gait Training Goal 1, PT) supervision required  -PC     Distance (Gait Training Goal 1, PT) 100  -PC     Time Frame (Gait Training Goal 1, PT) 1 week  -PC       Row Name 08/14/24 1151          Therapy Assessment/Plan (PT)    Planned Therapy Interventions (PT) gait training;strengthening  -PC               User Key  (r) = Recorded By, (t) = Taken By, (c) = Cosigned By      Initials Name Provider Type    PC Mimi Epstein, PT Physical Therapist                   Clinical Impression       Row Name 08/14/24 1144          Pain    Pretreatment Pain Rating 0/10 - no pain  -PC       Row Name 08/14/24 1144           Plan of Care Review    Plan of Care Reviewed With patient  -PC     Outcome Evaluation Pt is an 83 yo female adm with SOA, PNA, sepsis, hx of pulmonary fibrosis, chronic hypoxic resp failure. Pt lives alone, reports that she has good family support and that someone will be staying with her the first few nights home. Pt is on 3L O2 at home and raises it to 6L for mobility, today she was independent with getting out of bed, standing, and walked with SBA 80 ft on 6L during activity, o2 sats stayed >90% but she did have an increase in HR, we took several planned standing rests. PT will cont to follow and feel pt is appropriate to discharge home when medically ready  -PC       Row Name 08/14/24 1144          Therapy Assessment/Plan (PT)    Rehab Potential (PT) good, to achieve stated therapy goals  -PC     Criteria for Skilled Interventions Met (PT) yes;meets criteria  -PC     Therapy Frequency (PT) 5 times/wk  -PC       Row Name 08/14/24 1144          Vital Signs    Intratreatment Heart Rate (beats/min) 128  -PC     Posttreatment Heart Rate (beats/min) 104  -PC     Pre SpO2 (%) 100  -PC     O2 Delivery Pre Treatment supplemental O2  -PC     Intra SpO2 (%) 90  -PC     O2 Delivery Intra Treatment supplemental O2  -PC     Post SpO2 (%) 97  -PC     O2 Delivery Post Treatment supplemental O2  -PC       Row Name 08/14/24 1144          Positioning and Restraints    Pre-Treatment Position in bed  -PC     Post Treatment Position chair  -PC     In Chair reclined;call light within reach;encouraged to call for assist;exit alarm on  -PC               User Key  (r) = Recorded By, (t) = Taken By, (c) = Cosigned By      Initials Name Provider Type    PC Mimi Epstein, PT Physical Therapist                   Outcome Measures       Row Name 08/14/24 1152 08/14/24 0854       How much help from another person do you currently need...    Turning from your back to your side while in flat bed without using bedrails? 4  -PC 4  -LT     Moving from lying on back to sitting on the side of a flat bed without bedrails? 4  -PC 4  -LT    Moving to and from a bed to a chair (including a wheelchair)? 4  -PC 3  -LT    Standing up from a chair using your arms (e.g., wheelchair, bedside chair)? 4  -PC 3  -LT    Climbing 3-5 steps with a railing? 3  -PC 2  -LT    To walk in hospital room? 3  -PC 3  -LT    AM-PAC 6 Clicks Score (PT) 22  -PC 19  -LT    Highest Level of Mobility Goal 7 --> Walk 25 feet or more  -PC 6 --> Walk 10 steps or more  -LT              User Key  (r) = Recorded By, (t) = Taken By, (c) = Cosigned By      Initials Name Provider Type    PC Mimi Epstein, PT Physical Therapist    LT Vianca Tadeo RN Registered Nurse                                 Physical Therapy Education       Title: PT OT SLP Therapies (Done)       Topic: Physical Therapy (Done)       Point: Mobility training (Done)       Learning Progress Summary             Patient Acceptance, E,D, DU by  at 8/14/2024 1152                         Point: Home exercise program (Done)       Learning Progress Summary             Patient Acceptance, E,D, DU by  at 8/14/2024 1152                         Point: Body mechanics (Done)       Learning Progress Summary             Patient Acceptance, E,D, DU by  at 8/14/2024 1152                         Point: Precautions (Done)       Learning Progress Summary             Patient Acceptance, E,D, DU by  at 8/14/2024 1152                                         User Key       Initials Effective Dates Name Provider Type Discipline     06/16/21 -  Mimi Epstein, PT Physical Therapist PT                  PT Recommendation and Plan  Planned Therapy Interventions (PT): gait training, strengthening  Plan of Care Reviewed With: patient  Outcome Evaluation: Pt is an 83 yo female adm with SOA, PNA, sepsis, hx of pulmonary fibrosis, chronic hypoxic resp failure. Pt lives alone, reports that she has good family support and that someone will  be staying with her the first few nights home. Pt is on 3L O2 at home and raises it to 6L for mobility, today she was independent with getting out of bed, standing, and walked with SBA 80 ft on 6L during activity, o2 sats stayed >90% but she did have an increase in HR, we took several planned standing rests. PT will cont to follow and feel pt is appropriate to discharge home when medically ready     Time Calculation:         PT Charges       Row Name 08/14/24 1152             Time Calculation    Start Time 0915  -PC      Stop Time 0945  -PC      Time Calculation (min) 30 min  -PC      PT Received On 08/14/24  -PC      PT - Next Appointment 08/15/24  -PC      PT Goal Re-Cert Due Date 08/21/24  -PC                User Key  (r) = Recorded By, (t) = Taken By, (c) = Cosigned By      Initials Name Provider Type    PC Mimi Epstein, PT Physical Therapist                  Therapy Charges for Today       Code Description Service Date Service Provider Modifiers Qty    57835546299 HC PT EVAL MOD COMPLEXITY 2 8/14/2024 Mimi Epstein, PT GP 1    10406465926 HC PT THER PROC EA 15 MIN 8/14/2024 Mmii Epstein, PT GP 1            PT G-Codes  AM-PAC 6 Clicks Score (PT): 22  PT Discharge Summary  Anticipated Discharge Disposition (PT): home with assist    Mimi Epstein, AMOS  8/14/2024

## 2024-08-14 NOTE — DISCHARGE SUMMARY
PHYSICIAN DISCHARGE SUMMARY                                                                        [facility]    Patient Identification:  Name: Madalyn Galeas  Age: 84 y.o.  Sex: female  :  1940  MRN: [id number]  Primary Care Physician: Enmanuel Berger MD    Admit date: 2024  Discharge date and time:2024  Discharged Condition: good    Discharge Diagnoses:  Active Hospital Problems    Diagnosis  POA    **Sepsis [A41.9]  Yes    Community acquired pneumonia [J18.9]  Unknown    Essential hypertension [I10]  Yes    Hypothyroidism [E03.9]  Yes    Sleep apnea, obstructive [G47.33]  Yes    Pulmonary fibrosis [J84.10]  Yes    Pulmonary hypertension [I27.20]  Yes      Resolved Hospital Problems   No resolved problems to display.          PMHX:   Past Medical History:   Diagnosis Date    Adenomatous polyp of sigmoid colon 2021    Anemia     Colonic polyp     Depression     Diverticulosis     Esophageal reflux     Essential hypertension     GERD (gastroesophageal reflux disease)     H/O complete eye exam 2018    Hx of bone density study 02/15/2016    Hyperlipidemia     Hypothyroidism, acquired     Major depressive disorder, recurrent episode, in full remission     Nonsenile cataract 2016    Oxygen dependent     8L NC CONTINUOUS    Pulmonary fibrosis     Pulmonary hypertension     Sleep apnea, obstructive     USES CPAP     Stenosis, spinal, lumbar     Vertigo, peripheral      PSHX:   Past Surgical History:   Procedure Laterality Date    COLON RESECTION N/A 2021    Procedure: COLON RESECTION RIGHT;  Surgeon: Alfonso Cesar MD;  Location: [facility];  Service: General;  Laterality: N/A;    COLONOSCOPY  2012    COLONOSCOPY N/A 2021    Procedure: COLONOSCOPY INTO CECUM AND TI WITH BIOPSIES, SPOT INJECTION TO COLON MASS (MID-ASCENDING), HOT SNARE POLYPECTOMY, COLD BX POLYPECTOMIES, SALINE LIFT,  CLIP PLACEMENT X3;  Surgeon: Alfonso Cesar MD;  Location: [facility] ENDOSCOPY;  Service: General;  Laterality: N/A;  PRE:  ABNORMAL CT SCAN OF COLON  POST:  DIVERTICULOSIS, COLON MASS, POLYPS, HEMORRHOIDS    INCONTINENCE SURGERY      Dr. Espinal    MAMMSUZANNE BILATERAL  2017    dr de la cruz     PAP SMEAR  2014    dr de la cruz       [facility] Course: Madalyn Galeas   is a 84 y.o. female who presents to the hospital complaining of chief complaint of shortness of breath.  History supplied by patient and patient's family.  Patient reports that she woke up with the shakes and chills today, was unaware that she had a fever, temperature here 100.3.  Patient was short of breath.  Patient is on 3 L at baseline, when EMS arrived there patient was on room air, 73%, patient was placed on 4 L and continued to be hypoxic, required 10 L via nonrebreather to raise sats to 94%.  No other interventions prior to arrival.  Patient reports chronic cough, occasionally productive.  No chest pain, no vomiting or diarrhea.  The patient was evaluated in the ER and pulmonary was also consulted from the ER.  Workup in the ER suggest patient had some degree of pneumonia on top of her pulmonary fibrosis.  She was started on broad-spectrum IV antibiotics and admitted for further evaluation treatment.  The patient was admitted to the hospital and pulmonary was consulted.  She was getting better after being on antibiotics for couple days and looked well enough to go home with home health services.  Family also wanted to get hospice consult for consideration for possible hospice at home.  She will follow-up with her primary care in 1 week for ongoing care and also follow-up with her pulmonologist.  She will finish a few more days of oral antibiotics and continue with most of her other usual medications.  Oxygen was weaned down to 2 to 3 L at discharge and she was at baseline and able to walk in the [place]berts some with PT.    Consults:  "    Consults       Date and Time Order Name Status Description    8/11/2024 10:13 AM [facility] (on-call MD unless specified) Details      8/11/2024  9:47 AM Pulmonology (on-call MD unless specified) Completed           Results from last 7 days   Lab Units 08/14/24  0548   WBC 10*3/mm3 10.63   HEMOGLOBIN g/dL 12.4   HEMATOCRIT % 36.7   PLATELETS 10*3/mm3 200     Results from last 7 days   Lab Units 08/14/24  0548   SODIUM mmol/L 141   POTASSIUM mmol/L 3.6   CHLORIDE mmol/L 105   CO2 mmol/L 27.3   BUN mg/dL 20   CREATININE mg/dL 0.64   GLUCOSE mg/dL 109*   CALCIUM mg/dL 9.5     Significant Diagnostic Studies:   WBC   Date Value Ref Range Status   08/14/2024 10.63 3.40 - 10.80 10*3/mm3 Final     Hemoglobin   Date Value Ref Range Status   08/14/2024 12.4 12.0 - 15.9 g/dL Final     Hematocrit   Date Value Ref Range Status   08/14/2024 36.7 34.0 - 46.6 % Final     Platelets   Date Value Ref Range Status   08/14/2024 200 140 - 450 10*3/mm3 Final     Sodium   Date Value Ref Range Status   08/14/2024 141 136 - 145 mmol/L Final     Potassium   Date Value Ref Range Status   08/14/2024 3.6 3.5 - 5.2 mmol/L Final     Chloride   Date Value Ref Range Status   08/14/2024 105 98 - 107 mmol/L Final     CO2   Date Value Ref Range Status   08/14/2024 27.3 22.0 - 29.0 mmol/L Final     BUN   Date Value Ref Range Status   08/14/2024 20 8 - 23 mg/dL Final     Creatinine   Date Value Ref Range Status   08/14/2024 0.64 0.57 - 1.00 mg/dL Final     Glucose   Date Value Ref Range Status   08/14/2024 109 (H) 65 - 99 mg/dL Final     Calcium   Date Value Ref Range Status   08/14/2024 9.5 8.6 - 10.5 mg/dL Final     No results found for: \"AST\", \"ALT\", \"ALKPHOS\"  No results found for: \"APTT\", \"INR\"  No results found for: \"COLORU\", \"CLARITYU\", \"SPECGRAV\", \"PHUR\", \"PROTEINUR\", \"GLUCOSEU\", \"KETONESU\", \"BLOODU\", \"NITRITE\", \"LEUKOCYTESUR\", \"BILIRUBINUR\", \"UROBILINOGEN\", \"RBCUA\", \"WBCUA\", \"BACTERIA\", \"UACOMMENT\"  No results found for: \"TROPONINT\", \"TROPONINI\", " "\"BNP\"  No components found for: \"HGBA1C;2\"  No components found for: \"TSH;2\"  Imaging Results (All)       Procedure Component Value Units Date/Time    XR Chest 1 View [[id number]] Collected: 08/11/24 0958     Updated: 08/11/24 1003    Narrative:      XR CHEST 1 VW-     HISTORY: Female who is 84 years-old, short of breath     TECHNIQUE: Frontal view of the chest     COMPARISON: 11/2/2014     FINDINGS: The heart size is borderline. Fibrotic changes of the lungs  are again demonstrated. Mild hazy opacity at the peripheral right mid to  upper lung could be developing infiltrate or may represent progression  of underlying interstitial lung disease, follow-up as indications  persist. No large pleural effusion, or pneumothorax. No acute osseous  process.       Impression:      As described.           This report was finalized on 8/11/2024 10:00 AM by Dr. Artur Marrero M.D on Workstation[facility]             Lab Results (last 7 days)       Procedure Component Value Units Date/Time    Blood Culture - Blood, Arm, Left [[id number]]  (Normal) Collected: 08/11/24 0932    Specimen: Blood from Arm, Left Updated: 08/14/24 1101     Blood Culture No growth at 3 days    Blood Culture - Blood, Arm, Right [[id number]]  (Normal) Collected: 08/11/24 0938    Specimen: Blood from Arm, Right Updated: 08/14/24 1101     Blood Culture No growth at 3 days    Procalcitonin [[id number]]  (Abnormal) Collected: 08/14/24 0548    Specimen: Blood Updated: 08/14/24 0712     Procalcitonin 0.33 ng/mL     Narrative:      As a Marker for Sepsis (Non-Neonates):    1. <0.5 ng/mL represents a low risk of severe sepsis and/or septic shock.  2. >2 ng/mL represents a high risk of severe sepsis and/or septic shock.    As a Marker for Lower Respiratory Tract Infections that require antibiotic therapy:    PCT on Admission    Antibiotic Therapy       6-12 Hrs later    >0.5                Strongly Recommended  >0.25 - <0.5        Recommended   0.1 - 0.25          " "Discouraged              Remeasure/reassess PCT  <0.1                Strongly Discouraged     Remeasure/reassess PCT    As 28 day mortality risk marker: \"Change in Procalcitonin Result\" (>80% or <=80%) if Day 0 (or Day 1) and Day 4 values are available. Refer to http://[email or web address]ct-[email or web address]    Change in PCT <=80%  A decrease of PCT levels below or equal to 80% defines a positive change in PCT test result representing a higher risk for 28-day all-cause mortality of patients diagnosed with severe sepsis for septic shock.    Change in PCT >80%  A decrease of PCT levels of more than 80% defines a negative change in PCT result representing a lower risk for 28-day all-cause mortality of patients diagnosed with severe sepsis or septic shock.       Basic Metabolic Panel [[id number]]  (Abnormal) Collected: 08/14/24 0548    Specimen: Blood Updated: 08/14/24 0706     Glucose 109 mg/dL      BUN 20 mg/dL      Creatinine 0.64 mg/dL      Sodium 141 mmol/L      Potassium 3.6 mmol/L      Chloride 105 mmol/L      CO2 27.3 mmol/L      Calcium 9.5 mg/dL      BUN/Creatinine Ratio 31.3     Anion Gap 8.7 mmol/L      eGFR 87.8 mL/min/1.73     Narrative:      GFR Normal >60  Chronic Kidney Disease <60  Kidney Failure <15    The GFR formula is only valid for adults with stable renal function between ages 18 and 70.    C-reactive Protein [[id number]]  (Abnormal) Collected: 08/14/24 0548    Specimen: Blood Updated: 08/14/24 0706     C-Reactive Protein 7.26 mg/dL     CBC & Differential [[id number]]  (Abnormal) Collected: 08/14/24 0548    Specimen: Blood Updated: 08/14/24 0643    Narrative:      The following orders were created for panel order CBC & Differential.  Procedure                               Abnormality         Status                     ---------                               -----------         ------                     CBC Auto Differential[[id number]]        Abnormal            Final result                 Please view " "results for these tests on the individual orders.    CBC Auto Differential [[id number]]  (Abnormal) Collected: 08/14/24 0548    Specimen: Blood Updated: 08/14/24 0643     WBC 10.63 10*3/mm3      RBC 4.21 10*6/mm3      Hemoglobin 12.4 g/dL      Hematocrit 36.7 %      MCV 87.2 fL      MCH 29.5 pg      MCHC 33.8 g/dL      RDW 13.9 %      RDW-SD 44.4 fl      MPV 8.9 fL      Platelets 200 10*3/mm3      Neutrophil % 86.4 %      Lymphocyte % 6.4 %      Monocyte % 5.6 %      Eosinophil % 0.8 %      Basophil % 0.1 %      Immature Grans % 0.7 %      Neutrophils, Absolute 9.18 10*3/mm3      Lymphocytes, Absolute 0.68 10*3/mm3      Monocytes, Absolute 0.60 10*3/mm3      Eosinophils, Absolute 0.09 10*3/mm3      Basophils, Absolute 0.01 10*3/mm3      Immature Grans, Absolute 0.07 10*3/mm3      nRBC 0.0 /100 WBC     Procalcitonin [[id number]]  (Abnormal) Collected: 08/13/24 0439    Specimen: Blood Updated: 08/13/24 0603     Procalcitonin 0.57 ng/mL     Narrative:      As a Marker for Sepsis (Non-Neonates):    1. <0.5 ng/mL represents a low risk of severe sepsis and/or septic shock.  2. >2 ng/mL represents a high risk of severe sepsis and/or septic shock.    As a Marker for Lower Respiratory Tract Infections that require antibiotic therapy:    PCT on Admission    Antibiotic Therapy       6-12 Hrs later    >0.5                Strongly Recommended  >0.25 - <0.5        Recommended   0.1 - 0.25          Discouraged              Remeasure/reassess PCT  <0.1                Strongly Discouraged     Remeasure/reassess PCT    As 28 day mortality risk marker: \"Change in Procalcitonin Result\" (>80% or <=80%) if Day 0 (or Day 1) and Day 4 values are available. Refer to http://[email or web address]-[email or web address]    Change in PCT <=80%  A decrease of PCT levels below or equal to 80% defines a positive change in PCT test result representing a higher risk for 28-day all-cause mortality of patients diagnosed with severe sepsis for septic shock.    Change " in PCT >80%  A decrease of PCT levels of more than 80% defines a negative change in PCT result representing a lower risk for 28-day all-cause mortality of patients diagnosed with severe sepsis or septic shock.       Basic Metabolic Panel [[id number]]  (Abnormal) Collected: 08/13/24 0439    Specimen: Blood Updated: 08/13/24 0557     Glucose 129 mg/dL      BUN 19 mg/dL      Creatinine 0.65 mg/dL      Sodium 142 mmol/L      Potassium 3.8 mmol/L      Chloride 104 mmol/L      CO2 26.9 mmol/L      Calcium 9.3 mg/dL      BUN/Creatinine Ratio 29.2     Anion Gap 11.1 mmol/L      eGFR 87.5 mL/min/1.73     Narrative:      GFR Normal >60  Chronic Kidney Disease <60  Kidney Failure <15    The GFR formula is only valid for adults with stable renal function between ages 18 and 70.    C-reactive Protein [[id number]]  (Abnormal) Collected: 08/13/24 0439    Specimen: Blood Updated: 08/13/24 0556     C-Reactive Protein 16.81 mg/dL     CBC & Differential [[id number]]  (Abnormal) Collected: 08/13/24 0439    Specimen: Blood Updated: 08/13/24 0537    Narrative:      The following orders were created for panel order CBC & Differential.  Procedure                               Abnormality         Status                     ---------                               -----------         ------                     CBC Auto Differential[[id number]]        Abnormal            Final result                 Please view results for these tests on the individual orders.    CBC Auto Differential [[id number]]  (Abnormal) Collected: 08/13/24 0439    Specimen: Blood Updated: 08/13/24 0537     WBC 12.84 10*3/mm3      RBC 4.50 10*6/mm3      Hemoglobin 13.2 g/dL      Hematocrit 40.2 %      MCV 89.3 fL      MCH 29.3 pg      MCHC 32.8 g/dL      RDW 14.1 %      RDW-SD 45.6 fl      MPV 9.2 fL      Platelets 208 10*3/mm3      Neutrophil % 91.0 %      Lymphocyte % 4.6 %      Monocyte % 3.5 %      Eosinophil % 0.3 %      Basophil % 0.1 %      Immature Grans % 0.5 %       Neutrophils, Absolute 11.68 10*3/mm3      Lymphocytes, Absolute 0.59 10*3/mm3      Monocytes, Absolute 0.45 10*3/mm3      Eosinophils, Absolute 0.04 10*3/mm3      Basophils, Absolute 0.01 10*3/mm3      Immature Grans, Absolute 0.07 10*3/mm3      nRBC 0.0 /100 WBC     Basic Metabolic Panel [[id number]]  (Abnormal) Collected: 08/12/24 0443    Specimen: Blood Updated: 08/12/24 0551     Glucose 148 mg/dL      BUN 17 mg/dL      Creatinine 0.68 mg/dL      Sodium 138 mmol/L      Potassium 4.5 mmol/L      Chloride 101 mmol/L      CO2 30.7 mmol/L      Calcium 9.5 mg/dL      BUN/Creatinine Ratio 25.0     Anion Gap 6.3 mmol/L      eGFR 86.5 mL/min/1.73     Narrative:      GFR Normal >60  Chronic Kidney Disease <60  Kidney Failure <15    The GFR formula is only valid for adults with stable renal function between ages 18 and 70.    CBC & Differential [[id number]]  (Abnormal) Collected: 08/12/24 0443    Specimen: Blood Updated: 08/12/24 0532    Narrative:      The following orders were created for panel order CBC & Differential.  Procedure                               Abnormality         Status                     ---------                               -----------         ------                     CBC Auto Differential[[id number]]        Abnormal            Final result                 Please view results for these tests on the individual orders.    CBC Auto Differential [[id number]]  (Abnormal) Collected: 08/12/24 0443    Specimen: Blood Updated: 08/12/24 0532     WBC 9.14 10*3/mm3      RBC 4.80 10*6/mm3      Hemoglobin 13.7 g/dL      Hematocrit 42.2 %      MCV 87.9 fL      MCH 28.5 pg      MCHC 32.5 g/dL      RDW 13.9 %      RDW-SD 45.2 fl      MPV 8.8 fL      Platelets 166 10*3/mm3      Neutrophil % 95.6 %      Lymphocyte % 2.4 %      Monocyte % 1.3 %      Eosinophil % 0.0 %      Basophil % 0.2 %      Immature Grans % 0.5 %      Neutrophils, Absolute 8.73 10*3/mm3      Lymphocytes, Absolute 0.22 10*3/mm3      Monocytes,  Absolute 0.12 10*3/mm3      Eosinophils, Absolute 0.00 10*3/mm3      Basophils, Absolute 0.02 10*3/mm3      Immature Grans, Absolute 0.05 10*3/mm3      nRBC 0.0 /100 WBC     S. Pneumo Ag Urine or CSF - Urine, Urine, Clean Catch [[id number]]  (Normal) Collected: 08/11/24 1233    Specimen: Urine, Clean Catch Updated: 08/11/24 1349     Strep Pneumo Ag Negative    Legionella Antigen, Urine - Urine, Urine, Clean Catch [[id number]]  (Normal) Collected: 08/11/24 1233    Specimen: Urine, Clean Catch Updated: 08/11/24 1349     LEGIONELLA ANTIGEN, URINE Negative    High Sensitivity Troponin T 2Hr [[id number]]  (Abnormal) Collected: 08/11/24 1109    Specimen: Blood Updated: 08/11/24 1139     HS Troponin T 40 ng/L      Troponin T Delta -23 ng/L     Narrative:      High Sensitive Troponin T Reference Range:  <14.0 ng/L- Negative Female for AMI  <22.0 ng/L- Negative Male for AMI  >=14 - Abnormal Female indicating possible myocardial injury.  >=22 - Abnormal Male indicating possible myocardial injury.   Clinicians would have to utilize clinical acumen, EKG, Troponin, and serial changes to determine if it is an Acute Myocardial Infarction or myocardial injury due to an underlying chronic condition.         Respiratory Panel PCR w/COVID-19(SARS-CoV-2) ESSENCE/KEVIN/CHRIS/PAD/COR/LY In-House, NP Swab in UTM/[facility], 2 HR TAT - Swab, Nasopharynx [[id number]]  (Normal) Collected: 08/11/24 0902    Specimen: Swab from Nasopharynx Updated: 08/11/24 1006     ADENOVIRUS, PCR Not Detected     Coronavirus 229E Not Detected     Coronavirus HKU1 Not Detected     Coronavirus NL63 Not Detected     Coronavirus OC43 Not Detected     COVID19 Not Detected     Human Metapneumovirus Not Detected     Human Rhinovirus/Enterovirus Not Detected     Influenza A PCR Not Detected     Influenza B PCR Not Detected     Parainfluenza Virus 1 Not Detected     Parainfluenza Virus 2 Not Detected     Parainfluenza Virus 3 Not Detected     Parainfluenza Virus 4 Not Detected     RSV,  PCR Not Detected     Bordetella pertussis pcr Not Detected     Bordetella parapertussis PCR Not Detected     Chlamydophila pneumoniae PCR Not Detected     Mycoplasma pneumo by PCR Not Detected    Narrative:      In the setting of a positive respiratory panel with a viral infection PLUS a negative procalcitonin without other underlying concern for bacterial infection, consider observing off antibiotics or discontinuation of antibiotics and continue supportive care. If the respiratory panel is positive for atypical bacterial infection (Bordetella pertussis, Chlamydophila pneumoniae, or Mycoplasma pneumoniae), consider antibiotic de-escalation to target atypical bacterial infection.    High Sensitivity Troponin T [[id number]]  (Abnormal) Collected: 08/11/24 0905    Specimen: Blood Updated: 08/11/24 0948     HS Troponin T 63 ng/L     Narrative:      High Sensitive Troponin T Reference Range:  <14.0 ng/L- Negative Female for AMI  <22.0 ng/L- Negative Male for AMI  >=14 - Abnormal Female indicating possible myocardial injury.  >=22 - Abnormal Male indicating possible myocardial injury.   Clinicians would have to utilize clinical acumen, EKG, Troponin, and serial changes to determine if it is an Acute Myocardial Infarction or myocardial injury due to an underlying chronic condition.         BNP [[id number]]  (Normal) Collected: 08/11/24 0905    Specimen: Blood Updated: 08/11/24 0943     proBNP 440.0 pg/mL     Narrative:      This assay is used as an aid in the diagnosis of individuals suspected of having heart failure. It can be used as an aid in the diagnosis of acute decompensated heart failure (ADHF) in patients presenting with signs and symptoms of ADHF to the emergency department (ED). In addition, NT-proBNP of <300 pg/mL indicates ADHF is not likely.    Age Range Result Interpretation  NT-proBNP Concentration (pg/mL:      <50             Positive            >450                   Gray                 300-450             "        Negative             <300    50-75           Positive            >900                  Gray                300-900                  Negative            <300      >75             Positive            >1800                  Gray                300-1800                  Negative            <300    Procalcitonin [[id number]]  (Abnormal) Collected: 08/11/24 0905    Specimen: Blood Updated: 08/11/24 0943     Procalcitonin 0.70 ng/mL     Narrative:      As a Marker for Sepsis (Non-Neonates):    1. <0.5 ng/mL represents a low risk of severe sepsis and/or septic shock.  2. >2 ng/mL represents a high risk of severe sepsis and/or septic shock.    As a Marker for Lower Respiratory Tract Infections that require antibiotic therapy:    PCT on Admission    Antibiotic Therapy       6-12 Hrs later    >0.5                Strongly Recommended  >0.25 - <0.5        Recommended   0.1 - 0.25          Discouraged              Remeasure/reassess PCT  <0.1                Strongly Discouraged     Remeasure/reassess PCT    As 28 day mortality risk marker: \"Change in Procalcitonin Result\" (>80% or <=80%) if Day 0 (or Day 1) and Day 4 values are available. Refer to http://www.[facility]-pct-[email or web address].com    Change in PCT <=80%  A decrease of PCT levels below or equal to 80% defines a positive change in PCT test result representing a higher risk for 28-day all-cause mortality of patients diagnosed with severe sepsis for septic shock.    Change in PCT >80%  A decrease of PCT levels of more than 80% defines a negative change in PCT result representing a lower risk for 28-day all-cause mortality of patients diagnosed with severe sepsis or septic shock.       Comprehensive Metabolic Panel [[id number]]  (Abnormal) Collected: 08/11/24 0905    Specimen: Blood Updated: 08/11/24 0937     Glucose 122 mg/dL      BUN 19 mg/dL      Creatinine 0.67 mg/dL      Sodium 135 mmol/L      Potassium 4.0 mmol/L      Chloride 97 mmol/L      CO2 31.0 mmol/L      Calcium " 9.2 mg/dL      Total Protein 5.9 g/dL      Albumin 3.4 g/dL      ALT (SGPT) 61 U/L      AST (SGOT) 46 U/L      Alkaline Phosphatase 86 U/L      Total Bilirubin 0.6 mg/dL      Globulin 2.5 gm/dL      A/G Ratio 1.4 g/dL      BUN/Creatinine Ratio 28.4     Anion Gap 7.0 mmol/L      eGFR 86.8 mL/min/1.73     Narrative:      GFR Normal >60  Chronic Kidney Disease <60  Kidney Failure <15    The GFR formula is only valid for adults with stable renal function between ages 18 and 70.    Magnesium [[id number]]  (Abnormal) Collected: 08/11/24 0905    Specimen: Blood Updated: 08/11/24 0937     Magnesium 1.5 mg/dL     aPTT [[id number]]  (Normal) Collected: 08/11/24 0905    Specimen: Blood Updated: 08/11/24 0931     PTT 27.1 seconds     Protime-INR [[id number]]  (Normal) Collected: 08/11/24 0905    Specimen: Blood Updated: 08/11/24 0931     Protime 13.6 Seconds      INR 1.01    Lactic Acid, Plasma [[id number]]  (Normal) Collected: 08/11/24 0905    Specimen: Blood Updated: 08/11/24 0930     Lactate 1.6 mmol/L     CBC & Differential [[id number]]  (Abnormal) Collected: 08/11/24 0905    Specimen: Blood Updated: 08/11/24 0916    Narrative:      The following orders were created for panel order CBC & Differential.  Procedure                               Abnormality         Status                     ---------                               -----------         ------                     CBC Auto Differential[[id number]]        Abnormal            Final result                 Please view results for these tests on the individual orders.    CBC Auto Differential [[id number]]  (Abnormal) Collected: 08/11/24 0905    Specimen: Blood Updated: 08/11/24 0916     WBC 13.76 10*3/mm3      RBC 4.69 10*6/mm3      Hemoglobin 13.6 g/dL      Hematocrit 41.5 %      MCV 88.5 fL      MCH 29.0 pg      MCHC 32.8 g/dL      RDW 13.8 %      RDW-SD 44.1 fl      MPV 8.5 fL      Platelets 162 10*3/mm3      Neutrophil % 87.6 %      Lymphocyte % 6.0 %      Monocyte % 4.7 %  "     Eosinophil % 0.9 %      Basophil % 0.1 %      Immature Grans % 0.7 %      Neutrophils, Absolute 12.04 10*3/mm3      Lymphocytes, Absolute 0.82 10*3/mm3      Monocytes, Absolute 0.65 10*3/mm3      Eosinophils, Absolute 0.13 10*3/mm3      Basophils, Absolute 0.02 10*3/mm3      Immature Grans, Absolute 0.10 10*3/mm3      nRBC 0.0 /100 WBC           /85 (BP Location: Left arm, Patient Position: Lying)   Pulse 91   Temp 98.1 °F (36.7 °C) (Oral)   Resp 18   Ht 154.9 cm (60.98\")   Wt 57.6 kg (126 lb 15.8 oz)   LMP  (LMP Unknown)   SpO2 91%   BMI 24.01 kg/m²     Discharge Exam:  General Appearance:    Alert, cooperative, no distress                          Head:    Normocephalic, without obvious abnormality, atraumatic                          Eyes:                            Throat:   Lips, tongue, gums normal                          Neck:   Supple, symmetrical, trachea midline, no JVD                        Lungs:     Clear to auscultation bilaterally, respirations unlabored                Chest Wall:    No tenderness or deformity                        Heart:    Regular rate and rhythm, S1 and S2 normal, no murmur,no  Rub  or gallop                  Abdomen:     Soft, non-tender, bowel sounds active, no masses, no                                                        organomegaly                  Extremities:   Extremities normal, atraumatic, no cyanosis or edema                             Skin:   Skin is warm and dry,  no rashes or palpable lesions                  Neurologic:   no focal deficits noted     Disposition:  Home with home health    Activity as tolerated    Diet as tolerated  Diet Order   Procedures    Diet: Regular/House; Fluid Consistency: Thin (IDDSI 0)       Patient Instructions:      Discharge Medications        New Medications        Instructions Start Date   azithromycin 250 MG tablet  Commonly known as: Zithromax   250 mg daily for 3 days      cefdinir 300 MG capsule  Commonly " known as: OMNICEF   300 mg, Oral, 2 Times Daily             Continue These Medications        Instructions Start Date   acetaminophen 500 MG tablet  Commonly known as: TYLENOL   1,000 mg, Oral, Every 6 Hours PRN      Adcirca 20 MG tablet tablet  Generic drug: tadalafil   40 mg, Oral, Every 24 Hours Scheduled      ascorbic acid 500 MG tablet  Commonly known as: VITAMIN C   500 mg, Oral, Daily      atorvastatin 10 MG tablet  Commonly known as: LIPITOR   10 mg, Oral, Daily      Cholecalciferol 25 MCG (1000 UT) capsule   Oral, Daily      Esbriet 267 MG capsule  Generic drug: Pirfenidone   801 mg, Oral, 3 Times Daily With Meals      levothyroxine 112 MCG tablet  Commonly known as: SYNTHROID, LEVOTHROID   112 mcg, Oral, Daily      O2  Commonly known as: OXYGEN   8 L/min, Inhalation, Continuous      omeprazole 40 MG capsule  Commonly known as: priLOSEC   40 mg, Oral, 2 Times Daily      predniSONE 20 MG tablet  Commonly known as: DELTASONE   2 tablets, Oral, Daily      sertraline 50 MG tablet  Commonly known as: ZOLOFT   50 mg, Oral, Daily      verapamil  MG CR tablet  Commonly known as: CALAN-SR   120 mg, Oral, Daily      vitamin b complex capsule capsule   1 capsule, Oral, Daily             Future Appointments   Date Time Provider Department Center   11/12/2024  3:00 PM LAB CHAIR 6 [facility] JUAN DAVID  [facility]   11/12/2024  3:20 PM Hugo Ortiz MD [facility]   1/15/2025  2:45 PM Rambo Dasilva MD [facility]   1/23/2025  3:30 PM Alondra Berger MD [facility] [place]     Additional Instructions for the Follow-ups that You Need to Schedule       Ambulatory Referral to Home Health (Hospital)   As directed      Face to Face Visit Date: 8/14/2024   Follow-up provider for Plan of Care?: I treated the patient in an acute care facility and will not continue treatment after discharge.   Follow-up provider: ALONDRA BERGER [[id number]]   Reason/Clinical Findings: weak   Describe mobility limitations that make leaving  home difficult: weakness   Nursing/Therapeutic Services Requested: Skilled Nursing Physical Therapy   Skilled nursing orders: Other   PT orders: Therapeutic exercise   Frequency: 1 Week 1               Follow-up Information       [facility]. Schedule an appointment as soon as possible for a visit in 2 week(s).    Contact information:  [address]  [facility] [id number] [phone]             Enmanuel Berger MD .    Specialty: Family Medicine  Contact information:  [address] [facility] [address]  [facility] [address]  [phone]                           Discharge Order (From admission, onward)       Start     Ordered    08/14/24 1155  Discharge patient  Once        Expected Discharge Date: 08/14/24   [place] [facility] [place]   Physician of Record for Attribution - Please select from Treatment Team: JEANMARIE SPENCE [[id number]]   Review needed by CMO to determine Physician of Record: No      Question Answer Comment   Physician of Record for Attribution - Please select from Treatment Team JEANMARIE SPENCE    Review needed by CMO to determine Physician of Record No        08/14/24 1155                    Total time spent discharging patient including evaluation,post hospitalization follow up,  medication and post hospitalization instructions and education total time exceeds 30 minutes.    Signed:  Jeanmarie Spence MD  8/14/2024  12:05 EDT

## 2024-08-14 NOTE — PROGRESS NOTES
[facility]  [phone]  Dr. Damir Cuellar     Subjective:  LOS: 3    Chief Complaint:  Rigors and shortness of breath     Patient reports she still doing well today.  States that she still having some exertional dyspnea and just overall feels weak and deconditioned.  However when resting and after given sufficient time to recover she states that she is feeling well.  States the family have made arrangements and she will be well taken care of at home.  She denies any acute concerns or complaints at this time.    Objective   Vital Signs past 24hrs  Temp range: Temp (24hrs), Av.9 °F (36.6 °C), Min:97.7 °F (36.5 °C), Max:98.1 °F (36.7 °C)    BP range: BP: (109-163)/(56-85) 163/85  Pulse range: Heart Rate:  [] 99  Resp rate range: Resp:  [18-20] 18  Device (Oxygen Therapy): nasal cannulaFlow (L/min):  [3-3.5] 3  Oxygen range:SpO2:  [91 %-100 %] 91 %     Physical Exam  Vitals reviewed.   Constitutional:       General: She is not in acute distress.     Appearance: Normal appearance.   HENT:      Head: Normocephalic and atraumatic.   Eyes:      Extraocular Movements: Extraocular movements intact.      Pupils: Pupils are equal, round, and reactive to light.   Cardiovascular:      Rate and Rhythm: Normal rate and regular rhythm.      Heart sounds: No murmur heard.     No friction rub. No gallop.   Pulmonary:      Effort: Pulmonary effort is normal. No respiratory distress.      Breath sounds: Rales (dry crackles in the bilateral lung bases) present. No wheezing or rhonchi.   Skin:     General: Skin is warm and dry.      Findings: No rash.   Neurological:      General: No focal deficit present.      Mental Status: She is alert and oriented to person, place, and time.       Results Review:    I have reviewed the laboratory and imaging data since the last note by LPC physician.  My annotations are noted in assessment and plan.      Result Review:  I have personally reviewed the results from last  note by LPC physician to 8/14/2024 08:26 EDT and agree with these findings:  [x]  Laboratory list / accordion  [x]  Microbiology  [x]  Radiology  [x]  EKG/Telemetry   [x]  Cardiology/Vascular   [x]  Pathology  [x]  Old records  []  Other:    Medication Review:  I have reviewed the current MAR.  My annotations are noted in assessment and plan.    atorvastatin, 10 mg, Oral, Daily  azithromycin, 500 mg, Intravenous, Q24H  cefTRIAXone, 1,000 mg, Intravenous, Q24H  levothyroxine, 112 mcg, Oral, Q AM  pantoprazole, 40 mg, Oral, Q AM  Pirfenidone, 801 mg, Oral, TID  predniSONE, 40 mg, Oral, Daily  sertraline, 50 mg, Oral, Daily  sodium chloride, 10 mL, Intravenous, Q12H  tadalafil, 20 mg, Oral, BID  verapamil SR, 120 mg, Oral, Daily        O2, 8 L/min      Lines, Drains & Airways       Active LDAs       Name Placement date Placement time Site Days    Peripheral IV 08/11/24 0907 Right Antecubital 08/11/24  0907  Antecubital  2    Peripheral IV 08/11/24 1027 Posterior;Right Hand 08/11/24  1027  Hand  2    External Urinary Catheter 08/11/24  1554  --  2                  No active isolations  Diet Orders (active) (From admission, onward)       Start     Ordered    08/11/24 1419  Diet: Regular/House; Fluid Consistency: Thin (IDDSI 0)  Diet Effective Now         08/11/24 1418                      A/P:  IPF -- continue home med esbriet.  I don't really think this is an ae of her IPF  Chronic hypoxic respiratory failure (on 3 to 4 L nasal cannula)-continue weaning oxygen as able.  Currently on baseline oxygen requirement  Pulmonary hypertension group II and III -- has been on tadalafil at home (previously unable to tolerate tyvaso) so I would continue this for now and defer longterm continuance to her primary pulmonary MD  Sepsis -- seems pneumonia would be a likely soruce --continue antibiotics for 5-day course and could switch to oral Azithromycin and Cefdinir at discharge    Pt overall improving and close to baseline. OK for  discharge from pulmonary perspective.  She will need to get follow-up with her regular pulmonologist Dr. Zita Guy at [place].      Damir Cuellar DO   08/14/24  08:26 EDT      Part of this note may be an electronic transcription/translation of spoken language to printed text using the Dragon Dictation System.

## 2024-08-14 NOTE — PLAN OF CARE
Goal Outcome Evaluation:  Plan of Care Reviewed With: patient           Outcome Evaluation: Pt is an 83 yo female adm with SOA, PNA, sepsis, hx of pulmonary fibrosis, chronic hypoxic resp failure. Pt lives alone, reports that she has good family support and that someone will be staying with her the first few nights home. Pt is on 3L O2 at home and raises it to 6L for mobility, today she was independent with getting out of bed, standing, and walked with SBA 80 ft on 6L during activity, o2 sats stayed >90% but she did have an increase in HR, we took several planned standing rests. PT will cont to follow and feel pt is appropriate to discharge home when medically ready      Anticipated Discharge Disposition (PT): home with assist

## 2024-08-14 NOTE — OUTREACH NOTE
Prep Survey      Flowsheet Row Responses   [facility] facility patient discharged from? [facility]   Is LACE score < 7 ? No   Eligibility [facility]   Date of Admission 08/11/24   Date of Discharge 08/14/24   Discharge Disposition Home-Health Care Sv   Discharge diagnosis Sepsis   Does the patient have one of the following disease processes/diagnoses(primary or secondary)? Sepsis   Does the patient have Home health ordered? No   Is there a DME ordered? No   Prep survey completed? Yes            JS A - Registered Nurse

## 2024-08-14 NOTE — PLAN OF CARE
Goal Outcome Evaluation:  Plan of Care Reviewed With: patient        Progress: improving  Outcome Evaluation: A&Ox4. 3L NC, baseline per pt. IV abx. Up SBA to toliet. Discharge home today on PO abx. Family to bring O2 tank and transport home. Home meds returned to patient. VSS.

## 2024-08-15 ENCOUNTER — TRANSITIONAL CARE MANAGEMENT TELEPHONE ENCOUNTER (OUTPATIENT)
Dept: CALL CENTER | Facility: HOSPITAL | Age: 84
End: 2024-08-15
Payer: MEDICARE

## 2024-08-15 ENCOUNTER — TELEPHONE (OUTPATIENT)
Dept: FAMILY MEDICINE CLINIC | Facility: CLINIC | Age: 84
End: 2024-08-15
Payer: MEDICARE

## 2024-08-15 NOTE — TELEPHONE ENCOUNTER
Left patient a voicemail instructing her to contact our office to schedule her hospital follow-up     HUB TO RELAY

## 2024-08-15 NOTE — OUTREACH NOTE
Call Center TCM Note      Flowsheet Row Responses   [facility] patient discharged from? [place]   Does the patient have one of the following disease processes/diagnoses(primary or secondary)? Sepsis   TCM attempt successful? Yes  [VR-Dtr Anyi]   Call start time 0850   Call end time 0853   Discharge diagnosis Sepsis   Is patient permission given to speak with other caregiver? Yes   List who call center can speak with Dtr   Person spoke with today (if not patient) and relationship Dtr   Meds reviewed with patient/caregiver? Yes   Is the patient having any side effects they believe may be caused by any medication additions or changes? No   Does the patient have all medications related to this admission filled (includes all antibiotics, inhalers, nebulizers,steroids,etc.) Yes   Is the patient taking all medications as directed (includes completed medication regime)? Yes   Does the patient have an appointment with their PCP within 7-14 days of discharge? No   Nursing Interventions Patient declined scheduling/rescheduling appointment at this time, Routed TCM call to PCP office   Has home health visited the patient within 72 hours of discharge? N/A   Psychosocial issues? No   Did the patient receive a copy of their discharge instructions? Yes   Nursing interventions Reviewed instructions with patient   What is the patient's perception of their health status since discharge? Improving   Nursing interventions Nurse provided patient education   Is the patient/caregiver able to teach back TIME? E xtremely Ill - severe pain, discomfort, shortness of breath, M ental Decline - confused, sleepy, difficult to arouse, I nfection - may have signs and symptoms of an infection, T emperature - higher or lower than normal   Nursing interventions Nurse provided patient education   Is patient/caregiver able to teach back steps to recovery at home? Set small, achievable goals for return to baseline health, Rest and regain  strength, Eat a balanced diet   Is the patient/caregiver able to teach back signs and symptoms of worsening condition: Fever, Rapid heart rate (>90), Shortness of breath/rapid respiratory rate, Altered mental status(confusion/coma)   Is the patient/caregiver able to teach back the hierarchy of who to call/visit for symptoms/problems? PCP, Specialist, Home health nurse, Urgent Care, ED, 911 Yes   TCM call completed? Yes   Wrap up additional comments Dtr reports patient doing well, no concerns today.   Call end time 0853   Would this patient benefit from a Referral to [facility] Social Work? No   Is the patient interested in additional calls from an ambulatory ? No            Lindsey Gregory RN    8/15/2024, 08:53 EDT

## 2024-08-16 LAB
BACTERIA SPEC AEROBE CULT: NORMAL
BACTERIA SPEC AEROBE CULT: NORMAL

## 2024-08-19 ENCOUNTER — TELEPHONE (OUTPATIENT)
Dept: FAMILY MEDICINE CLINIC | Facility: CLINIC | Age: 84
End: 2024-08-19
Payer: MEDICARE

## 2024-08-19 ENCOUNTER — TELEPHONE (OUTPATIENT)
Dept: ONCOLOGY | Facility: CLINIC | Age: 84
End: 2024-08-19

## 2024-08-19 NOTE — TELEPHONE ENCOUNTER
Attempted to contact patient to inform her per Dr. Berger  thank you so much for letting me know, and that I will be praying for her in this current season of her life.      HUB TO RELAY

## 2025-01-16 ENCOUNTER — TELEPHONE (OUTPATIENT)
Dept: FAMILY MEDICINE CLINIC | Facility: CLINIC | Age: 85
End: 2025-01-16

## 2025-01-17 DIAGNOSIS — E03.9 ACQUIRED HYPOTHYROIDISM: Chronic | ICD-10-CM

## 2025-01-17 RX ORDER — LEVOTHYROXINE SODIUM 112 MCG
112 TABLET ORAL DAILY
Qty: 30 TABLET | Refills: 0 | Status: SHIPPED | OUTPATIENT
Start: 2025-01-17

## 2025-03-03 DIAGNOSIS — E03.9 ACQUIRED HYPOTHYROIDISM: Chronic | ICD-10-CM

## 2025-03-03 RX ORDER — LEVOTHYROXINE SODIUM 112 MCG
112 TABLET ORAL DAILY
Qty: 14 TABLET | Refills: 0 | Status: SHIPPED | OUTPATIENT
Start: 2025-03-03

## (undated) DEVICE — SUT SILK 0 TIES 18IN SA66G

## (undated) DEVICE — KT ORCA ORCAPOD DISP STRL

## (undated) DEVICE — SUT GUT CHRM 3/0 SH 36IN G172H

## (undated) DEVICE — GLV SURG BIOGEL LTX PF 7 1/2

## (undated) DEVICE — SUT SILK 3/0 TIES 18IN A184H

## (undated) DEVICE — SUT SILK 2/0 TIES 18IN A185H

## (undated) DEVICE — ECHELON FLEX 60 ARTICULATING ENDOSCOPIC LINEAR CUTTER (NO CARTRIDGE): Brand: ECHELON FLEX ENDOPATH

## (undated) DEVICE — SENSR O2 OXIMAX FNGR A/ 18IN NONSTR

## (undated) DEVICE — TRAP FLD MINIVAC MEGADYNE 100ML

## (undated) DEVICE — ELECTRD BLD EZ CLN MOD XLNG 2.75IN

## (undated) DEVICE — SUT SILK 2/0 SH 30IN K833H

## (undated) DEVICE — THE SINGLE USE ETRAP – POLYP TRAP IS USED FOR SUCTION RETRIEVAL OF ENDOSCOPICALLY REMOVED POLYPS.: Brand: ETRAP

## (undated) DEVICE — Device: Brand: SPOT EX ENDOSCOPIC TATTOO

## (undated) DEVICE — SINGLE-USE BIOPSY FORCEPS: Brand: RADIAL JAW 4

## (undated) DEVICE — TOTAL TRAY, 16FR 10ML SIL FOLEY, URN: Brand: MEDLINE

## (undated) DEVICE — CANNULA,OXY,ADULT,SUPER SOFT,W/14'TUB,UC: Brand: MEDLINE INDUSTRIES, INC.

## (undated) DEVICE — MEDI-VAC YANKAUER SUCTION HANDLE W/BULBOUS TIP: Brand: CARDINAL HEALTH

## (undated) DEVICE — PENCL E/S ULTRAVAC TELESCP NOSE HOLSTR 10FT

## (undated) DEVICE — DRP SLUSH WARMR MACH CIR 44X44IN

## (undated) DEVICE — PENCL ES MEGADINE EZ/CLEAN BUTN W/HOLSTR 10FT

## (undated) DEVICE — ADAPT CLN BIOGUARD AIR/H2O DISP

## (undated) DEVICE — WOUND RETRACTOR AND PROTECTOR: Brand: ALEXIS WOUND PROTECTOR-RETRACTOR

## (undated) DEVICE — STPLR SKIN VISISTAT WD 35CT

## (undated) DEVICE — SUT PDS 1 CT1 36IN Z347H

## (undated) DEVICE — COVER,MAYO STAND,STERILE: Brand: MEDLINE

## (undated) DEVICE — SUT SILK 2/0 SH CR8 18IN CR8 C012D

## (undated) DEVICE — RETRV ROTH NET PLAT UNIV

## (undated) DEVICE — ELECTRD BLD EZ CLN MOD 6.5IN

## (undated) DEVICE — APPL CHLORAPREP HI/LITE 26ML ORNG

## (undated) DEVICE — LN SMPL CO2 SHTRM SD STREAM W/M LUER

## (undated) DEVICE — PATIENT RETURN ELECTRODE, SINGLE-USE, CONTACT QUALITY MONITORING, ADULT, WITH 9FT CORD, FOR PATIENTS WEIGING OVER 33LBS. (15KG): Brand: MEGADYNE

## (undated) DEVICE — ENSEAL TEMPERATURE CONTROLLED TISSUE SEALING TECHNOLOGY DISPOSABLE TISSUE SEALING DEVICE TAPTRONIC TRIGGER ACTIVATED POWER 5MM JAW STYLE: Brand: ENSEAL

## (undated) DEVICE — THE TORRENT IRRIGATION SCOPE CONNECTOR IS USED WITH THE TORRENT IRRIGATION TUBING TO PROVIDE IRRIGATION FLUIDS SUCH AS STERILE WATER DURING GASTROINTESTINAL ENDOSCOPIC PROCEDURES WHEN USED IN CONJUNCTION WITH AN IRRIGATION PUMP (OR ELECTROSURGICAL UNIT).: Brand: TORRENT

## (undated) DEVICE — SUT VIC 3/0 TIES 18IN J110T

## (undated) DEVICE — SNAR POLYP SENSATION STDOVL 27 240 BX40

## (undated) DEVICE — THE CARR-LOCKE INJECTION NEEDLE IS A SINGLE USE, DISPOSABLE, FLEXIBLE SHEATH INJECTION NEEDLE USED FOR THE INJECTION OF VARIOUS TYPES OF MEDIA THROUGH FLEXIBLE ENDOSCOPES.

## (undated) DEVICE — PAD,ABDOMINAL,8"X10",ST,LF: Brand: MEDLINE

## (undated) DEVICE — LEGGINGS, PAIR, CLEAR, STERILE: Brand: MEDLINE

## (undated) DEVICE — TUBING, SUCTION, 1/4" X 10', STRAIGHT: Brand: MEDLINE

## (undated) DEVICE — PK PROC MAJ 40